# Patient Record
Sex: FEMALE | Race: WHITE | Employment: FULL TIME | URBAN - METROPOLITAN AREA
[De-identification: names, ages, dates, MRNs, and addresses within clinical notes are randomized per-mention and may not be internally consistent; named-entity substitution may affect disease eponyms.]

---

## 2017-02-20 ENCOUNTER — TELEPHONE (OUTPATIENT)
Dept: FAMILY MEDICINE CLINIC | Age: 67
End: 2017-02-20

## 2017-05-24 ENCOUNTER — TELEPHONE (OUTPATIENT)
Dept: FAMILY MEDICINE CLINIC | Age: 67
End: 2017-05-24

## 2017-05-24 DIAGNOSIS — E78.5 HYPERLIPIDEMIA, UNSPECIFIED HYPERLIPIDEMIA TYPE: ICD-10-CM

## 2017-05-24 DIAGNOSIS — I10 ESSENTIAL HYPERTENSION: Primary | ICD-10-CM

## 2017-05-27 ENCOUNTER — HOSPITAL ENCOUNTER (OUTPATIENT)
Dept: OTHER | Age: 67
Discharge: HOME | End: 2017-05-27
Attending: FAMILY MEDICINE | Admitting: FAMILY MEDICINE

## 2017-05-27 LAB
ALBUMIN SERPL-MCNC: 4.4 G/DL (ref 3.4–5)
ALP BLD-CCNC: 54 U/L (ref 40–129)
ALT SERPL-CCNC: 15 U/L (ref 10–40)
ANION GAP SERPL CALCULATED.3IONS-SCNC: 12 MMOL/L (ref 3–16)
AST SERPL-CCNC: 17 U/L (ref 15–37)
BILIRUB SERPL-MCNC: <0.2 MG/DL (ref 0–1)
BILIRUBIN DIRECT: <0.2 MG/DL (ref 0–0.3)
BILIRUBIN, INDIRECT: NORMAL MG/DL (ref 0–1)
BUN BLDV-MCNC: 27 MG/DL (ref 7–20)
CALCIUM SERPL-MCNC: 9.9 MG/DL (ref 8.3–10.6)
CHLORIDE BLD-SCNC: 105 MMOL/L (ref 99–110)
CHOLESTEROL, FASTING: 163 MG/DL (ref 0–199)
CO2: 25 MMOL/L (ref 21–32)
CREAT SERPL-MCNC: 0.9 MG/DL (ref 0.6–1.2)
GFR AFRICAN AMERICAN: >60
GFR NON-AFRICAN AMERICAN: >60
GLUCOSE BLD-MCNC: 111 MG/DL (ref 70–99)
HDLC SERPL-MCNC: 68 MG/DL (ref 40–60)
LDL CHOLESTEROL CALCULATED: 81 MG/DL
POTASSIUM SERPL-SCNC: 4.9 MMOL/L (ref 3.5–5.1)
SODIUM BLD-SCNC: 142 MMOL/L (ref 136–145)
TOTAL PROTEIN: 7.2 G/DL (ref 6.4–8.2)
TRIGLYCERIDE, FASTING: 71 MG/DL (ref 0–150)
VLDLC SERPL CALC-MCNC: 14 MG/DL

## 2017-05-30 RX ORDER — MELOXICAM 15 MG/1
TABLET ORAL
Qty: 90 TABLET | Refills: 0 | Status: SHIPPED | OUTPATIENT
Start: 2017-05-30 | End: 2017-06-02 | Stop reason: SDUPTHER

## 2017-05-30 RX ORDER — CITALOPRAM 40 MG/1
TABLET ORAL
Qty: 90 TABLET | Refills: 0 | Status: SHIPPED | OUTPATIENT
Start: 2017-05-30 | End: 2017-06-02 | Stop reason: SDUPTHER

## 2017-06-02 ENCOUNTER — OFFICE VISIT (OUTPATIENT)
Dept: FAMILY MEDICINE CLINIC | Age: 67
End: 2017-06-02

## 2017-06-02 VITALS
OXYGEN SATURATION: 96 % | DIASTOLIC BLOOD PRESSURE: 78 MMHG | SYSTOLIC BLOOD PRESSURE: 124 MMHG | RESPIRATION RATE: 16 BRPM | HEART RATE: 82 BPM | WEIGHT: 158 LBS | HEIGHT: 65 IN | TEMPERATURE: 98.2 F | BODY MASS INDEX: 26.33 KG/M2

## 2017-06-02 DIAGNOSIS — I10 ESSENTIAL HYPERTENSION: ICD-10-CM

## 2017-06-02 DIAGNOSIS — F41.9 ANXIETY: Primary | ICD-10-CM

## 2017-06-02 DIAGNOSIS — E78.5 HYPERLIPIDEMIA, UNSPECIFIED HYPERLIPIDEMIA TYPE: ICD-10-CM

## 2017-06-02 DIAGNOSIS — F32.A DEPRESSION, UNSPECIFIED DEPRESSION TYPE: ICD-10-CM

## 2017-06-02 PROCEDURE — 99214 OFFICE O/P EST MOD 30 MIN: CPT | Performed by: FAMILY MEDICINE

## 2017-06-02 RX ORDER — MELOXICAM 15 MG/1
TABLET ORAL
Qty: 90 TABLET | Refills: 1 | Status: SHIPPED | OUTPATIENT
Start: 2017-06-02

## 2017-06-02 RX ORDER — CLONIDINE HYDROCHLORIDE 0.2 MG/1
0.2 TABLET ORAL NIGHTLY
Qty: 90 TABLET | Refills: 1 | Status: SHIPPED | OUTPATIENT
Start: 2017-06-02 | End: 2017-12-20 | Stop reason: SDUPTHER

## 2017-06-02 RX ORDER — ATORVASTATIN CALCIUM 20 MG/1
TABLET, FILM COATED ORAL
Qty: 90 TABLET | Refills: 1 | Status: SHIPPED | OUTPATIENT
Start: 2017-06-02 | End: 2017-12-20 | Stop reason: SDUPTHER

## 2017-06-02 RX ORDER — CITALOPRAM 40 MG/1
TABLET ORAL
Qty: 90 TABLET | Refills: 1 | Status: SHIPPED | OUTPATIENT
Start: 2017-06-02

## 2017-06-02 RX ORDER — LORAZEPAM 1 MG/1
1 TABLET ORAL EVERY 6 HOURS PRN
Qty: 60 TABLET | Refills: 1 | Status: SHIPPED | OUTPATIENT
Start: 2017-06-02

## 2017-06-16 ENCOUNTER — TELEPHONE (OUTPATIENT)
Dept: FAMILY MEDICINE CLINIC | Age: 67
End: 2017-06-16

## 2017-06-16 RX ORDER — AMOXICILLIN 500 MG/1
CAPSULE ORAL
Qty: 4 CAPSULE | Refills: 0 | Status: SHIPPED | OUTPATIENT
Start: 2017-06-16 | End: 2017-07-03 | Stop reason: SDUPTHER

## 2017-06-30 RX ORDER — CITALOPRAM 40 MG/1
TABLET ORAL
Qty: 90 TABLET | Refills: 1 | Status: SHIPPED | OUTPATIENT
Start: 2017-06-30

## 2017-07-03 ENCOUNTER — TELEPHONE (OUTPATIENT)
Dept: FAMILY MEDICINE CLINIC | Age: 67
End: 2017-07-03

## 2017-07-03 RX ORDER — AMOXICILLIN 500 MG/1
CAPSULE ORAL
Qty: 4 CAPSULE | Refills: 0 | Status: SHIPPED | OUTPATIENT
Start: 2017-07-03 | End: 2017-09-06 | Stop reason: SDUPTHER

## 2017-09-06 ENCOUNTER — TELEPHONE (OUTPATIENT)
Dept: FAMILY MEDICINE CLINIC | Age: 67
End: 2017-09-06

## 2017-09-06 RX ORDER — AMOXICILLIN 500 MG/1
CAPSULE ORAL
Qty: 4 CAPSULE | Refills: 1 | Status: SHIPPED | OUTPATIENT
Start: 2017-09-06 | End: 2017-10-19 | Stop reason: SDUPTHER

## 2017-10-19 ENCOUNTER — TELEPHONE (OUTPATIENT)
Dept: FAMILY MEDICINE CLINIC | Age: 67
End: 2017-10-19

## 2017-10-19 DIAGNOSIS — F10.10 ALCOHOL ABUSE: Primary | ICD-10-CM

## 2017-10-19 RX ORDER — AMOXICILLIN 500 MG/1
CAPSULE ORAL
Qty: 4 CAPSULE | Refills: 1 | Status: SHIPPED | OUTPATIENT
Start: 2017-10-19

## 2017-12-26 RX ORDER — ATORVASTATIN CALCIUM 20 MG/1
TABLET, FILM COATED ORAL
Qty: 90 TABLET | Refills: 0 | Status: SHIPPED | OUTPATIENT
Start: 2017-12-26

## 2017-12-26 RX ORDER — CLONIDINE HYDROCHLORIDE 0.2 MG/1
TABLET ORAL
Qty: 90 TABLET | Refills: 0 | Status: SHIPPED | OUTPATIENT
Start: 2017-12-26

## 2018-01-06 ENCOUNTER — HOSPITAL ENCOUNTER (OUTPATIENT)
Dept: OTHER | Age: 68
Discharge: HOME | End: 2018-01-06
Attending: FAMILY MEDICINE | Admitting: FAMILY MEDICINE

## 2018-01-07 LAB
ESTIMATED AVERAGE GLUCOSE: 148.5 MG/DL
HBA1C MFR BLD: 6.8 %

## 2018-05-30 ENCOUNTER — HOSPITAL ENCOUNTER (OUTPATIENT)
Dept: DIABETES SERVICES | Age: 68
Discharge: HOME | End: 2018-05-31
Admitting: FAMILY MEDICINE

## 2018-05-30 DIAGNOSIS — R73.03 PREDIABETES: ICD-10-CM

## 2018-06-01 ENCOUNTER — HOSPITAL ENCOUNTER (OUTPATIENT)
Dept: OTHER | Age: 68
Discharge: HOME | End: 2018-06-30
Attending: FAMILY MEDICINE | Admitting: FAMILY MEDICINE

## 2018-11-20 ENCOUNTER — APPOINTMENT (OUTPATIENT)
Dept: GENERAL RADIOLOGY | Age: 68
End: 2018-11-20
Payer: COMMERCIAL

## 2018-11-20 ENCOUNTER — HOSPITAL ENCOUNTER (EMERGENCY)
Age: 68
Discharge: HOME | End: 2018-11-21
Attending: EMERGENCY MEDICINE
Payer: COMMERCIAL

## 2018-11-20 DIAGNOSIS — R11.2 NAUSEA AND VOMITING, INTRACTABILITY OF VOMITING NOT SPECIFIED, UNSPECIFIED VOMITING TYPE: ICD-10-CM

## 2018-11-20 DIAGNOSIS — E86.0 DEHYDRATION: Primary | ICD-10-CM

## 2018-11-20 DIAGNOSIS — E87.6 HYPOKALEMIA: ICD-10-CM

## 2018-11-20 DIAGNOSIS — R10.13 EPIGASTRIC PAIN: ICD-10-CM

## 2018-11-20 LAB
A/G RATIO: 1.3 (ref 1.1–2.2)
ALBUMIN SERPL-MCNC: 4.5 G/DL (ref 3.4–5)
ALP BLD-CCNC: 75 U/L (ref 40–129)
ALT SERPL-CCNC: 15 U/L (ref 10–40)
ANION GAP SERPL CALCULATED.3IONS-SCNC: 13 MMOL/L (ref 3–16)
AST SERPL-CCNC: 24 U/L (ref 15–37)
BACTERIA: ABNORMAL /HPF
BASOPHILS ABSOLUTE: 0 K/UL (ref 0–0.2)
BASOPHILS RELATIVE PERCENT: 0.1 %
BILIRUB SERPL-MCNC: 0.7 MG/DL (ref 0–1)
BILIRUBIN URINE: NEGATIVE
BLOOD, URINE: ABNORMAL
BUN BLDV-MCNC: 20 MG/DL (ref 7–20)
CALCIUM SERPL-MCNC: 10.1 MG/DL (ref 8.3–10.6)
CHLORIDE BLD-SCNC: 91 MMOL/L (ref 99–110)
CLARITY: CLEAR
CO2: 35 MMOL/L (ref 21–32)
COLOR: YELLOW
CREAT SERPL-MCNC: 1 MG/DL (ref 0.6–1.2)
EOSINOPHILS ABSOLUTE: 0 K/UL (ref 0–0.6)
EOSINOPHILS RELATIVE PERCENT: 0.1 %
EPITHELIAL CELLS, UA: ABNORMAL /HPF
GFR AFRICAN AMERICAN: >60
GFR NON-AFRICAN AMERICAN: 55
GLOBULIN: 3.5 G/DL
GLUCOSE BLD-MCNC: 162 MG/DL (ref 70–99)
GLUCOSE URINE: NEGATIVE MG/DL
HCT VFR BLD CALC: 43.4 % (ref 36–48)
HEMOGLOBIN: 14.8 G/DL (ref 12–16)
KETONES, URINE: NEGATIVE MG/DL
LEUKOCYTE ESTERASE, URINE: ABNORMAL
LIPASE: 13 U/L (ref 13–60)
LYMPHOCYTES ABSOLUTE: 0.7 K/UL (ref 1–5.1)
LYMPHOCYTES RELATIVE PERCENT: 4.7 %
MAGNESIUM: 2.6 MG/DL (ref 1.8–2.4)
MCH RBC QN AUTO: 31.6 PG (ref 26–34)
MCHC RBC AUTO-ENTMCNC: 34.1 G/DL (ref 31–36)
MCV RBC AUTO: 92.6 FL (ref 80–100)
MICROSCOPIC EXAMINATION: YES
MONOCYTES ABSOLUTE: 0.1 K/UL (ref 0–1.3)
MONOCYTES RELATIVE PERCENT: 0.9 %
NEUTROPHILS ABSOLUTE: 13.3 K/UL (ref 1.7–7.7)
NEUTROPHILS RELATIVE PERCENT: 94.2 %
NITRITE, URINE: NEGATIVE
PDW BLD-RTO: 13.1 % (ref 12.4–15.4)
PH UA: 8.5
PLATELET # BLD: 299 K/UL (ref 135–450)
PMV BLD AUTO: 7.4 FL (ref 5–10.5)
POTASSIUM REFLEX MAGNESIUM: 2.9 MMOL/L (ref 3.5–5.1)
PRO-BNP: 300 PG/ML (ref 0–124)
PROTEIN UA: ABNORMAL MG/DL
RBC # BLD: 4.69 M/UL (ref 4–5.2)
RBC UA: ABNORMAL /HPF (ref 0–2)
SODIUM BLD-SCNC: 139 MMOL/L (ref 136–145)
SPECIFIC GRAVITY UA: 1.01
TOTAL PROTEIN: 8 G/DL (ref 6.4–8.2)
TROPONIN: <0.01 NG/ML
URINE REFLEX TO CULTURE: YES
URINE TYPE: ABNORMAL
UROBILINOGEN, URINE: 0.2 E.U./DL
WBC # BLD: 14.1 K/UL (ref 4–11)
WBC UA: ABNORMAL /HPF (ref 0–5)

## 2018-11-20 PROCEDURE — 85025 COMPLETE CBC W/AUTO DIFF WBC: CPT

## 2018-11-20 PROCEDURE — 96365 THER/PROPH/DIAG IV INF INIT: CPT

## 2018-11-20 PROCEDURE — 96361 HYDRATE IV INFUSION ADD-ON: CPT

## 2018-11-20 PROCEDURE — 93005 ELECTROCARDIOGRAM TRACING: CPT | Performed by: PHYSICIAN ASSISTANT

## 2018-11-20 PROCEDURE — 83735 ASSAY OF MAGNESIUM: CPT

## 2018-11-20 PROCEDURE — 87086 URINE CULTURE/COLONY COUNT: CPT

## 2018-11-20 PROCEDURE — 83880 ASSAY OF NATRIURETIC PEPTIDE: CPT

## 2018-11-20 PROCEDURE — 81001 URINALYSIS AUTO W/SCOPE: CPT

## 2018-11-20 PROCEDURE — 2580000003 HC RX 258: Performed by: PHYSICIAN ASSISTANT

## 2018-11-20 PROCEDURE — 83690 ASSAY OF LIPASE: CPT

## 2018-11-20 PROCEDURE — 80053 COMPREHEN METABOLIC PANEL: CPT

## 2018-11-20 PROCEDURE — 6360000002 HC RX W HCPCS: Performed by: PHYSICIAN ASSISTANT

## 2018-11-20 PROCEDURE — 6370000000 HC RX 637 (ALT 250 FOR IP): Performed by: PHYSICIAN ASSISTANT

## 2018-11-20 PROCEDURE — 99285 EMERGENCY DEPT VISIT HI MDM: CPT

## 2018-11-20 PROCEDURE — 84484 ASSAY OF TROPONIN QUANT: CPT

## 2018-11-20 PROCEDURE — S0028 INJECTION, FAMOTIDINE, 20 MG: HCPCS | Performed by: PHYSICIAN ASSISTANT

## 2018-11-20 PROCEDURE — 2500000003 HC RX 250 WO HCPCS: Performed by: PHYSICIAN ASSISTANT

## 2018-11-20 PROCEDURE — 71045 X-RAY EXAM CHEST 1 VIEW: CPT

## 2018-11-20 PROCEDURE — 96375 TX/PRO/DX INJ NEW DRUG ADDON: CPT

## 2018-11-20 RX ORDER — CYCLOBENZAPRINE HCL 10 MG
10 TABLET ORAL DAILY PRN
COMMUNITY

## 2018-11-20 RX ORDER — POTASSIUM CHLORIDE 7.45 MG/ML
10 INJECTION INTRAVENOUS ONCE
Status: COMPLETED | OUTPATIENT
Start: 2018-11-20 | End: 2018-11-21

## 2018-11-20 RX ORDER — ONDANSETRON 2 MG/ML
4 INJECTION INTRAMUSCULAR; INTRAVENOUS ONCE
Status: COMPLETED | OUTPATIENT
Start: 2018-11-20 | End: 2018-11-20

## 2018-11-20 RX ORDER — CITALOPRAM 20 MG/1
20 TABLET ORAL DAILY
COMMUNITY

## 2018-11-20 RX ORDER — POTASSIUM CHLORIDE 20 MEQ/1
40 TABLET, EXTENDED RELEASE ORAL ONCE
Status: COMPLETED | OUTPATIENT
Start: 2018-11-20 | End: 2018-11-20

## 2018-11-20 RX ORDER — 0.9 % SODIUM CHLORIDE 0.9 %
1000 INTRAVENOUS SOLUTION INTRAVENOUS ONCE
Status: COMPLETED | OUTPATIENT
Start: 2018-11-20 | End: 2018-11-21

## 2018-11-20 RX ORDER — 0.9 % SODIUM CHLORIDE 0.9 %
1000 INTRAVENOUS SOLUTION INTRAVENOUS ONCE
Status: COMPLETED | OUTPATIENT
Start: 2018-11-20 | End: 2018-11-20

## 2018-11-20 RX ADMIN — SODIUM CHLORIDE 1000 ML: 9 INJECTION, SOLUTION INTRAVENOUS at 23:07

## 2018-11-20 RX ADMIN — POTASSIUM CHLORIDE 10 MEQ: 7.46 INJECTION, SOLUTION INTRAVENOUS at 23:07

## 2018-11-20 RX ADMIN — ONDANSETRON 4 MG: 2 INJECTION INTRAMUSCULAR; INTRAVENOUS at 22:40

## 2018-11-20 RX ADMIN — POTASSIUM CHLORIDE 40 MEQ: 20 TABLET, EXTENDED RELEASE ORAL at 22:40

## 2018-11-20 RX ADMIN — FAMOTIDINE 20 MG: 10 INJECTION, SOLUTION INTRAVENOUS at 22:40

## 2018-11-20 RX ADMIN — SODIUM CHLORIDE 1000 ML: 9 INJECTION, SOLUTION INTRAVENOUS at 22:06

## 2018-11-21 VITALS
HEIGHT: 65 IN | BODY MASS INDEX: 24.16 KG/M2 | OXYGEN SATURATION: 95 % | DIASTOLIC BLOOD PRESSURE: 74 MMHG | HEART RATE: 92 BPM | RESPIRATION RATE: 14 BRPM | TEMPERATURE: 97.9 F | SYSTOLIC BLOOD PRESSURE: 126 MMHG | WEIGHT: 145 LBS

## 2018-11-21 LAB
EKG ATRIAL RATE: 122 BPM
EKG DIAGNOSIS: NORMAL
EKG P AXIS: 75 DEGREES
EKG P-R INTERVAL: 144 MS
EKG Q-T INTERVAL: 336 MS
EKG QRS DURATION: 88 MS
EKG QTC CALCULATION (BAZETT): 478 MS
EKG R AXIS: 86 DEGREES
EKG T AXIS: 80 DEGREES
EKG VENTRICULAR RATE: 122 BPM

## 2018-11-21 PROCEDURE — 96375 TX/PRO/DX INJ NEW DRUG ADDON: CPT

## 2018-11-21 PROCEDURE — 6360000002 HC RX W HCPCS: Performed by: PHYSICIAN ASSISTANT

## 2018-11-21 PROCEDURE — 2580000003 HC RX 258: Performed by: PHYSICIAN ASSISTANT

## 2018-11-21 PROCEDURE — 96367 TX/PROPH/DG ADDL SEQ IV INF: CPT

## 2018-11-21 PROCEDURE — 93010 ELECTROCARDIOGRAM REPORT: CPT | Performed by: INTERNAL MEDICINE

## 2018-11-21 PROCEDURE — 6370000000 HC RX 637 (ALT 250 FOR IP): Performed by: PHYSICIAN ASSISTANT

## 2018-11-21 RX ORDER — PROMETHAZINE HYDROCHLORIDE 25 MG/ML
12.5 INJECTION, SOLUTION INTRAMUSCULAR; INTRAVENOUS ONCE
Status: COMPLETED | OUTPATIENT
Start: 2018-11-21 | End: 2018-11-21

## 2018-11-21 RX ORDER — DICYCLOMINE HCL 20 MG
20 TABLET ORAL ONCE
Status: COMPLETED | OUTPATIENT
Start: 2018-11-21 | End: 2018-11-21

## 2018-11-21 RX ADMIN — CEFTRIAXONE SODIUM 1 G: 1 INJECTION, POWDER, FOR SOLUTION INTRAMUSCULAR; INTRAVENOUS at 00:11

## 2018-11-21 RX ADMIN — PROMETHAZINE HYDROCHLORIDE 12.5 MG: 25 INJECTION INTRAMUSCULAR; INTRAVENOUS at 00:49

## 2018-11-21 RX ADMIN — DICYCLOMINE HYDROCHLORIDE 20 MG: 20 TABLET ORAL at 00:49

## 2018-11-21 NOTE — ED PROVIDER NOTES
activity: No     Other Topics Concern    Not on file     Social History Narrative    No narrative on file     No current facility-administered medications for this encounter. Current Outpatient Prescriptions   Medication Sig Dispense Refill    citalopram (CELEXA) 20 MG tablet Take 20 mg by mouth daily      cyclobenzaprine (FLEXERIL) 10 MG tablet Take 10 mg by mouth daily as needed for Muscle spasms      atorvastatin (LIPITOR) 20 MG tablet TAKE ONE TABLET BY MOUTH DAILY 90 tablet 0    cloNIDine (CATAPRES) 0.2 MG tablet TAKE ONE TABLET BY MOUTH ONCE NIGHTLY 90 tablet 0    amoxicillin (AMOXIL) 500 MG capsule Take 4 capsules 1 hour before procedure. 4 capsule 1    meloxicam (MOBIC) 15 MG tablet TAKE ONE TABLET BY MOUTH DAILY 90 tablet 1    LORazepam (ATIVAN) 1 MG tablet Take 1 tablet by mouth every 6 hours as needed for Anxiety 60 tablet 1    meloxicam (MOBIC) 15 MG tablet TAKE ONE TABLET BY MOUTH DAILY 90 tablet 1    citalopram (CELEXA) 40 MG tablet TAKE ONE TABLET BY MOUTH DAILY 90 tablet 1    magnesium chloride (MAG DELAY 64) 535 (64 MG) MG TBCR CR tablet Take 1 tablet by mouth daily. 30 tablet 0    citalopram (CELEXA) 40 MG tablet TAKE ONE TABLET BY MOUTH DAILY 90 tablet 1    varenicline (CHANTIX STARTING MONTH KYLE) 0.5 MG X 11 & 1 MG X 42 tablet Follow package directions. 56 tablet 0    varenicline (CHANTIX CONTINUING MONTH KYLE) 1 MG tablet Follow package directions. 56 tablet 2    Potassium Gluconate 550 MG TABS Take 1 tablet by mouth      Multiple Vitamin (MULTI-VITAMIN DAILY) TABS Take  by mouth.          Allergies   Allergen Reactions    Codeine Nausea Only    Sulfa Antibiotics      Nursing Notes Reviewed    Physical Exam:  ED Triage Vitals [11/20/18 2130]   Enc Vitals Group      BP (!) 148/72      Pulse 114      Resp 18      Temp 97.9 °F (36.6 °C)      Temp Source Oral      SpO2 95 %      Weight 145 lb (65.8 kg)      Height 5' 5\" (1.651 m)      Head Circumference       Peak Flow Pain Score       Pain Loc       Pain Edu? Excl. in [address]? GENERAL APPEARANCE: A well-developed well-nourished pleasant uncomfortable 46-year-old female in mild distress  HEAD: Normocephalic, atraumatic  EYES: Sclera anicteric.no conjunctival injection,   ENT: Mucous membranes moist, no nasal discharge,  NECK: Supple, no meningismus,   HEART: RRR but rapid at 106 without rubs murmurs or gallops  LUNGS:  Clear good air movement no wheezing no retraction or accessory muscle use,  ABDOMEN: Soft, non-tender to palpation, no guarding or rebound. , no mass or distention and no hepatosplenomegaly. Clearly no evidence of an acute abdomen or peritoneal signs at time of exam  : Bladder not distended,   EXTREMITIES: No acute deformities, no peripheral edema  SKIN: Warm and dry.  Normal color, no rash,  capillary refill less than 2 seconds  MENTAL STATUS: Alert, oriented, interactive,     Nursing note and vital signs reviewed     I have reviewed and interpreted all of the currently available lab results from this visit (if applicable):  Results for orders placed or performed during the hospital encounter of 11/20/18   CBC Auto Differential   Result Value Ref Range    WBC 14.1 (H) 4.0 - 11.0 K/uL    RBC 4.69 4.00 - 5.20 M/uL    Hemoglobin 14.8 12.0 - 16.0 g/dL    Hematocrit 43.4 36.0 - 48.0 %    MCV 92.6 80.0 - 100.0 fL    MCH 31.6 26.0 - 34.0 pg    MCHC 34.1 31.0 - 36.0 g/dL    RDW 13.1 12.4 - 15.4 %    Platelets 642 281 - 709 K/uL    MPV 7.4 5.0 - 10.5 fL    Neutrophils % 94.2 %    Lymphocytes % 4.7 %    Monocytes % 0.9 %    Eosinophils % 0.1 %    Basophils % 0.1 %    Neutrophils # 13.3 (H) 1.7 - 7.7 K/uL    Lymphocytes # 0.7 (L) 1.0 - 5.1 K/uL    Monocytes # 0.1 0.0 - 1.3 K/uL    Eosinophils # 0.0 0.0 - 0.6 K/uL    Basophils # 0.0 0.0 - 0.2 K/uL   Comprehensive Metabolic Panel w/ Reflex to MG   Result Value Ref Range    Sodium 139 136 - 145 mmol/L    Potassium reflex Magnesium 2.9 (LL) 3.5 - 5.1 mmol/L    Chloride 91 (L) 99 - 110 mmol/L    CO2 35 (H) 21 - 32 mmol/L    Anion Gap 13 3 - 16    Glucose 162 (H) 70 - 99 mg/dL    BUN 20 7 - 20 mg/dL    CREATININE 1.0 0.6 - 1.2 mg/dL    GFR Non-African American 55 (A) >60    GFR African American >60 >60    Calcium 10.1 8.3 - 10.6 mg/dL    Total Protein 8.0 6.4 - 8.2 g/dL    Alb 4.5 3.4 - 5.0 g/dL    Albumin/Globulin Ratio 1.3 1.1 - 2.2    Total Bilirubin 0.7 0.0 - 1.0 mg/dL    Alkaline Phosphatase 75 40 - 129 U/L    ALT 15 10 - 40 U/L    AST 24 15 - 37 U/L    Globulin 3.5 g/dL   Troponin   Result Value Ref Range    Troponin <0.01 <0.01 ng/mL   Lipase   Result Value Ref Range    Lipase 13.0 13.0 - 60.0 U/L   Urinalysis Reflex to Culture   Result Value Ref Range    Color, UA Yellow Straw/Yellow    Clarity, UA Clear Clear    Glucose, Ur Negative Negative mg/dL    Bilirubin Urine Negative Negative    Ketones, Urine Negative Negative mg/dL    Specific Gravity, UA 1.010 1.005 - 1.030    Blood, Urine TRACE-INTACT (A) Negative    pH, UA 8.5 (A) 5.0 - 8.0    Protein, UA TRACE (A) Negative mg/dL    Urobilinogen, Urine 0.2 <2.0 E.U./dL    Nitrite, Urine Negative Negative    Leukocyte Esterase, Urine SMALL (A) Negative    Microscopic Examination YES     Urine Reflex to Culture Yes     Urine Type Not Specified    Brain Natriuretic Peptide   Result Value Ref Range    Pro- (H) 0 - 124 pg/mL   Magnesium   Result Value Ref Range    Magnesium 2.60 (H) 1.80 - 2.40 mg/dL   Microscopic Urinalysis   Result Value Ref Range    WBC, UA 0-2 0 - 5 /HPF    RBC, UA 3-5 (A) 0 - 2 /HPF    Epi Cells 0-2 /HPF    Bacteria, UA 1+ (A) /HPF   EKG 12 Lead   Result Value Ref Range    Ventricular Rate 122 BPM    Atrial Rate 122 BPM    P-R Interval 144 ms    QRS Duration 88 ms    Q-T Interval 336 ms    QTc Calculation (Bazett) 478 ms    P Axis 75 degrees    R Axis 86 degrees    T Axis 80 degrees    Diagnosis       Sinus tachycardiaNonspecific ST abnormalityAbnormal ECGNo previous ECGs available        Radiographs (if

## 2018-11-21 NOTE — ED PROVIDER NOTES
skin/urogenital alcon.  No further workup   CBC Auto Differential   Result Value Ref Range    WBC 14.1 (H) 4.0 - 11.0 K/uL    RBC 4.69 4.00 - 5.20 M/uL    Hemoglobin 14.8 12.0 - 16.0 g/dL    Hematocrit 43.4 36.0 - 48.0 %    MCV 92.6 80.0 - 100.0 fL    MCH 31.6 26.0 - 34.0 pg    MCHC 34.1 31.0 - 36.0 g/dL    RDW 13.1 12.4 - 15.4 %    Platelets 363 444 - 357 K/uL    MPV 7.4 5.0 - 10.5 fL    Neutrophils % 94.2 %    Lymphocytes % 4.7 %    Monocytes % 0.9 %    Eosinophils % 0.1 %    Basophils % 0.1 %    Neutrophils # 13.3 (H) 1.7 - 7.7 K/uL    Lymphocytes # 0.7 (L) 1.0 - 5.1 K/uL    Monocytes # 0.1 0.0 - 1.3 K/uL    Eosinophils # 0.0 0.0 - 0.6 K/uL    Basophils # 0.0 0.0 - 0.2 K/uL   Comprehensive Metabolic Panel w/ Reflex to MG   Result Value Ref Range    Sodium 139 136 - 145 mmol/L    Potassium reflex Magnesium 2.9 (LL) 3.5 - 5.1 mmol/L    Chloride 91 (L) 99 - 110 mmol/L    CO2 35 (H) 21 - 32 mmol/L    Anion Gap 13 3 - 16    Glucose 162 (H) 70 - 99 mg/dL    BUN 20 7 - 20 mg/dL    CREATININE 1.0 0.6 - 1.2 mg/dL    GFR Non-African American 55 (A) >60    GFR African American >60 >60    Calcium 10.1 8.3 - 10.6 mg/dL    Total Protein 8.0 6.4 - 8.2 g/dL    Alb 4.5 3.4 - 5.0 g/dL    Albumin/Globulin Ratio 1.3 1.1 - 2.2    Total Bilirubin 0.7 0.0 - 1.0 mg/dL    Alkaline Phosphatase 75 40 - 129 U/L    ALT 15 10 - 40 U/L    AST 24 15 - 37 U/L    Globulin 3.5 g/dL   Troponin   Result Value Ref Range    Troponin <0.01 <0.01 ng/mL   Lipase   Result Value Ref Range    Lipase 13.0 13.0 - 60.0 U/L   Urinalysis Reflex to Culture   Result Value Ref Range    Color, UA Yellow Straw/Yellow    Clarity, UA Clear Clear    Glucose, Ur Negative Negative mg/dL    Bilirubin Urine Negative Negative    Ketones, Urine Negative Negative mg/dL    Specific Gravity, UA 1.010 1.005 - 1.030    Blood, Urine TRACE-INTACT (A) Negative    pH, UA 8.5 (A) 5.0 - 8.0    Protein, UA TRACE (A) Negative mg/dL    Urobilinogen, Urine 0.2 <2.0 E.U./dL    Nitrite, Urine going home her sister's weight is stable with her tonight although she typically lives alone. Is very close follow-up with primary care provider indications for return to the ED with a low suspicion for return. All questions are answered. Indications for return to the ED are discussed. Patient is advised if any new or worsening symptoms arise they should immediately return to the emergency room. Follow-up with primary care in 1-2 days. I estimate there is LOW risk for ACUTE CORONARY SYNDROME, INTRACRANIAL HEMORRHAGE, MALIGNANT DYSRHYTHMIA, MENINGITIS, PNEUMONIA, PULMONARY EMBOLISM, SEPSIS, SUBARACHNOID HEMORRHAGE, SUBDURAL HEMATOMA, STROKE, or URINARY TRACT INFECTION, thus I consider the discharge disposition reasonable. Ja Lopez and I have discussed the diagnosis and risks, and we agree with discharging home to follow-up with their primary doctor. We also discussed returning to the Emergency Department immediately if new or worsening symptoms occur. We have discussed the symptoms which are most concerning (e.g., changing or worsening pain, weakness, vomiting, fever) that necessitate immediate return. Counseling:   I have spoken with the patient and discussed todays results, in addition to providing specific details for the plan of care and counseling regarding the diagnosis and prognosis and are agreeable with the plan. Assessment      1. Dehydration    2. Epigastric pain    3. Nausea and vomiting, intractability of vomiting not specified, unspecified vomiting type    4. Hypokalemia      This patient's ED course included: a personal history and physicial examination  This patient has remained hemodynamically stable during their ED course. Plan   Discharge to home. Patient condition is stable.     New Medications     Discharge Medication List as of 11/21/2018  1:19 AM        Electronically signed by Natividad Gonsales PA-C   DD: 11/20/18  **This report was transcribed using voice recognition

## 2018-11-22 LAB — URINE CULTURE, ROUTINE: NORMAL

## 2019-03-25 ENCOUNTER — HOSPITAL ENCOUNTER (OUTPATIENT)
Dept: GENERAL RADIOLOGY | Age: 69
Discharge: HOME | End: 2019-03-25
Payer: MEDICARE

## 2019-03-25 DIAGNOSIS — M15.0 PRIMARY GENERALIZED (OSTEO)ARTHRITIS: ICD-10-CM

## 2019-03-25 PROCEDURE — 77080 DXA BONE DENSITY AXIAL: CPT

## 2020-06-01 ENCOUNTER — HOSPITAL ENCOUNTER (OUTPATIENT)
Age: 70
Discharge: HOME | End: 2020-06-01
Payer: MEDICARE

## 2020-06-01 ENCOUNTER — OFFICE VISIT (OUTPATIENT)
Dept: ENT CLINIC | Age: 70
End: 2020-06-01
Payer: MEDICARE

## 2020-06-01 VITALS
TEMPERATURE: 97.6 F | SYSTOLIC BLOOD PRESSURE: 118 MMHG | DIASTOLIC BLOOD PRESSURE: 60 MMHG | HEIGHT: 66 IN | BODY MASS INDEX: 20.63 KG/M2 | WEIGHT: 128.4 LBS

## 2020-06-01 PROBLEM — H90.3 SENSORINEURAL HEARING LOSS (SNHL) OF BOTH EARS: Status: ACTIVE | Noted: 2020-06-01

## 2020-06-01 PROBLEM — H93.11 TINNITUS AURIUM, RIGHT: Status: ACTIVE | Noted: 2020-06-01

## 2020-06-01 LAB
ALBUMIN SERPL-MCNC: 4.5 G/DL (ref 3.4–5)
ALP BLD-CCNC: 52 U/L (ref 40–129)
ALT SERPL-CCNC: 14 U/L (ref 10–40)
AST SERPL-CCNC: 20 U/L (ref 15–37)
BILIRUB SERPL-MCNC: 0.3 MG/DL (ref 0–1)
BILIRUBIN DIRECT: <0.2 MG/DL (ref 0–0.3)
BILIRUBIN, INDIRECT: NORMAL MG/DL (ref 0–1)
TOTAL PROTEIN: 7.1 G/DL (ref 6.4–8.2)

## 2020-06-01 PROCEDURE — 80076 HEPATIC FUNCTION PANEL: CPT

## 2020-06-01 PROCEDURE — 36415 COLL VENOUS BLD VENIPUNCTURE: CPT

## 2020-06-01 PROCEDURE — 99203 OFFICE O/P NEW LOW 30 MIN: CPT | Performed by: OTOLARYNGOLOGY

## 2020-06-01 RX ORDER — FLUTICASONE PROPIONATE 50 MCG
1 SPRAY, SUSPENSION (ML) NASAL DAILY
COMMUNITY

## 2020-06-01 RX ORDER — VENLAFAXINE HYDROCHLORIDE 75 MG/1
75 CAPSULE, EXTENDED RELEASE ORAL DAILY
COMMUNITY

## 2020-06-01 RX ORDER — GLUCOSAMINE/CHONDR SU A SOD 750-600 MG
TABLET ORAL
COMMUNITY

## 2020-06-01 RX ORDER — PHENOL 1.4 %
AEROSOL, SPRAY (ML) MUCOUS MEMBRANE
COMMUNITY

## 2020-06-01 RX ORDER — BUDESONIDE AND FORMOTEROL FUMARATE DIHYDRATE 80; 4.5 UG/1; UG/1
2 AEROSOL RESPIRATORY (INHALATION) 2 TIMES DAILY
COMMUNITY

## 2020-06-01 RX ORDER — CIPROFLOXACIN HYDROCHLORIDE 3.5 MG/ML
4 SOLUTION/ DROPS TOPICAL 2 TIMES DAILY
Qty: 32 DROP | Refills: 0 | Status: SHIPPED | OUTPATIENT
Start: 2020-06-01 | End: 2020-06-01 | Stop reason: CLARIF

## 2020-06-01 RX ORDER — ALBUTEROL SULFATE 90 UG/1
2 AEROSOL, METERED RESPIRATORY (INHALATION) EVERY 6 HOURS PRN
COMMUNITY

## 2020-06-01 NOTE — PATIENT INSTRUCTIONS
-Dry ear precautions recommended as follows:   Patient to place a silicone ear plug or cotton ball coated and Vaseline into both ears during showering, instructed to remove afterwards to aerate ears OR use a shower cap   Patient instructed to avoid digital trauma to the ears   Instructed to notify the clinic immediately with any acute otalgia, hearing loss, purulent otorrhea

## 2020-06-01 NOTE — PROGRESS NOTES
by mouth daily      cyclobenzaprine (FLEXERIL) 10 MG tablet Take 10 mg by mouth daily as needed for Muscle spasms      amoxicillin (AMOXIL) 500 MG capsule Take 4 capsules 1 hour before procedure. (Patient not taking: Reported on 6/1/2020) 4 capsule 1    citalopram (CELEXA) 40 MG tablet TAKE ONE TABLET BY MOUTH DAILY (Patient not taking: Reported on 6/1/2020) 90 tablet 1    citalopram (CELEXA) 40 MG tablet TAKE ONE TABLET BY MOUTH DAILY (Patient not taking: Reported on 6/1/2020) 90 tablet 1    varenicline (CHANTIX STARTING MONTH KYLE) 0.5 MG X 11 & 1 MG X 42 tablet Follow package directions. (Patient not taking: Reported on 6/1/2020) 56 tablet 0    varenicline (CHANTIX CONTINUING MONTH KYLE) 1 MG tablet Follow package directions. (Patient not taking: Reported on 6/1/2020) 56 tablet 2    Potassium Gluconate 550 MG TABS Take 1 tablet by mouth       No current facility-administered medications for this visit.         Review of Systems     REVIEW OF SYSTEMS  The following systems were reviewed and revealed the following in addition to any already discussed in the HPI:    CONSTITUTIONAL: No weight loss, no fever, no night sweats, no chills  EYES: no vision changes, no blurry vision  EARS: no changes in hearing, no otalgia  NOSE: no epistaxis, no rhinorrhea  RESPIRATORY: no difficulty breathing, no shortness of breath  CV: no chest pain,  no peripheral vascular disease  HEME: No coagulation disorder, no bleeding disorder  NEURO: No TIA or stroke-like symptoms  SKIN: No new rashes in the head and neck, no recent skin cancers  MOUTH: No new ulcers, no recent teeth infections  GASTROINTESTINAL: No diarrhea, stomach pain  PSYCH: No anxiety, no depression    PhysicalExam     Vitals:    06/01/20 1428   BP: 118/60   Site: Left Lower Arm   Position: Sitting   Cuff Size: Medium Adult   Temp: 97.6 °F (36.4 °C)   TempSrc: Oral   Weight: 128 lb 6.4 oz (58.2 kg)   Height: 5' 5.5\" (1.664 m)       PHYSICAL EXAM  /60 (Site: Left

## 2020-07-02 ENCOUNTER — HOSPITAL ENCOUNTER (OUTPATIENT)
Age: 70
Discharge: HOME | End: 2020-07-02
Payer: MEDICARE

## 2020-07-02 LAB
ALBUMIN SERPL-MCNC: 4.4 G/DL (ref 3.4–5)
ALP BLD-CCNC: 50 U/L (ref 40–129)
ALT SERPL-CCNC: 19 U/L (ref 10–40)
AST SERPL-CCNC: 25 U/L (ref 15–37)
BILIRUB SERPL-MCNC: 0.4 MG/DL (ref 0–1)
BILIRUBIN DIRECT: <0.2 MG/DL (ref 0–0.3)
BILIRUBIN, INDIRECT: NORMAL MG/DL (ref 0–1)
TOTAL PROTEIN: 6.6 G/DL (ref 6.4–8.2)

## 2020-07-02 PROCEDURE — 36415 COLL VENOUS BLD VENIPUNCTURE: CPT

## 2020-07-02 PROCEDURE — 80076 HEPATIC FUNCTION PANEL: CPT

## 2020-07-29 ENCOUNTER — HOSPITAL ENCOUNTER (OUTPATIENT)
Dept: PHYSICAL THERAPY | Age: 70
Setting detail: THERAPIES SERIES
Discharge: HOME | End: 2020-07-29
Payer: MEDICARE

## 2020-07-29 PROCEDURE — 97110 THERAPEUTIC EXERCISES: CPT

## 2020-07-29 PROCEDURE — 97163 PT EVAL HIGH COMPLEX 45 MIN: CPT

## 2020-07-29 PROCEDURE — 97530 THERAPEUTIC ACTIVITIES: CPT

## 2020-07-29 NOTE — FLOWSHEET NOTE
Physical Therapy Pelvic Floor Evaluation    2020  Patient: Maribel Lees : 1950  MRN: [id number]    This is a 81 y.o. female referred by Andrew Amaya MD to [place] [address] with a primary diagnosis of: Urge urinary incontinence, female bladder prolapse    Onset Date: 2017  Next MD appt: 3 months post therapy    Subjective:   Patient history: Patient reports she feels that she started having a bulge in her vagina about 3 years ago. States she was not diagnosed with an MD until just recently. Reports she started having incontinence of urine about 2 years ago. States \"I started wearing a pad if I am out of the house, I cannot make it to the bathroom in time\". \"I have to go up 2 flights of stairs and if I don't go before I left work I cannot make it to the bathroom\". Reports most of the time her incontinence is dribbles, but there have been times where she has had complete loss. Also is reporting some incontinence with stress, though mostly is urge related. Not currently sexually active. Denies history of sexual abuse/trauma. Reports she has chronic constipation, takes Miralax and stool softener everyday. Has very painful gas pains and feels she has incomplete BMs. \"I am embarrassed to be in a meeting\". 2nd person present during evaluation today? Declined  What do you feel is your problem? \"incontinence\"   When did you problem first start? \"about 3 years ago\"  Has your problem been getting worse, stay the same, or getting better? \"getting worse\"  Have you ever had treatment for this problem? NO    4 week or greater of failed trial of PFPT program? NO   PFPT program as defined by \"Completing 4 weeks of an ordered plan of pelvic muscle exercises designed to increase periurethral muscle strength\". Urinary frequency? Daytime? NO Nighttime? NO  Bowel problems? Yes, see above  Urinary or bowel leakage? Yes, both  Leakage frequency?  Occasionally   Protection: Always urinary leakage pads      Pregnancy:   # of pregnancies: 5, 2 miscarriages    # of deliveries: 3 vaginal   Episiotomy: yes   Normal post- healing? yes  Are you having regular menstrual cycles? Post-menopause   Date of last pap smear? Unknown (in the last 3 years)    Pain: 0/10    Sensation/neurovascular: WNL  Diagnostic tests: none to date    Precautions/Contraindications:universal     Past Medical History:   Diagnosis Date    Alcohol abuse     Depression     Hyperlipidemia     Hypertension     Hypokalemia     Rheumatic heart disease      Past Surgical History:   Procedure Laterality Date    COLONOSCOPY      FRACTURE SURGERY      bilateral arm fracture    TONSILLECTOMY      TUBAL LIGATION       Not in a hospital admission.     Current Outpatient Medications:     Polyethylene Glycol 3350 (MIRALAX PO), Take 0.52 mg by mouth, Disp: , Rfl:     Melatonin 10 MG TABS, Take by mouth, Disp: , Rfl:     Calcium Carb-Cholecalciferol (CALCIUM 600+D3) 600-200 MG-UNIT TABS, Take by mouth, Disp: , Rfl:     fluticasone (FLONASE) 50 MCG/ACT nasal spray, 1 spray by Each Nostril route daily, Disp: , Rfl:     albuterol sulfate HFA (VENTOLIN HFA) 108 (90 Base) MCG/ACT inhaler, Inhale 2 puffs into the lungs every 6 hours as needed for Wheezing, Disp: , Rfl:     budesonide-formoterol (SYMBICORT) 80-4.5 MCG/ACT AERO, Inhale 2 puffs into the lungs 2 times daily, Disp: , Rfl:     venlafaxine (EFFEXOR XR) 75 MG extended release capsule, Take 75 mg by mouth daily, Disp: , Rfl:     citalopram (CELEXA) 20 MG tablet, Take 20 mg by mouth daily, Disp: , Rfl:     cyclobenzaprine (FLEXERIL) 10 MG tablet, Take 10 mg by mouth daily as needed for Muscle spasms, Disp: , Rfl:     atorvastatin (LIPITOR) 20 MG tablet, TAKE ONE TABLET BY MOUTH DAILY, Disp: 90 tablet, Rfl: 0    cloNIDine (CATAPRES) 0.2 MG tablet, TAKE ONE TABLET BY MOUTH ONCE NIGHTLY, Disp: 90 tablet, Rfl: 0    amoxicillin (AMOXIL) 500 MG capsule, Take 4 capsules 1 hour before procedure. (Patient not taking: Reported on 6/1/2020), Disp: 4 capsule, Rfl: 1    citalopram (CELEXA) 40 MG tablet, TAKE ONE TABLET BY MOUTH DAILY (Patient not taking: Reported on 6/1/2020), Disp: 90 tablet, Rfl: 1    LORazepam (ATIVAN) 1 MG tablet, Take 1 tablet by mouth every 6 hours as needed for Anxiety, Disp: 60 tablet, Rfl: 1    meloxicam (MOBIC) 15 MG tablet, TAKE ONE TABLET BY MOUTH DAILY, Disp: 90 tablet, Rfl: 1    citalopram (CELEXA) 40 MG tablet, TAKE ONE TABLET BY MOUTH DAILY (Patient not taking: Reported on 6/1/2020), Disp: 90 tablet, Rfl: 1    varenicline (CHANTIX STARTING MONTH KYLE) 0.5 MG X 11 & 1 MG X 42 tablet, Follow package directions. (Patient not taking: Reported on 6/1/2020), Disp: 56 tablet, Rfl: 0    varenicline (CHANTIX CONTINUING MONTH PAK) 1 MG tablet, Follow package directions. (Patient not taking: Reported on 6/1/2020), Disp: 56 tablet, Rfl: 2    Potassium Gluconate 550 MG TABS, Take 1 tablet by mouth, Disp: , Rfl:     magnesium chloride (MAG DELAY 64) 535 (64 MG) MG TBCR CR tablet, Take 1 tablet by mouth daily. , Disp: 30 tablet, Rfl: 0    Multiple Vitamin (MULTI-VITAMIN DAILY) TABS, Take  by mouth.  , Disp: , Rfl:      Allergies   Allergen Reactions    Codeine Nausea Only    Sulfa Antibiotics        Objective:    PFDI score: 217/300    Observation:   Posture:  WNL   Gait analysis: WNL  Lumbar Mobility: WNL  Scar Location/Mobility: n/a    Special Tests:  Sacroiliac Test:   Gaenslen: negative    SANCHO: negative     Lumbar Spine:   Slump test: negative    Neuro:   Sensation: (B) UEs: intact to light touch   Sensation: (B) LEs: intact to light touch   Anal Sensation:    S5 intact to light touch    S4 intact to light touch    S3 intact to light touch   Reflexes:     Anal: present    Cough: present    Pelvic Floor Exam  External:  Skin Condition: normal  Sensation: intact  Palpation: no point tenderness   Tone: normal  Perineal Body Mobility:    Voluntary PFM Contraction: present (normal)   Voluntary PFM Relaxation: present (normal)   Involuntary PFM Contraction/Cough Reflex: present (normal)   Involuntary PFM Relaxation: present (normal)  Perineal Body Descent:   Rest: supported   Bearing down: absent (normal-cephalad)      Internal:  Sensation: intact  Palpation: (B) pudendal nerve pain, (R) > (L)  Vaginal Vault Size: WNL  PERFECT:   Power: 3/5   Endurance: 10sec. Repetitions: 3   Fast Twitch: 3   Elevation: present   Co-contraction: not present   Timing: present  OI strength: 4-/5 (B)  Pelvic Organ Prolapse:    Type: Grade II cystocele, Grade I rectocele        Treatment: see flowsheet    Assessment:  Impression: The patient is a 80 yo female referred to PFPT due to UUI and prolapse. The patient demonstrated decreased pelvic floor coordination, endurance, strength and coordination. Demonstrated vaginal prolapse as well as painful internal palpation. The patient is reporting bladder and bowel incontinence at times. Is also reporting discomfort from vaginal prolapse. The patient seems very motivated and should do well with PT and HEP compliance. Time Frame: 6 weeks. Rehab Potential: fair to good    Goals:  1. Patient will demonstrate independence with HEP to self-manage symptoms. 2. Patient will improve pelvic floor strength to at least 4/5 to improve continence. 3. Patient will improve PFDI by at least 50 points demonstrating improved pelvic floor functioning and quality of life. 4. Patient will report less than 3 leakages per 3 day bladder diary period to demonstrate improved continence and pelvic floor functioning. Plan  Patient will be seen 1-2x/week for 6weeks. Rx to consist of: Home management, NMred, manual, modalities PRN, TA, TE    Time IN/OUT: 1:30pm-2:45pm      Kaila Boss PT, DPT    ______________________________________________________________________    If you have any questions or concerns, please don't hesitate to call.   Thank you for your referral.    Physician Signature:________________________________Date:__________________  By signing above, therapists plan is approved by physician

## 2020-07-29 NOTE — FLOWSHEET NOTE
Physical Therapy Daily Treatment Note    Date:  2020    Patient Name:  Ferny Luther    :  1950  MRN: [id number]  Restrictions/Precautions: universal    Medical/Treatment Diagnosis Information:  · Diagnosis: UUI and prolapse  Insurance/Certification information:  PT Insurance Information: [address] $40 co-pay  Physician Information:  Referring Practitioner: Christina Hernández MD  Plan of care signed (Y/N): N  Visit# / total visits:  approved     G-Code (if applicable):          PFDI: 217/300    Medicare Cap (if applicable):  n/a = total amount used, updated 2020    Time in:   1:30pm     Timed Treatment: 55min. Total Treatment Time:  75min.  ________________________________________________________________________________________    Pain Level:    /10  SUBJECTIVE:  See eval    OBJECTIVE:     Exercise (TE) Resistance/Repetitions Other comments            PF strengthening        Short hold: (1sec) 10 times       Long hold: (10sec) 10 times                     PF relaxation Belly breathing:x10                               Other Treatment:   TA:  Bladder re-training/education: 38min    Bladder Diary: patient educated on importance of filling out bladder diary at home, complete with fluid intake, voids, and leakage when applicable. Voiding: patient educated on normal voiding and urinary cycle and the physiology of bladder control muscles and pelvic floor. The patient was educated on bladder dysfunction as well as MESFIN/UUI with urethral involvement. The patient was also educated on prolapse and it's affect on urination and discomfort. Dietary:     Other:    Manual Treatments:  --       Modalities: --     Test/Measurements:  See eval         ASSESSMENT: see eval        Treatment/Activity Tolerance:   [x] Patient tolerated treatment well [] Patient limited by fatique  [] Patient limited by pain [] Patient limited by other medical complications  [] Other:     Goals:    Time Frame: 6 weeks. Rehab Potential: fair to good     Goals:  1. Patient will demonstrate independence with HEP to self-manage symptoms. 2. Patient will improve pelvic floor strength to at least 4/5 to improve continence. 3. Patient will improve PFDI by at least 50 points demonstrating improved pelvic floor functioning and quality of life. 4. Patient will report less than 3 leakages per 3 day bladder diary period to demonstrate improved continence and pelvic floor functioning.         Plan: [x] Continue per plan of care [] Alter current plan (see comments)   [] Plan of care initiated [] Hold pending MD visit [] Discharge      Plan for Next Session:  Review diary, TE    Re-Certification Due Date:   8/28/2020      Signature:  Ai Allen, PT, DPT

## 2020-08-03 ENCOUNTER — HOSPITAL ENCOUNTER (OUTPATIENT)
Dept: PHYSICAL THERAPY | Age: 70
Setting detail: THERAPIES SERIES
Discharge: HOME | End: 2020-08-03
Payer: MEDICARE

## 2020-08-03 PROCEDURE — 97530 THERAPEUTIC ACTIVITIES: CPT

## 2020-08-03 PROCEDURE — 97110 THERAPEUTIC EXERCISES: CPT

## 2020-08-03 NOTE — FLOWSHEET NOTE
Physical Therapy Daily Treatment Note    Date:  8/3/2020    Patient Name:  Justino Weber    :  1950  MRN: [id number]  Restrictions/Precautions: universal    Medical/Treatment Diagnosis Information:  · Diagnosis: UUI and prolapse  Insurance/Certification information:  PT Insurance Information: [address] $40 co-pay  Physician Information:  Referring Practitioner: Twyla Jean MD  Plan of care signed (Y/N): Y  Visit# / total visits:  approved     G-Code (if applicable):          PFDI: 217/300    Medicare Cap (if applicable):  n/a = total amount used, updated 8/3/2020    Time in:   3:45pm     Timed Treatment: 60min. Total Treatment Time:  60min.  ________________________________________________________________________________________    Pain Level:    0/10  SUBJECTIVE:  Patient reports \"I am doing okay\". No new complaints. OBJECTIVE:     Exercise (TE) Resistance/Repetitions Other comments            PF strengthening        Short hold: (1sec) 10 times       Long hold: (5sec) 10 times                     PF relaxation Belly breathing:x10                               Other Treatment:   TA:  Bladder re-training/education: 45min    Bladder/Bowel Diary: 6-7 voids/day, 3-4 small soft BMs/day, 0 episodes of leakage, 0 episodes of urgency    Voiding: educated patient to void every 2-3 hours, avoid holding urine too long. Dietary: limited fiber and water intake, advised patient to increase fiber to ~20-25g/day and water to ~30-40oz/day; significant educated provided to patient on fiber importance and ways to incorporate into daily intake. Other: bowel routine, squatty potty, blowing through fist.    Manual Treatments:  --       Modalities: --     Test/Measurements:  See eval         ASSESSMENT: The patient responded well to education provided. The patient was given a lot of information and handouts to help with education and compliance. The patient adapted nicely to added TE this date.   Will follow up in 2 weeks to assess progress. Treatment/Activity Tolerance:   [x] Patient tolerated treatment well [] Patient limited by fatique  [] Patient limited by pain [] Patient limited by other medical complications  [] Other:     Goals:    Time Frame: 6 weeks. Rehab Potential: fair to good     Goals:  1. Patient will demonstrate independence with HEP to self-manage symptoms. 2. Patient will improve pelvic floor strength to at least 4/5 to improve continence. 3. Patient will improve PFDI by at least 50 points demonstrating improved pelvic floor functioning and quality of life. 4. Patient will report less than 3 leakages per 3 day bladder diary period to demonstrate improved continence and pelvic floor functioning.         Plan: [x] Continue per plan of care [] Alter current plan (see comments)   [] Plan of care initiated [] Hold pending MD visit [] Discharge      Plan for Next Session:  Review diary, TE    Re-Certification Due Date:   8/28/2020      Signature:  Kayleen Hassan, PT, DPT

## 2020-08-19 ENCOUNTER — HOSPITAL ENCOUNTER (OUTPATIENT)
Dept: PHYSICAL THERAPY | Age: 70
Setting detail: THERAPIES SERIES
Discharge: HOME | End: 2020-08-19
Payer: MEDICARE

## 2020-08-19 PROCEDURE — 97530 THERAPEUTIC ACTIVITIES: CPT

## 2020-08-19 PROCEDURE — 97110 THERAPEUTIC EXERCISES: CPT

## 2020-08-19 NOTE — FLOWSHEET NOTE
patient adapted nicely to added TE this date. Has made good progress, should continue to progress. Will follow up with PCP this week regarding medications and constipation. Patient expressed frustrations regarding prolapse. Gave patient updated HEP. Will follow up in 2 weeks to assess progress. Treatment/Activity Tolerance:   [x] Patient tolerated treatment well [] Patient limited by fatique  [] Patient limited by pain [] Patient limited by other medical complications  [] Other:     Goals:    Time Frame: 6 weeks. Rehab Potential: fair to good     Goals:  1. Patient will demonstrate independence with HEP to self-manage symptoms. 2. Patient will improve pelvic floor strength to at least 4/5 to improve continence. 3. Patient will improve PFDI by at least 50 points demonstrating improved pelvic floor functioning and quality of life. 4. Patient will report less than 3 leakages per 3 day bladder diary period to demonstrate improved continence and pelvic floor functioning.         Plan: [x] Continue per plan of care [] Alter current plan (see comments)   [] Plan of care initiated [] Hold pending MD visit [] Discharge      Plan for Next Session:  TE    Re-Certification Due Date:   8/28/2020      Signature:  Malgorzata Hussein, PT, DPT

## 2020-08-22 ENCOUNTER — HOSPITAL ENCOUNTER (OUTPATIENT)
Age: 70
Discharge: HOME | End: 2020-08-22
Payer: MEDICARE

## 2020-08-22 LAB
A/G RATIO: 1.7 (ref 1.1–2.2)
ALBUMIN SERPL-MCNC: 4.7 G/DL (ref 3.4–5)
ALP BLD-CCNC: 52 U/L (ref 40–129)
ALT SERPL-CCNC: 21 U/L (ref 10–40)
ANION GAP SERPL CALCULATED.3IONS-SCNC: 9 MMOL/L (ref 3–16)
AST SERPL-CCNC: 27 U/L (ref 15–37)
BASOPHILS ABSOLUTE: 0.1 K/UL (ref 0–0.2)
BASOPHILS RELATIVE PERCENT: 0.9 %
BILIRUB SERPL-MCNC: 0.4 MG/DL (ref 0–1)
BUN BLDV-MCNC: 25 MG/DL (ref 7–20)
CALCIUM SERPL-MCNC: 10.8 MG/DL (ref 8.3–10.6)
CHLORIDE BLD-SCNC: 98 MMOL/L (ref 99–110)
CHOLESTEROL, TOTAL: 165 MG/DL (ref 0–199)
CO2: 28 MMOL/L (ref 21–32)
CREAT SERPL-MCNC: 1.1 MG/DL (ref 0.6–1.2)
EOSINOPHILS ABSOLUTE: 0.2 K/UL (ref 0–0.6)
EOSINOPHILS RELATIVE PERCENT: 3.6 %
GFR AFRICAN AMERICAN: 60
GFR NON-AFRICAN AMERICAN: 49
GLOBULIN: 2.7 G/DL
GLUCOSE BLD-MCNC: 102 MG/DL (ref 70–99)
HCT VFR BLD CALC: 38.5 % (ref 36–48)
HDLC SERPL-MCNC: 84 MG/DL (ref 40–60)
HEMOGLOBIN: 12.9 G/DL (ref 12–16)
LDL CHOLESTEROL CALCULATED: 73 MG/DL
LYMPHOCYTES ABSOLUTE: 1.3 K/UL (ref 1–5.1)
LYMPHOCYTES RELATIVE PERCENT: 23.4 %
MCH RBC QN AUTO: 32.2 PG (ref 26–34)
MCHC RBC AUTO-ENTMCNC: 33.6 G/DL (ref 31–36)
MCV RBC AUTO: 95.6 FL (ref 80–100)
MONOCYTES ABSOLUTE: 0.5 K/UL (ref 0–1.3)
MONOCYTES RELATIVE PERCENT: 9.4 %
NEUTROPHILS ABSOLUTE: 3.6 K/UL (ref 1.7–7.7)
NEUTROPHILS RELATIVE PERCENT: 62.7 %
PDW BLD-RTO: 13 % (ref 12.4–15.4)
PLATELET # BLD: 239 K/UL (ref 135–450)
PMV BLD AUTO: 7.2 FL (ref 5–10.5)
POTASSIUM SERPL-SCNC: 5.3 MMOL/L (ref 3.5–5.1)
RBC # BLD: 4.03 M/UL (ref 4–5.2)
SODIUM BLD-SCNC: 135 MMOL/L (ref 136–145)
TOTAL PROTEIN: 7.4 G/DL (ref 6.4–8.2)
TRIGL SERPL-MCNC: 40 MG/DL (ref 0–150)
TSH REFLEX: 2.75 UIU/ML (ref 0.27–4.2)
VLDLC SERPL CALC-MCNC: 8 MG/DL
WBC # BLD: 5.7 K/UL (ref 4–11)

## 2020-08-22 PROCEDURE — 80053 COMPREHEN METABOLIC PANEL: CPT

## 2020-08-22 PROCEDURE — 83036 HEMOGLOBIN GLYCOSYLATED A1C: CPT

## 2020-08-22 PROCEDURE — 85025 COMPLETE CBC W/AUTO DIFF WBC: CPT

## 2020-08-22 PROCEDURE — 80061 LIPID PANEL: CPT

## 2020-08-22 PROCEDURE — 36415 COLL VENOUS BLD VENIPUNCTURE: CPT

## 2020-08-22 PROCEDURE — 84443 ASSAY THYROID STIM HORMONE: CPT

## 2020-08-23 LAB
ESTIMATED AVERAGE GLUCOSE: 131.2 MG/DL
HBA1C MFR BLD: 6.2 %

## 2020-08-31 ENCOUNTER — HOSPITAL ENCOUNTER (OUTPATIENT)
Dept: PHYSICAL THERAPY | Age: 70
Setting detail: THERAPIES SERIES
Discharge: HOME | End: 2020-08-31

## 2020-08-31 NOTE — FLOWSHEET NOTE
Physical Therapy  Cancellation/No-show Note  Patient Name:  Yevgeniy Morris  :  1950   Date:  2020  Cancels to date: 1  No-shows to date: 0    For today's appointment patient:  [x] Cancelled  [] Rescheduled appointment  [] No-show     Reason given by patient:  [] Patient ill  [] Conflicting appointment  [] No transportation    [] Conflict with work  [] No reason given  [x] Other:     Comments:  Patient wanted to follow up with GI MD before next appointment.     Electronically signed by:  Thanh Proctor PT

## 2020-09-23 ENCOUNTER — HOSPITAL ENCOUNTER (OUTPATIENT)
Dept: PHYSICAL THERAPY | Age: 70
Setting detail: THERAPIES SERIES
Discharge: HOME | End: 2020-09-23
Payer: MEDICARE

## 2020-09-23 PROCEDURE — 97140 MANUAL THERAPY 1/> REGIONS: CPT

## 2020-09-23 PROCEDURE — 97110 THERAPEUTIC EXERCISES: CPT

## 2020-09-23 PROCEDURE — 97530 THERAPEUTIC ACTIVITIES: CPT

## 2020-09-23 NOTE — FLOWSHEET NOTE
PF strengthening        Short hold: (1sec) 10 times       Long hold: (5sec) 10 times               PF squat x10    PF + hip ABD x10 w/ blue tband    PF + hip ADD x10 w/ ball squeeze                                   Other Treatment:   TA:  Bladder re-training/education:     Bladder/Bowel Diary:  6-7 voids/day, 2-3 small soft (was 3-4 small soft BMs/day), 0 episodes of leakage, 0 episodes of urgency    Voiding: educated patient to void every 2-3 hours, avoid holding urine too long. Dietary: patient has made significant improvement in fiber intake and water intake. Other: bowel routine, squatty potty, blowing through fist.    Manual Treatments:  --       Modalities: --     Test/Measurements:    Palpation: (B) pudendal nerve pain, (R) > (L)  Vaginal Vault Size: WNL  PERFECT:              Power: 4/5 (was 3/5)              Endurance: 10sec. Repetitions: 10 (was 3)              Fast Twitch: 6 (was 3)              Elevation: present              Co-contraction: present (was not present)              Timing: present  Pelvic Organ Prolapse:               Type: Grade I (was Grade II) cystocele, Grade I rectocele          ASSESSMENT: After 4 PT visits, the patient has made some progress with pelvic floor strength and endurance and overall coordination. The patient continues with difficulty and frustration with BMs. The patient requested discharge to continue to work with GI MD to further explore BM issues, though is expressing improvement with pelvic floor PT. Advised patient to continue with above program and HEP to further improve pelvic floor. Let this note stand as d/c summary. Treatment/Activity Tolerance:   [x] Patient tolerated treatment well [] Patient limited by fatique  [] Patient limited by pain [] Patient limited by other medical complications  [] Other:     Goals:    Time Frame: 6 weeks. Rehab Potential: fair to good     Goals:  1.  Patient will demonstrate independence with HEP to self-manage symptoms. (MET)  2. Patient will improve pelvic floor strength to at least 4/5 to improve continence. (MET)  3. Patient will improve PFDI by at least 50 points demonstrating improved pelvic floor functioning and quality of life. (MET)  4.  Patient will report less than 3 leakages per 3 day bladder diary period to demonstrate improved continence and pelvic floor functioning. (MET)        Plan: [] Continue per plan of care [] Alter current plan (see comments)   [] Plan of care initiated [] Hold pending MD visit [x] Discharge       Signature:  Essie Epley, PT, DPT

## 2020-10-03 ENCOUNTER — HOSPITAL ENCOUNTER (OUTPATIENT)
Age: 70
Discharge: HOME | End: 2020-10-03
Payer: MEDICARE

## 2020-10-03 LAB
ANION GAP SERPL CALCULATED.3IONS-SCNC: 8 MMOL/L (ref 3–16)
BUN BLDV-MCNC: 15 MG/DL (ref 7–20)
CALCIUM SERPL-MCNC: 10 MG/DL (ref 8.3–10.6)
CHLORIDE BLD-SCNC: 97 MMOL/L (ref 99–110)
CO2: 29 MMOL/L (ref 21–32)
CREAT SERPL-MCNC: 0.9 MG/DL (ref 0.6–1.2)
GFR AFRICAN AMERICAN: >60
GFR NON-AFRICAN AMERICAN: >60
GLUCOSE BLD-MCNC: 84 MG/DL (ref 70–99)
POTASSIUM SERPL-SCNC: 4.5 MMOL/L (ref 3.5–5.1)
SODIUM BLD-SCNC: 134 MMOL/L (ref 136–145)

## 2020-10-03 PROCEDURE — 80048 BASIC METABOLIC PNL TOTAL CA: CPT

## 2020-10-03 PROCEDURE — 36415 COLL VENOUS BLD VENIPUNCTURE: CPT

## 2021-02-17 ENCOUNTER — HOSPITAL ENCOUNTER (OUTPATIENT)
Age: 71
Discharge: HOME | End: 2021-02-17
Payer: MEDICARE

## 2021-02-17 LAB
A/G RATIO: 1.5 (ref 1.1–2.2)
ALBUMIN SERPL-MCNC: 4.6 G/DL (ref 3.4–5)
ALP BLD-CCNC: 66 U/L (ref 40–129)
ALT SERPL-CCNC: 17 U/L (ref 10–40)
AMPHETAMINE SCREEN, URINE: NORMAL
ANION GAP SERPL CALCULATED.3IONS-SCNC: 11 MMOL/L (ref 3–16)
AST SERPL-CCNC: 24 U/L (ref 15–37)
BARBITURATE SCREEN URINE: NORMAL
BENZODIAZEPINE SCREEN, URINE: NORMAL
BILIRUB SERPL-MCNC: 0.3 MG/DL (ref 0–1)
BUN BLDV-MCNC: 15 MG/DL (ref 7–20)
CALCIUM SERPL-MCNC: 10.3 MG/DL (ref 8.3–10.6)
CANNABINOID SCREEN URINE: NORMAL
CHLORIDE BLD-SCNC: 100 MMOL/L (ref 99–110)
CO2: 27 MMOL/L (ref 21–32)
COCAINE METABOLITE SCREEN URINE: NORMAL
CREAT SERPL-MCNC: 0.9 MG/DL (ref 0.6–1.2)
GFR AFRICAN AMERICAN: >60
GFR NON-AFRICAN AMERICAN: >60
GLOBULIN: 3 G/DL
GLUCOSE BLD-MCNC: 97 MG/DL (ref 70–99)
Lab: NORMAL
METHADONE SCREEN, URINE: NORMAL
OPIATE SCREEN URINE: NORMAL
OXYCODONE URINE: NORMAL
PH UA: 8
PHENCYCLIDINE SCREEN URINE: NORMAL
POTASSIUM SERPL-SCNC: 4.3 MMOL/L (ref 3.5–5.1)
PROPOXYPHENE SCREEN: NORMAL
SODIUM BLD-SCNC: 138 MMOL/L (ref 136–145)
TOTAL PROTEIN: 7.6 G/DL (ref 6.4–8.2)

## 2021-02-17 PROCEDURE — 80053 COMPREHEN METABOLIC PANEL: CPT

## 2021-02-17 PROCEDURE — 80307 DRUG TEST PRSMV CHEM ANLYZR: CPT

## 2021-02-17 PROCEDURE — 36415 COLL VENOUS BLD VENIPUNCTURE: CPT

## 2021-02-17 PROCEDURE — 83036 HEMOGLOBIN GLYCOSYLATED A1C: CPT

## 2021-02-17 PROCEDURE — 85025 COMPLETE CBC W/AUTO DIFF WBC: CPT

## 2021-02-18 LAB
ESTIMATED AVERAGE GLUCOSE: 131.2 MG/DL
HBA1C MFR BLD: 6.2 %

## 2021-02-19 LAB
BASOPHILS ABSOLUTE: 0 K/UL (ref 0–0.2)
BASOPHILS RELATIVE PERCENT: 0.7 %
EOSINOPHILS ABSOLUTE: 0.1 K/UL (ref 0–0.6)
EOSINOPHILS RELATIVE PERCENT: 1.7 %
HCT VFR BLD CALC: 41.9 % (ref 36–48)
HEMOGLOBIN: 13.5 G/DL (ref 12–16)
LYMPHOCYTES ABSOLUTE: 1.6 K/UL (ref 1–5.1)
LYMPHOCYTES RELATIVE PERCENT: 21.6 %
MCH RBC QN AUTO: 31.6 PG (ref 26–34)
MCHC RBC AUTO-ENTMCNC: 32.2 G/DL (ref 31–36)
MCV RBC AUTO: 98 FL (ref 80–100)
MONOCYTES ABSOLUTE: 0.5 K/UL (ref 0–1.3)
MONOCYTES RELATIVE PERCENT: 7 %
NEUTROPHILS ABSOLUTE: 5 K/UL (ref 1.7–7.7)
NEUTROPHILS RELATIVE PERCENT: 69 %
PDW BLD-RTO: 14.2 % (ref 12.4–15.4)
PLATELET # BLD: 295 K/UL (ref 135–450)
PMV BLD AUTO: 8.2 FL (ref 5–10.5)
RBC # BLD: 4.27 M/UL (ref 4–5.2)
WBC # BLD: 7.3 K/UL (ref 4–11)

## 2021-06-09 ENCOUNTER — HOSPITAL ENCOUNTER (OUTPATIENT)
Dept: NEUROLOGY | Age: 71
Discharge: HOME | End: 2021-06-09
Payer: MEDICARE

## 2021-06-09 DIAGNOSIS — R20.2 PARESTHESIA: ICD-10-CM

## 2021-06-09 PROCEDURE — 95885 MUSC TST DONE W/NERV TST LIM: CPT | Performed by: PHYSICAL MEDICINE & REHABILITATION

## 2021-06-09 PROCEDURE — 95910 NRV CNDJ TEST 7-8 STUDIES: CPT | Performed by: PHYSICAL MEDICINE & REHABILITATION

## 2021-06-09 NOTE — PROCEDURES
[address]      Electrodiagnostic Report  Test Date:  2021    Patient: Mary Zambrano : 1950 Physician: Bryson Mckay D.O.   Sex: Female Height:   Ref Phys: Danae Simmonds, APRN-CNP     Patient Complaints:  Patient is a 78year-old female who presents with numbness tingling bilateral hands. onset 3-4 months ago. Patient History / Exam:  PMH no endocrine disease, + borderline a1c 5.7 no neck or arm surgery PE: reflexes 1+, + thumb opposition weakness     Impression: Study is consistent with bilateral carpal tunnel syndrome, moderate severity. No evidence of an acute radiculopathy or other entrapment neuropathy. Thank you. NCV & EMG Findings:  Evaluation of the left median motor and the right median motor nerves showed prolonged distal onset latency (L4.9, R5.9 ms) and reduced amplitude (L2.0, R2.7 mV). The left median sensory and the right median sensory nerves showed prolonged distal peak latency (L4.3, R5.7 ms). All remaining nerves (as indicated in the following tables) were within normal limits. Left vs. Right side comparison data for the median motor nerve indicates abnormal L-R latency difference (1.0 ms). The median sensory nerve indicates abnormal L-R latency difference (1.4 ms) and abnormal L-R amplitude difference (74.6 %). All remaining left vs. right side differences were within normal limits. All examined muscles (as indicated in the following table) showed no evidence of electrical instability.                   Bryson Mckay D.O.        Nerve Conduction Studies  Anti Sensory Summary Table     Stim Site NR Peak (ms) Norm Peak (ms) P-T Amp (µV) Norm P-T Amp Site1 Site2 Delta-P (ms) Dist (cm) Jay (m/s) Norm Jay (m/s)   Left Median Anti Sensory (2nd Digit)   Wrist    4.3 <3.6 44.8 >10 Wrist 2nd Digit 4.3 14.0 33    Right Median Anti Sensory (2nd Digit)   Wrist    5.7 <3.6 11.4 >10 Wrist 2nd Digit 5.7 14.0 25    Left Ulnar Anti Sensory (5th C6-7 Nml Nml Nml Nml Nml 0 Nml Nml    Left Ext Indicis Radial (Post Int) C7-8 Nml Nml Nml Nml Nml 0 Nml Nml    Left 1stDorInt Ulnar C8-T1 Nml Nml Nml Nml Nml 0 Nml Nml    Left Abd Poll Brev Median C8-T1 Nml Nml Nml Nml Nml 0 Nml Nml    Left Cervical Parasp Up Rami C1-3 Nml Nml Nml         Left Cervical Parasp Mid Rami C4-6 Nml Nml Nml         Left Cervical Parasp Low Rami C7-8 Nml Nml Nml           Electronically signed by Gabriel Rcici DO on 6/9/2021 at 3:39 PM]

## 2021-08-07 ENCOUNTER — HOSPITAL ENCOUNTER (OUTPATIENT)
Age: 71
Discharge: HOME | End: 2021-08-07
Payer: MEDICARE

## 2021-08-07 LAB
A/G RATIO: 1.5 (ref 1.1–2.2)
ALBUMIN SERPL-MCNC: 4 G/DL (ref 3.4–5)
ALP BLD-CCNC: 66 U/L (ref 40–129)
ALT SERPL-CCNC: 14 U/L (ref 10–40)
ANION GAP SERPL CALCULATED.3IONS-SCNC: 10 MMOL/L (ref 3–16)
AST SERPL-CCNC: 21 U/L (ref 15–37)
BASOPHILS ABSOLUTE: 0.1 K/UL (ref 0–0.2)
BASOPHILS RELATIVE PERCENT: 1.1 %
BILIRUB SERPL-MCNC: 0.3 MG/DL (ref 0–1)
BUN BLDV-MCNC: 21 MG/DL (ref 7–20)
CALCIUM SERPL-MCNC: 9.5 MG/DL (ref 8.3–10.6)
CHLORIDE BLD-SCNC: 106 MMOL/L (ref 99–110)
CHOLESTEROL, FASTING: 155 MG/DL (ref 0–199)
CO2: 26 MMOL/L (ref 21–32)
CREAT SERPL-MCNC: 0.9 MG/DL (ref 0.6–1.2)
EOSINOPHILS ABSOLUTE: 0.2 K/UL (ref 0–0.6)
EOSINOPHILS RELATIVE PERCENT: 3.4 %
GFR AFRICAN AMERICAN: >60
GFR NON-AFRICAN AMERICAN: >60
GLOBULIN: 2.7 G/DL
GLUCOSE FASTING: 95 MG/DL (ref 70–99)
HCT VFR BLD CALC: 36 % (ref 36–48)
HDLC SERPL-MCNC: 64 MG/DL (ref 40–60)
HEMOGLOBIN: 12.3 G/DL (ref 12–16)
LDL CHOLESTEROL CALCULATED: 78 MG/DL
LYMPHOCYTES ABSOLUTE: 1.6 K/UL (ref 1–5.1)
LYMPHOCYTES RELATIVE PERCENT: 27.1 %
MCH RBC QN AUTO: 31.9 PG (ref 26–34)
MCHC RBC AUTO-ENTMCNC: 34.1 G/DL (ref 31–36)
MCV RBC AUTO: 93.7 FL (ref 80–100)
MONOCYTES ABSOLUTE: 0.5 K/UL (ref 0–1.3)
MONOCYTES RELATIVE PERCENT: 9.4 %
NEUTROPHILS ABSOLUTE: 3.4 K/UL (ref 1.7–7.7)
NEUTROPHILS RELATIVE PERCENT: 59 %
PDW BLD-RTO: 13.3 % (ref 12.4–15.4)
PLATELET # BLD: 221 K/UL (ref 135–450)
PMV BLD AUTO: 8.4 FL (ref 5–10.5)
POTASSIUM SERPL-SCNC: 4.3 MMOL/L (ref 3.5–5.1)
RBC # BLD: 3.84 M/UL (ref 4–5.2)
SODIUM BLD-SCNC: 142 MMOL/L (ref 136–145)
TOTAL PROTEIN: 6.7 G/DL (ref 6.4–8.2)
TRIGLYCERIDE, FASTING: 66 MG/DL (ref 0–150)
TSH REFLEX: 2.97 UIU/ML (ref 0.27–4.2)
VLDLC SERPL CALC-MCNC: 13 MG/DL
WBC # BLD: 5.8 K/UL (ref 4–11)

## 2021-08-07 PROCEDURE — 80061 LIPID PANEL: CPT

## 2021-08-07 PROCEDURE — 85025 COMPLETE CBC W/AUTO DIFF WBC: CPT

## 2021-08-07 PROCEDURE — 84443 ASSAY THYROID STIM HORMONE: CPT

## 2021-08-07 PROCEDURE — 83036 HEMOGLOBIN GLYCOSYLATED A1C: CPT

## 2021-08-07 PROCEDURE — 80053 COMPREHEN METABOLIC PANEL: CPT

## 2021-08-07 PROCEDURE — 36415 COLL VENOUS BLD VENIPUNCTURE: CPT

## 2021-08-08 LAB
ESTIMATED AVERAGE GLUCOSE: 134.1 MG/DL
HBA1C MFR BLD: 6.3 %

## 2022-03-12 ENCOUNTER — HOSPITAL ENCOUNTER (OUTPATIENT)
Age: 72
Discharge: HOME | End: 2022-03-12
Payer: MEDICARE

## 2022-03-12 LAB
BILIRUBIN URINE: NEGATIVE
BLOOD, URINE: ABNORMAL
CLARITY: CLEAR
COLOR: YELLOW
GLUCOSE URINE: NEGATIVE MG/DL
KETONES, URINE: NEGATIVE MG/DL
LEUKOCYTE ESTERASE, URINE: NEGATIVE
MICROSCOPIC EXAMINATION: YES
NITRITE, URINE: NEGATIVE
PH UA: 7 (ref 5–8)
PROTEIN UA: NEGATIVE MG/DL
RBC UA: NORMAL /HPF (ref 0–4)
SPECIFIC GRAVITY UA: 1.01 (ref 1–1.03)
URINE REFLEX TO CULTURE: ABNORMAL
URINE TYPE: ABNORMAL
UROBILINOGEN, URINE: 0.2 E.U./DL
WBC UA: NORMAL /HPF (ref 0–5)

## 2022-03-12 PROCEDURE — 36415 COLL VENOUS BLD VENIPUNCTURE: CPT

## 2022-03-12 PROCEDURE — 80307 DRUG TEST PRSMV CHEM ANLYZR: CPT

## 2022-03-12 PROCEDURE — 80053 COMPREHEN METABOLIC PANEL: CPT

## 2022-03-12 PROCEDURE — 83036 HEMOGLOBIN GLYCOSYLATED A1C: CPT

## 2022-03-12 PROCEDURE — 81001 URINALYSIS AUTO W/SCOPE: CPT

## 2022-03-13 LAB
A/G RATIO: 1.9 (ref 1.1–2.2)
ALBUMIN SERPL-MCNC: 4.2 G/DL (ref 3.4–5)
ALP BLD-CCNC: 57 U/L (ref 40–129)
ALT SERPL-CCNC: 15 U/L (ref 10–40)
AMPHETAMINE SCREEN, URINE: NORMAL
ANION GAP SERPL CALCULATED.3IONS-SCNC: 13 MMOL/L (ref 3–16)
AST SERPL-CCNC: 21 U/L (ref 15–37)
BARBITURATE SCREEN URINE: NORMAL
BENZODIAZEPINE SCREEN, URINE: NORMAL
BILIRUB SERPL-MCNC: 0.3 MG/DL (ref 0–1)
BUN BLDV-MCNC: 22 MG/DL (ref 7–20)
CALCIUM SERPL-MCNC: 9.6 MG/DL (ref 8.3–10.6)
CANNABINOID SCREEN URINE: NORMAL
CHLORIDE BLD-SCNC: 100 MMOL/L (ref 99–110)
CO2: 24 MMOL/L (ref 21–32)
COCAINE METABOLITE SCREEN URINE: NORMAL
CREAT SERPL-MCNC: 0.8 MG/DL (ref 0.6–1.2)
GFR AFRICAN AMERICAN: >60
GFR NON-AFRICAN AMERICAN: >60
GLUCOSE BLD-MCNC: 115 MG/DL (ref 70–99)
Lab: NORMAL
METHADONE SCREEN, URINE: NORMAL
OPIATE SCREEN URINE: NORMAL
OXYCODONE URINE: NORMAL
PH UA: 7
PHENCYCLIDINE SCREEN URINE: NORMAL
POTASSIUM SERPL-SCNC: 4.4 MMOL/L (ref 3.5–5.1)
PROPOXYPHENE SCREEN: NORMAL
SODIUM BLD-SCNC: 137 MMOL/L (ref 136–145)
TOTAL PROTEIN: 6.4 G/DL (ref 6.4–8.2)

## 2022-03-14 LAB
ESTIMATED AVERAGE GLUCOSE: 139.9 MG/DL
HBA1C MFR BLD: 6.5 %

## 2022-06-14 ENCOUNTER — HOSPITAL ENCOUNTER (EMERGENCY)
Age: 72
Discharge: HOME | End: 2022-06-15
Attending: EMERGENCY MEDICINE
Payer: MEDICARE

## 2022-06-14 DIAGNOSIS — I10 UNCONTROLLED HYPERTENSION: ICD-10-CM

## 2022-06-14 DIAGNOSIS — R11.2 NON-INTRACTABLE VOMITING WITH NAUSEA, UNSPECIFIED VOMITING TYPE: ICD-10-CM

## 2022-06-14 DIAGNOSIS — R51.9 NONINTRACTABLE HEADACHE, UNSPECIFIED CHRONICITY PATTERN, UNSPECIFIED HEADACHE TYPE: Primary | ICD-10-CM

## 2022-06-14 LAB
INFLUENZA A: NOT DETECTED
INFLUENZA B: NOT DETECTED
SARS-COV-2 RNA, RT PCR: NOT DETECTED

## 2022-06-14 PROCEDURE — 80053 COMPREHEN METABOLIC PANEL: CPT

## 2022-06-14 PROCEDURE — 96375 TX/PRO/DX INJ NEW DRUG ADDON: CPT

## 2022-06-14 PROCEDURE — 85025 COMPLETE CBC W/AUTO DIFF WBC: CPT

## 2022-06-14 PROCEDURE — 84484 ASSAY OF TROPONIN QUANT: CPT

## 2022-06-14 PROCEDURE — 99284 EMERGENCY DEPT VISIT MOD MDM: CPT

## 2022-06-14 PROCEDURE — 36415 COLL VENOUS BLD VENIPUNCTURE: CPT

## 2022-06-14 PROCEDURE — 82077 ASSAY SPEC XCP UR&BREATH IA: CPT

## 2022-06-14 PROCEDURE — 96374 THER/PROPH/DIAG INJ IV PUSH: CPT

## 2022-06-14 PROCEDURE — 87636 SARSCOV2 & INF A&B AMP PRB: CPT

## 2022-06-14 ASSESSMENT — PAIN DESCRIPTION - LOCATION: LOCATION: HEAD

## 2022-06-14 ASSESSMENT — PAIN DESCRIPTION - DESCRIPTORS: DESCRIPTORS: ACHING

## 2022-06-14 ASSESSMENT — PAIN DESCRIPTION - PAIN TYPE: TYPE: ACUTE PAIN

## 2022-06-14 ASSESSMENT — PAIN SCALES - GENERAL: PAINLEVEL_OUTOF10: 9

## 2022-06-14 ASSESSMENT — PAIN DESCRIPTION - FREQUENCY: FREQUENCY: CONTINUOUS

## 2022-06-14 ASSESSMENT — PAIN - FUNCTIONAL ASSESSMENT: PAIN_FUNCTIONAL_ASSESSMENT: 0-10

## 2022-06-14 NOTE — Clinical Note
Catia Jean-Baptiste was seen and treated in our emergency department on 6/14/2022. She may return to work on 06/16/2022. If you have any questions or concerns, please don't hesitate to call.       Marcia Julian MD

## 2022-06-15 ENCOUNTER — APPOINTMENT (OUTPATIENT)
Dept: CT IMAGING | Age: 72
End: 2022-06-15
Payer: MEDICARE

## 2022-06-15 VITALS
WEIGHT: 128 LBS | HEART RATE: 97 BPM | SYSTOLIC BLOOD PRESSURE: 183 MMHG | DIASTOLIC BLOOD PRESSURE: 92 MMHG | HEIGHT: 65 IN | BODY MASS INDEX: 21.33 KG/M2 | TEMPERATURE: 97 F | OXYGEN SATURATION: 96 % | RESPIRATION RATE: 17 BRPM

## 2022-06-15 LAB
A/G RATIO: 2 (ref 1.1–2.2)
ALBUMIN SERPL-MCNC: 5.5 G/DL (ref 3.4–5)
ALP BLD-CCNC: 60 U/L (ref 40–129)
ALT SERPL-CCNC: 23 U/L (ref 10–40)
ANION GAP SERPL CALCULATED.3IONS-SCNC: 16 MMOL/L (ref 3–16)
AST SERPL-CCNC: 55 U/L (ref 15–37)
BASOPHILS ABSOLUTE: 0 K/UL (ref 0–0.2)
BASOPHILS RELATIVE PERCENT: 0.6 %
BILIRUB SERPL-MCNC: 0.5 MG/DL (ref 0–1)
BUN BLDV-MCNC: 25 MG/DL (ref 7–20)
CALCIUM SERPL-MCNC: 10.4 MG/DL (ref 8.3–10.6)
CHLORIDE BLD-SCNC: 98 MMOL/L (ref 99–110)
CO2: 25 MMOL/L (ref 21–32)
CREAT SERPL-MCNC: 0.6 MG/DL (ref 0.6–1.2)
EKG ATRIAL RATE: 258 BPM
EKG DIAGNOSIS: NORMAL
EKG P AXIS: 67 DEGREES
EKG Q-T INTERVAL: 470 MS
EKG QRS DURATION: 88 MS
EKG QTC CALCULATION (BAZETT): 457 MS
EKG R AXIS: 66 DEGREES
EKG T AXIS: 67 DEGREES
EKG VENTRICULAR RATE: 57 BPM
EOSINOPHILS ABSOLUTE: 0 K/UL (ref 0–0.6)
EOSINOPHILS RELATIVE PERCENT: 0.1 %
ETHANOL: NORMAL MG/DL (ref 0–0.08)
GFR AFRICAN AMERICAN: >60
GFR NON-AFRICAN AMERICAN: >60
GLUCOSE BLD-MCNC: 167 MG/DL (ref 70–99)
HCT VFR BLD CALC: 44.8 % (ref 36–48)
HEMOGLOBIN: 15.1 G/DL (ref 12–16)
LYMPHOCYTES ABSOLUTE: 0.8 K/UL (ref 1–5.1)
LYMPHOCYTES RELATIVE PERCENT: 10.3 %
MCH RBC QN AUTO: 31.2 PG (ref 26–34)
MCHC RBC AUTO-ENTMCNC: 33.7 G/DL (ref 31–36)
MCV RBC AUTO: 92.5 FL (ref 80–100)
MONOCYTES ABSOLUTE: 0.1 K/UL (ref 0–1.3)
MONOCYTES RELATIVE PERCENT: 1.9 %
NEUTROPHILS ABSOLUTE: 6.4 K/UL (ref 1.7–7.7)
NEUTROPHILS RELATIVE PERCENT: 87.1 %
PDW BLD-RTO: 13.5 % (ref 12.4–15.4)
PLATELET # BLD: 271 K/UL (ref 135–450)
PMV BLD AUTO: 7.4 FL (ref 5–10.5)
POTASSIUM REFLEX MAGNESIUM: 5.6 MMOL/L (ref 3.5–5.1)
RBC # BLD: 4.85 M/UL (ref 4–5.2)
SODIUM BLD-SCNC: 139 MMOL/L (ref 136–145)
TOTAL PROTEIN: 8.2 G/DL (ref 6.4–8.2)
TROPONIN: <0.01 NG/ML
WBC # BLD: 7.4 K/UL (ref 4–11)

## 2022-06-15 PROCEDURE — 93005 ELECTROCARDIOGRAM TRACING: CPT | Performed by: EMERGENCY MEDICINE

## 2022-06-15 PROCEDURE — 96375 TX/PRO/DX INJ NEW DRUG ADDON: CPT

## 2022-06-15 PROCEDURE — 70450 CT HEAD/BRAIN W/O DYE: CPT

## 2022-06-15 PROCEDURE — 93010 ELECTROCARDIOGRAM REPORT: CPT | Performed by: INTERNAL MEDICINE

## 2022-06-15 PROCEDURE — 2580000003 HC RX 258: Performed by: EMERGENCY MEDICINE

## 2022-06-15 PROCEDURE — 6360000002 HC RX W HCPCS: Performed by: EMERGENCY MEDICINE

## 2022-06-15 PROCEDURE — 96374 THER/PROPH/DIAG INJ IV PUSH: CPT

## 2022-06-15 PROCEDURE — 6370000000 HC RX 637 (ALT 250 FOR IP): Performed by: EMERGENCY MEDICINE

## 2022-06-15 RX ORDER — PROCHLORPERAZINE EDISYLATE 5 MG/ML
10 INJECTION INTRAMUSCULAR; INTRAVENOUS ONCE
Status: COMPLETED | OUTPATIENT
Start: 2022-06-15 | End: 2022-06-15

## 2022-06-15 RX ORDER — DIPHENHYDRAMINE HYDROCHLORIDE 50 MG/ML
25 INJECTION INTRAMUSCULAR; INTRAVENOUS ONCE
Status: COMPLETED | OUTPATIENT
Start: 2022-06-15 | End: 2022-06-15

## 2022-06-15 RX ORDER — 0.9 % SODIUM CHLORIDE 0.9 %
1000 INTRAVENOUS SOLUTION INTRAVENOUS ONCE
Status: COMPLETED | OUTPATIENT
Start: 2022-06-15 | End: 2022-06-15

## 2022-06-15 RX ORDER — BUTALBITAL, ACETAMINOPHEN AND CAFFEINE 50; 325; 40 MG/1; MG/1; MG/1
1 TABLET ORAL ONCE
Status: COMPLETED | OUTPATIENT
Start: 2022-06-15 | End: 2022-06-15

## 2022-06-15 RX ORDER — ONDANSETRON 2 MG/ML
4 INJECTION INTRAMUSCULAR; INTRAVENOUS ONCE
Status: COMPLETED | OUTPATIENT
Start: 2022-06-15 | End: 2022-06-15

## 2022-06-15 RX ORDER — CLONIDINE HYDROCHLORIDE 0.1 MG/1
0.2 TABLET ORAL ONCE
Status: COMPLETED | OUTPATIENT
Start: 2022-06-15 | End: 2022-06-15

## 2022-06-15 RX ADMIN — DIPHENHYDRAMINE HYDROCHLORIDE 25 MG: 50 INJECTION, SOLUTION INTRAMUSCULAR; INTRAVENOUS at 00:58

## 2022-06-15 RX ADMIN — BUTALBITAL, ACETAMINOPHEN, AND CAFFEINE 1 TABLET: 50; 325; 40 TABLET ORAL at 02:50

## 2022-06-15 RX ADMIN — PROCHLORPERAZINE EDISYLATE 10 MG: 5 INJECTION INTRAMUSCULAR; INTRAVENOUS at 01:00

## 2022-06-15 RX ADMIN — CLONIDINE HYDROCHLORIDE 0.2 MG: 0.1 TABLET ORAL at 01:48

## 2022-06-15 RX ADMIN — SODIUM CHLORIDE 1000 ML: 9 INJECTION, SOLUTION INTRAVENOUS at 00:37

## 2022-06-15 RX ADMIN — ONDANSETRON HYDROCHLORIDE 4 MG: 2 INJECTION, SOLUTION INTRAMUSCULAR; INTRAVENOUS at 00:56

## 2022-06-15 ASSESSMENT — PAIN SCALES - GENERAL
PAINLEVEL_OUTOF10: 6
PAINLEVEL_OUTOF10: 6
PAINLEVEL_OUTOF10: 10

## 2022-06-15 ASSESSMENT — PAIN DESCRIPTION - LOCATION
LOCATION: HEAD

## 2022-06-15 NOTE — ED PROVIDER NOTES
[address] [facility]  EMERGENCY DEPARTMENT ENCOUNTER      Pt Name: Tori Abdullahi  MRN: [id number]  [place] 1950  Date of evaluation: 6/14/2022  Provider: Nicky Silverio MD    CHIEF COMPLAINT       Chief Complaint   Patient presents with    Emesis     headache, body aches and vomitting since 6pm.          HISTORY OF PRESENT ILLNESS   (Location/Symptom, Timing/Onset, Context/Setting, Quality, Duration, Modifying Factors, Severity)  Note limiting factors. Tori Abdullahi is a 73 y.o. female with past medical history of hypertension, hyperlipidemia, alcohol abuse here today with headaches, nausea and vomiting. The patient states she woke up this morning with a severe intense headache. Located throughout her head. Throbbing and moderate to severe. No associated photophobia. No vision or speech changes. Denies numbness, tingling or weakness. Denies recent trauma or injury. She states when she was younger she had migraine headaches and this 1 feels similar but is not usually associated with the nausea and vomiting she is having now. She notes she has not had a migraine like this in quite some time. The patient states that this evening her headache began associated with frequent nausea and vomiting. She denies any abdominal pain whatsoever. No diarrhea. She said no recent fevers. No recent URI symptoms. She was not able to take her blood pressure medications this evening due to the nausea but has otherwise been compliant with meds    HPI    Nursing Notes were reviewed. REVIEW OF SYSTEMS    (2-9 systems for level 4, 10 or more for level 5)     Review of Systems    Please see HPI for pertinent positive and negative review of system findings. A full 10 system ROS was performed and otherwise negative.         PAST MEDICAL HISTORY     Past Medical History:   Diagnosis Date    Alcohol abuse     Depression     Hyperlipidemia     Hypertension     Hypokalemia     Rheumatic heart disease SURGICAL HISTORY       Past Surgical History:   Procedure Laterality Date    COLONOSCOPY      FRACTURE SURGERY      bilateral arm fracture    TONSILLECTOMY      TUBAL LIGATION           CURRENT MEDICATIONS       Previous Medications    ALBUTEROL SULFATE HFA (VENTOLIN HFA) 108 (90 BASE) MCG/ACT INHALER    Inhale 2 puffs into the lungs every 6 hours as needed for Wheezing    AMOXICILLIN (AMOXIL) 500 MG CAPSULE    Take 4 capsules 1 hour before procedure. ATORVASTATIN (LIPITOR) 20 MG TABLET    TAKE ONE TABLET BY MOUTH DAILY    BUDESONIDE-FORMOTEROL (SYMBICORT) 80-4.5 MCG/ACT AERO    Inhale 2 puffs into the lungs 2 times daily    CALCIUM CARB-CHOLECALCIFEROL (CALCIUM 600+D3) 600-200 MG-UNIT TABS    Take by mouth    CITALOPRAM (CELEXA) 20 MG TABLET    Take 20 mg by mouth daily    CITALOPRAM (CELEXA) 40 MG TABLET    TAKE ONE TABLET BY MOUTH DAILY    CITALOPRAM (CELEXA) 40 MG TABLET    TAKE ONE TABLET BY MOUTH DAILY    CLONIDINE (CATAPRES) 0.2 MG TABLET    TAKE ONE TABLET BY MOUTH ONCE NIGHTLY    CYCLOBENZAPRINE (FLEXERIL) 10 MG TABLET    Take 10 mg by mouth daily as needed for Muscle spasms    FLUTICASONE (FLONASE) 50 MCG/ACT NASAL SPRAY    1 spray by Each Nostril route daily    LORAZEPAM (ATIVAN) 1 MG TABLET    Take 1 tablet by mouth every 6 hours as needed for Anxiety    MAGNESIUM CHLORIDE (MAG DELAY 64) 535 (64 MG) MG TBCR CR TABLET    Take 1 tablet by mouth daily. MELATONIN 10 MG TABS    Take by mouth    MELOXICAM (MOBIC) 15 MG TABLET    TAKE ONE TABLET BY MOUTH DAILY    MULTIPLE VITAMIN (MULTI-VITAMIN DAILY) TABS    Take  by mouth. POLYETHYLENE GLYCOL 3350 (MIRALAX PO)    Take 0.52 mg by mouth    POTASSIUM GLUCONATE 550 MG TABS    Take 1 tablet by mouth    VARENICLINE (CHANTIX CONTINUING MONTH PAK) 1 MG TABLET    Follow package directions. VARENICLINE (CHANTIX STARTING MONTH PAK) 0.5 MG X 11 & 1 MG X 42 TABLET    Follow package directions.     VENLAFAXINE (EFFEXOR XR) 75 MG EXTENDED RELEASE CAPSULE    Take 75 mg by mouth daily       ALLERGIES     Codeine and Sulfa antibiotics    FAMILY HISTORY       Family History   Problem Relation Age of Onset    Stroke Sister     Heart Disease Mother     High Blood Pressure Mother     Heart Disease Father     Stroke Sister     Brain Cancer Sister           SOCIAL HISTORY       Social History     Socioeconomic History    Marital status:      Spouse name: None    Number of children: None    Years of education: None    Highest education level: None   Occupational History    None   Tobacco Use    Smoking status: Current Every Day Smoker     Packs/day: 0.50     Years: 31.00     Pack years: 15.50     Types: Cigarettes    Smokeless tobacco: Never Used    Tobacco comment: 1/2 elliott   Substance and Sexual Activity    Alcohol use: No     Alcohol/week: 0.0 standard drinks     Comment: last drink 7 yrs ago. recovering alcoholic    Drug use: No    Sexual activity: Never   Other Topics Concern    None   Social History Narrative    None     Social Determinants of Health     Financial Resource Strain:     Difficulty of Paying Living Expenses: Not on file   Food Insecurity:     Worried About Running Out of Food in the Last Year: Not on file    Kleber of Food in the Last Year: Not on file   Transportation Needs:     Lack of Transportation (Medical): Not on file    Lack of Transportation (Non-Medical):  Not on file   Physical Activity:     Days of Exercise per Week: Not on file    Minutes of Exercise per Session: Not on file   Stress:     Feeling of Stress : Not on file   Social Connections:     Frequency of Communication with Friends and Family: Not on file    Frequency of Social Gatherings with Friends and Family: Not on file    Attends Latter-day Services: Not on file    Active Member of Clubs or Organizations: Not on file    Attends Club or Organization Meetings: Not on file    Marital Status: Not on file   Intimate Partner Violence:     Fear of Current or Ex-Partner: Not on file    Emotionally Abused: Not on file    Physically Abused: Not on file    Sexually Abused: Not on file   Housing Stability:     Unable to Pay for Housing in the Last Year: Not on file    Number of Places Lived in the Last Year: Not on file    Unstable Housing in the Last Year: Not on file       SCREENINGS    [place] Coma Scale  Eye Opening: Spontaneous  Best Verbal Response: Oriented  Best Motor Response: Obeys commands  [place] Coma Scale Score: 15          PHYSICAL EXAM    (up to 7 for level 4, 8 or more for level 5)     ED Triage Vitals   BP Temp Temp Source Heart Rate Resp SpO2 Height Weight   06/14/22 2241 06/14/22 2235 06/14/22 2235 06/14/22 2238 06/14/22 2235 06/14/22 2235 06/14/22 2235 06/14/22 2235   (!) 190/100 97 °F (36.1 °C) Oral 88 18 97 % 5' 5\" (1.651 m) 128 lb (58.1 kg)       Physical Exam    General appearance:  Cooperative. Uncomfortable appearing. Skin:  Warm. Dry. Eye:  Extraocular movements intact. Pupils are equally round and reactive to light and accommodation. Extraocular motions are intact. CN II-XII intact. Ears, nose, mouth and throat:  Oral mucosa moist,  Neck:  Trachea midline. Heart:  Regular rate and rhythm  Perfusion:  intact  Respiratory:  Lungs clear to auscultation bilaterally. Respirations nonlabored. Abdominal:   Non distended. Nontender  Neurological:  Alert and oriented x 3. Moves all extremities spontaneously. Intact sensation throughout. Intact finger-to-nose bilaterally. No drift in the upper or lower extremities.   Gait normal.  2+ bilateral patellar reflexes  Musculoskeletal:   Normal ROM, no deformities          Psychiatric:  Normal mood      DIAGNOSTIC RESULTS       Labs Reviewed   CBC WITH AUTO DIFFERENTIAL - Abnormal; Notable for the following components:       Result Value    Lymphocytes Absolute 0.8 (*)     All other components within normal limits   COMPREHENSIVE METABOLIC PANEL W/ REFLEX TO MG FOR LOW K - Abnormal; Notable for the following components:    Potassium reflex Magnesium 5.6 (*)     Chloride 98 (*)     Glucose 167 (*)     BUN 25 (*)     Albumin 5.5 (*)     AST 55 (*)     All other components within normal limits   COVID-19 & INFLUENZA COMBO   TROPONIN   ETHANOL       Interpretation per the Radiologist below, if obtained/available at the time of this note:    CT Head WO Contrast   Final Result   No acute intracranial abnormality. MRI may be obtained if clinically   indicated. All other labs/imaging were within normal range or not returned as of this dictation. EMERGENCY DEPARTMENT COURSE and DIFFERENTIAL DIAGNOSIS/MDM:   Vitals:    Vitals:    06/15/22 0002 06/15/22 0102 06/15/22 0202 06/15/22 0231   BP: (!) 199/95 (!) 176/98 (!) 181/86 (!) 171/99   Pulse:  (!) 101 75 76   Resp:  14 18 20   Temp:       TempSrc:       SpO2: 97% 98% 95% 97%   Weight:       Height:           EKG: Sinus rhythm although the EKG incorrectly states atrial flutter at a rate of 57 bpm.  Nonspecific ST segment changes with flipped T waves V1 and flattening V2. No ST elevation. Patient presented to the emergency department today complaining of headache with nausea and vomiting. She reports a prior history of migraine headaches that affected her greatly, though notes it has been a few years since she has had a similar type migraine headache. Not particularly worst headache of life, though does note the nausea and vomiting is somewhat new. Normal neurologic exam.  Found to be hypertensive here but has not had her evening dose of clonidine due to her nausea and vomiting. Given her age and prior history of alcoholism a head CT was performed and showed no acute abnormality, mass or acute hemorrhage. She had laboratory studies performed which were unremarkable. She was treated symptomatically here with IV fluids, Compazine, Benadryl and has had nearly complete symptomatic resolution.   She has no more nausea and

## 2022-06-15 NOTE — ED NOTES
Pt left ambulatory with steady gait. Pt awakened to d/c and pt rating HA of 6/10.      Jose Joiner RN  06/15/22 [id number]

## 2022-06-15 NOTE — ED NOTES
Health Call Center    Phone Message    May a detailed message be left on voicemail: yes     Reason for Call: Other: Jenny from [facility] requests an update on the status of Omid's bladder surgery. The last update she reports is from 7/13/2020. Please call her back to discuss.      Action Taken: Message routed to:  [facility] ([facility]  Urology    Travel Screening: Not Applicable                                                                         Pt sleeping, resp easy and unlabored     Joey Baker, KEY  06/15/22 [id number]

## 2022-10-08 ENCOUNTER — HOSPITAL ENCOUNTER (OUTPATIENT)
Age: 72
Discharge: HOME | End: 2022-10-08
Payer: MEDICARE

## 2022-10-08 LAB
A/G RATIO: 2.3 (ref 1.1–2.2)
ALBUMIN SERPL-MCNC: 4.9 G/DL (ref 3.4–5)
ALP BLD-CCNC: 70 U/L (ref 40–129)
ALT SERPL-CCNC: 20 U/L (ref 10–40)
ANION GAP SERPL CALCULATED.3IONS-SCNC: 15 MMOL/L (ref 3–16)
AST SERPL-CCNC: 26 U/L (ref 15–37)
BASOPHILS ABSOLUTE: 0 K/UL (ref 0–0.2)
BASOPHILS RELATIVE PERCENT: 0.9 %
BILIRUB SERPL-MCNC: 0.5 MG/DL (ref 0–1)
BUN BLDV-MCNC: 18 MG/DL (ref 7–20)
CALCIUM SERPL-MCNC: 10.2 MG/DL (ref 8.3–10.6)
CHLORIDE BLD-SCNC: 103 MMOL/L (ref 99–110)
CHOLESTEROL, FASTING: 155 MG/DL (ref 0–199)
CO2: 25 MMOL/L (ref 21–32)
CREAT SERPL-MCNC: 0.9 MG/DL (ref 0.6–1.2)
EOSINOPHILS ABSOLUTE: 0.2 K/UL (ref 0–0.6)
EOSINOPHILS RELATIVE PERCENT: 3.7 %
GFR AFRICAN AMERICAN: >60
GFR NON-AFRICAN AMERICAN: >60
GLUCOSE FASTING: 100 MG/DL (ref 70–99)
HCT VFR BLD CALC: 38.6 % (ref 36–48)
HDLC SERPL-MCNC: 81 MG/DL (ref 40–60)
HEMOGLOBIN: 13.2 G/DL (ref 12–16)
LDL CHOLESTEROL CALCULATED: 62 MG/DL
LYMPHOCYTES ABSOLUTE: 1 K/UL (ref 1–5.1)
LYMPHOCYTES RELATIVE PERCENT: 23.1 %
MCH RBC QN AUTO: 31.7 PG (ref 26–34)
MCHC RBC AUTO-ENTMCNC: 34.2 G/DL (ref 31–36)
MCV RBC AUTO: 92.5 FL (ref 80–100)
MONOCYTES ABSOLUTE: 0.4 K/UL (ref 0–1.3)
MONOCYTES RELATIVE PERCENT: 9 %
NEUTROPHILS ABSOLUTE: 2.9 K/UL (ref 1.7–7.7)
NEUTROPHILS RELATIVE PERCENT: 63.3 %
PDW BLD-RTO: 13.3 % (ref 12.4–15.4)
PLATELET # BLD: 233 K/UL (ref 135–450)
PMV BLD AUTO: 7.9 FL (ref 5–10.5)
POTASSIUM SERPL-SCNC: 4.8 MMOL/L (ref 3.5–5.1)
RBC # BLD: 4.18 M/UL (ref 4–5.2)
SODIUM BLD-SCNC: 143 MMOL/L (ref 136–145)
TOTAL PROTEIN: 7 G/DL (ref 6.4–8.2)
TRIGLYCERIDE, FASTING: 60 MG/DL (ref 0–150)
TSH SERPL DL<=0.05 MIU/L-ACNC: 3.25 UIU/ML (ref 0.27–4.2)
VLDLC SERPL CALC-MCNC: 12 MG/DL
WBC # BLD: 4.5 K/UL (ref 4–11)

## 2022-10-08 PROCEDURE — 85025 COMPLETE CBC W/AUTO DIFF WBC: CPT

## 2022-10-08 PROCEDURE — 80053 COMPREHEN METABOLIC PANEL: CPT

## 2022-10-08 PROCEDURE — 84443 ASSAY THYROID STIM HORMONE: CPT

## 2022-10-08 PROCEDURE — 80061 LIPID PANEL: CPT

## 2022-10-08 PROCEDURE — 83036 HEMOGLOBIN GLYCOSYLATED A1C: CPT

## 2022-10-09 LAB
ESTIMATED AVERAGE GLUCOSE: 128.4 MG/DL
HBA1C MFR BLD: 6.1 %

## 2022-11-22 ENCOUNTER — HOSPITAL ENCOUNTER (OUTPATIENT)
Dept: GENERAL RADIOLOGY | Age: 72
Discharge: HOME | End: 2022-11-22
Payer: MEDICARE

## 2022-11-22 DIAGNOSIS — Z78.0 ASYMPTOMATIC MENOPAUSAL STATE: ICD-10-CM

## 2022-11-22 PROCEDURE — 77080 DXA BONE DENSITY AXIAL: CPT

## 2022-12-12 ENCOUNTER — APPOINTMENT (OUTPATIENT)
Dept: CT IMAGING | Age: 72
End: 2022-12-12
Payer: MEDICARE

## 2022-12-12 ENCOUNTER — HOSPITAL ENCOUNTER (EMERGENCY)
Age: 72
Discharge: HOME | End: 2022-12-12
Payer: MEDICARE

## 2022-12-12 ENCOUNTER — APPOINTMENT (OUTPATIENT)
Dept: GENERAL RADIOLOGY | Age: 72
End: 2022-12-12
Payer: MEDICARE

## 2022-12-12 VITALS
BODY MASS INDEX: 24.45 KG/M2 | SYSTOLIC BLOOD PRESSURE: 162 MMHG | RESPIRATION RATE: 16 BRPM | DIASTOLIC BLOOD PRESSURE: 83 MMHG | HEART RATE: 93 BPM | HEIGHT: 63 IN | TEMPERATURE: 98.3 F | WEIGHT: 138 LBS | OXYGEN SATURATION: 97 %

## 2022-12-12 DIAGNOSIS — S70.02XA CONTUSION OF LEFT HIP, INITIAL ENCOUNTER: Primary | ICD-10-CM
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
## 2022-12-12 DIAGNOSIS — S70.02XA HEMATOMA OF LEFT HIP, INITIAL ENCOUNTER: ICD-10-CM

## 2022-12-12 DIAGNOSIS — R26.9 ALTERED GAIT: ICD-10-CM

## 2022-12-12 LAB
A/G RATIO: 1.5 (ref 1.1–2.2)
ALBUMIN SERPL-MCNC: 4.4 G/DL (ref 3.4–5)
ALP BLD-CCNC: 69 U/L (ref 40–129)
ALT SERPL-CCNC: 21 U/L (ref 10–40)
ANION GAP SERPL CALCULATED.3IONS-SCNC: 11 MMOL/L (ref 3–16)
AST SERPL-CCNC: 28 U/L (ref 15–37)
BASOPHILS ABSOLUTE: 0.1 K/UL (ref 0–0.2)
BASOPHILS RELATIVE PERCENT: 1.1 %
BILIRUB SERPL-MCNC: 0.4 MG/DL (ref 0–1)
BUN BLDV-MCNC: 18 MG/DL (ref 7–20)
CALCIUM SERPL-MCNC: 9.6 MG/DL (ref 8.3–10.6)
CHLORIDE BLD-SCNC: 103 MMOL/L (ref 99–110)
CO2: 27 MMOL/L (ref 21–32)
CREAT SERPL-MCNC: 0.9 MG/DL (ref 0.6–1.2)
EOSINOPHILS ABSOLUTE: 0.1 K/UL (ref 0–0.6)
EOSINOPHILS RELATIVE PERCENT: 1.5 %
ETHANOL: NORMAL MG/DL (ref 0–0.08)
GFR SERPL CREATININE-BSD FRML MDRD: >60 ML/MIN/{1.73_M2}
GLUCOSE BLD-MCNC: 110 MG/DL (ref 70–99)
HCT VFR BLD CALC: 40.3 % (ref 36–48)
HEMOGLOBIN: 13.5 G/DL (ref 12–16)
LYMPHOCYTES ABSOLUTE: 1.9 K/UL (ref 1–5.1)
LYMPHOCYTES RELATIVE PERCENT: 30.3 %
MCH RBC QN AUTO: 30.8 PG (ref 26–34)
MCHC RBC AUTO-ENTMCNC: 33.6 G/DL (ref 31–36)
MCV RBC AUTO: 91.7 FL (ref 80–100)
MONOCYTES ABSOLUTE: 0.4 K/UL (ref 0–1.3)
MONOCYTES RELATIVE PERCENT: 7.2 %
NEUTROPHILS ABSOLUTE: 3.7 K/UL (ref 1.7–7.7)
NEUTROPHILS RELATIVE PERCENT: 59.9 %
PDW BLD-RTO: 13.5 % (ref 12.4–15.4)
PLATELET # BLD: 241 K/UL (ref 135–450)
PMV BLD AUTO: 7.5 FL (ref 5–10.5)
POTASSIUM REFLEX MAGNESIUM: 4.4 MMOL/L (ref 3.5–5.1)
RBC # BLD: 4.39 M/UL (ref 4–5.2)
SODIUM BLD-SCNC: 141 MMOL/L (ref 136–145)
TOTAL PROTEIN: 7.4 G/DL (ref 6.4–8.2)
TROPONIN: <0.01 NG/ML
WBC # BLD: 6.1 K/UL (ref 4–11)

## 2022-12-12 PROCEDURE — 80053 COMPREHEN METABOLIC PANEL: CPT

## 2022-12-12 PROCEDURE — 72125 CT NECK SPINE W/O DYE: CPT

## 2022-12-12 PROCEDURE — 85025 COMPLETE CBC W/AUTO DIFF WBC: CPT

## 2022-12-12 PROCEDURE — 82077 ASSAY SPEC XCP UR&BREATH IA: CPT

## 2022-12-12 PROCEDURE — 84484 ASSAY OF TROPONIN QUANT: CPT

## 2022-12-12 PROCEDURE — 72131 CT LUMBAR SPINE W/O DYE: CPT

## 2022-12-12 PROCEDURE — 71101 X-RAY EXAM UNILAT RIBS/CHEST: CPT

## 2022-12-12 PROCEDURE — 99285 EMERGENCY DEPT VISIT HI MDM: CPT

## 2022-12-12 PROCEDURE — 6370000000 HC RX 637 (ALT 250 FOR IP): Performed by: PHYSICIAN ASSISTANT

## 2022-12-12 PROCEDURE — 70450 CT HEAD/BRAIN W/O DYE: CPT

## 2022-12-12 PROCEDURE — 36415 COLL VENOUS BLD VENIPUNCTURE: CPT

## 2022-12-12 PROCEDURE — 93005 ELECTROCARDIOGRAM TRACING: CPT | Performed by: PHYSICIAN ASSISTANT

## 2022-12-12 PROCEDURE — 73502 X-RAY EXAM HIP UNI 2-3 VIEWS: CPT

## 2022-12-12 RX ORDER — LIDOCAINE 4 G/G
1 PATCH TOPICAL ONCE
Status: DISCONTINUED | OUTPATIENT
Start: 2022-12-12 | End: 2022-12-13 | Stop reason: HOSPADM

## 2022-12-12 RX ORDER — METHOCARBAMOL 500 MG/1
500 TABLET, FILM COATED ORAL 4 TIMES DAILY
Qty: 12 TABLET | Refills: 0 | Status: SHIPPED | OUTPATIENT
Start: 2022-12-12 | End: 2022-12-15

## 2022-12-12 RX ORDER — ACETAMINOPHEN 500 MG
1000 TABLET ORAL ONCE
Status: COMPLETED | OUTPATIENT
Start: 2022-12-12 | End: 2022-12-12

## 2022-12-12 RX ORDER — LIDOCAINE 50 MG/G
1 PATCH TOPICAL DAILY
Qty: 10 PATCH | Refills: 0 | Status: SHIPPED | OUTPATIENT
Start: 2022-12-12 | End: 2022-12-22

## 2022-12-12 RX ADMIN — ACETAMINOPHEN 1000 MG: 500 TABLET ORAL at 19:50

## 2022-12-12 ASSESSMENT — PAIN SCALES - GENERAL: PAINLEVEL_OUTOF10: 7

## 2022-12-12 NOTE — Clinical Note
Jaspreet Castellon was seen and treated in our emergency department on 12/12/2022. She may return to work on 12/15/2022. If you have any questions or concerns, please don't hesitate to call.       PARDEEP Quiñones

## 2022-12-12 NOTE — ED PROVIDER NOTES
[address] ED  EMERGENCY DEPARTMENT ENCOUNTER        Pt Name: Carey Varela  MRN: [id number]  [place] 1950  Date of evaluation: 12/12/2022  Provider: PARDEEP Solitario  PCP: Colton Acevedo,     This patient was not seen and evaluated by the attending physician No att. providers found. I have evaluated this patient. My supervising physician was available for consultation. CHIEF COMPLAINT       Chief Complaint   Patient presents with    Fall     Tripped and fell a few days ago. Ongoing pain left hip and back. Arrives ambulatory slowly. HISTORY OF PRESENT ILLNESS   (Location/Symptom, Timing/Onset, Context/Setting, Quality, Duration, Modifying Factors, Severity)  Note limiting factors. Carey Varela is a 81 y.o. female with past medical history of hypertension, hyperlipidemia, alcohol abuse, osteopenia who presents via private vehicle from her home for hip pain. She notes a couple of days ago she had a mechanical fall and she fell on her left lateral hip. She denies hitting her head no LOC. She was drinking etoh at time of fall. She notes since then she has had pain to the lateral left hip and low back pain. Moving the hip, WB and lying on her left side makes it worse. Nothing makes it better. She denies N/V headache or vision change or neck pain since the fall. She has no other acute concerns or complaints at this time. Denies Blood thinners. Nursing Notes were all reviewed and agreed with or any disagreements were addressed  in the HPI. Pt was seen during the [place] 19 pandemic. Appropriate PPE worn by ME during patient encounters. Pt seen during a time with constrained hospital bed capacity and other potential inpatient and outpatient resources were constrained due to the viral pandemic. REVIEW OF SYSTEMS    (2-9 systems for level 4, 10 or more for level 5)     Review of Systems    Positives and Pertinent negatives as per HPI.   Except as noted abovein the ROS, all other systems were reviewed and negative. PAST MEDICAL HISTORY     Past Medical History:   Diagnosis Date    Alcohol abuse     Depression     Hyperlipidemia     Hypertension     Hypokalemia     Rheumatic heart disease          SURGICAL HISTORY     Past Surgical History:   Procedure Laterality Date    COLONOSCOPY      FRACTURE SURGERY      bilateral arm fracture    TONSILLECTOMY      TUBAL LIGATION           CURRENTMEDICATIONS       Discharge Medication List as of 12/12/2022 10:34 PM        CONTINUE these medications which have NOT CHANGED    Details   Polyethylene Glycol 3350 (MIRALAX PO) Take 0.52 mg by mouthHistorical Med      Melatonin 10 MG TABS Take by mouthHistorical Med      Calcium Carb-Cholecalciferol (CALCIUM 600+D3) 600-200 MG-UNIT TABS Take by mouthHistorical Med      fluticasone (FLONASE) 50 MCG/ACT nasal spray 1 spray by Each Nostril route dailyHistorical Med      albuterol sulfate HFA (VENTOLIN HFA) 108 (90 Base) MCG/ACT inhaler Inhale 2 puffs into the lungs every 6 hours as needed for WheezingHistorical Med      venlafaxine (EFFEXOR XR) 75 MG extended release capsule Take 75 mg by mouth dailyHistorical Med      atorvastatin (LIPITOR) 20 MG tablet TAKE ONE TABLET BY MOUTH DAILY, Disp-90 tablet, R-0Normal      cloNIDine (CATAPRES) 0.2 MG tablet TAKE ONE TABLET BY MOUTH ONCE NIGHTLY, Disp-90 tablet, R-0Normal      LORazepam (ATIVAN) 1 MG tablet Take 1 tablet by mouth every 6 hours as needed for Anxiety, Disp-60 tablet, R-1Print      meloxicam (MOBIC) 15 MG tablet TAKE ONE TABLET BY MOUTH DAILY, Disp-90 tablet, R-1Normal      Potassium Gluconate 550 MG TABS Take 1 tablet by mouth      magnesium chloride (MAG DELAY 64) 535 (64 MG) MG TBCR CR tablet Take 1 tablet by mouth daily. , Disp-30 tablet, R-0      Multiple Vitamin (MULTI-VITAMIN DAILY) TABS Take  by mouth.                  ALLERGIES     Codeine and Sulfa antibiotics    FAMILYHISTORY       Family History   Problem Relation Age of Onset    Stroke Sister     Heart Disease Mother     High Blood Pressure Mother     Heart Disease Father     Stroke Sister     Brain Cancer Sister           SOCIAL HISTORY       Social History     Socioeconomic History    Marital status:    Tobacco Use    Smoking status: Every Day     Packs/day: 0.50     Years: 31.00     Pack years: 15.50     Types: Cigarettes    Smokeless tobacco: Never    Tobacco comments:     1/2 elliott   Substance and Sexual Activity    Alcohol use: No     Alcohol/week: 0.0 standard drinks     Comment: last drink 7 yrs ago. recovering alcoholic    Drug use: No    Sexual activity: Never       SCREENINGS             PHYSICAL EXAM    (up to 7 for level 4, 8 or more for level 5)     ED Triage Vitals [12/12/22 1639]   BP Temp Temp Source Heart Rate Resp SpO2 Height Weight   (!) 162/83 98.3 °F (36.8 °C) Oral 93 16 97 % 5' 3\" (1.6 m) 138 lb (62.6 kg)       Physical Exam  Vitals and nursing note reviewed. Constitutional:       General: She is not in acute distress. Appearance: She is well-developed. She is not ill-appearing, toxic-appearing or diaphoretic. HENT:      Head: Normocephalic and atraumatic. No raccoon eyes or Chinchilla's sign. Right Ear: External ear normal. No hemotympanum. Left Ear: External ear normal. No hemotympanum. Nose: Nose normal.      Mouth/Throat:      Mouth: Mucous membranes are moist.      Pharynx: Oropharynx is clear. Eyes:      General:         Right eye: No discharge. Left eye: No discharge. Extraocular Movements: Extraocular movements intact. Conjunctiva/sclera: Conjunctivae normal.      Pupils: Pupils are equal, round, and reactive to light. Cardiovascular:      Rate and Rhythm: Normal rate and regular rhythm. Pulses: Normal pulses. Heart sounds: Normal heart sounds. No murmur heard. No friction rub. No gallop. Pulmonary:      Effort: Pulmonary effort is normal. No respiratory distress.       Breath sounds: Normal breath sounds. No wheezing or rales. Chest:      Chest wall: No tenderness. Abdominal:      General: Abdomen is flat. There is no distension. Palpations: Abdomen is soft. Tenderness: There is no abdominal tenderness. Musculoskeletal:      Cervical back: Normal range of motion and neck supple. No rigidity or tenderness. Comments: Inspection of the left lower extremity reveals overall good bony alignment no gross deformity there is extensive bruising from the left greater trochanter that extends along the IT band. No crepitus, the hematoma is soft, compressible, no pain out of proportion to exam.  Full active range of motion at the hip and the knee. No pain with knee or hip range of motion. No gross ligamentous instability. Skin:     General: Skin is warm and dry. Capillary Refill: Capillary refill takes less than 2 seconds. Neurological:      General: No focal deficit present. Mental Status: She is alert and oriented to person, place, and time. Cranial Nerves: No cranial nerve deficit. Sensory: No sensory deficit. Motor: No weakness. Gait: Gait normal.      Deep Tendon Reflexes: Reflexes normal.   Psychiatric:         Behavior: Behavior normal.         DIAGNOSTIC RESULTS   LABS:    Labs Reviewed   COMPREHENSIVE METABOLIC PANEL W/ REFLEX TO MG FOR LOW K - Abnormal; Notable for the following components:       Result Value    Glucose 110 (*)     All other components within normal limits   CBC WITH AUTO DIFFERENTIAL   TROPONIN   ETHANOL       All other labs were within normal range or not returned as of this dictation. EKG: All EKG's are interpreted by the Emergency Department Physician who either signs orCo-signs this chart in the absence of a cardiologist.  Please see their note for interpretation of EKG.       RADIOLOGY:   Non-plain film images such as CT, Ultrasound and MRI are read by the radiologist. Plain radiographic images are visualized andpreliminarily interpreted by the  ED Provider with the below findings:        Interpretation perthe Radiologist below, if available at the time of this note:    CT LUMBAR SPINE WO CONTRAST   Final Result   1. No acute traumatic abnormality involving the lumbar spine. CT CERVICAL SPINE WO CONTRAST   Final Result   1. No acute intracranial abnormality. 2. No acute traumatic abnormality involving the cervical spine. 3. Multilevel degenerative disease seen throughout the cervical spine with   grade 1 spondylolisthesis of C3 on C4.         CT HEAD WO CONTRAST   Final Result   1. No acute intracranial abnormality. 2. No acute traumatic abnormality involving the cervical spine. 3. Multilevel degenerative disease seen throughout the cervical spine with   grade 1 spondylolisthesis of C3 on C4. XR HIP 2-3 VW W PELVIS LEFT   Final Result   1. Mild degenerative changes seen in the hip joints, lumbar spine and   sacroiliac joints, though no fracture or dislocation. XR RIBS LEFT INCLUDE CHEST (MIN 3 VIEWS)   Final Result   1. No rib fracture, pneumothorax, or other acute process. No results found.        PROCEDURES   Unless otherwise noted below, none     Procedures    CRITICAL CARE TIME   N/A    CONSULTS:  None      EMERGENCY DEPARTMENT COURSE and DIFFERENTIALDIAGNOSIS/MDM:   Vitals:    Vitals:    12/12/22 1639   BP: (!) 162/83   Pulse: 93   Resp: 16   Temp: 98.3 °F (36.8 °C)   TempSrc: Oral   SpO2: 97%   Weight: 138 lb (62.6 kg)   Height: 5' 3\" (1.6 m)       Patient was given thefollowing medications:  Medications   acetaminophen (TYLENOL) tablet 1,000 mg (1,000 mg Oral Given 12/12/22 1950)       PDMP Monitoring:    Last PDMP [place] as Reviewed [facility]):  Review User Review Instant Review Result          Last Controlled Substance Monitoring Documentation      [address] Office Visit from 6/2/2017 in [address] The Prescription Monitoring Report for this abnormality no evidence of acute back or cervical spine abnormality. No evidence of acute hip fracture. Patient is able to actively range of the joint and able to weight-bear I have no concern for occult fracture. I believe she is suffering a contusion and hematoma, she will be given outpatient referral to orthopedics for hematoma monitoring. 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 I observed the patient's gait, I do not appreciate any abnormality but patient notes that she has been Progressively feeling steady on her feet therefore she will be given outpatient referral to neurology to ensure her perception of gait abnormality is not from a neuro source, she will also be provided with outpatient Ortho. ER return precautions discussed. Based on patient's clinical history and clinical findings I currently estimate there is low probability for: compartment syndrome, DVT, septic arthritis, dislocation, surgically emergent fracture, tendon or NV injury. We have discussed the symptoms which are most concerning (e.g., changing or worsening pain, numbness, weakness) that necessitate immediate return. Pt is in agreement with the current plan and all questions were addressed. Is this patient to be included in the SEP-1 Core Measure due to severe sepsis or septic shock? No   Exclusion criteria - the patient is NOT to be included for SEP-1 Core Measure due to: Infection is not suspected    Discharge Time out:  CC Reviewed Yes   Test Results Yes     Vitals:    12/12/22 1639   BP: (!) 162/83   Pulse: 93   Resp: 16   Temp: 98.3 °F (36.8 °C)   SpO2: 97%              FINAL IMPRESSION      1. Contusion of left hip, initial encounter    2. Hematoma of left hip, initial encounter    3.  Fall from standing, initial encounter 4. Altered gait          DISPOSITION/PLAN   DISPOSITION Decision To Discharge 12/12/2022 10:17:21 PM      PATIENT REFERREDTO:  MD Katarina Ruiz [address]  [phone]    Schedule an appointment as soon as possible for a visit   [address] [facility] [address]  [phone]    Schedule an appointment as soon as possible for a visit   For a recheck in  3-7  days, sooner if no improvement or worsening of symptoms    [place] ([place] [facility] [place] [facility]  [address] [facility]  Go to   If symptoms worsen    Evelin Jack, [address] [facility] [address]  [address]    Schedule an appointment as soon as possible for a visit   neurology    DISCHARGE MEDICATIONS:  Discharge Medication List as of 12/12/2022 10:34 PM        START taking these medications    Details   methocarbamol (ROBAXIN) 500 MG tablet Take 1 tablet by mouth 4 times daily for 3 days, Disp-12 tablet, R-0Normal      lidocaine (LIDODERM) 5 % Place 1 patch onto the skin daily for 10 days 12 hours on, 12 hours off., Disp-10 patch, R-0Normal             DISCONTINUED MEDICATIONS:  Discharge Medication List as of 12/12/2022 10:34 PM                 (Please note that portions ofthis note were completed with a voice recognition program.  Efforts were made to edit the dictations but occasionally words are mis-transcribed.)    Maegan Gottlieb (electronically signed)       PARDEEP Gottlieb  12/16/22 [id number]

## 2022-12-13 LAB
EKG ATRIAL RATE: 70 BPM
EKG DIAGNOSIS: NORMAL
EKG P AXIS: 53 DEGREES
EKG P-R INTERVAL: 140 MS
EKG Q-T INTERVAL: 410 MS
EKG QRS DURATION: 78 MS
EKG QTC CALCULATION (BAZETT): 442 MS
EKG R AXIS: 68 DEGREES
EKG T AXIS: 61 DEGREES
EKG VENTRICULAR RATE: 70 BPM

## 2022-12-13 PROCEDURE — 93010 ELECTROCARDIOGRAM REPORT: CPT | Performed by: INTERNAL MEDICINE

## 2023-01-08 ENCOUNTER — HOSPITAL ENCOUNTER (OUTPATIENT)
Age: 73
Discharge: HOME | End: 2023-01-08
Payer: MEDICARE

## 2023-01-08 LAB
A/G RATIO: 1.6 (ref 1.1–2.2)
ALBUMIN SERPL-MCNC: 4 G/DL (ref 3.4–5)
ALP BLD-CCNC: 58 U/L (ref 40–129)
ALT SERPL-CCNC: 16 U/L (ref 10–40)
ANION GAP SERPL CALCULATED.3IONS-SCNC: 13 MMOL/L (ref 3–16)
AST SERPL-CCNC: 26 U/L (ref 15–37)
BASOPHILS ABSOLUTE: 0.1 K/UL (ref 0–0.2)
BASOPHILS RELATIVE PERCENT: 1.2 %
BILIRUB SERPL-MCNC: 0.3 MG/DL (ref 0–1)
BUN BLDV-MCNC: 25 MG/DL (ref 7–20)
C-REACTIVE PROTEIN: <3 MG/L (ref 0–5.1)
CALCIUM SERPL-MCNC: 9.5 MG/DL (ref 8.3–10.6)
CHLORIDE BLD-SCNC: 103 MMOL/L (ref 99–110)
CO2: 24 MMOL/L (ref 21–32)
CREAT SERPL-MCNC: 0.9 MG/DL (ref 0.6–1.2)
EOSINOPHILS ABSOLUTE: 0.1 K/UL (ref 0–0.6)
EOSINOPHILS RELATIVE PERCENT: 1.9 %
GFR SERPL CREATININE-BSD FRML MDRD: >60 ML/MIN/{1.73_M2}
GLUCOSE BLD-MCNC: 113 MG/DL (ref 70–99)
HCT VFR BLD CALC: 34.1 % (ref 36–48)
HEMOGLOBIN: 11.7 G/DL (ref 12–16)
IRON SATURATION: 19 % (ref 15–50)
IRON: 66 UG/DL (ref 37–145)
LYMPHOCYTES ABSOLUTE: 1.6 K/UL (ref 1–5.1)
LYMPHOCYTES RELATIVE PERCENT: 21.7 %
MCH RBC QN AUTO: 31.6 PG (ref 26–34)
MCHC RBC AUTO-ENTMCNC: 34.3 G/DL (ref 31–36)
MCV RBC AUTO: 92.1 FL (ref 80–100)
MONOCYTES ABSOLUTE: 0.7 K/UL (ref 0–1.3)
MONOCYTES RELATIVE PERCENT: 9.5 %
NEUTROPHILS ABSOLUTE: 5 K/UL (ref 1.7–7.7)
NEUTROPHILS RELATIVE PERCENT: 65.7 %
PDW BLD-RTO: 14.5 % (ref 12.4–15.4)
PLATELET # BLD: 264 K/UL (ref 135–450)
PMV BLD AUTO: 7.7 FL (ref 5–10.5)
POTASSIUM SERPL-SCNC: 4.7 MMOL/L (ref 3.5–5.1)
RBC # BLD: 3.71 M/UL (ref 4–5.2)
SEDIMENTATION RATE, ERYTHROCYTE: 26 MM/HR (ref 0–30)
SODIUM BLD-SCNC: 140 MMOL/L (ref 136–145)
T4 TOTAL: 6 UG/DL (ref 4.5–10.9)
TOTAL IRON BINDING CAPACITY: 340 UG/DL (ref 260–445)
TOTAL PROTEIN: 6.5 G/DL (ref 6.4–8.2)
TSH SERPL DL<=0.05 MIU/L-ACNC: 2.51 UIU/ML (ref 0.27–4.2)
VITAMIN B-12: 812 PG/ML (ref 211–911)
VITAMIN D 25-HYDROXY: 37.5 NG/ML
WBC # BLD: 7.6 K/UL (ref 4–11)

## 2023-01-08 PROCEDURE — 82306 VITAMIN D 25 HYDROXY: CPT

## 2023-01-08 PROCEDURE — 84443 ASSAY THYROID STIM HORMONE: CPT

## 2023-01-08 PROCEDURE — 86038 ANTINUCLEAR ANTIBODIES: CPT

## 2023-01-08 PROCEDURE — 82607 VITAMIN B-12: CPT

## 2023-01-08 PROCEDURE — 84439 ASSAY OF FREE THYROXINE: CPT

## 2023-01-08 PROCEDURE — 85025 COMPLETE CBC W/AUTO DIFF WBC: CPT

## 2023-01-08 PROCEDURE — 84425 ASSAY OF VITAMIN B-1: CPT

## 2023-01-08 PROCEDURE — 83550 IRON BINDING TEST: CPT

## 2023-01-08 PROCEDURE — 82746 ASSAY OF FOLIC ACID SERUM: CPT

## 2023-01-08 PROCEDURE — 80053 COMPREHEN METABOLIC PANEL: CPT

## 2023-01-08 PROCEDURE — 83540 ASSAY OF IRON: CPT

## 2023-01-08 PROCEDURE — 86039 ANTINUCLEAR ANTIBODIES (ANA): CPT

## 2023-01-08 PROCEDURE — 85652 RBC SED RATE AUTOMATED: CPT

## 2023-01-08 PROCEDURE — 84436 ASSAY OF TOTAL THYROXINE: CPT

## 2023-01-08 PROCEDURE — 86140 C-REACTIVE PROTEIN: CPT

## 2023-01-09 LAB
ANTI-NUCLEAR ANTIBODY (ANA): POSITIVE
FOLATE: >20 NG/ML (ref 4.78–24.2)
T4 FREE: 0.8 NG/DL (ref 0.9–1.8)

## 2023-01-10 ENCOUNTER — OFFICE VISIT (OUTPATIENT)
Dept: ORTHOPEDIC SURGERY | Age: 73
End: 2023-01-10

## 2023-01-10 VITALS — BODY MASS INDEX: 24.41 KG/M2 | WEIGHT: 143 LBS | HEIGHT: 64 IN

## 2023-01-10 DIAGNOSIS — M25.552 HIP PAIN, LEFT: Primary | ICD-10-CM

## 2023-01-10 DIAGNOSIS — T14.8XXA HEMATOMA: ICD-10-CM

## 2023-01-10 RX ORDER — LISINOPRIL 10 MG/1
10 TABLET ORAL DAILY
COMMUNITY

## 2023-01-10 NOTE — PROGRESS NOTES
Dr Iva Morrow      Date /Time 1/10/2023       4:20 PM EST  Name Geovanna Garcia             1950   Location  [address] [place] [address]  MRN [id number]                Chief Complaint   Patient presents with    Hip Injury     Slipped and fell getting in to a vehicle 12/10/22. History of Present Illness    Geovanna Garcia is a 81 y.o. female who presents with  left hip pain. Sent in consultation by Mana Kyle DO,. Injury Mechanism:  fall. Worker's Comp. & legal issues:   none. Previous Treatments: Ice, Heat, and NSAIDs    Patient presents to the office today for new problem. Patient here with a chief complaint of left lateral hip pain. On 12/10/2022 patient tripped and fell. She states she has had several falls. She states that her legs just seem to stop working. She has no groin pain or buttocks pain. She has no pain with weightbearing. She simply has pain with pressure applied over the lateral aspect of the hip. Past History  Past Medical History:   Diagnosis Date    Alcohol abuse     Depression     Hyperlipidemia     Hypertension     Hypokalemia     Rheumatic heart disease      Past Surgical History:   Procedure Laterality Date    COLONOSCOPY      FRACTURE SURGERY      bilateral arm fracture    TONSILLECTOMY      TUBAL LIGATION       Family History   Problem Relation Age of Onset    Stroke Sister     Heart Disease Mother     High Blood Pressure Mother     Heart Disease Father     Stroke Sister     Brain Cancer Sister      Social History     Tobacco Use    Smoking status: Every Day     Packs/day: 0.50     Years: 31.00     Pack years: 15.50     Types: Cigarettes    Smokeless tobacco: Never    Tobacco comments:     1/2 elliott   Substance Use Topics    Alcohol use: No     Alcohol/week: 0.0 standard drinks     Comment: last drink 7 yrs ago.  recovering alcoholic      Current Outpatient Medications on File Prior to Visit   Medication Sig Dispense Refill    Polyethylene Glycol 3350 (Dorene Whipple PO) Take 0.52 mg by mouth      Melatonin 10 MG TABS Take by mouth      Calcium Carb-Cholecalciferol (CALCIUM 600+D3) 600-200 MG-UNIT TABS Take by mouth      fluticasone (FLONASE) 50 MCG/ACT nasal spray 1 spray by Each Nostril route daily      venlafaxine (EFFEXOR XR) 75 MG extended release capsule Take 75 mg by mouth daily      atorvastatin (LIPITOR) 20 MG tablet TAKE ONE TABLET BY MOUTH DAILY 90 tablet 0    cloNIDine (CATAPRES) 0.2 MG tablet TAKE ONE TABLET BY MOUTH ONCE NIGHTLY 90 tablet 0    LORazepam (ATIVAN) 1 MG tablet Take 1 tablet by mouth every 6 hours as needed for Anxiety 60 tablet 1    meloxicam (MOBIC) 15 MG tablet TAKE ONE TABLET BY MOUTH DAILY 90 tablet 1    Potassium Gluconate 550 MG TABS Take 1 tablet by mouth      magnesium chloride (MAG DELAY 64) 535 (64 MG) MG TBCR CR tablet Take 1 tablet by mouth daily. 30 tablet 0    Multiple Vitamin (MULTI-VITAMIN DAILY) TABS Take  by mouth.        lisinopril (PRINIVIL;ZESTRIL) 10 MG tablet Take 10 mg by mouth daily      albuterol sulfate HFA (PROVENTIL;VENTOLIN;PROAIR) 108 (90 Base) MCG/ACT inhaler Inhale 2 puffs into the lungs every 6 hours as needed for Wheezing (Patient not taking: Reported on 1/10/2023)       No current facility-administered medications on file prior to visit. ASCVD 10-YEAR RISK SCORE  The 10-year ASCVD risk score (Bhavana DK, et al., 2019) is: 26.3%    Values used to calculate the score:      Age: 81 years      Sex: Female      Is Non- : No      Diabetic: No      Tobacco smoker: Yes      Systolic Blood Pressure: 477 mmHg      Is BP treated: Yes      HDL Cholesterol: 81 mg/dL      Total Cholesterol: 155 mg/dL   . Review of Systems  10-point ROS is negative other than HPI. Physical Exam  Based off 1997 Exam Criteria  Ht 5' 4\" (1.626 m)   Wt 143 lb (64.9 kg)   BMI 24.55 kg/m²      Constitutional:       General: He is not in acute distress. Appearance: Normal appearance.    Cardiovascular:      Rate and Rhythm: Normal rate and regular rhythm. Pulses: Normal pulses. Pulmonary:      Effort: Pulmonary effort is normal. No respiratory distress. Neurological:      Mental Status: He is alert and oriented to person, place, and time. Mental status is at baseline. Musculoskeletal:  Gait:  altered    Skin: Clean and intact.   No open sores or wounds    Lymphatics: No palpable lymph nodes    Spine / Hip Exam:      RIGHT  LEFT    Lumbar Spine Exam  [x] All Neg    [x] All Neg     Straight leg raise  []  []Not tested   []  []Not tested    Clonus  []  []Not tested   []  []Not tested    Pain with motion  []  []Not tested   []  []Not tested    Radiculopathy  []  []Not tested   []  []Not tested    Paraspinal muscle tenderness  [] Paraspinal  []Midline   [] Paraspinal  []Midline   Sensation RIGHT  LEFT    L3  [x] Normal []Decreased    [x] Normal []Decreased   L4  [x] Normal  []Decreased   [x] Normal []Decreased   L5  [x] Normal []Decreased   [x] Normal []Decreased   S1  [x] Normal  []Decreased   [x] Normal []Decreased   Pelvis       Scoliosis  [x] Nml  [] Present     Leg-length discrepency  [x] Equal  [] Right longer   [] Left longer   Range of Motion Active Passive Active Passive   Hip Flexion 120  120    Abduction 50  50    External Rotation @ 90 flex 65  65    Internal Rotation @ 90 flex 20  20           Hip Impingement / Dysplasia  [x] All Neg  [] Not tested   [x] All Neg  [] Not tested    Hip impingement test  []  []Not tested   []  []Not tested    C-sign  []  []Not tested   []  []Not tested    Anterior instability apprehension  []  []Not tested   []  []Not tested    Posterior instability apprehension  []  []Not tested   []  []Not tested    Uncontained Internal rotation  []  []Not tested  []  []Not tested          Abductors  [x] All Neg  [] Not tested   [] All Neg  [] Not tested    Medius strength  []  []Not tested   []  []Not tested    Minimum strength  []  []Not tested   []  []Not tested    IT band tendonitis  [] []Not tested   []  []Not tested    Trochanteric tenderness  []  []Not tested  [x]  []Not tested   Sciatic neuropathic pain  []  []Not tested   []  []Not tested       Hematoma present lateral hip    Post-arthroplasty  [] All Neg  [] Not tested   [] All Neg  [] Not tested    Rectus tendonitis  []  []Not tested   []  []Not tested    Iliopsoas tendonitis       Start-up pain  []  []Not tested   []  []Not tested          Imaging    Left Hip: [address]: Trays were ordered and reviewed of the left hip.  3 views. AP pelvis, lateral, and false profile. They demonstrate no evidence of fractures or dislocations with well-maintained joint space. Procedure:  Orders Placed This Encounter   Procedures    XR HIP LEFT (2-3 VIEWS)    Walker Medline     Patient was prescribed a Medline Walker. This mobility device is required for the following reasons:    Patient has mobility limitations that significantly impair their ability to participate in one or more mobility related activities of daily living. The patient is able to safely use the mobility device. Functional mobility deficit can be sufficiently resolved with the use of this device. Verbal and written instructions for the use of and application of this item were provided. The patient was educated and fit by a healthcare professional with expert knowledge and specialization in brace application while under the direct supervision of the treating physician. They were instructed to contact the office immediately should the equipment result in increased pain, decreased sensation, increased swelling or worsening of the condition. Assessment and Plan  Angie Magallanes was seen today for hip injury. Diagnoses and all orders for this visit:    Hip pain, left  -     XR HIP LEFT (2-3 VIEWS)    Hematoma      Patient has extreme balance issues. She already has a scheduled appointment with a neurosurgeon.   Her hip hematoma should resolve without consequence. I have provided her with a walker. Follow-up as needed. I discussed with Eric Bryson that her history, symptoms, signs, and imaging are most consistent with  hip hematoma    We reviewed the natural history of these conditions and treatment options ranging from conservative measures (rest, icing, activity modification, physical therapy, pain meds, cortisone injection) to surgical options. In terms of treatment, I recommended continuing with rest, icing, avoidance of painful activities, NSAIDs or pain meds as tolerated, and physical therapy. We discussed surgical options as well, should conservative measures fail. Electronically signed by Saskia Hassan MD on 1/10/2023 at 4:20 PM  This dictation was generated by voice recognition computer software. Although all attempts are made to edit the dictation for accuracy, there may be errors in the transcription that are not intended.

## 2023-01-12 LAB
ANTINUCLEAR AB INTERPRETIVE COMMENT: NORMAL
ANTINUCLEAR ANTIBODY, HEP-2, IGG: NORMAL

## 2023-01-13 LAB — VITAMIN B1 WHOLE BLOOD: 101 NMOL/L (ref 70–180)

## 2023-02-10 ENCOUNTER — HOSPITAL ENCOUNTER (OUTPATIENT)
Age: 73
Discharge: HOME | End: 2023-02-10
Payer: MEDICARE

## 2023-02-10 DIAGNOSIS — Z01.818 ENCOUNTER FOR PREADMISSION TESTING: ICD-10-CM

## 2023-02-10 LAB
ABO/RH: NORMAL
ANION GAP SERPL CALCULATED.3IONS-SCNC: 11 MMOL/L (ref 3–16)
ANTIBODY SCREEN: NORMAL
APTT: 27.7 SEC (ref 23–34.3)
BACTERIA: NORMAL /HPF
BASOPHILS ABSOLUTE: 0.1 K/UL (ref 0–0.2)
BASOPHILS RELATIVE PERCENT: 1 %
BILIRUBIN URINE: NEGATIVE
BLOOD, URINE: NEGATIVE
BUN BLDV-MCNC: 24 MG/DL (ref 7–20)
CALCIUM SERPL-MCNC: 9.9 MG/DL (ref 8.3–10.6)
CHLORIDE BLD-SCNC: 103 MMOL/L (ref 99–110)
CLARITY: ABNORMAL
CO2: 29 MMOL/L (ref 21–32)
COLOR: YELLOW
CREAT SERPL-MCNC: 0.8 MG/DL (ref 0.6–1.2)
EOSINOPHILS ABSOLUTE: 0.1 K/UL (ref 0–0.6)
EOSINOPHILS RELATIVE PERCENT: 2.2 %
EPITHELIAL CELLS, UA: 0 /HPF (ref 0–5)
GFR SERPL CREATININE-BSD FRML MDRD: >60 ML/MIN/{1.73_M2}
GLUCOSE BLD-MCNC: 106 MG/DL (ref 70–99)
GLUCOSE URINE: NEGATIVE MG/DL
HCT VFR BLD CALC: 38.7 % (ref 36–48)
HEMOGLOBIN: 12.6 G/DL (ref 12–16)
HYALINE CASTS: 0 /LPF (ref 0–8)
INR BLD: 0.96 (ref 0.87–1.14)
KETONES, URINE: NEGATIVE MG/DL
LEUKOCYTE ESTERASE, URINE: NEGATIVE
LYMPHOCYTES ABSOLUTE: 1.3 K/UL (ref 1–5.1)
LYMPHOCYTES RELATIVE PERCENT: 20.5 %
MCH RBC QN AUTO: 30.4 PG (ref 26–34)
MCHC RBC AUTO-ENTMCNC: 32.4 G/DL (ref 31–36)
MCV RBC AUTO: 93.9 FL (ref 80–100)
MICROSCOPIC EXAMINATION: YES
MONOCYTES ABSOLUTE: 0.7 K/UL (ref 0–1.3)
MONOCYTES RELATIVE PERCENT: 10.7 %
NEUTROPHILS ABSOLUTE: 4.2 K/UL (ref 1.7–7.7)
NEUTROPHILS RELATIVE PERCENT: 65.6 %
NITRITE, URINE: NEGATIVE
PDW BLD-RTO: 13.7 % (ref 12.4–15.4)
PH UA: 7.5 (ref 5–8)
PLATELET # BLD: 266 K/UL (ref 135–450)
PMV BLD AUTO: 7.8 FL (ref 5–10.5)
POTASSIUM SERPL-SCNC: 3.9 MMOL/L (ref 3.5–5.1)
PROTEIN UA: NEGATIVE MG/DL
PROTHROMBIN TIME: 12.7 SEC (ref 11.7–14.5)
RBC # BLD: 4.12 M/UL (ref 4–5.2)
RBC UA: 3 /HPF (ref 0–4)
SEDIMENTATION RATE, ERYTHROCYTE: 23 MM/HR (ref 0–30)
SODIUM BLD-SCNC: 143 MMOL/L (ref 136–145)
SPECIFIC GRAVITY UA: 1.01 (ref 1–1.03)
URINE REFLEX TO CULTURE: ABNORMAL
URINE TYPE: ABNORMAL
UROBILINOGEN, URINE: 0.2 E.U./DL
WBC # BLD: 6.4 K/UL (ref 4–11)
WBC UA: 1 /HPF (ref 0–5)

## 2023-02-10 PROCEDURE — 85652 RBC SED RATE AUTOMATED: CPT

## 2023-02-10 PROCEDURE — 86850 RBC ANTIBODY SCREEN: CPT

## 2023-02-10 PROCEDURE — 85610 PROTHROMBIN TIME: CPT

## 2023-02-10 PROCEDURE — 87641 MR-STAPH DNA AMP PROBE: CPT

## 2023-02-10 PROCEDURE — 85730 THROMBOPLASTIN TIME PARTIAL: CPT

## 2023-02-10 PROCEDURE — 86900 BLOOD TYPING SEROLOGIC ABO: CPT

## 2023-02-10 PROCEDURE — 36415 COLL VENOUS BLD VENIPUNCTURE: CPT

## 2023-02-10 PROCEDURE — 86901 BLOOD TYPING SEROLOGIC RH(D): CPT

## 2023-02-10 PROCEDURE — 80048 BASIC METABOLIC PNL TOTAL CA: CPT

## 2023-02-10 PROCEDURE — 85025 COMPLETE CBC W/AUTO DIFF WBC: CPT

## 2023-02-10 PROCEDURE — 81001 URINALYSIS AUTO W/SCOPE: CPT

## 2023-02-12 LAB — MRSA SCREEN RT-PCR: NORMAL

## 2023-02-13 ENCOUNTER — ANESTHESIA EVENT (OUTPATIENT)
Dept: OPERATING ROOM | Age: 73
End: 2023-02-13
Payer: MEDICARE

## 2023-02-13 RX ORDER — MAGNESIUM OXIDE 400 MG/1
800 TABLET ORAL DAILY
Status: ON HOLD | COMMUNITY

## 2023-02-13 NOTE — PROGRESS NOTES
[address] [facility] time ______0600______        Surgery time ____0730________    Take the following medications with a sip of water: Follow your MD/Surgeons pre-procedure instructions regarding your medications     Do not eat or drink anything after 12:00 midnight prior to your surgery. This includes water chewing gum, mints and ice chips. You may brush your teeth and gargle the morning of your surgery, but do not swallow the water     Please see your family doctor/pediatrician for a history and physical and/or concerning medications. Bring any test results/reports from your physicians office. If you are under the care of a heart doctor or specialist doctor, please be aware that you may be asked to them for clearance    You may be asked to stop blood thinners such as Coumadin, Plavix, Fragmin, Lovenox, etc., or any anti-inflammatories such as:  Aspirin, Ibuprofen, Advil, Naproxen prior to your surgery. We also ask that you stop any OTC medications such as fish oil, vitamin E, glucosamine, garlic, Multivitamins, COQ 10, etc.    We ask that you do not smoke 24 hours prior to surgery  We ask that you do not  drink any alcoholic beverages 24 hours prior to surgery     You must make arrangements for a responsible adult to take you home after your surgery. For your safety you will not be allowed to leave alone or drive yourself home. Your surgery will be cancelled if you do not have a ride home. Also for your safety, it is strongly suggested that someone stay with you the first 24 hours after your surgery. A parent or legal guardian must accompany a child scheduled for surgery and plan to stay at the hospital until the child is discharged. Please do not bring other children with you. For your comfort, please wear simple loose fitting clothing to the hospital.  Please do not bring valuables.     Do not wear any make-up or nail polish on your fingers or toes      For your safety, please do not wear any jewelry or body piercing's on the day of surgery. All jewelry must be removed. If you have dentures, they will be removed before going to operating room. For your convenience, we will provide you with a container. If you wear contact lenses or glasses, they will be removed, please bring a case for them. If you have a living will and a durable power of  for healthcare, please bring in a copy. As part of our patient safety program to minimize surgical site infections, we ask you to do the following:    Please notify your surgeon if you develop any illness between         now and the  day of your surgery. This includes a cough, cold, fever, sore throat, nausea,         or vomiting, and diarrhea, etc.   Please notify your surgeon if you experience dizziness, shortness         of breath or blurred vision between now and the time of your surgery. Do not shave your operative site 96 hours prior to surgery. For face and neck surgery, men may use an electric razor 48 hours   prior to surgery. You may shower the night before surgery or the morning of   your surgery with an antibacterial soap. You will need to bring a photo ID and insurance card    [facility] has an onsite pharmacy, would you like to utilize our pharmacy     If you will be staying overnight and use a C-pap machine, please bring   your C-pap to hospital     Our goal is to provide you with excellent care, therefore, visitors will be limited to two(2) in the room at a time so that we may focus on providing this care for you. Please contact pre-admission testing if you have any further questions. [facility] phone number:  [address] PAT fax number:  [phone]  Please note these are generalized instructions for all surgical cases, you may be provided with more specific instructions according to your surgery.     C-Difficile admission screening and protocol:       * Admitted with diarrhea? [] YES    [x]  NO     *Prior history of C-Diff. In last 3 months? [] YES    [x]  NO     *Antibiotic use in the past 6-8 weeks? [x]  NO    []  YES                 If yes, which ANTIBIOTIC AND REASON______     *Prior hospitalization or nursing home in the last month? []  YES    [x]  NO        SAFETY FIRST. .call before you fall

## 2023-02-14 ENCOUNTER — ANESTHESIA (OUTPATIENT)
Dept: OPERATING ROOM | Age: 73
End: 2023-02-14
Payer: MEDICARE

## 2023-02-14 ENCOUNTER — HOSPITAL ENCOUNTER (INPATIENT)
Age: 73
LOS: 3 days | Discharge: NURSING, REHAB OR LONG-TERM CARE | End: 2023-02-17
Attending: NEUROLOGICAL SURGERY | Admitting: NEUROLOGICAL SURGERY
Payer: MEDICARE

## 2023-02-14 ENCOUNTER — APPOINTMENT (OUTPATIENT)
Dept: GENERAL RADIOLOGY | Age: 73
End: 2023-02-14
Attending: NEUROLOGICAL SURGERY
Payer: MEDICARE

## 2023-02-14 DIAGNOSIS — M47.12 CERVICAL SPONDYLOSIS WITH MYELOPATHY: ICD-10-CM

## 2023-02-14 DIAGNOSIS — Z01.818 ENCOUNTER FOR PREADMISSION TESTING: Primary | ICD-10-CM

## 2023-02-14 LAB
ABO/RH: NORMAL
ANTIBODY SCREEN: NORMAL

## 2023-02-14 PROCEDURE — 6370000000 HC RX 637 (ALT 250 FOR IP)

## 2023-02-14 PROCEDURE — 3700000000 HC ANESTHESIA ATTENDED CARE: Performed by: NEUROLOGICAL SURGERY

## 2023-02-14 PROCEDURE — 6360000002 HC RX W HCPCS

## 2023-02-14 PROCEDURE — 2580000003 HC RX 258: Performed by: NEUROLOGICAL SURGERY

## 2023-02-14 PROCEDURE — 2580000003 HC RX 258: Performed by: NURSE ANESTHETIST, CERTIFIED REGISTERED

## 2023-02-14 PROCEDURE — 7100000000 HC PACU RECOVERY - FIRST 15 MIN: Performed by: NEUROLOGICAL SURGERY

## 2023-02-14 PROCEDURE — 6370000000 HC RX 637 (ALT 250 FOR IP): Performed by: NEUROLOGICAL SURGERY

## 2023-02-14 PROCEDURE — 3209999900 FLUORO FOR SURGICAL PROCEDURES

## 2023-02-14 PROCEDURE — 86850 RBC ANTIBODY SCREEN: CPT

## 2023-02-14 PROCEDURE — 2709999900 HC NON-CHARGEABLE SUPPLY: Performed by: NEUROLOGICAL SURGERY

## 2023-02-14 PROCEDURE — A4217 STERILE WATER/SALINE, 500 ML: HCPCS | Performed by: NEUROLOGICAL SURGERY

## 2023-02-14 PROCEDURE — 86900 BLOOD TYPING SEROLOGIC ABO: CPT

## 2023-02-14 PROCEDURE — 6370000000 HC RX 637 (ALT 250 FOR IP): Performed by: ANESTHESIOLOGY

## 2023-02-14 PROCEDURE — 2580000003 HC RX 258

## 2023-02-14 PROCEDURE — 0RG20K1 FUSION OF 2 OR MORE CERVICAL VERTEBRAL JOINTS WITH NONAUTOLOGOUS TISSUE SUBSTITUTE, POSTERIOR APPROACH, POSTERIOR COLUMN, OPEN APPROACH: ICD-10-PCS | Performed by: NEUROLOGICAL SURGERY

## 2023-02-14 PROCEDURE — 6360000002 HC RX W HCPCS: Performed by: ANESTHESIOLOGY

## 2023-02-14 PROCEDURE — 3700000001 HC ADD 15 MINUTES (ANESTHESIA): Performed by: NEUROLOGICAL SURGERY

## 2023-02-14 PROCEDURE — 6360000002 HC RX W HCPCS: Performed by: NEUROLOGICAL SURGERY

## 2023-02-14 PROCEDURE — 1200000000 HC SEMI PRIVATE

## 2023-02-14 PROCEDURE — 3600000015 HC SURGERY LEVEL 5 ADDTL 15MIN: Performed by: NEUROLOGICAL SURGERY

## 2023-02-14 PROCEDURE — 72020 X-RAY EXAM OF SPINE 1 VIEW: CPT

## 2023-02-14 PROCEDURE — 2500000003 HC RX 250 WO HCPCS: Performed by: NURSE ANESTHETIST, CERTIFIED REGISTERED

## 2023-02-14 PROCEDURE — 6360000002 HC RX W HCPCS: Performed by: NURSE ANESTHETIST, CERTIFIED REGISTERED

## 2023-02-14 PROCEDURE — 86901 BLOOD TYPING SEROLOGIC RH(D): CPT

## 2023-02-14 PROCEDURE — 2720000010 HC SURG SUPPLY STERILE: Performed by: NEUROLOGICAL SURGERY

## 2023-02-14 PROCEDURE — C1713 ANCHOR/SCREW BN/BN,TIS/BN: HCPCS | Performed by: NEUROLOGICAL SURGERY

## 2023-02-14 PROCEDURE — 3600000005 HC SURGERY LEVEL 5 BASE: Performed by: NEUROLOGICAL SURGERY

## 2023-02-14 PROCEDURE — 7100000001 HC PACU RECOVERY - ADDTL 15 MIN: Performed by: NEUROLOGICAL SURGERY

## 2023-02-14 PROCEDURE — 00NW0ZZ RELEASE CERVICAL SPINAL CORD, OPEN APPROACH: ICD-10-PCS | Performed by: NEUROLOGICAL SURGERY

## 2023-02-14 PROCEDURE — 2500000003 HC RX 250 WO HCPCS: Performed by: NEUROLOGICAL SURGERY

## 2023-02-14 DEVICE — SCREW SPNL L10MM DIA3.5MM OCCIPITOCERVICOTHORACIC TI POLYAX: Type: IMPLANTABLE DEVICE | Site: NECK | Status: FUNCTIONAL

## 2023-02-14 DEVICE — GRAFT BNE SUB M CANC FRZN MORSELIZED W/ VIABLE CELL TRINITY: Type: IMPLANTABLE DEVICE | Site: NECK | Status: FUNCTIONAL

## 2023-02-14 DEVICE — SET SCR SPNL TI ALLY OCCIPITOCERVICOTHORACIC STREAMLINE OCT: Type: IMPLANTABLE DEVICE | Site: NECK | Status: FUNCTIONAL

## 2023-02-14 DEVICE — ROD SPNL 60 MM: Type: IMPLANTABLE DEVICE | Site: NECK | Status: FUNCTIONAL

## 2023-02-14 RX ORDER — EPHEDRINE SULFATE/0.9% NACL/PF 50 MG/5 ML
SYRINGE (ML) INTRAVENOUS PRN
Status: DISCONTINUED | OUTPATIENT
Start: 2023-02-14 | End: 2023-02-14 | Stop reason: SDUPTHER

## 2023-02-14 RX ORDER — SODIUM CHLORIDE 9 MG/ML
INJECTION, SOLUTION INTRAVENOUS PRN
Status: DISCONTINUED | OUTPATIENT
Start: 2023-02-14 | End: 2023-02-14 | Stop reason: HOSPADM

## 2023-02-14 RX ORDER — MEPERIDINE HYDROCHLORIDE 25 MG/ML
12.5 INJECTION INTRAMUSCULAR; INTRAVENOUS; SUBCUTANEOUS EVERY 5 MIN PRN
Status: DISCONTINUED | OUTPATIENT
Start: 2023-02-14 | End: 2023-02-14 | Stop reason: HOSPADM

## 2023-02-14 RX ORDER — KETAMINE HCL IN NACL, ISO-OSM 100MG/10ML
SYRINGE (ML) INJECTION PRN
Status: DISCONTINUED | OUTPATIENT
Start: 2023-02-14 | End: 2023-02-14 | Stop reason: SDUPTHER

## 2023-02-14 RX ORDER — ONDANSETRON 2 MG/ML
4 INJECTION INTRAMUSCULAR; INTRAVENOUS EVERY 6 HOURS PRN
Status: DISCONTINUED | OUTPATIENT
Start: 2023-02-14 | End: 2023-02-17 | Stop reason: HOSPADM

## 2023-02-14 RX ORDER — MAGNESIUM HYDROXIDE/ALUMINUM HYDROXICE/SIMETHICONE 120; 1200; 1200 MG/30ML; MG/30ML; MG/30ML
15 SUSPENSION ORAL EVERY 6 HOURS PRN
Status: DISCONTINUED | OUTPATIENT
Start: 2023-02-14 | End: 2023-02-17 | Stop reason: HOSPADM

## 2023-02-14 RX ORDER — LORAZEPAM 1 MG/1
1 TABLET ORAL 2 TIMES DAILY PRN
Status: DISCONTINUED | OUTPATIENT
Start: 2023-02-14 | End: 2023-02-17 | Stop reason: HOSPADM

## 2023-02-14 RX ORDER — SODIUM CHLORIDE 9 MG/ML
INJECTION, SOLUTION INTRAVENOUS CONTINUOUS
Status: DISCONTINUED | OUTPATIENT
Start: 2023-02-14 | End: 2023-02-17 | Stop reason: HOSPADM

## 2023-02-14 RX ORDER — PHENYLEPHRINE HCL IN 0.9% NACL 1 MG/10 ML
SYRINGE (ML) INTRAVENOUS PRN
Status: DISCONTINUED | OUTPATIENT
Start: 2023-02-14 | End: 2023-02-14 | Stop reason: SDUPTHER

## 2023-02-14 RX ORDER — METHOCARBAMOL 100 MG/ML
INJECTION, SOLUTION INTRAMUSCULAR; INTRAVENOUS PRN
Status: DISCONTINUED | OUTPATIENT
Start: 2023-02-14 | End: 2023-02-14 | Stop reason: SDUPTHER

## 2023-02-14 RX ORDER — ROCURONIUM BROMIDE 10 MG/ML
INJECTION, SOLUTION INTRAVENOUS PRN
Status: DISCONTINUED | OUTPATIENT
Start: 2023-02-14 | End: 2023-02-14 | Stop reason: SDUPTHER

## 2023-02-14 RX ORDER — LORAZEPAM 1 MG/1
1 TABLET ORAL EVERY 6 HOURS PRN
Status: DISCONTINUED | OUTPATIENT
Start: 2023-02-14 | End: 2023-02-14 | Stop reason: DRUGHIGH

## 2023-02-14 RX ORDER — GINSENG 100 MG
CAPSULE ORAL
Status: COMPLETED | OUTPATIENT
Start: 2023-02-14 | End: 2023-02-14

## 2023-02-14 RX ORDER — FLUTICASONE PROPIONATE 50 MCG
1 SPRAY, SUSPENSION (ML) NASAL DAILY
Status: DISCONTINUED | OUTPATIENT
Start: 2023-02-15 | End: 2023-02-17 | Stop reason: HOSPADM

## 2023-02-14 RX ORDER — LANOLIN ALCOHOL/MO/W.PET/CERES
9 CREAM (GRAM) TOPICAL NIGHTLY PRN
Status: DISCONTINUED | OUTPATIENT
Start: 2023-02-14 | End: 2023-02-14 | Stop reason: ALTCHOICE

## 2023-02-14 RX ORDER — MIDAZOLAM HYDROCHLORIDE 1 MG/ML
INJECTION INTRAMUSCULAR; INTRAVENOUS PRN
Status: DISCONTINUED | OUTPATIENT
Start: 2023-02-14 | End: 2023-02-14 | Stop reason: SDUPTHER

## 2023-02-14 RX ORDER — ONDANSETRON 2 MG/ML
4 INJECTION INTRAMUSCULAR; INTRAVENOUS
Status: DISCONTINUED | OUTPATIENT
Start: 2023-02-14 | End: 2023-02-14 | Stop reason: HOSPADM

## 2023-02-14 RX ORDER — LABETALOL HYDROCHLORIDE 5 MG/ML
5 INJECTION, SOLUTION INTRAVENOUS
Status: DISCONTINUED | OUTPATIENT
Start: 2023-02-14 | End: 2023-02-14 | Stop reason: HOSPADM

## 2023-02-14 RX ORDER — ONDANSETRON 4 MG/1
4 TABLET, ORALLY DISINTEGRATING ORAL EVERY 8 HOURS PRN
Status: DISCONTINUED | OUTPATIENT
Start: 2023-02-14 | End: 2023-02-17 | Stop reason: HOSPADM

## 2023-02-14 RX ORDER — SUCCINYLCHOLINE/SOD CL,ISO/PF 200MG/10ML
SYRINGE (ML) INTRAVENOUS PRN
Status: DISCONTINUED | OUTPATIENT
Start: 2023-02-14 | End: 2023-02-14 | Stop reason: SDUPTHER

## 2023-02-14 RX ORDER — METHOCARBAMOL 750 MG/1
750 TABLET, FILM COATED ORAL 4 TIMES DAILY
Status: DISCONTINUED | OUTPATIENT
Start: 2023-02-14 | End: 2023-02-17 | Stop reason: HOSPADM

## 2023-02-14 RX ORDER — LABETALOL HYDROCHLORIDE 5 MG/ML
INJECTION, SOLUTION INTRAVENOUS PRN
Status: DISCONTINUED | OUTPATIENT
Start: 2023-02-14 | End: 2023-02-14 | Stop reason: SDUPTHER

## 2023-02-14 RX ORDER — ONDANSETRON 2 MG/ML
INJECTION INTRAMUSCULAR; INTRAVENOUS PRN
Status: DISCONTINUED | OUTPATIENT
Start: 2023-02-14 | End: 2023-02-14 | Stop reason: SDUPTHER

## 2023-02-14 RX ORDER — SODIUM CHLORIDE 0.9 % (FLUSH) 0.9 %
5-40 SYRINGE (ML) INJECTION PRN
Status: DISCONTINUED | OUTPATIENT
Start: 2023-02-14 | End: 2023-02-17 | Stop reason: HOSPADM

## 2023-02-14 RX ORDER — SODIUM CHLORIDE 0.9 % (FLUSH) 0.9 %
5-40 SYRINGE (ML) INJECTION PRN
Status: DISCONTINUED | OUTPATIENT
Start: 2023-02-14 | End: 2023-02-14 | Stop reason: HOSPADM

## 2023-02-14 RX ORDER — SODIUM CHLORIDE 0.9 % (FLUSH) 0.9 %
5-40 SYRINGE (ML) INJECTION EVERY 12 HOURS SCHEDULED
Status: DISCONTINUED | OUTPATIENT
Start: 2023-02-14 | End: 2023-02-17 | Stop reason: HOSPADM

## 2023-02-14 RX ORDER — FENTANYL CITRATE 50 UG/ML
INJECTION, SOLUTION INTRAMUSCULAR; INTRAVENOUS PRN
Status: DISCONTINUED | OUTPATIENT
Start: 2023-02-14 | End: 2023-02-14 | Stop reason: SDUPTHER

## 2023-02-14 RX ORDER — DEXAMETHASONE SODIUM PHOSPHATE 4 MG/ML
INJECTION, SOLUTION INTRA-ARTICULAR; INTRALESIONAL; INTRAMUSCULAR; INTRAVENOUS; SOFT TISSUE PRN
Status: DISCONTINUED | OUTPATIENT
Start: 2023-02-14 | End: 2023-02-14 | Stop reason: SDUPTHER

## 2023-02-14 RX ORDER — LANOLIN ALCOHOL/MO/W.PET/CERES
9 CREAM (GRAM) TOPICAL NIGHTLY PRN
Status: DISCONTINUED | OUTPATIENT
Start: 2023-02-14 | End: 2023-02-17 | Stop reason: HOSPADM

## 2023-02-14 RX ORDER — ATORVASTATIN CALCIUM 20 MG/1
20 TABLET, FILM COATED ORAL DAILY
Status: DISCONTINUED | OUTPATIENT
Start: 2023-02-14 | End: 2023-02-17 | Stop reason: HOSPADM

## 2023-02-14 RX ORDER — SODIUM CHLORIDE 0.9 % (FLUSH) 0.9 %
5-40 SYRINGE (ML) INJECTION EVERY 12 HOURS SCHEDULED
Status: DISCONTINUED | OUTPATIENT
Start: 2023-02-14 | End: 2023-02-14 | Stop reason: HOSPADM

## 2023-02-14 RX ORDER — POLYETHYLENE GLYCOL 3350 17 G/17G
17 POWDER, FOR SOLUTION ORAL DAILY
Status: DISCONTINUED | OUTPATIENT
Start: 2023-02-14 | End: 2023-02-17 | Stop reason: HOSPADM

## 2023-02-14 RX ORDER — BUPIVACAINE HYDROCHLORIDE AND EPINEPHRINE 5; 5 MG/ML; UG/ML
INJECTION, SOLUTION EPIDURAL; INTRACAUDAL; PERINEURAL
Status: COMPLETED | OUTPATIENT
Start: 2023-02-14 | End: 2023-02-14

## 2023-02-14 RX ORDER — SODIUM CHLORIDE 9 MG/ML
INJECTION, SOLUTION INTRAVENOUS PRN
Status: DISCONTINUED | OUTPATIENT
Start: 2023-02-14 | End: 2023-02-17 | Stop reason: HOSPADM

## 2023-02-14 RX ORDER — SODIUM CHLORIDE 9 MG/ML
INJECTION, SOLUTION INTRAVENOUS CONTINUOUS PRN
Status: DISCONTINUED | OUTPATIENT
Start: 2023-02-14 | End: 2023-02-14 | Stop reason: SDUPTHER

## 2023-02-14 RX ORDER — LISINOPRIL 10 MG/1
10 TABLET ORAL DAILY
Status: DISCONTINUED | OUTPATIENT
Start: 2023-02-14 | End: 2023-02-17 | Stop reason: HOSPADM

## 2023-02-14 RX ORDER — PROPOFOL 10 MG/ML
INJECTION, EMULSION INTRAVENOUS PRN
Status: DISCONTINUED | OUTPATIENT
Start: 2023-02-14 | End: 2023-02-14 | Stop reason: SDUPTHER

## 2023-02-14 RX ORDER — OXYCODONE HYDROCHLORIDE 5 MG/1
5 TABLET ORAL EVERY 4 HOURS PRN
Status: DISCONTINUED | OUTPATIENT
Start: 2023-02-14 | End: 2023-02-17 | Stop reason: HOSPADM

## 2023-02-14 RX ORDER — VENLAFAXINE HYDROCHLORIDE 75 MG/1
150 CAPSULE, EXTENDED RELEASE ORAL DAILY
Status: DISCONTINUED | OUTPATIENT
Start: 2023-02-14 | End: 2023-02-17 | Stop reason: HOSPADM

## 2023-02-14 RX ORDER — ACETAMINOPHEN 325 MG/1
650 TABLET ORAL EVERY 6 HOURS
Status: DISCONTINUED | OUTPATIENT
Start: 2023-02-14 | End: 2023-02-17 | Stop reason: HOSPADM

## 2023-02-14 RX ORDER — FENTANYL CITRATE 50 UG/ML
25 INJECTION, SOLUTION INTRAMUSCULAR; INTRAVENOUS EVERY 5 MIN PRN
Status: DISCONTINUED | OUTPATIENT
Start: 2023-02-14 | End: 2023-02-14 | Stop reason: HOSPADM

## 2023-02-14 RX ORDER — GLYCOPYRROLATE 0.2 MG/ML
INJECTION INTRAMUSCULAR; INTRAVENOUS PRN
Status: DISCONTINUED | OUTPATIENT
Start: 2023-02-14 | End: 2023-02-14 | Stop reason: SDUPTHER

## 2023-02-14 RX ORDER — LIDOCAINE HYDROCHLORIDE 20 MG/ML
INJECTION, SOLUTION EPIDURAL; INFILTRATION; INTRACAUDAL; PERINEURAL PRN
Status: DISCONTINUED | OUTPATIENT
Start: 2023-02-14 | End: 2023-02-14 | Stop reason: SDUPTHER

## 2023-02-14 RX ORDER — OXYCODONE HYDROCHLORIDE 10 MG/1
10 TABLET ORAL EVERY 4 HOURS PRN
Status: DISCONTINUED | OUTPATIENT
Start: 2023-02-14 | End: 2023-02-17 | Stop reason: HOSPADM

## 2023-02-14 RX ORDER — CLONIDINE HYDROCHLORIDE 0.1 MG/1
0.1 TABLET ORAL NIGHTLY
Status: DISCONTINUED | OUTPATIENT
Start: 2023-02-14 | End: 2023-02-17 | Stop reason: HOSPADM

## 2023-02-14 RX ORDER — DIPHENHYDRAMINE HYDROCHLORIDE 50 MG/ML
12.5 INJECTION INTRAMUSCULAR; INTRAVENOUS
Status: DISCONTINUED | OUTPATIENT
Start: 2023-02-14 | End: 2023-02-14 | Stop reason: HOSPADM

## 2023-02-14 RX ORDER — LANOLIN ALCOHOL/MO/W.PET/CERES
800 CREAM (GRAM) TOPICAL DAILY
Status: DISCONTINUED | OUTPATIENT
Start: 2023-02-14 | End: 2023-02-17 | Stop reason: HOSPADM

## 2023-02-14 RX ORDER — LANOLIN ALCOHOL/MO/W.PET/CERES
800 CREAM (GRAM) TOPICAL DAILY
Status: DISCONTINUED | OUTPATIENT
Start: 2023-02-14 | End: 2023-02-14 | Stop reason: ALTCHOICE

## 2023-02-14 RX ORDER — SCOLOPAMINE TRANSDERMAL SYSTEM 1 MG/1
1 PATCH, EXTENDED RELEASE TRANSDERMAL ONCE
Status: COMPLETED | OUTPATIENT
Start: 2023-02-14 | End: 2023-02-17

## 2023-02-14 RX ADMIN — ACETAMINOPHEN 325MG 650 MG: 325 TABLET ORAL at 14:21

## 2023-02-14 RX ADMIN — Medication 200 MG: at 08:22

## 2023-02-14 RX ADMIN — METOPROLOL TARTRATE 25 MG: 25 TABLET, FILM COATED ORAL at 21:29

## 2023-02-14 RX ADMIN — SODIUM CHLORIDE: 9 INJECTION, SOLUTION INTRAVENOUS at 07:43

## 2023-02-14 RX ADMIN — POLYETHYLENE GLYCOL 3350 17 G: 17 POWDER, FOR SOLUTION ORAL at 14:27

## 2023-02-14 RX ADMIN — HYDROMORPHONE HYDROCHLORIDE 0.5 MG: 1 INJECTION, SOLUTION INTRAMUSCULAR; INTRAVENOUS; SUBCUTANEOUS at 11:54

## 2023-02-14 RX ADMIN — GLYCOPYRROLATE 0.2 MG: 0.2 INJECTION INTRAMUSCULAR; INTRAVENOUS at 07:43

## 2023-02-14 RX ADMIN — FENTANYL CITRATE 50 MCG: 50 INJECTION INTRAMUSCULAR; INTRAVENOUS at 08:35

## 2023-02-14 RX ADMIN — SODIUM CHLORIDE: 9 INJECTION, SOLUTION INTRAVENOUS at 07:33

## 2023-02-14 RX ADMIN — MIDAZOLAM 2 MG: 1 INJECTION INTRAMUSCULAR; INTRAVENOUS at 07:33

## 2023-02-14 RX ADMIN — SUGAMMADEX 200 MG: 100 INJECTION, SOLUTION INTRAVENOUS at 10:20

## 2023-02-14 RX ADMIN — Medication 20 MG: at 07:48

## 2023-02-14 RX ADMIN — ROCURONIUM BROMIDE 10 MG: 100 INJECTION, SOLUTION INTRAVENOUS at 07:37

## 2023-02-14 RX ADMIN — LABETALOL HYDROCHLORIDE 2.5 MG: 5 INJECTION, SOLUTION INTRAVENOUS at 09:55

## 2023-02-14 RX ADMIN — Medication 20 MG: at 08:10

## 2023-02-14 RX ADMIN — LISINOPRIL 10 MG: 10 TABLET ORAL at 14:21

## 2023-02-14 RX ADMIN — ROCURONIUM BROMIDE 20 MG: 100 INJECTION, SOLUTION INTRAVENOUS at 08:58

## 2023-02-14 RX ADMIN — PROPOFOL 100 MG: 10 INJECTION, EMULSION INTRAVENOUS at 07:37

## 2023-02-14 RX ADMIN — Medication 800 MG: at 16:17

## 2023-02-14 RX ADMIN — PROPOFOL 50 MG: 10 INJECTION, EMULSION INTRAVENOUS at 10:18

## 2023-02-14 RX ADMIN — FENTANYL CITRATE 100 MCG: 50 INJECTION INTRAMUSCULAR; INTRAVENOUS at 10:37

## 2023-02-14 RX ADMIN — VANCOMYCIN HYDROCHLORIDE 750 MG: 750 INJECTION, POWDER, LYOPHILIZED, FOR SOLUTION INTRAVENOUS at 16:14

## 2023-02-14 RX ADMIN — LABETALOL HYDROCHLORIDE 2.5 MG: 5 INJECTION, SOLUTION INTRAVENOUS at 10:02

## 2023-02-14 RX ADMIN — CEFAZOLIN 2000 MG: 2 INJECTION, POWDER, FOR SOLUTION INTRAMUSCULAR; INTRAVENOUS at 07:33

## 2023-02-14 RX ADMIN — FENTANYL CITRATE 50 MCG: 50 INJECTION INTRAMUSCULAR; INTRAVENOUS at 08:42

## 2023-02-14 RX ADMIN — FENTANYL CITRATE 100 MCG: 50 INJECTION INTRAMUSCULAR; INTRAVENOUS at 07:34

## 2023-02-14 RX ADMIN — SODIUM CHLORIDE: 9 INJECTION, SOLUTION INTRAVENOUS at 21:34

## 2023-02-14 RX ADMIN — ROCURONIUM BROMIDE 40 MG: 100 INJECTION, SOLUTION INTRAVENOUS at 07:41

## 2023-02-14 RX ADMIN — VENLAFAXINE HYDROCHLORIDE 150 MG: 75 CAPSULE, EXTENDED RELEASE ORAL at 14:21

## 2023-02-14 RX ADMIN — ONDANSETRON 4 MG: 2 INJECTION INTRAMUSCULAR; INTRAVENOUS at 07:43

## 2023-02-14 RX ADMIN — Medication 20 MG: at 07:45

## 2023-02-14 RX ADMIN — METHOCARBAMOL TABLETS 750 MG: 750 TABLET, COATED ORAL at 21:29

## 2023-02-14 RX ADMIN — OXYCODONE HYDROCHLORIDE 10 MG: 10 TABLET ORAL at 21:29

## 2023-02-14 RX ADMIN — LABETALOL HYDROCHLORIDE 2.5 MG: 5 INJECTION, SOLUTION INTRAVENOUS at 08:58

## 2023-02-14 RX ADMIN — SODIUM CHLORIDE: 9 INJECTION, SOLUTION INTRAVENOUS at 14:29

## 2023-02-14 RX ADMIN — LABETALOL HYDROCHLORIDE 2.5 MG: 5 INJECTION, SOLUTION INTRAVENOUS at 08:42

## 2023-02-14 RX ADMIN — DEXAMETHASONE SODIUM PHOSPHATE 8 MG: 4 INJECTION, SOLUTION INTRAMUSCULAR; INTRAVENOUS at 07:43

## 2023-02-14 RX ADMIN — SODIUM CHLORIDE: 9 INJECTION, SOLUTION INTRAVENOUS at 09:51

## 2023-02-14 RX ADMIN — Medication 100 MG: at 07:37

## 2023-02-14 RX ADMIN — METHOCARBAMOL 1000 MG: 100 INJECTION, SOLUTION INTRAMUSCULAR; INTRAVENOUS at 08:18

## 2023-02-14 RX ADMIN — Medication 10 MG: at 07:50

## 2023-02-14 RX ADMIN — LIDOCAINE HYDROCHLORIDE 60 MG: 20 INJECTION, SOLUTION EPIDURAL; INFILTRATION; INTRACAUDAL; PERINEURAL at 07:37

## 2023-02-14 RX ADMIN — ACETAMINOPHEN 325MG 650 MG: 325 TABLET ORAL at 21:29

## 2023-02-14 RX ADMIN — CLONIDINE HYDROCHLORIDE 0.1 MG: 0.1 TABLET ORAL at 21:29

## 2023-02-14 RX ADMIN — Medication 10 MG: at 08:32

## 2023-02-14 RX ADMIN — ATORVASTATIN CALCIUM 20 MG: 20 TABLET, FILM COATED ORAL at 14:21

## 2023-02-14 RX ADMIN — ROCURONIUM BROMIDE 30 MG: 100 INJECTION, SOLUTION INTRAVENOUS at 08:10

## 2023-02-14 ASSESSMENT — LIFESTYLE VARIABLES: SMOKING_STATUS: 1

## 2023-02-14 ASSESSMENT — PAIN SCALES - GENERAL
PAINLEVEL_OUTOF10: 9
PAINLEVEL_OUTOF10: 5
PAINLEVEL_OUTOF10: 9
PAINLEVEL_OUTOF10: 9
PAINLEVEL_OUTOF10: 8
PAINLEVEL_OUTOF10: 8
PAINLEVEL_OUTOF10: 5

## 2023-02-14 ASSESSMENT — PAIN DESCRIPTION - LOCATION
LOCATION: NECK

## 2023-02-14 ASSESSMENT — PAIN DESCRIPTION - FREQUENCY
FREQUENCY: CONTINUOUS

## 2023-02-14 ASSESSMENT — PAIN DESCRIPTION - ORIENTATION
ORIENTATION: MID

## 2023-02-14 ASSESSMENT — PAIN - FUNCTIONAL ASSESSMENT
PAIN_FUNCTIONAL_ASSESSMENT: PREVENTS OR INTERFERES SOME ACTIVE ACTIVITIES AND ADLS
PAIN_FUNCTIONAL_ASSESSMENT: 0-10
PAIN_FUNCTIONAL_ASSESSMENT: ACTIVITIES ARE NOT PREVENTED

## 2023-02-14 ASSESSMENT — PAIN DESCRIPTION - PAIN TYPE
TYPE: SURGICAL PAIN

## 2023-02-14 ASSESSMENT — PAIN DESCRIPTION - ONSET
ONSET: ON-GOING

## 2023-02-14 ASSESSMENT — PAIN DESCRIPTION - DESCRIPTORS
DESCRIPTORS: SORE;HEAVINESS
DESCRIPTORS: ACHING
DESCRIPTORS: ACHING

## 2023-02-14 ASSESSMENT — ENCOUNTER SYMPTOMS: SHORTNESS OF BREATH: 0

## 2023-02-14 NOTE — BRIEF OP NOTE
Brief Postoperative Note      Patient: Ian Tran  YOB: 1950  MRN: [id number]    Date of Procedure: 2/14/2023    Pre-Op Diagnosis: CERVICAL Spondylosis WITH MYELOPATHY    Post-Op Diagnosis: Same       Procedure(s):  DECOMPRESSIVE POSTERIOR CERVICAL LAMINECTOMY C3, C4, C5 WITH LATERAL MASS DANII AND SCREW FUSION C3-C6    Surgeon(s):  Greg Oliveira MD    Assistant:  Surgical Assistant: Veto Bang; Roddy Calloway    Anesthesia: General    Estimated Blood Loss (mL): less than 913     Complications: None    Specimens:   * No specimens in log *    Implants:  Implant Name Type Inv. Item Serial No.  Lot No. LRB No. Used Action   GRAFT BNE SUB M CANC FRZN MORSELIZED W/ VIABLE CELL [place] - [id number]  GRAFT BNE SUB M CANC FRZN MORSELIZED W/ VIABLE CELL [place] [id number] [employer]-  N/A 1 Implanted   SCREW SPNL L10MM DIA3. 5MM OCCIPITOCERVICOTHORACIC TI [employer] - [id number]  SCREW SPNL L10MM DIA3. 5MM OCCIPITOCERVICOTHORACIC TI POLYAX  [employer]  N/A 7 Implanted   SET SCR SPNL TI ALLY OCCIPITOCERVICOTHORACIC STREAMLINE OCT - [id number]  SET SCR SPNL TI ALLY OCCIPITOCERVICOTHORACIC STREAMLINE OCT  [employer]  N/A 7 Implanted   DANII SPNL 60 MM - [id number]  DANII SPNL 60 MM  [employer]-  N/A 2 Implanted         Drains:   Closed/Suction Drain Posterior Neck (Active)       Urinary Catheter 02/14/23 2 Way (Active)       Findings: severe canal stenosis C34 C45. Severe lateral mass degeneration worst at left C34, unable to achieve adequate purchase of screw on left at C34. Stabilized C3-6 on right and C4-6 on left.       Electronically signed by Greg Oliveira MD on 2/14/2023 at 10:37 AM    [id number]

## 2023-02-14 NOTE — PROGRESS NOTES
PACU Transfer Note    Vitals:    02/14/23 1215   BP: (!) 127/57   Pulse: 66   Resp: 10   Temp: 96.9 °F (36.1 °C)   SpO2: 93%       In: 1250 [I.V.:1200]  Out: 905 [Urine:825; Drains:5]    Pain assessment:  present - adequately treated  Pain Level: 5    Report given to Receiving unit [facility]. Drain in place and emptied, keating draining with no issues. Dressing to neck clean, dry and intact.      2/14/2023 12:30 PM

## 2023-02-14 NOTE — ANESTHESIA POSTPROCEDURE EVALUATION
Department of Anesthesiology  Postprocedure Note    Patient: Samantha Montenegro  MRN: [id number]  YOB: 1950  Date of evaluation: 2/14/2023      Procedure Summary     Date: 02/14/23 Room / Location: [address] [place] [address]    Anesthesia Start: 0237 Anesthesia Stop: 4233    Procedure: DECOMPRESSIVE POSTERIOR CERVICAL LAMINECTOMY C3, C4, C5 WITH LATERAL MASS DANII AND SCREW FUSION C3-C6 (Neck) Diagnosis:       Cervical disc disorder at C5-C6 level with myelopathy      (CERVICAL DISC WITH MYELOPATHY)    Surgeons: Alicia Del Castillo MD Responsible Provider: Tripp Hartley MD    Anesthesia Type: General ASA Status: 3          Anesthesia Type: General    Karsten Phase I: Karsten Score: 8    Karsten Phase II:        Anesthesia Post Evaluation    Patient location during evaluation: PACU  Patient participation: complete - patient participated  Level of consciousness: awake  Airway patency: patent  Nausea & Vomiting: no nausea  Complications: no  Cardiovascular status: hemodynamically stable  Respiratory status: acceptable  Hydration status: euvolemic  Multimodal analgesia pain management approach

## 2023-02-14 NOTE — PROGRESS NOTES
Patient arrived on the unit alert and oriented x4. Patients dressing on back of neck is clean dry and intact. Patients accordion drain is in place. Patient has a keating. Patient has orders for general diet. Will continue to monitor. Call light in place.   Electronically signed by Hermann Harris RN on 2/14/2023 at 6:51 PM

## 2023-02-14 NOTE — ANESTHESIA PRE PROCEDURE
Department of Anesthesiology  Preprocedure Note       Name:  Diana Santos   Age:  81 y.o.  :  1950                                          MRN:  [id number]         Date:  2023      Surgeon: Lu Romero):  Reese Pan MD    Procedure: Procedure(s):  DECOMPRESSIVE POSTERIOR CERVICAL LAMINECTOMY C3, C4, C5 WITH LATERAL MASS DANII AND SCREW FUSION C3-C6    Medications prior to admission:   Prior to Admission medications    Medication Sig Start Date End Date Taking?  Authorizing Provider   metoprolol tartrate (LOPRESSOR) 25 MG tablet Take 25 mg by mouth 2 times daily   Yes Historical Provider, MD   magnesium oxide (MAG-OX) 400 MG tablet Take 800 mg by mouth daily   Yes Historical Provider, MD   lisinopril (PRINIVIL;ZESTRIL) 10 MG tablet Take 10 mg by mouth daily    Historical Provider, MD   Polyethylene Glycol 3350 (MIRALAX PO) Take 0.52 mg by mouth as needed    Historical Provider, MD   Melatonin 10 MG TABS Take by mouth nightly as needed    Historical Provider, MD   Calcium Carb-Cholecalciferol (CALCIUM 600+D3) 600-200 MG-UNIT TABS Take 1 tablet by mouth 2 times daily    Historical Provider, MD   fluticasone (FLONASE) 50 MCG/ACT nasal spray 1 spray by Each Nostril route daily    Historical Provider, MD   venlafaxine (EFFEXOR XR) 75 MG extended release capsule Take 150 mg by mouth daily    Historical Provider, MD   atorvastatin (LIPITOR) 20 MG tablet TAKE ONE TABLET BY MOUTH DAILY 17   Annmarie Monterroso, DO   cloNIDine (CATAPRES) 0.2 MG tablet TAKE ONE TABLET BY MOUTH ONCE NIGHTLY 17   Annmarie Monterroso, DO   LORazepam (ATIVAN) 1 MG tablet Take 1 tablet by mouth every 6 hours as needed for Anxiety 17   Regulo Ibarra MD   meloxicam (MOBIC) 15 MG tablet TAKE ONE TABLET BY MOUTH DAILY 17   Regulo Ibarra MD   Potassium Gluconate 550 MG TABS Take 1 tablet by mouth daily    Historical Provider, MD   Multiple Vitamin (MULTI-VITAMIN DAILY) TABS Take by mouth daily Historical Provider, MD       Current medications:    Current Facility-Administered Medications   Medication Dose Route Frequency Provider Last Rate Last Admin    sodium chloride flush 0.9 % injection 5-40 mL  5-40 mL IntraVENous 2 times per day Brunilda Lainez MD        sodium chloride flush 0.9 % injection 5-40 mL  5-40 mL IntraVENous PRN Brunilda Lainez MD        0.9 % sodium chloride infusion   IntraVENous PRN Brunilda Lainez MD        ceFAZolin (ANCEF) 2,000 mg in sodium chloride 0.9 % 50 mL IVPB (mini-bag)  2,000 mg IntraVENous Once Lexy Jean MD           Allergies: Allergies   Allergen Reactions    Codeine Nausea And Vomiting and Nausea Only    Sulfa Antibiotics Nausea And Vomiting       Problem List:    Patient Active Problem List   Diagnosis Code    Hypertension I10    Depression F32. A    Hyperlipidemia E78.5    Hypophosphatemia E83.39    Alcohol abuse F10.10    Anxiety F41.9    Tobacco use Z72.0    Hyperglycemia R73.9    Osteopenia M85.80    Tinnitus aurium, right H93.11    Sensorineural hearing loss (SNHL) of both ears H90.3       Past Medical History:        Diagnosis Date    Alcohol abuse 2010    Anxiety and depression     Arthritis     Cancer ([facility] [address] 2021    right fallopian cancer    Depression     Hyperlipidemia     Hypertension     Hypokalemia 2010    IBS (irritable bowel syndrome)     Overactive bladder     Pre-diabetes     Prolonged emergence from general anesthesia     Rheumatic heart disease     at age 7 had rheumatic fever    Wears glasses        Past Surgical History:        Procedure Laterality Date    BILATERAL SALPINGOOPHORECTOMY      during bladder surgery    BLADDER SUSPENSION      also removed uterus and fallopian tubes    CARPAL TUNNEL RELEASE Bilateral 2022    COLONOSCOPY      FRACTURE SURGERY      bilateral arm fracture    OVARY REMOVAL Bilateral     TONSILLECTOMY      TUBAL LIGATION         Social History:    Social History     Tobacco Use    Smoking status: Every Day     Packs/day: 0.50     Years: 31.00     Pack years: 15.50     Types: Cigarettes    Smokeless tobacco: Never    Tobacco comments:     1/2 elliott   Substance Use Topics    Alcohol use: Not Currently     Comment: quit in 2012                                Ready to quit: Not Answered  Counseling given: Not Answered  Tobacco comments: 1/2 elliott      Vital Signs (Current):   Vitals:    02/13/23 0858 02/14/23 0632   BP:  114/67   Pulse:  60   Resp:  16   Temp:  98.1 °F (36.7 °C)   TempSrc:  Temporal   SpO2:  98%   Weight: 141 lb (64 kg) 140 lb (63.5 kg)   Height: 5' 4\" (1.626 m) 5' 4\" (1.626 m)                                              BP Readings from Last 3 Encounters:   02/14/23 114/67   12/12/22 (!) 162/83   06/15/22 (!) 183/92       NPO Status: Time of last liquid consumption: 2200                        Time of last solid consumption: 2200                        Date of last liquid consumption: 02/13/23                        Date of last solid food consumption: 02/13/23    BMI:   Wt Readings from Last 3 Encounters:   02/14/23 140 lb (63.5 kg)   01/10/23 143 lb (64.9 kg)   12/12/22 138 lb (62.6 kg)     Body mass index is 24.03 kg/m².     CBC:   Lab Results   Component Value Date/Time    WBC 6.4 02/10/2023 02:30 PM    RBC 4.12 02/10/2023 02:30 PM    HGB 12.6 02/10/2023 02:30 PM    HCT 38.7 02/10/2023 02:30 PM    MCV 93.9 02/10/2023 02:30 PM    RDW 13.7 02/10/2023 02:30 PM     02/10/2023 02:30 PM       CMP:   Lab Results   Component Value Date/Time     02/10/2023 02:30 PM    K 3.9 02/10/2023 02:30 PM    K 4.4 12/12/2022 08:00 PM     02/10/2023 02:30 PM    CO2 29 02/10/2023 02:30 PM    BUN 24 02/10/2023 02:30 PM    CREATININE 0.8 02/10/2023 02:30 PM    GFRAA >60 10/08/2022 09:32 AM    GFRAA >60 03/07/2012 09:30 PM    AGRATIO 1.6 01/08/2023 09:45 AM    LABGLOM >60 02/10/2023 02:30 PM    GLUCOSE 106 02/10/2023 02:30 PM    PROT 6.5 01/08/2023 09:45 AM    PROT 8.0 03/07/2012 09:30 PM    CALCIUM 9.9 02/10/2023 02:30 PM    BILITOT 0.3 01/08/2023 09:45 AM    ALKPHOS 58 01/08/2023 09:45 AM    AST 26 01/08/2023 09:45 AM    ALT 16 01/08/2023 09:45 AM       POC Tests: No results for input(s): POCGLU, POCNA, POCK, POCCL, POCBUN, POCHEMO, POCHCT in the last 72 hours. Coags:   Lab Results   Component Value Date/Time    PROTIME 12.7 02/10/2023 02:30 PM    INR 0.96 02/10/2023 02:30 PM    APTT 27.7 02/10/2023 02:30 PM       HCG (If Applicable):   Lab Results   Component Value Date    PREGTESTUR neg 11/12/2010        ABGs: No results found for: PHART, PO2ART, YDG5JUB, YGC9MSU, BEART, I9FMQBFA     Type & Screen (If Applicable):  No results found for: LABABO, LABRH    Drug/Infectious Status (If Applicable):  No results found for: HIV, HEPCAB    COVID-19 Screening (If Applicable):   Lab Results   Component Value Date/Time    COVID19 NOT DETECTED 06/14/2022 10:45 PM           Anesthesia Evaluation  Patient summary reviewed history of anesthetic complications (Slow to wake up): Airway: Mallampati: II  TM distance: >3 FB   Neck ROM: limited  Mouth opening: > = 3 FB and < 3 FB   Dental: normal exam   (+) partials      Pulmonary:normal exam  breath sounds clear to auscultation  (+) current smoker    (-) shortness of breath          Patient did not smoke on day of surgery. Cardiovascular:    (+) hypertension:, hyperlipidemia        Rhythm: regular  Rate: normal                    Neuro/Psych:   (+) neuromuscular disease:, depression/anxiety             GI/Hepatic/Renal: Neg GI/Hepatic/Renal ROS            Endo/Other:    (+) DiabetesType II DM, well controlled, , : arthritis:., .                 Abdominal:             Vascular: negative vascular ROS. Other Findings:           Anesthesia Plan      general     ASA 3       Induction: intravenous. MIPS: Postoperative opioids intended and Prophylactic antiemetics administered.   Anesthetic plan and risks discussed with patient. Plan discussed with CRNA.     Attending anesthesiologist reviewed and agrees with Ragena Bernheim, MD   2/14/2023

## 2023-02-14 NOTE — PROGRESS NOTES
Patient admitted to PACU # 13 from OR at [address], C4, C5 WITH LATERAL MASS DANII AND SCREW FUSION C3-C6   per Dr Margret Gonzalez. Attached to PACU monitoring system and report received from anesthesia provider. Patient was reported to be hemodynamically stable during procedure. Patient drowsy on admission and denied pain. Drain in place and compressed. Dressing to neck clean dry and intact. Neck collar on. Will monitor.

## 2023-02-14 NOTE — H&P
Update History & Physical    The patient's History and Physical of February 14, 2023 was reviewed with the patient and I examined the patient. There was no change. The surgical site was confirmed by the patient and me. Plan: The risks, benefits, expected outcome, and alternative to the recommended procedure have been discussed with the patient. Patient understands and wants to proceed with the procedure.      Electronically signed by Orion Villagran MD on 2/14/2023 at 7:19 AM

## 2023-02-14 NOTE — PLAN OF CARE
Problem: Discharge Planning  Goal: Discharge to home or other facility with appropriate resources  Outcome: Progressing     Problem: Safety - Adult  Goal: Free from fall injury  Outcome: Progressing     Problem: Pain  Goal: Verbalizes/displays adequate comfort level or baseline comfort level  Outcome: Progressing     Problem: Skin/Tissue Integrity  Goal: Absence of new skin breakdown  Description: 1. Monitor for areas of redness and/or skin breakdown  2. Assess vascular access sites hourly  3. Every 4-6 hours minimum:  Change oxygen saturation probe site  4. Every 4-6 hours:  If on nasal continuous positive airway pressure, respiratory therapy assess nares and determine need for appliance change or resting period.   Outcome: Progressing     Problem: ABCDS Injury Assessment  Goal: Absence of physical injury  Outcome: Progressing    Electronically signed by Maritza Leigh RN on 2/14/2023 at 5:32 PM

## 2023-02-15 PROCEDURE — 97530 THERAPEUTIC ACTIVITIES: CPT

## 2023-02-15 PROCEDURE — 97535 SELF CARE MNGMENT TRAINING: CPT

## 2023-02-15 PROCEDURE — 94760 N-INVAS EAR/PLS OXIMETRY 1: CPT

## 2023-02-15 PROCEDURE — 6370000000 HC RX 637 (ALT 250 FOR IP): Performed by: NEUROLOGICAL SURGERY

## 2023-02-15 PROCEDURE — 6360000002 HC RX W HCPCS

## 2023-02-15 PROCEDURE — 97166 OT EVAL MOD COMPLEX 45 MIN: CPT

## 2023-02-15 PROCEDURE — 1200000000 HC SEMI PRIVATE

## 2023-02-15 PROCEDURE — 2580000003 HC RX 258: Performed by: NEUROLOGICAL SURGERY

## 2023-02-15 PROCEDURE — 97163 PT EVAL HIGH COMPLEX 45 MIN: CPT

## 2023-02-15 PROCEDURE — 2580000003 HC RX 258

## 2023-02-15 PROCEDURE — 6360000002 HC RX W HCPCS: Performed by: NEUROLOGICAL SURGERY

## 2023-02-15 PROCEDURE — 97116 GAIT TRAINING THERAPY: CPT

## 2023-02-15 PROCEDURE — 6370000000 HC RX 637 (ALT 250 FOR IP)

## 2023-02-15 RX ADMIN — METOPROLOL TARTRATE 25 MG: 25 TABLET, FILM COATED ORAL at 08:50

## 2023-02-15 RX ADMIN — CLONIDINE HYDROCHLORIDE 0.1 MG: 0.1 TABLET ORAL at 21:01

## 2023-02-15 RX ADMIN — ACETAMINOPHEN 325MG 650 MG: 325 TABLET ORAL at 08:50

## 2023-02-15 RX ADMIN — BISACODYL 5 MG: 5 TABLET, COATED ORAL at 21:14

## 2023-02-15 RX ADMIN — SODIUM CHLORIDE: 9 INJECTION, SOLUTION INTRAVENOUS at 05:33

## 2023-02-15 RX ADMIN — OXYCODONE HYDROCHLORIDE 5 MG: 5 TABLET ORAL at 16:01

## 2023-02-15 RX ADMIN — ONDANSETRON 4 MG: 2 INJECTION INTRAMUSCULAR; INTRAVENOUS at 01:13

## 2023-02-15 RX ADMIN — VENLAFAXINE HYDROCHLORIDE 150 MG: 75 CAPSULE, EXTENDED RELEASE ORAL at 08:49

## 2023-02-15 RX ADMIN — LISINOPRIL 10 MG: 10 TABLET ORAL at 08:50

## 2023-02-15 RX ADMIN — VANCOMYCIN HYDROCHLORIDE 750 MG: 750 INJECTION, POWDER, LYOPHILIZED, FOR SOLUTION INTRAVENOUS at 16:11

## 2023-02-15 RX ADMIN — ATORVASTATIN CALCIUM 20 MG: 20 TABLET, FILM COATED ORAL at 08:50

## 2023-02-15 RX ADMIN — ACETAMINOPHEN 325MG 650 MG: 325 TABLET ORAL at 13:42

## 2023-02-15 RX ADMIN — ACETAMINOPHEN 325MG 650 MG: 325 TABLET ORAL at 01:13

## 2023-02-15 RX ADMIN — VANCOMYCIN HYDROCHLORIDE 750 MG: 750 INJECTION, POWDER, LYOPHILIZED, FOR SOLUTION INTRAVENOUS at 01:13

## 2023-02-15 RX ADMIN — Medication 10 ML: at 08:51

## 2023-02-15 RX ADMIN — OXYCODONE HYDROCHLORIDE 10 MG: 10 TABLET ORAL at 20:59

## 2023-02-15 RX ADMIN — METOPROLOL TARTRATE 25 MG: 25 TABLET, FILM COATED ORAL at 21:01

## 2023-02-15 RX ADMIN — METHOCARBAMOL TABLETS 750 MG: 750 TABLET, COATED ORAL at 13:42

## 2023-02-15 RX ADMIN — METHOCARBAMOL TABLETS 750 MG: 750 TABLET, COATED ORAL at 08:49

## 2023-02-15 RX ADMIN — METHOCARBAMOL TABLETS 750 MG: 750 TABLET, COATED ORAL at 20:59

## 2023-02-15 RX ADMIN — Medication 800 MG: at 08:49

## 2023-02-15 RX ADMIN — Medication 10 ML: at 21:06

## 2023-02-15 RX ADMIN — METHOCARBAMOL TABLETS 750 MG: 750 TABLET, COATED ORAL at 17:49

## 2023-02-15 RX ADMIN — Medication 9 MG: at 20:57

## 2023-02-15 RX ADMIN — ACETAMINOPHEN 325MG 650 MG: 325 TABLET ORAL at 20:57

## 2023-02-15 RX ADMIN — OXYCODONE HYDROCHLORIDE 10 MG: 10 TABLET ORAL at 01:12

## 2023-02-15 ASSESSMENT — PAIN - FUNCTIONAL ASSESSMENT
PAIN_FUNCTIONAL_ASSESSMENT: PREVENTS OR INTERFERES SOME ACTIVE ACTIVITIES AND ADLS

## 2023-02-15 ASSESSMENT — PAIN DESCRIPTION - ONSET
ONSET: ON-GOING

## 2023-02-15 ASSESSMENT — PAIN DESCRIPTION - ORIENTATION
ORIENTATION: MID
ORIENTATION: POSTERIOR
ORIENTATION: MID
ORIENTATION: POSTERIOR
ORIENTATION: MID

## 2023-02-15 ASSESSMENT — PAIN DESCRIPTION - DESCRIPTORS
DESCRIPTORS: ACHING
DESCRIPTORS: THROBBING
DESCRIPTORS: HEAVINESS;SORE
DESCRIPTORS: SORE;HEAVINESS
DESCRIPTORS: DISCOMFORT
DESCRIPTORS: ACHING
DESCRIPTORS: SORE;HEAVINESS
DESCRIPTORS: THROBBING

## 2023-02-15 ASSESSMENT — PAIN DESCRIPTION - LOCATION
LOCATION: NECK
LOCATION: INCISION;NECK

## 2023-02-15 ASSESSMENT — PAIN SCALES - GENERAL
PAINLEVEL_OUTOF10: 9
PAINLEVEL_OUTOF10: 8
PAINLEVEL_OUTOF10: 0
PAINLEVEL_OUTOF10: 8
PAINLEVEL_OUTOF10: 9
PAINLEVEL_OUTOF10: 10
PAINLEVEL_OUTOF10: 9
PAINLEVEL_OUTOF10: 0
PAINLEVEL_OUTOF10: 9
PAINLEVEL_OUTOF10: 7
PAINLEVEL_OUTOF10: 0
PAINLEVEL_OUTOF10: 8
PAINLEVEL_OUTOF10: 9

## 2023-02-15 ASSESSMENT — PAIN DESCRIPTION - FREQUENCY
FREQUENCY: CONTINUOUS

## 2023-02-15 ASSESSMENT — PAIN DESCRIPTION - PAIN TYPE
TYPE: SURGICAL PAIN

## 2023-02-15 NOTE — CARE COORDINATION
[facility] - Acute Rehab Unit   After review, this patient is felt to be:       [x]  Appropriate for Acute Inpatient Rehab    []  Appropriate for Acute Inpatient Rehab Pending Insurance Authorization    []  Not appropriate for Acute Inpatient Rehab    []  Referral received and ARU reviewing patient; Evaluation ongoing. Left VM for CM regarding acceptance. Will await call back before initiating precert. Will notify DCP with further updates.  Thank you for the referral.       Rich Lemus MPH, RRT  Clinical Liaison [address] [place] [address]  A)[phone] (W)[phone]   Electronically signed by Rich Lemus on 2/15/2023 at 1:01 PM

## 2023-02-15 NOTE — CARE COORDINATION
Patient in agreement with plan for ARU as recommended by PT and ordered by MD. She prefers facility closer to home which [facility] list.   Patient referred and accepted pending [facility] approval.   Electronically signed by Indigo Alberto on 2/15/2023 at 5:04 PM

## 2023-02-15 NOTE — PLAN OF CARE
Problem: Discharge Planning  Goal: Discharge to home or other facility with appropriate resources  2/15/2023 1108 by Tona Casas RN  Outcome: Progressing  Flowsheets (Taken 2/15/2023 1108)  Discharge to home or other facility with appropriate resources:   Identify barriers to discharge with patient and caregiver   Identify discharge learning needs (meds, wound care, etc)   Arrange for needed discharge resources and transportation as appropriate     Problem: Safety - Adult  Goal: Free from fall injury  2/15/2023 1108 by Tona Casas RN  Outcome: Progressing  Flowsheets (Taken 2/15/2023 1108)  Free From Fall Injury: Instruct family/caregiver on patient safety     Problem: Pain  Goal: Verbalizes/displays adequate comfort level or baseline comfort level  2/15/2023 1108 by Tona Casas RN  Outcome: Progressing  Flowsheets (Taken 2/15/2023 1108)  Verbalizes/displays adequate comfort level or baseline comfort level:   Encourage patient to monitor pain and request assistance   Assess pain using appropriate pain scale   Administer analgesics based on type and severity of pain and evaluate response   Implement non-pharmacological measures as appropriate and evaluate response     Problem: Skin/Tissue Integrity  Goal: Absence of new skin breakdown  Description: 1. Monitor for areas of redness and/or skin breakdown  2. Assess vascular access sites hourly  3. Every 4-6 hours minimum:  Change oxygen saturation probe site  4. Every 4-6 hours:  If on nasal continuous positive airway pressure, respiratory therapy assess nares and determine need for appliance change or resting period.   2/15/2023 1108 by Tona Casas RN  Outcome: Progressing     Problem: ABCDS Injury Assessment  Goal: Absence of physical injury  2/15/2023 1108 by Tona Casas RN  Outcome: Progressing  Flowsheets (Taken 2/15/2023 1108)  Absence of Physical Injury: Implement safety measures based on patient assessment

## 2023-02-15 NOTE — PROGRESS NOTES
Occupational Therapy  Facility/Department: [facility]  Occupational Therapy Initial Assessment    Name: Jess Flower  : 1950  MRN: [id number]  Date of Service: 2/15/2023    Discharge Recommendations:  5-7 sessions per week, Patient would benefit from continued therapy after discharge        Jess Flower scored a 14/24 on the AM-PAC ADL Inpatient form. Current research shows that an AM-PAC score of 17 or less is typically not associated with a discharge to the patient's home setting. Based on the patient's AM-PAC score and their current ADL deficits, it is recommended that the patient have 5-7 sessions per week of Occupational Therapy at d/c to increase the patient's independence. At this time, this patient demonstrates medical, and rehabilitative needs, and would benefit from intensive rehabilitation services upon discharge from the Inpatient setting. This patient demonstrates the ability to participate in and benefit from an intensive therapy program with a coordinated interdisciplinary team approach. Please see assessment section for further patient specific details. If patient discharges prior to next session this note will serve as a discharge summary. Please see below for the latest assessment towards goals. Patient Diagnosis(es): The encounter diagnosis was Encounter for preadmission testing. Past Medical History:  has a past medical history of Alcohol abuse, Anxiety and depression, Arthritis, Cancer ([address] Depression, Hyperlipidemia, Hypertension, Hypokalemia, IBS (irritable bowel syndrome), Overactive bladder, Pre-diabetes, Prolonged emergence from general anesthesia, Rheumatic heart disease, and Wears glasses. Past Surgical History:  has a past surgical history that includes Tonsillectomy; Tubal ligation; fracture surgery; Colonoscopy; bladder suspension; Ovary removal (Bilateral); Carpal tunnel release (Bilateral, );  Bilateral salpingoophorectomy; and cervical fusion (N/A, 2/14/2023). Treatment Diagnosis: impaired ADL/fxl mobility    Assessment   Performance deficits / Impairments: Decreased functional mobility ; Decreased safe awareness;Decreased ADL status; Decreased balance;Decreased high-level IADLs;Decreased endurance;Decreased strength;Decreased ROM; Decreased fine motor control;Decreased coordination  Assessment: 80 yo female admitted 2/14 for planned decompressive laminectomy C3-C5 and fusion C3, C4, C5, C6 d/t cervical spondylosis with myelopathy. Pt with multiple falls. PMH: HTN, Depression, Alcohol abuse, hearing loss, carpal tunnel (surgery). PTA, pt lives alone and fully IND + FT work. However, last 5 months noted falls, BUE numbness and poor coordination, BLE ataxia and \"clumsiness\". Today, pt functioning below baseline most limited by spinal precautions, and decreased coordination, balance and endurance. Pt with mild improvement in BUE and BLE symptoms post procedure. Pt required Min/CGA fxl tx and fxl mobility household distances with RW with fair balance requiring inc time. Pt required Min A sink side grooming (for balance and oral care), Mod A toileting and Max A donning [place] J collar. Pt is a high fall risk with low AMPAC score demonstrating need for further skilled OT.  Anticipate able to tolerate mod-high pace OT 5-7x/week to maximize return to IND  Treatment Diagnosis: impaired ADL/fxl mobility  Prognosis: Good  Decision Making: Medium Complexity  REQUIRES OT FOLLOW-UP: Yes  Activity Tolerance  Activity Tolerance: Patient limited by fatigue        Plan   Occupational Therapy Plan  Times Per Week: 5  Current Treatment Recommendations: Strengthening, ROM, Balance training, Functional mobility training, Endurance training, Pain management, Safety education & training, Modalities, Positioning, Patient/Caregiver education & training, Self-Care / ADL, Home management training     Restrictions  Restrictions/Precautions  Restrictions/Precautions: Fall Risk  Required Braces or Orthoses?: Yes  Required Braces or Orthoses  Cervical: c-collar  Position Activity Restriction  Spinal Precautions: No Bending, No Lifting, No Twisting    Subjective   General  Chart Reviewed: Yes  Patient assessed for rehabilitation services?: Yes  Additional Pertinent Hx: 82 yo female admitted 2/14 for planned decompressive laminectomy C3-C5 and fusion C3, C4, C5, C6 d/t cervical spondylosis with myelopathy. Pt with multiple falls. PMH: HTN, Depression, Alcohol abuse, hearing loss, carpal tunnel (surgery)  Family / Caregiver Present: No  Referring Practitioner: Anna Crabtree MD  Diagnosis: cerival spondylosis with myelopathy  Subjective  Subjective: Pt resting in recliner upon arrival and agreeable to OT/PT eval. Symptoms prior to surgery: BUE numbness dorsum upper arm and forearm, down to entire hand/digitnumbness. BUE poor coordination. BLE \"clumsiness\" and ataxia. BLE spasms (no pain or numbness)  General Comment  Comments: RN ok to see       Social/Functional History  Social/Functional History  Lives With: Alone  Type of Home: House  Home Layout: Able to Live on Main level with bedroom/bathroom  Home Access: Stairs to enter with rails  Entrance Stairs - Number of Steps: 3  Entrance Stairs - Rails: Both  Bathroom Shower/Tub: Walk-in shower  Bathroom Toilet: Standard  Bathroom Equipment: Grab bars in shower, Grab bars around toilet  Home Equipment: Waynetta Bears, 4 wheeled, Walker, rolling  Has the patient had two or more falls in the past year or any fall with injury in the past year?: Yes (last 5 months- many falls.  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 mild limitation at shoulders d/t pain)  PROM: Within functional limits  Strength: Generally decreased, functional  Coordination: Generally decreased, functional  Sensation: Impaired (dorsum of upper arm and forearm- reports mild. entire BUE hands numbness (able to identify light touch))    ADL  Grooming: Minimal assistance; Increased time to complete;Verbal cueing  Grooming Skilled Clinical Factors: standing sink side for oral care and hand washing. increased time d/t poor/fair coordination. UE Dressing: Maximum assistance  UE Dressing Skilled Clinical Factors: adjusting [place] J collar  Toileting: Moderate assistance  Toileting Skilled Clinical Factors: Pt urinates on commode. pt manages pericare, assist clothing management       Activity Tolerance  Activity Tolerance: Patient limited by pain; Patient tolerated treatment well  Bed mobility  Bed Mobility Comments: not observed    Transfers  Sit to stand: Contact guard assistance;Minimal assistance  Stand to sit: Contact guard assistance;Minimal assistance  Transfer Comments: RW. cues hand placement. recliner/toilet                     Standing balance: mild posterior lean. Min A sink side grooming x5 min. required unilateral support on sink                Fxl mobility: Min/CGA with RW. recliner>sink>toilet>sink>recliner with mild-moderate unsteadiness. min increased time.  cues for RW management    Vision  Vision: Within Functional Limits  Vision Exceptions: Wears glasses at all times  Hearing  Hearing: Exceptions to [facility]  Hearing Exceptions: Hard of hearing/hearing concerns (deaf right ear)    Cognition  Overall Cognitive Status: WFL  Orientation  Overall Orientation Status: Within Functional Limits                    Education Given To: Patient  Education Provided: Role of Therapy;Plan of Care;Transfer Training;Precautions  Education Provided Comments: spinal precautions. need for further therapy.   Education Method: Demonstration;Verbal  Barriers to Learning: None  Education Outcome: Verbalized understanding;Demonstrated understanding;Continued education needed                      AM-PAC Score        AM-PAC Inpatient Daily Activity Raw Score: 14 (02/15/23 1132)  AM-PAC Inpatient ADL T-Scale Score : 33.39 (02/15/23 1132)  ADL Inpatient CMS 0-100% Score: 59.67 (02/15/23 1132)  ADL Inpatient CMS G-Code Modifier : CK (02/15/23 1132)    Goals  Short Term Goals  Time Frame for Short Term Goals: prior to d/c  Short Term Goal 1: toileting SUP  Short Term Goal 2: LB dressing with AE as needed CGA  Short Term Goal 3: UB dressing (including [place] J collar) SBA  Short Term Goal 4: tolerate 5 min fxl standing task SBA  Short Term Goal 5: fxl tx and mobility with RW household distances SBA  Patient Goals   Patient goals : \"continue working and back to my independent self\"       Therapy Time   Individual Concurrent Group Co-treatment   Time In 1020         Time Out 1115         Minutes 55         Timed Code Treatment Minutes: 40 Minutes (15 eval. 25 ADL 15 TA)       SILVIA Grider, OTR/L

## 2023-02-15 NOTE — PROGRESS NOTES
ARU consult noted. Patient requesting Jojo at present as she lives out that way. CM placed call to [facility] for Jojo.

## 2023-02-15 NOTE — PLAN OF CARE
Problem: Discharge Planning  Goal: Discharge to home or other facility with appropriate resources  Outcome: Progressing  Flowsheets (Taken 2/15/2023 0847)  Discharge to home or other facility with appropriate resources: Identify barriers to discharge with patient and caregiver     Problem: Safety - Adult  Goal: Free from fall injury  Outcome: Progressing  Flowsheets (Taken 2/15/2023 0847)  Free From Fall Injury: Instruct family/caregiver on patient safety     Problem: Pain  Goal: Verbalizes/displays adequate comfort level or baseline comfort level  Outcome: Progressing  Flowsheets (Taken 2/15/2023 0847)  Verbalizes/displays adequate comfort level or baseline comfort level: Encourage patient to monitor pain and request assistance     Problem: Skin/Tissue Integrity  Goal: Absence of new skin breakdown  Description: 1. Monitor for areas of redness and/or skin breakdown  2. Assess vascular access sites hourly  3. Every 4-6 hours minimum:  Change oxygen saturation probe site  4. Every 4-6 hours:  If on nasal continuous positive airway pressure, respiratory therapy assess nares and determine need for appliance change or resting period. Outcome: Progressing  Note: Will monitor skin and mucous members. Will turn patient every 2 hours, monitor for friction and sheering, and change dressings as needed. Will preform skin assessment every shift.          Problem: ABCDS Injury Assessment  Goal: Absence of physical injury  Outcome: Progressing  Flowsheets (Taken 2/15/2023 0847)  Absence of Physical Injury: Implement safety measures based on patient assessment

## 2023-02-15 NOTE — PROGRESS NOTES
Patient assisted to bathroom and up to chair for breakfast. Voided 500 ml of clear, yellow urine. Tolerating ambulation with walker. Cervical collar in place. Pain tolerable at this time. Agreeable to taking scheduled Tylenol. Accordion drain in place. No output at this time. Ice pack in place. Chair alarm on. Call light and belongings within reach. Will monitor.      Electronically signed by Ge Juárez RN on 2/15/2023 at 8:10 AM

## 2023-02-15 NOTE — OP NOTE
[address] [place] [address] [facility]                                OPERATIVE REPORT    PATIENT NAME: Da Judge                     :        1950  MED REC NO:   [id number]                          ROOM:       3126  ACCOUNT NO:   [de-identified]                           ADMIT DATE: 2023  PROVIDER:     Eleazar Fragoso MD    DATE OF PROCEDURE:  2023    PREOPERATIVE DIAGNOSIS:  Cervical spondylosis with myelopathy. POSTOPERATIVE DIAGNOSIS:  Cervical spondylosis with myelopathy. OPERATION PERFORMED:  1. Posterior cervical decompressive laminectomy C3, C4, C5.  2.  Posterior cervical instrumentation and fusion using RTI lateral mass  screws and rods, bilateral C3, C4, C5, C6.  3.  Posterolateral fusion bilateral C3-4, C4-5, C5-6 using Radha. 4.  Physician-directed and interpreted intraoperative fluoroscopy. SURGEON:  Eleazar Fragoso MD    ANESTHESIA:  General endotracheal.    COMPLICATIONS:  None apparent. ESTIMATED BLOOD LOSS:  75 mL. INDICATIONS:  The patient is a 70-year-old with progressive decline in  neurologic function and imaging demonstrated cervical spondylosis with  severe canal stenosis and signal change in the spinal cord with cord  compression. She was floridly myelopathic and was set up for an urgent  laminectomy and decompression with fixation. OPERATIVE PROCEDURE:  After obtaining informed consent, she was taken to  the operating suite on 2023. General endotracheal intubation was  initiated. She was positioned prone onto laminectomy rolls with head  secured in three-point fixation, chin was two fingerbreadths above the  sternum in midline position. Posterior cervical was prepped with hair  clippers. Fluoroscopy was brought in and a midline posterior cervical  incision was planned. She was prepped and draped in typical surgical  fashion.   Skin was infiltrated with Marcaine and epinephrine. Scalpel  was used to incise the skin. Bovie was used to dissect subcutaneous  tissues to the ligamentum nuchae. Midline dissection with Bovie of  ligamentum nuchae was performed and this was dissected down to C2 to the  midline. C2 level was confirmed intraoperatively with fluoroscopy. Following this, careful bilateral subperiosteal dissections were  performed to expose the posterior elements and lateral masses from C3 to  C6 and self-retaining retractors were placed. Following this, a Midas  drill was used to decorticate entry points of the lateral masses C3 to  C6 bilaterally and this was placed 1 mm medial and 1 mm inferior to the  midpoint of the lateral mass. A  hand drill with a stop guide to  10 mm was delivered to the field and  holes were drilled in the  lateral masses of C3 to C6 bilaterally under fluoroscopy. Hand tap was  then used to tap the  holes and 10 mm lateral mass screws were then  advanced through the  holes bilaterally C3 to C6. The left C3-4  lateral mass was severely degenerated and adequate purchase of a lateral  mass screw could not be obtained and a decision was made to not place a  screw in that lateral mass. Following this, the Midas drill was then  used to drill a trough between the junction of the lateral mass and the  lamina of C3, C4 and C5 bilaterally; 2 mm and 1 mm Kerrisons were then  used to remove remnants of eggshell thin bone of C3, C4 and C5  bilaterally. Nerve hook was used to elevate any ligament and Kerrison  was used to dissect ligament and then a Gaynel Prow was used to assure that  there was no adhesion between posterior elements and posterior dura. The posterior elements of C3 to C5 were grasped with Allis clamps and  were then removed and lifted and delivered off the field in toto. This  achieved decompression of the cervical canal from C3 to C5 and there was  decompression of the cord.   [place] and electrocautery were used for  hemostasis and the incision was irrigated. Bleeding was controlled. Rods were then selected and cut to size. These were positioned over the  lateral mass screws bilaterally. Set screws were placed and torquing  device was used to complete posterior instrumentation, fusion of C3 to  C6. Following this, lateral masses were decorticated with a drill and  Radha was packed along the lateral masses and hardware completing  posterolateral fusion bilateral of C3 to C6. A drain was placed and  then the incision was closed in multiple layers. Skin was closed. Dressing was applied. The patient was then flipped onto a bed in a hard  collar and the patient was extubated and transferred to Recovery in  hemodynamically stable condition moving all four extremities. There are  no apparent complications. All counts were correct. A time-out  procedure was performed prior to incision and I was present for the  entire procedure.         Luanne Funes MD    D: 02/14/2023 10:59:54       T: 02/14/2023 11:02:33     [id number]  Job#: [id number]     Doc#: [id number]    CC:

## 2023-02-15 NOTE — PROGRESS NOTES
Patient up in chair. Has been in chair most of shift. Tolerating well. Pain tolerable at this time. Cervical collar in place. Ice pack placed as needed. Dinner eaten. 15 ml of clear, red drainage emptied from drain. Total of 40 ml emptied this shift. Chair alarm on. Patient calls appropriately for assistance. Will monitor.      Electronically signed by Vicki Jones RN on 2/15/2023 at 6:32 PM

## 2023-02-15 NOTE — PROGRESS NOTES
POD 1 posterior cervical laminectomy C3-5 and fusion C3-6  Afebrile  Vitals stable  Uop good  Incision cdi  Wearing collar  Pain adequately controlled with pain medication  Drain 55/50    Doing well. Noting improvement in preoperative symptoms, states walking is better, upper extremity sensation is improved. Continue drain  Continue anitbiotics  Continue pain control  Continue PT OT    Consult for acute rehab placement. Answered her questions, in agreement.

## 2023-02-15 NOTE — PROGRESS NOTES
Physical Therapy  Facility/Department: [address] ORTHOPEDICS  Physical Therapy Initial Assessment    Name: Otilia Mckeon  : 1950  MRN: [id number]  Date of Service: 2/15/2023    Assessment / Discharge Recommendations:  -cervical decompression and fusion secondary to myelopathy   - recent bilateral carpal tunnel surgery   -good start mobilizing from bed and to ambulate with rolling walker   -recommend Acute Inpatient Rehab for continued PT OT and nursing care   -high fall risk  -anticipate able to tolerate and well benefit from a HIGH frequency of sessions  -she will need to reach Modified Independent level for all mobility and basic ADLs to manage at home safely and effectively   -she lives alone    Otilia Mckeon scored a 13/24 on the AM-PAC short mobility form. Current research shows that an AM-PAC score of 17 or less is typically not associated with a discharge to the patient's home setting. Based on the patient's AM-PAC score and their current functional mobility deficits, it is recommended that the patient have 5-7 sessions per week of Physical Therapy at d/c to increase the patient's independence. At this time, this patient demonstrates complex rehabilitative needs, and would benefit from intensive rehabilitation services upon discharge from the Inpatient setting. If patient discharges prior to next session this note will serve as a discharge summary. Please see below for the latest assessment towards goals. Patient Diagnosis(es): The encounter diagnosis was Encounter for preadmission testing. Past Medical History:  has a past medical history of Alcohol abuse, Anxiety and depression, Arthritis, Cancer ([address] Depression, Hyperlipidemia, Hypertension, Hypokalemia, IBS (irritable bowel syndrome), Overactive bladder, Pre-diabetes, Prolonged emergence from general anesthesia, Rheumatic heart disease, and Wears glasses.   Past Surgical History:  has a past surgical history that includes Tonsillectomy; Tubal ligation; fracture surgery; Colonoscopy; bladder suspension; Ovary removal (Bilateral); Carpal tunnel release (Bilateral, 2022); Bilateral salpingoophorectomy; and cervical fusion (N/A, 2/14/2023). Body Structures, Functions, Activity Limitations Requiring Skilled Therapeutic Intervention: Decreased functional mobility ; Decreased strength;Decreased balance;Decreased ADL status; Decreased coordination;Decreased fine motor control  Therapy Prognosis: Good  Decision Making: Medium Complexity  Requires PT Follow-Up: Yes  Activity Tolerance  Activity Tolerance: Patient limited by pain; Patient tolerated treatment well     Plan   Physcial Therapy Plan  Current Treatment Recommendations: Functional mobility training, Transfer training, ADL/Self-care training, Gait training, Positioning, Modalities, Therapeutic activities, Patient/Caregiver education & training  Additional Comments: qdx1-2 then 3-5 while on acute floor  Safety Devices  Type of Devices: Chair alarm in place, Call light within reach, Gait belt, Patient at risk for falls, Left in chair, Nurse notified     Restrictions  Restrictions/Precautions  Restrictions/Precautions: Fall Risk  Required Braces or Orthoses?: Yes  Required Braces or Orthoses  Cervical: c-collar  Position Activity Restriction  Spinal Precautions: No Bending, No Lifting, No Twisting     Subjective   General  Chart Reviewed: Yes  Patient assessed for rehabilitation services?: Yes  Additional Pertinent Hx: here due to recent history of falls - recent bilater carpal tunnel surgery  - to neurosurgeon and found to be myelopathic  Response To Previous Treatment: Not applicable  Family / Caregiver Present: No  Follows Commands: Within Functional Limits  Subjective  Subjective: arrived to room along with OT to patient resting in recliner - she is alert oriented and agreeable to PTOT assessments and up to ambulate in room to bathroom (brush teeth, wash face, etc)    -she states pain is moderate at rest - along incision and onto both suprascapular areas right > left      -she does report her arms are feeling some better since surgery  Social/Functional History  Social/Functional History  Lives With: Alone  Type of Home: House  Home Layout: Able to Live on Main level with bedroom/bathroom  Home Access: Stairs to enter with rails  Entrance Stairs - Number of Steps: 3  Entrance Stairs - Rails: Both  Bathroom Shower/Tub: Walk-in shower  Bathroom Toilet: Standard  Bathroom Equipment: Grab bars in shower, Grab bars around toilet  Home Equipment: Druscilla Covert, 4 wheeled, Walker, rolling  Has the patient had two or more falls in the past year or any fall with injury in the past year?: Yes (last 5 months- many falls.  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\"  Vision/Hearing  Vision  Vision: Within Functional Limits  Vision Exceptions: Wears glasses at all times  Hearing  Hearing: Exceptions to [facility]  Hearing Exceptions: Hard of hearing/hearing concerns (deaf right ear)    Cognition   Orientation  Overall Orientation Status: Within Functional Limits  Cognition  Overall Cognitive Status: WFL     Objective   Observation/Palpation  Observation: Leydi santamaria  Gross Assessment  Tone: Normal  Sensation:  (not assessed formally)   Strength Other  Other: grossly functional - not tested formally in LEs at this session (anticipate rehab stay for formal baseline testing)  Bed mobility  Bed Mobility Comments: not observed  Transfers  Sit to Stand: Minimal Assistance  Stand to Sit: Minimal Assistance  Ambulation  Surface: Level tile  Device: Rolling Walker  Assistance: Minimal assistance  Quality of Gait: step through pattern with ~symmetric step length but with increased lateral sway and inconsistent foot placement  Distance: in room for ~40 feet overall  Comments: mild unsteadiness with ambulation  More Ambulation?: No  Stairs/Curb  Stairs?: No  Balance  Comments: midline in sitting at the recliner  - midline in static stance with modest lean on the walker -taking increased time to get stable   -dynamic is fair on rolling walker with hands on assist - improves with increased hold of the gait belt to \"ground\" her  -placed additional arm rests to recliner   -placed cold pack to neck     AM-PAC Score  AM-PAC Inpatient Mobility Raw Score : 13 (02/15/23 1106)  AM-PAC Inpatient T-Scale Score : 36.74 (02/15/23 1106)  Mobility Inpatient CMS 0-100% Score: 64.91 (02/15/23 1106)  Mobility Inpatient CMS G-Code Modifier : CL (02/15/23 1106)    Goals  Short Term Goals  Time Frame for Short Term Goals: 2-3 days  Short Term Goal 1: bed mobility at [facility]/min assist  Short Term Goal 2: transfers at close cga  Short Term Goal 3: ambulation at [facility] rolling walker for 75 feet  Short Term Goal 4: min assist donning and doffing the [place]-J collar  Patient Goals   Patient Goals : pain relief and for good healing and return of good function       Education  Patient Education  Education Given To: Patient  Education Provided: Role of Therapy;Plan of Care;Precautions;Transfer Training  Education Method: Demonstration;Verbal  Barriers to Learning: None  Education Outcome: Verbalized understanding;Continued education needed      Therapy Time   Individual Concurrent Group Co-treatment   Time In 1020         Time Out 1115         Minutes 2050 [facility],

## 2023-02-16 LAB
CREAT SERPL-MCNC: 0.7 MG/DL (ref 0.6–1.2)
GFR SERPL CREATININE-BSD FRML MDRD: >60 ML/MIN/{1.73_M2}
GLUCOSE BLD-MCNC: 101 MG/DL (ref 70–99)
PERFORMED ON: ABNORMAL
VANCOMYCIN RANDOM: 13.8 UG/ML

## 2023-02-16 PROCEDURE — 36415 COLL VENOUS BLD VENIPUNCTURE: CPT

## 2023-02-16 PROCEDURE — 97530 THERAPEUTIC ACTIVITIES: CPT

## 2023-02-16 PROCEDURE — 1200000000 HC SEMI PRIVATE

## 2023-02-16 PROCEDURE — 6370000000 HC RX 637 (ALT 250 FOR IP): Performed by: NEUROLOGICAL SURGERY

## 2023-02-16 PROCEDURE — 82565 ASSAY OF CREATININE: CPT

## 2023-02-16 PROCEDURE — 2580000003 HC RX 258

## 2023-02-16 PROCEDURE — 80202 ASSAY OF VANCOMYCIN: CPT

## 2023-02-16 PROCEDURE — 94760 N-INVAS EAR/PLS OXIMETRY 1: CPT

## 2023-02-16 PROCEDURE — 2580000003 HC RX 258: Performed by: NEUROLOGICAL SURGERY

## 2023-02-16 PROCEDURE — 6360000002 HC RX W HCPCS

## 2023-02-16 PROCEDURE — 6370000000 HC RX 637 (ALT 250 FOR IP)

## 2023-02-16 RX ADMIN — METOPROLOL TARTRATE 25 MG: 25 TABLET, FILM COATED ORAL at 20:23

## 2023-02-16 RX ADMIN — Medication 10 ML: at 08:30

## 2023-02-16 RX ADMIN — METHOCARBAMOL TABLETS 750 MG: 750 TABLET, COATED ORAL at 20:23

## 2023-02-16 RX ADMIN — POLYETHYLENE GLYCOL 3350 17 G: 17 POWDER, FOR SOLUTION ORAL at 08:27

## 2023-02-16 RX ADMIN — CLONIDINE HYDROCHLORIDE 0.1 MG: 0.1 TABLET ORAL at 20:23

## 2023-02-16 RX ADMIN — OXYCODONE HYDROCHLORIDE 5 MG: 5 TABLET ORAL at 15:04

## 2023-02-16 RX ADMIN — VANCOMYCIN HYDROCHLORIDE 750 MG: 750 INJECTION, POWDER, LYOPHILIZED, FOR SOLUTION INTRAVENOUS at 02:35

## 2023-02-16 RX ADMIN — LISINOPRIL 10 MG: 10 TABLET ORAL at 08:28

## 2023-02-16 RX ADMIN — OXYCODONE HYDROCHLORIDE 10 MG: 10 TABLET ORAL at 02:24

## 2023-02-16 RX ADMIN — METOPROLOL TARTRATE 25 MG: 25 TABLET, FILM COATED ORAL at 08:27

## 2023-02-16 RX ADMIN — ACETAMINOPHEN 325MG 650 MG: 325 TABLET ORAL at 20:23

## 2023-02-16 RX ADMIN — Medication 800 MG: at 08:28

## 2023-02-16 RX ADMIN — ACETAMINOPHEN 325MG 650 MG: 325 TABLET ORAL at 08:28

## 2023-02-16 RX ADMIN — METHOCARBAMOL TABLETS 750 MG: 750 TABLET, COATED ORAL at 08:28

## 2023-02-16 RX ADMIN — VANCOMYCIN HYDROCHLORIDE 750 MG: 750 INJECTION, POWDER, LYOPHILIZED, FOR SOLUTION INTRAVENOUS at 15:08

## 2023-02-16 RX ADMIN — METHOCARBAMOL TABLETS 750 MG: 750 TABLET, COATED ORAL at 17:07

## 2023-02-16 RX ADMIN — ACETAMINOPHEN 325MG 650 MG: 325 TABLET ORAL at 15:04

## 2023-02-16 RX ADMIN — ACETAMINOPHEN 325MG 650 MG: 325 TABLET ORAL at 02:22

## 2023-02-16 RX ADMIN — VENLAFAXINE HYDROCHLORIDE 150 MG: 75 CAPSULE, EXTENDED RELEASE ORAL at 08:27

## 2023-02-16 RX ADMIN — OXYCODONE HYDROCHLORIDE 5 MG: 5 TABLET ORAL at 08:27

## 2023-02-16 RX ADMIN — OXYCODONE HYDROCHLORIDE 5 MG: 5 TABLET ORAL at 20:23

## 2023-02-16 RX ADMIN — METHOCARBAMOL TABLETS 750 MG: 750 TABLET, COATED ORAL at 15:03

## 2023-02-16 RX ADMIN — ATORVASTATIN CALCIUM 20 MG: 20 TABLET, FILM COATED ORAL at 08:28

## 2023-02-16 ASSESSMENT — PAIN SCALES - GENERAL
PAINLEVEL_OUTOF10: 1
PAINLEVEL_OUTOF10: 8
PAINLEVEL_OUTOF10: 5
PAINLEVEL_OUTOF10: 6
PAINLEVEL_OUTOF10: 9

## 2023-02-16 ASSESSMENT — PAIN DESCRIPTION - ONSET: ONSET: ON-GOING

## 2023-02-16 ASSESSMENT — PAIN DESCRIPTION - DESCRIPTORS
DESCRIPTORS: ACHING;DISCOMFORT
DESCRIPTORS: ACHING

## 2023-02-16 ASSESSMENT — PAIN DESCRIPTION - ORIENTATION
ORIENTATION: POSTERIOR

## 2023-02-16 ASSESSMENT — PAIN SCALES - WONG BAKER: WONGBAKER_NUMERICALRESPONSE: 0

## 2023-02-16 ASSESSMENT — PAIN - FUNCTIONAL ASSESSMENT
PAIN_FUNCTIONAL_ASSESSMENT: PREVENTS OR INTERFERES SOME ACTIVE ACTIVITIES AND ADLS
PAIN_FUNCTIONAL_ASSESSMENT: PREVENTS OR INTERFERES SOME ACTIVE ACTIVITIES AND ADLS

## 2023-02-16 ASSESSMENT — PAIN DESCRIPTION - LOCATION
LOCATION: NECK

## 2023-02-16 ASSESSMENT — PAIN DESCRIPTION - FREQUENCY: FREQUENCY: CONTINUOUS

## 2023-02-16 ASSESSMENT — PAIN DESCRIPTION - PAIN TYPE: TYPE: SURGICAL PAIN

## 2023-02-16 NOTE — PROGRESS NOTES
Patient assisted to bathroom then back to bed using walker. Denies pain at this time. C collar still in place. Drain intact. Call light in reach.

## 2023-02-16 NOTE — PROGRESS NOTES
Assessment done and charted. Patient was mediated with oxycodone for neck pain. Cervical collar remains in place. 5ml of bloody drainage emptied from accordian drain. Will continue to monitor.

## 2023-02-16 NOTE — CONSULTS
Consult Note  Physical Medicine and Rehabilitation    Patient: Nely Morris  [id number]  Date: 2/16/2023      Chief Complaint: BUE sensory changes, difficulty with balance    History of Present Illness/Hospital Course:  Patient is a 82 yo F with pmh HTN, HLD, Pre-DM2, IBS, depression/anxiety who initially presented 2/14/2023 for urgent cervical spine surgery. She presented recently as an outpatient with progressing BUE numbness, coordination difficulty, and frequent falls. Imaging revealed cervical spondylosis with severe canal stenosis, cord compression, and cord signal change. Therefore decision was made to undergo urgent C3-5 laminectomy and and C3-6 posterior fusion (2/14 with Dr. Dyan Leiva). Currently, patient reports moderate posterior neck pain. Described as burning. Worse with movement and removing cervical collar. Improves with rest and medication. She has tingling/numbness in bilateral hands and difficulty with fine motor coordination. She denies sensory loss in the legs but does have difficulty with coordination and balance. Her symptoms were progressive over the past 6 months. Overall symptoms seem slightly improved since surgery. She would like to go to [address] [place] [address] to improve her function prior to returning home.        Prior Level of Function:  Independent for mobility, ADLs, and IADLs -- but having frequent falls in last month    Current Level of Function:  Anand for mobility  Up to MaxA for ADLs    Pertinent Social History:  Support: lives alone  Home set-up: house with [address], 3 steps to enter    Past Medical History:   Diagnosis Date    Alcohol abuse 2010    Anxiety and depression     Arthritis     Cancer ([facility] [address] 2021    right fallopian cancer    Depression     Hyperlipidemia     Hypertension     Hypokalemia 2010    IBS (irritable bowel syndrome)     Overactive bladder     Pre-diabetes     Prolonged emergence from general anesthesia     Rheumatic heart disease     at age 7 had rheumatic fever Wears glasses        Past Surgical History:   Procedure Laterality Date    BILATERAL SALPINGOOPHORECTOMY      during bladder surgery    BLADDER SUSPENSION      also removed uterus and fallopian tubes    CARPAL TUNNEL RELEASE Bilateral 2022    CERVICAL FUSION N/A 2/14/2023    DECOMPRESSIVE POSTERIOR CERVICAL LAMINECTOMY C3, C4, C5 WITH LATERAL MASS DANII AND SCREW FUSION C3-C6 performed by Bimal Cox MD at [place] [address]      bilateral arm fracture    OVARY REMOVAL Bilateral     TONSILLECTOMY      TUBAL LIGATION         Family History   Problem Relation Age of Onset    Heart Disease Mother     High Blood Pressure Mother     Heart Disease Father     Stroke Sister     Stroke Sister     Brain Cancer Sister        Social History     Socioeconomic History    Marital status:      Spouse name: None    Number of children: None    Years of education: None    Highest education level: None   Tobacco Use    Smoking status: Every Day     Packs/day: 0.50     Years: 31.00     Pack years: 15.50     Types: Cigarettes    Smokeless tobacco: Never    Tobacco comments:     1/2 elliott   Vaping Use    Vaping Use: Never used   Substance and Sexual Activity    Alcohol use: Not Currently     Comment: quit in 2012    Drug use: Never    Sexual activity: Not Currently           REVIEW OF SYSTEMS:   CONSTITUTIONAL: negative for fevers, chills, diaphoresis, appetite change, night sweats, unexpected weight change, fatigue  EYES: negative for blurred vision, eye discharge, visual disturbance and icterus  HEENT: negative for hearing loss, tinnitus, ear drainage, sinus pressure, nasal congestion, epistaxis and snoring  RESPIRATORY: Negative for hemoptysis, cough, sputum production  CARDIOVASCULAR: negative for chest pain, palpitations, exertional chest pressure/discomfort, syncope, edema  GASTROINTESTINAL: negative for nausea, vomiting, diarrhea, blood in stool, abdominal pain, constipation  GENITOURINARY: negative for frequency, dysuria, urinary incontinence, decreased urine volume, and hematuria  HEMATOLOGIC/LYMPHATIC: negative for easy bruising, bleeding and lymphadenopathy  ALLERGIC/IMMUNOLOGIC: negative for recurrent infections, angioedema, anaphylaxis and drug reactions  ENDOCRINE: negative for weight changes and diabetic symptoms including polyuria, polydipsia and polyphagia  MUSCULOSKELETAL: refer to HPI  NEUROLOGICAL: refer to HPI  PSYCHIATRIC/BEHAVIORAL: negative for hallucinations, behavioral problems, agitation, confusion. Physical Examination:  Vitals: Patient Vitals for the past 24 hrs:   BP Temp Temp src Pulse Resp SpO2 Weight   02/16/23 0733 123/63 98.4 °F (36.9 °C) Oral 61 17 95 % --   02/16/23 0453 (!) 113/59 98.3 °F (36.8 °C) Oral 60 16 93 % --   02/16/23 0235 -- -- -- -- -- -- 141 lb 15.6 oz (64.4 kg)   02/16/23 0222 -- -- -- -- 16 -- --   02/16/23 0024 106/67 98.5 °F (36.9 °C) -- 69 16 90 % --   02/15/23 2101 124/62 -- -- 68 -- -- --   02/15/23 2057 -- -- -- -- 17 -- --   02/15/23 1911 124/62 98 °F (36.7 °C) -- 68 16 95 % --     Const: Alert. WDWN. No distress  Eyes: Conjunctiva noninjected, no icterus noted; pupils equal, round, and reactive to light. HENT: Atraumatic, normocephalic; Oral mucosa moist  Neck: Trachea midline, neck supple. No thyromegaly noted. CV: Regular rate and rhythm, no murmur rub or gallop noted  Resp: Lungs clear to auscultation bilaterally, no rales wheezes or rhonchi, no retractions. Respirations unlabored. GI: Soft, nontender, nondistended. Normal bowel sounds. No palpable masses. Skin: Normal temperature and turgor. No rashes or breakdown noted on exposed areas. Ext: No significant edema appreciated. No varicosities. MSK: Cervical collar in place. No joint tenderness, erythema, warmth noted. Neuro: Alert, oriented, appropriate. No cranial nerve deficits appreciated. Sensation intact to light touch but subjectively abnormal in bilateral hands/fingers. Motor examination reveals strength 4/4 shoulder abduction, elbow flex, elbow ext, wrist ext, 5-/5 , 4/5 finger abduction, 5-/5 BLE. No abnormalities with finger/nose noted. Reflexes increased, symmetric. Negative Gaby. Psych: Stable mood, normal judgement, normal affect     Lab Results   Component Value Date    WBC 6.4 02/10/2023    HGB 12.6 02/10/2023    HCT 38.7 02/10/2023    MCV 93.9 02/10/2023     02/10/2023     Lab Results   Component Value Date    INR 0.96 02/10/2023    INR 0.98 05/25/2011    PROTIME 12.7 02/10/2023    PROTIME 10.7 05/25/2011     Lab Results   Component Value Date    CREATININE 0.8 02/10/2023    BUN 24 (H) 02/10/2023     02/10/2023    K 3.9 02/10/2023     02/10/2023    CO2 29 02/10/2023     Lab Results   Component Value Date    ALT 16 01/08/2023    AST 26 01/08/2023    ALKPHOS 58 01/08/2023    BILITOT 0.3 01/08/2023       Most recent EKG revealed:  Normal sinus rhythmNormal ECGWhen compared with ECG of 15-DEANDRE-2022 00:27,No significant change was foundConfirmed by Ladarius Gruber MD, [address] (1986) on 12/13/2022 6:47:26 AM    Most recent imaging studies revealed:    Per Dr. Sofiya Hanley note:  MRI cervical spine with cervical spondylosis, severe canal stenosis, spine cord compression and signal change. Assessment:  Cervical myelopathy -s/p C3-5 laminectomy and C3-6 fusion (2/14 with Dr. Sofiya Hanley)  HTN  HLD  Pre-DM2  IBS  Depression/anxiety    Impairments: weakness, sensory deficit, decreased balance     Recommendations:    Patient with new functional deficits and ongoing medical complexity. Demonstrates ability to tolerate 3 hours therapy/day. She is a good candidate for acute inpatient rehab when medically appropriate. Her preference is for [address] based on location. Requires insurance pre-cert. Thank you for this consult. Please contact me with any questions or concerns. Elijah Oconnor.  Lalita Gonzalez MD 2/16/2023, 8:03 AM

## 2023-02-16 NOTE — PROGRESS NOTES
Shift assessment complete. Vitals taken and stable. AM meds and PRN eddie, given to patient. Patient sitting up in chair eating breakfast. Call light in reach.

## 2023-02-16 NOTE — PROGRESS NOTES
Clinical Pharmacy Note  Vancomycin Consult    Laurent Julien is a 81 y.o. female ordered Vancomycin for post-op prophylaxis; consult received from Dr. Margret Gonzalez to manage therapy. Allergies:  Codeine and Sulfa antibiotics     Temp max:  Temp (24hrs), Av.3 °F (36.8 °C), Min:98 °F (36.7 °C), Max:98.5 °F (36.9 °C)      No results for input(s): WBC in the last 72 hours. Recent Labs     23  1125   CREATININE 0.7         Intake/Output Summary (Last 24 hours) at 2023 1308  Last data filed at 2023 0615  Gross per 24 hour   Intake 800 ml   Output 4655 ml   Net -3855 ml             Ht Readings from Last 1 Encounters:   23 5' 4\" (1.626 m)        Wt Readings from Last 1 Encounters:   23 141 lb 15.6 oz (64.4 kg)         Estimated Creatinine Clearance: 65 mL/min (based on SCr of 0.7 mg/dL). Assessment:  Day # 3 of vancomycin. Current regimen: 750 mg every 12 hours  Vancomycin level: 13.8 mg/L  Predicted AUC: 453     Plan:  Continue current regimen. Thank you for the consult.    Aaron Tate [facility], PharmD, 2023 1:09 PM

## 2023-02-16 NOTE — PLAN OF CARE
Problem: Discharge Planning  Goal: Discharge to home or other facility with appropriate resources  2/15/2023 2327 by Armond Liu RN  Outcome: Progressing  Flowsheets (Taken 2/15/2023 2327)  Discharge to home or other facility with appropriate resources:   Identify barriers to discharge with patient and caregiver   Identify discharge learning needs (meds, wound care, etc)     Problem: Safety - Adult  Goal: Free from fall injury  2/15/2023 2327 by Armond Liu RN  Outcome: Progressing  Flowsheets (Taken 2/15/2023 2327)  Free From Fall Injury: Instruct family/caregiver on patient safety     Problem: Pain  Goal: Verbalizes/displays adequate comfort level or baseline comfort level  2/15/2023 2327 by Armond Liu RN  Outcome: Progressing  Flowsheets (Taken 2/15/2023 2327)  Verbalizes/displays adequate comfort level or baseline comfort level:   Encourage patient to monitor pain and request assistance   Assess pain using appropriate pain scale   Administer analgesics based on type and severity of pain and evaluate response   Implement non-pharmacological measures as appropriate and evaluate response     Problem: Skin/Tissue Integrity  Goal: Absence of new skin breakdown  Description: 1. Monitor for areas of redness and/or skin breakdown  2. Assess vascular access sites hourly  3. Every 4-6 hours minimum:  Change oxygen saturation probe site  4. Every 4-6 hours:  If on nasal continuous positive airway pressure, respiratory therapy assess nares and determine need for appliance change or resting period. 2/15/2023 2327 by Armond Liu RN  Outcome: Progressing  Note: Skin assessment complete. No new signs of skin breakdown noted. Assistance provided with repositioning while in bed.       Problem: ABCDS Injury Assessment  Goal: Absence of physical injury  2/15/2023 2327 by Armond Liu RN  Outcome: Progressing  Flowsheets (Taken 2/15/2023 2327)  Absence of Physical Injury: Implement safety measures based on patient assessment

## 2023-02-16 NOTE — PROGRESS NOTES
Dressing to neck removed. Incision with staple LEEANNA, no drainage noted. Collar in place. Pt refused offer of ice pack at this time. Will continue to monitor.

## 2023-02-16 NOTE — PROGRESS NOTES
[address] Day: 3  POD #2   Operation: Posterior cervical laminectomy C3-5 and fusion C3-6 on right and C4-6 on the left (per operative note), and posterior lateral fusion bilaterally C3-6 with Radha. Placement of drain in surgery. /69   Pulse 69   Temp 98.4 °F (36.9 °C) (Oral)   Resp 18   Ht 5' 4\" (1.626 m)   Wt 141 lb 15.6 oz (64.4 kg)   SpO2 96%   BMI 24.37 kg/m²     Patient admitted for above procedure for c/o weakness, numbness and tingling in the upper and lower extremities, difficulty walking with several falls, weakness in rt HG, difficulty buttoning and bladder incontinence. Gaby's was positive L>R. Legs felt \"out of control. \" She was using a walking. Doing well. Up in the chair. Getting up to the bathroom to void. Improved bladder control. Gait and sensation improved. Legs feel more in control. Eating and urinating without difficulty. Pre-operative pain, numbness, tingling, weakness down bilaterally arm is improving. Complaining of posterior neck pain and pain across the shoulders. Pain controlled on current medications. pain is perceived as moderate (4-6 pain scale). Ice therapy is helpful. 3+/5 motor strength in all myotomes bilateral upper, and 4+/5 in the lower extremities. Sensation intact \"like sandpaper in the upper extremities,\" but \"normal in the lower extremities. Dressing clean, dry and intact. Drain with serosanguinous drainage; output yesterday 25,20,10  Collar in place. Off briefly for meals. Participating in therapy. Acute rehab consulted. She would like to go to [facility] near her home. Gait is improving. Labs: Glucose 101  Med list and MAR reviewed. A/P:  Continue collar, therapy and pain control   Continue drain, Vancomycin  Await acute rehab eval and decision from insurance.    Monitor for BM  All questions answered at the bedside

## 2023-02-16 NOTE — PROGRESS NOTES
Dressing removed from rt knee. Prineo dressing LEEANNA in place. No drainage noted. Small amount of drainage noted on old dressing. Shay hose and pneumoboots in place. Call light in reach. Will continue to monitor.

## 2023-02-16 NOTE — PLAN OF CARE
Problem: Discharge Planning  Goal: Discharge to home or other facility with appropriate resources  2/16/2023 1525 by Hannah Pantoja RN  Outcome: Progressing  2/16/2023 1525 by Hannah Pantoja RN  Outcome: Progressing     Problem: Safety - Adult  Goal: Free from fall injury  2/16/2023 1525 by Hannah Pantoja RN  Outcome: Progressing  2/16/2023 1525 by Hannah Pantoja RN  Outcome: Progressing     Problem: Pain  Goal: Verbalizes/displays adequate comfort level or baseline comfort level  2/16/2023 1525 by Hannah Pantoja RN  Outcome: Progressing  2/16/2023 1525 by Hannah Pantoja RN  Outcome: Progressing     Problem: Skin/Tissue Integrity  Goal: Absence of new skin breakdown  Description: 1. Monitor for areas of redness and/or skin breakdown  2. Assess vascular access sites hourly  3. Every 4-6 hours minimum:  Change oxygen saturation probe site  4. Every 4-6 hours:  If on nasal continuous positive airway pressure, respiratory therapy assess nares and determine need for appliance change or resting period.   2/16/2023 1525 by Hannah Pantoja RN  Outcome: Progressing  2/16/2023 1525 by Hannah Pantoja RN  Outcome: Progressing     Problem: ABCDS Injury Assessment  Goal: Absence of physical injury  2/16/2023 1525 by Hannah Pantoja RN  Outcome: Progressing  2/16/2023 1525 by Hannah Pantoja RN  Outcome: Progressing

## 2023-02-16 NOTE — CARE COORDINATION
[facility] and can accept patient. Informed Patric Kirkland nurse. She reported patient may be ready for discharge tomorrow. Informed Anna Juarez at [address] [place] [address] regarding above.  Patient will need a rapid covid test.    Electronically signed by NINO Delarosa, NIC, Case Management on 2/16/2023 at 3:09 PM  [facility] [id number]

## 2023-02-16 NOTE — PROGRESS NOTES
Patient assigned to this nurse from [facility]. Patient is resting in the chair, tray set up to assist patient with eating dinner. VSS stable. New ice pack applied to incision site on c-spine and neck brace reapplied. Robaxin given per order, patient satisfied. No acute complaints at this time. Will continue to monitor.  Electronically signed by Jose Sevilla RN on 2/16/2023 at 6:11 PM

## 2023-02-16 NOTE — PROGRESS NOTES
Physical Therapy    Erlinda Flores  2/16/2023  To room to patient sitting OOB in recliner - states she is overall comfortable - using tray table leg/base at foot rest  Recommend she use the 'arm rests' as placed yesterday to recliner to lessen the tension of her arms to the lower cervicosapular area. Agreeable and indicates more comfortable.   Electronically signed by Martina Gutierrez PT on 2/16/2023 at 9:44 AM

## 2023-02-17 ENCOUNTER — HOSPITAL ENCOUNTER (INPATIENT)
Age: 73
DRG: 559 | End: 2023-02-17
Attending: STUDENT IN AN ORGANIZED HEALTH CARE EDUCATION/TRAINING PROGRAM | Admitting: STUDENT IN AN ORGANIZED HEALTH CARE EDUCATION/TRAINING PROGRAM
Payer: MEDICARE

## 2023-02-17 VITALS
HEIGHT: 64 IN | BODY MASS INDEX: 24.39 KG/M2 | HEART RATE: 59 BPM | TEMPERATURE: 98.1 F | OXYGEN SATURATION: 96 % | WEIGHT: 142.86 LBS | RESPIRATION RATE: 16 BRPM | SYSTOLIC BLOOD PRESSURE: 137 MMHG | DIASTOLIC BLOOD PRESSURE: 58 MMHG

## 2023-02-17 DIAGNOSIS — G95.9 CERVICAL MYELOPATHY (HCC): Primary | ICD-10-CM

## 2023-02-17 DIAGNOSIS — M47.12 CERVICAL SPONDYLOSIS WITH MYELOPATHY: ICD-10-CM

## 2023-02-17 LAB — SARS-COV-2, NAAT: NOT DETECTED

## 2023-02-17 PROCEDURE — 6360000002 HC RX W HCPCS

## 2023-02-17 PROCEDURE — 2580000003 HC RX 258

## 2023-02-17 PROCEDURE — 6370000000 HC RX 637 (ALT 250 FOR IP): Performed by: STUDENT IN AN ORGANIZED HEALTH CARE EDUCATION/TRAINING PROGRAM

## 2023-02-17 PROCEDURE — 94760 N-INVAS EAR/PLS OXIMETRY 1: CPT

## 2023-02-17 PROCEDURE — 6370000000 HC RX 637 (ALT 250 FOR IP)

## 2023-02-17 PROCEDURE — 6370000000 HC RX 637 (ALT 250 FOR IP): Performed by: NEUROLOGICAL SURGERY

## 2023-02-17 PROCEDURE — 1280000000 HC REHAB R&B

## 2023-02-17 PROCEDURE — 87635 SARS-COV-2 COVID-19 AMP PRB: CPT

## 2023-02-17 RX ORDER — BISACODYL 10 MG
10 SUPPOSITORY, RECTAL RECTAL DAILY PRN
Status: DISCONTINUED | OUTPATIENT
Start: 2023-02-17 | End: 2023-03-03 | Stop reason: HOSPADM

## 2023-02-17 RX ORDER — LANOLIN ALCOHOL/MO/W.PET/CERES
800 CREAM (GRAM) TOPICAL DAILY
Status: CANCELLED | OUTPATIENT
Start: 2023-02-18

## 2023-02-17 RX ORDER — MAGNESIUM HYDROXIDE/ALUMINUM HYDROXICE/SIMETHICONE 120; 1200; 1200 MG/30ML; MG/30ML; MG/30ML
15 SUSPENSION ORAL EVERY 6 HOURS PRN
Status: CANCELLED | OUTPATIENT
Start: 2023-02-17

## 2023-02-17 RX ORDER — MAGNESIUM HYDROXIDE/ALUMINUM HYDROXICE/SIMETHICONE 120; 1200; 1200 MG/30ML; MG/30ML; MG/30ML
15 SUSPENSION ORAL EVERY 6 HOURS PRN
Status: DISCONTINUED | OUTPATIENT
Start: 2023-02-17 | End: 2023-03-03 | Stop reason: HOSPADM

## 2023-02-17 RX ORDER — POLYETHYLENE GLYCOL 3350 17 G/17G
17 POWDER, FOR SOLUTION ORAL DAILY
Status: DISCONTINUED | OUTPATIENT
Start: 2023-02-18 | End: 2023-03-03 | Stop reason: HOSPADM

## 2023-02-17 RX ORDER — VENLAFAXINE HYDROCHLORIDE 37.5 MG/1
150 CAPSULE, EXTENDED RELEASE ORAL DAILY
Status: DISCONTINUED | OUTPATIENT
Start: 2023-02-18 | End: 2023-03-03 | Stop reason: HOSPADM

## 2023-02-17 RX ORDER — ACETAMINOPHEN 325 MG/1
650 TABLET ORAL EVERY 6 HOURS
Status: DISCONTINUED | OUTPATIENT
Start: 2023-02-17 | End: 2023-02-21

## 2023-02-17 RX ORDER — VENLAFAXINE HYDROCHLORIDE 75 MG/1
150 CAPSULE, EXTENDED RELEASE ORAL DAILY
Status: CANCELLED | OUTPATIENT
Start: 2023-02-18

## 2023-02-17 RX ORDER — POLYETHYLENE GLYCOL 3350 17 G/17G
17 POWDER, FOR SOLUTION ORAL DAILY
Status: CANCELLED | OUTPATIENT
Start: 2023-02-18

## 2023-02-17 RX ORDER — OXYCODONE HYDROCHLORIDE 5 MG/1
5 TABLET ORAL EVERY 4 HOURS PRN
Status: CANCELLED | OUTPATIENT
Start: 2023-02-17

## 2023-02-17 RX ORDER — FLUTICASONE PROPIONATE 50 MCG
1 SPRAY, SUSPENSION (ML) NASAL DAILY
Status: CANCELLED | OUTPATIENT
Start: 2023-02-18

## 2023-02-17 RX ORDER — ONDANSETRON 4 MG/1
4 TABLET, ORALLY DISINTEGRATING ORAL EVERY 8 HOURS PRN
Status: CANCELLED | OUTPATIENT
Start: 2023-02-17

## 2023-02-17 RX ORDER — ATORVASTATIN CALCIUM 10 MG/1
20 TABLET, FILM COATED ORAL DAILY
Status: DISCONTINUED | OUTPATIENT
Start: 2023-02-18 | End: 2023-03-03 | Stop reason: HOSPADM

## 2023-02-17 RX ORDER — MAGNESIUM HYDROXIDE/ALUMINUM HYDROXICE/SIMETHICONE 120; 1200; 1200 MG/30ML; MG/30ML; MG/30ML
30 SUSPENSION ORAL EVERY 6 HOURS PRN
Status: DISCONTINUED | OUTPATIENT
Start: 2023-02-17 | End: 2023-02-17

## 2023-02-17 RX ORDER — LISINOPRIL 10 MG/1
10 TABLET ORAL DAILY
Status: CANCELLED | OUTPATIENT
Start: 2023-02-18

## 2023-02-17 RX ORDER — MAGNESIUM HYDROXIDE/ALUMINUM HYDROXICE/SIMETHICONE 120; 1200; 1200 MG/30ML; MG/30ML; MG/30ML
30 SUSPENSION ORAL EVERY 6 HOURS PRN
Status: CANCELLED | OUTPATIENT
Start: 2023-02-17

## 2023-02-17 RX ORDER — LANOLIN ALCOHOL/MO/W.PET/CERES
800 CREAM (GRAM) TOPICAL DAILY
Status: DISCONTINUED | OUTPATIENT
Start: 2023-02-18 | End: 2023-03-03 | Stop reason: HOSPADM

## 2023-02-17 RX ORDER — BISACODYL 10 MG
10 SUPPOSITORY, RECTAL RECTAL DAILY PRN
Status: CANCELLED | OUTPATIENT
Start: 2023-02-17

## 2023-02-17 RX ORDER — ONDANSETRON 2 MG/ML
4 INJECTION INTRAMUSCULAR; INTRAVENOUS EVERY 6 HOURS PRN
Status: DISCONTINUED | OUTPATIENT
Start: 2023-02-17 | End: 2023-03-03 | Stop reason: HOSPADM

## 2023-02-17 RX ORDER — METHOCARBAMOL 750 MG/1
750 TABLET, FILM COATED ORAL 4 TIMES DAILY
Status: DISCONTINUED | OUTPATIENT
Start: 2023-02-17 | End: 2023-02-21

## 2023-02-17 RX ORDER — METHOCARBAMOL 750 MG/1
750 TABLET, FILM COATED ORAL 4 TIMES DAILY
Qty: 40 TABLET | Refills: 0 | Status: ON HOLD | OUTPATIENT
Start: 2023-02-17 | End: 2023-02-27

## 2023-02-17 RX ORDER — LISINOPRIL 10 MG/1
10 TABLET ORAL DAILY
Status: DISCONTINUED | OUTPATIENT
Start: 2023-02-18 | End: 2023-03-03 | Stop reason: HOSPADM

## 2023-02-17 RX ORDER — SODIUM CHLORIDE 0.9 % (FLUSH) 0.9 %
5-40 SYRINGE (ML) INJECTION EVERY 12 HOURS SCHEDULED
Status: CANCELLED | OUTPATIENT
Start: 2023-02-17

## 2023-02-17 RX ORDER — SODIUM CHLORIDE 0.9 % (FLUSH) 0.9 %
5-40 SYRINGE (ML) INJECTION EVERY 12 HOURS SCHEDULED
Status: DISCONTINUED | OUTPATIENT
Start: 2023-02-18 | End: 2023-02-22

## 2023-02-17 RX ORDER — LORAZEPAM 1 MG/1
1 TABLET ORAL 2 TIMES DAILY PRN
Status: CANCELLED | OUTPATIENT
Start: 2023-02-17

## 2023-02-17 RX ORDER — ACETAMINOPHEN 325 MG/1
650 TABLET ORAL EVERY 6 HOURS
Status: CANCELLED | OUTPATIENT
Start: 2023-02-17

## 2023-02-17 RX ORDER — OXYCODONE HYDROCHLORIDE 5 MG/1
5 TABLET ORAL EVERY 4 HOURS PRN
Status: DISCONTINUED | OUTPATIENT
Start: 2023-02-17 | End: 2023-02-21

## 2023-02-17 RX ORDER — LORAZEPAM 1 MG/1
1 TABLET ORAL 2 TIMES DAILY PRN
Status: DISCONTINUED | OUTPATIENT
Start: 2023-02-17 | End: 2023-03-03 | Stop reason: HOSPADM

## 2023-02-17 RX ORDER — OXYCODONE HYDROCHLORIDE 5 MG/1
10 TABLET ORAL EVERY 4 HOURS PRN
Status: DISCONTINUED | OUTPATIENT
Start: 2023-02-17 | End: 2023-02-21

## 2023-02-17 RX ORDER — CLONIDINE HYDROCHLORIDE 0.1 MG/1
0.1 TABLET ORAL NIGHTLY
Status: DISCONTINUED | OUTPATIENT
Start: 2023-02-17 | End: 2023-03-03 | Stop reason: HOSPADM

## 2023-02-17 RX ORDER — LANOLIN ALCOHOL/MO/W.PET/CERES
9 CREAM (GRAM) TOPICAL NIGHTLY PRN
Status: CANCELLED | OUTPATIENT
Start: 2023-02-17

## 2023-02-17 RX ORDER — METHOCARBAMOL 750 MG/1
750 TABLET, FILM COATED ORAL 4 TIMES DAILY
Status: CANCELLED | OUTPATIENT
Start: 2023-02-17

## 2023-02-17 RX ORDER — SODIUM CHLORIDE 0.9 % (FLUSH) 0.9 %
5-40 SYRINGE (ML) INJECTION PRN
Status: CANCELLED | OUTPATIENT
Start: 2023-02-17

## 2023-02-17 RX ORDER — FLUTICASONE PROPIONATE 50 MCG
1 SPRAY, SUSPENSION (ML) NASAL DAILY
Status: DISCONTINUED | OUTPATIENT
Start: 2023-02-18 | End: 2023-02-23

## 2023-02-17 RX ORDER — CLONIDINE HYDROCHLORIDE 0.1 MG/1
0.1 TABLET ORAL NIGHTLY
Status: CANCELLED | OUTPATIENT
Start: 2023-02-17

## 2023-02-17 RX ORDER — OXYCODONE HYDROCHLORIDE 10 MG/1
10 TABLET ORAL EVERY 4 HOURS PRN
Status: CANCELLED | OUTPATIENT
Start: 2023-02-17

## 2023-02-17 RX ORDER — SODIUM CHLORIDE 0.9 % (FLUSH) 0.9 %
5-40 SYRINGE (ML) INJECTION PRN
Status: DISCONTINUED | OUTPATIENT
Start: 2023-02-17 | End: 2023-03-03 | Stop reason: HOSPADM

## 2023-02-17 RX ORDER — CLONIDINE HYDROCHLORIDE 0.2 MG/1
0.1 TABLET ORAL NIGHTLY
Qty: 90 TABLET | Refills: 0 | Status: ON HOLD
Start: 2023-02-17

## 2023-02-17 RX ORDER — ATORVASTATIN CALCIUM 20 MG/1
20 TABLET, FILM COATED ORAL DAILY
Status: CANCELLED | OUTPATIENT
Start: 2023-02-18

## 2023-02-17 RX ORDER — ONDANSETRON 4 MG/1
4 TABLET, ORALLY DISINTEGRATING ORAL EVERY 8 HOURS PRN
Status: DISCONTINUED | OUTPATIENT
Start: 2023-02-17 | End: 2023-03-03 | Stop reason: HOSPADM

## 2023-02-17 RX ORDER — LANOLIN ALCOHOL/MO/W.PET/CERES
9 CREAM (GRAM) TOPICAL NIGHTLY PRN
Status: DISCONTINUED | OUTPATIENT
Start: 2023-02-17 | End: 2023-02-22

## 2023-02-17 RX ORDER — ONDANSETRON 2 MG/ML
4 INJECTION INTRAMUSCULAR; INTRAVENOUS EVERY 6 HOURS PRN
Status: CANCELLED | OUTPATIENT
Start: 2023-02-17

## 2023-02-17 RX ORDER — OXYCODONE HYDROCHLORIDE 5 MG/1
5 TABLET ORAL EVERY 6 HOURS PRN
Qty: 28 TABLET | Refills: 0 | Status: ON HOLD | OUTPATIENT
Start: 2023-02-17 | End: 2023-02-24

## 2023-02-17 RX ADMIN — ATORVASTATIN CALCIUM 20 MG: 20 TABLET, FILM COATED ORAL at 08:11

## 2023-02-17 RX ADMIN — LISINOPRIL 10 MG: 10 TABLET ORAL at 08:11

## 2023-02-17 RX ADMIN — CLONIDINE HYDROCHLORIDE 0.1 MG: 0.1 TABLET ORAL at 20:52

## 2023-02-17 RX ADMIN — ACETAMINOPHEN 325MG 650 MG: 325 TABLET ORAL at 02:23

## 2023-02-17 RX ADMIN — OXYCODONE 10 MG: 5 TABLET ORAL at 20:51

## 2023-02-17 RX ADMIN — METOPROLOL TARTRATE 25 MG: 25 TABLET, FILM COATED ORAL at 20:51

## 2023-02-17 RX ADMIN — VENLAFAXINE HYDROCHLORIDE 150 MG: 75 CAPSULE, EXTENDED RELEASE ORAL at 08:10

## 2023-02-17 RX ADMIN — Medication 800 MG: at 08:12

## 2023-02-17 RX ADMIN — ACETAMINOPHEN 325MG 650 MG: 325 TABLET ORAL at 08:11

## 2023-02-17 RX ADMIN — ACETAMINOPHEN 325MG 650 MG: 325 TABLET ORAL at 20:51

## 2023-02-17 RX ADMIN — METOPROLOL TARTRATE 25 MG: 25 TABLET, FILM COATED ORAL at 08:11

## 2023-02-17 RX ADMIN — METHOCARBAMOL TABLETS 750 MG: 750 TABLET, COATED ORAL at 08:11

## 2023-02-17 RX ADMIN — VANCOMYCIN HYDROCHLORIDE 750 MG: 750 INJECTION, POWDER, LYOPHILIZED, FOR SOLUTION INTRAVENOUS at 02:26

## 2023-02-17 RX ADMIN — ACETAMINOPHEN 325MG 650 MG: 325 TABLET ORAL at 15:42

## 2023-02-17 RX ADMIN — METHOCARBAMOL TABLETS 750 MG: 750 TABLET, COATED ORAL at 20:52

## 2023-02-17 RX ADMIN — POLYETHYLENE GLYCOL 3350 17 G: 17 POWDER, FOR SOLUTION ORAL at 08:10

## 2023-02-17 ASSESSMENT — PAIN DESCRIPTION - DESCRIPTORS
DESCRIPTORS: ACHING;DISCOMFORT
DESCRIPTORS: THROBBING
DESCRIPTORS: THROBBING
DESCRIPTORS: PRESSURE;TIGHTNESS

## 2023-02-17 ASSESSMENT — PAIN SCALES - GENERAL
PAINLEVEL_OUTOF10: 3
PAINLEVEL_OUTOF10: 0
PAINLEVEL_OUTOF10: 9
PAINLEVEL_OUTOF10: 7
PAINLEVEL_OUTOF10: 9
PAINLEVEL_OUTOF10: 10
PAINLEVEL_OUTOF10: 0

## 2023-02-17 ASSESSMENT — PAIN - FUNCTIONAL ASSESSMENT
PAIN_FUNCTIONAL_ASSESSMENT: PREVENTS OR INTERFERES SOME ACTIVE ACTIVITIES AND ADLS

## 2023-02-17 ASSESSMENT — PAIN DESCRIPTION - ORIENTATION
ORIENTATION: POSTERIOR

## 2023-02-17 ASSESSMENT — PAIN DESCRIPTION - PAIN TYPE
TYPE: SURGICAL PAIN

## 2023-02-17 ASSESSMENT — PAIN DESCRIPTION - LOCATION
LOCATION: INCISION
LOCATION: NECK
LOCATION: NECK
LOCATION: INCISION

## 2023-02-17 ASSESSMENT — PAIN DESCRIPTION - FREQUENCY
FREQUENCY: CONTINUOUS
FREQUENCY: INTERMITTENT
FREQUENCY: INTERMITTENT
FREQUENCY: CONTINUOUS

## 2023-02-17 ASSESSMENT — PAIN SCALES - WONG BAKER
WONGBAKER_NUMERICALRESPONSE: 2
WONGBAKER_NUMERICALRESPONSE: 0

## 2023-02-17 ASSESSMENT — PAIN DESCRIPTION - ONSET
ONSET: ON-GOING

## 2023-02-17 NOTE — PROGRESS NOTES
Patient resting in chair at start of shift, A/Ox4 and vital signs stable. Complaining of pain to posterior neck, PRN pain medication given. Patient up to bathroom x1 with walker and back to bed. C-collar on, neuro checks remain the same since post surgery. States chronic tingling to BUE has improved. Drain with scant amount of drainage in chamber - will empty at a later time. Alarm on, all needs met at this time.

## 2023-02-17 NOTE — PLAN OF CARE
Problem: Skin/Tissue Integrity - Adult  Goal: Skin integrity remains intact  Outcome: Progressing  Goal: Incisions, wounds, or drain sites healing without S/S of infection  Outcome: Progressing  Goal: Oral mucous membranes remain intact  Outcome: Progressing

## 2023-02-17 NOTE — PROGRESS NOTES
[id number]Hx: Cervical myelopathy [facility]) [G95.9]   BYU:[birth year]  SOPHY:[id number]  Date of Admit: 2/17/2023  Room #: 6969/2076-83    Subjective:   Patient admitted to room 161 @ 1350 from [facility] via stretcher. Alert and oriented x4. Oriented to room and call light system. Oriented to rehab routine and therapy schedules. Informed about care conferences and ordering of meals with PCA. Drug / Medication Review:   Medications were reviewed by RN at time of admission  [x]  No potential or actual clinically significant medication issues were noted. []  Potential or actual clinically significant medication issues were found and MD was notified. (Specified below if applicable)   []  Allergy to medication   []  Drug interactions (drug/drug, drug/food, drug/disease interactions)   []  Duplicate drug   []  Omission (drug missing from prescribed regimen)   []  Non adherence   []  Adverse reaction   []  Wrong patient, drug, dose, route, time error   []  Ineffective drug therapy    Patient Mood Interview    Symptom Presence  0. No (enter 0 in column 2)  1. Yes (enter 0-3 in column 2)  9. No response (leave column 2 blank  Symptom Frequency  0. Never or 1 day  1. 2-6 days (several days)  2. 7-11 days (half or more days)  3. 12-14 days (nearly everyday) 1. Symptom Presence 2. Symptom Frequency    Enter scores in boxes   Little interest or pleasure in doing things   0 0   Feeling down, depressed, or hopeless   1 3   If either A or B is coded 2 or 3, CONTINUE asking the questions below. If not, END the interview.      Trouble falling or staying asleep, or sleeping too much   1 1   Feeling tired or having little energy   1 3   Poor appetite or overeating   0 0   Feeling bad about yourself - or that you are a failure or have let yourself or your family down   0 0   Trouble concentrating on things, such as reading the newspaper or watching television   1 3   Moving or speaking so slowly that other people could have noticed. Or the opposite- being so fidgety or restless that you have been moving around a lot more than usual.   0 0   Thoughts that you would be better off dead, or of hurting yourself in some way. 0 0   Total Severity: Add scores for all frequency responses in column 2    10   Social isolation (from [employer])  How often do you feel lonely or isolated from those around you?   [] 0. Never  [] 1. Rarely  [x] 2. Sometimes  [] 3. Often  [] 4. Always  [] 8. Patient unable to respond. Admission BIMS:  Number of word repeated after first attempt:  []  0. None []  1. One []  2. Two [x]  3. Three    Able to report correct year:  []  0. Missed by >5 years, or no answer  []  1. Missed by 2-5 years  []  2. Missed by 1 year  [x]  3. Correct    Able to report correct month:   []  0. Missed by >1 month, or no answer  []  1. Missed by 6 days to 1 month  [x]  2. Accurate within 5 days    Able to report correct day of the week:  []  0. Incorrect or no answer  [x]  1. Correct    Recall:   Able to recall \"sock\":  []  0. No could not recall  []  1. Yes, after cueing  [x]  2. Yes, no cue required  Able to recall \"blue\":  []  0. No could not recall  []  1. Yes, after cueing  [x]  2. Yes, no cue required  Able to recall \"bed\":  []  0. No could not recall  []  1. Yes, after cueing  [x]  2. Yes, no cue required      4 Eyes Skin Assessment   The patient is being assessed for: Admission     I agree that 2 RN's have performed a thorough Head to Toe Skin Assessment on the patient. ALL assessment sites listed below have been assessed. Areas assessed by both nurses:   [x]   Head, Face, and Ears   [x]   Shoulders, Back, and Chest, Abdomen  [x]   Arms, Elbows, and Hands   [x]   Coccyx, Sacrum, and Ischium  [x]   Legs, Feet, and Heel     Does the Patient have Skin Breakdown?   No         Aubrey Prevention initiated:  Yes   Wound Care Orders initiated:  Not Applicable      [facility] nurse consulted for Pressure Injury (Stage 3,4, Unstageable, DTI, NWPT, Complex wounds)and New or Established Ostomies:  Not Applicable    Primary Nurse eSignature: Sara Villanueva RN  Co-signer eSignature:  Patricia Carr RN

## 2023-02-17 NOTE — DISCHARGE INSTR - COC
Continuity of Care Form    Patient Name: Otilia Mckeon   :  1950  MRN:  [id number]    [facility] date:  2023  Discharge date:  2023    Code Status Order: Full Code   Advance Directives:   Advance Care Flowsheet Documentation       Date/Time Healthcare Directive Type of Healthcare Directive Copy in [address] Agent's Name Healthcare Agent's Phone Number    23 1735 No, patient does not have an advance directive for healthcare treatment -- -- -- -- --            Admitting Physician:  Anuel López MD  PCP: Jennifer Clark DO    Discharging Nurse: Jacinto [facility] Unit/Room#: G8T-1363/3126-01  Discharging Unit Phone Number: [phone]    Emergency Contact:   Extended Emergency Contact Information  Primary Emergency Contact: [address] [employer] Phone: [phone]  Mobile Phone: [phone]  Relation: Boyfriend   needed?  No  Secondary Emergency Contact: Citlaly Hernandez  Address: [address] [place] [address] Phone: [phone]  Relation: Child    Past Surgical History:  Past Surgical History:   Procedure Laterality Date    BILATERAL SALPINGOOPHORECTOMY      during bladder surgery    BLADDER SUSPENSION      also removed uterus and fallopian tubes    CARPAL TUNNEL RELEASE Bilateral     CERVICAL FUSION N/A 2023    DECOMPRESSIVE POSTERIOR CERVICAL LAMINECTOMY C3, C4, C5 WITH LATERAL MASS DANII AND SCREW FUSION C3-C6 performed by Anuel López MD at [facility]      bilateral arm fracture    OVARY REMOVAL Bilateral     TONSILLECTOMY      TUBAL LIGATION         Immunization History:   Immunization History   Administered Date(s) Administered    Influenza Vaccine, unspecified formulation 10/24/2016    Influenza Virus Vaccine 2011    Pneumococcal Polysaccharide (Ykorjgmef31) 2011, 2014       Active Problems:  Patient Active Problem List Diagnosis Code    Hypertension I10    Depression F32. A    Hyperlipidemia E78.5    Hypophosphatemia E83.39    Alcohol abuse F10.10    Anxiety F41.9    Tobacco use Z72.0    Hyperglycemia R73.9    Osteopenia M85.80    Tinnitus aurium, right H93.11    Sensorineural hearing loss (SNHL) of both ears H90.3    Cervical spondylosis with myelopathy M47.12       Isolation/Infection:   Isolation            No Isolation          Patient Infection Status       Infection Onset Added Last Indicated Last Indicated By Review Planned Expiration Resolved Resolved By    None active    Resolved    COVID-19 (Rule Out) 06/14/22 06/14/22 06/14/22 COVID-19 & Influenza Combo (Ordered)   06/14/22 Rule-Out Test Resulted            Nurse Assessment:  Last Vital Signs: BP (!) 137/58   Pulse 59   Temp 98.1 °F (36.7 °C) (Oral)   Resp 16   Ht 5' 4\" (1.626 m)   Wt 142 lb 13.7 oz (64.8 kg)   SpO2 96%   BMI 24.52 kg/m²     Last documented pain score (0-10 scale): Pain Level: 9  Last Weight:   Wt Readings from Last 1 Encounters:   02/17/23 142 lb 13.7 oz (64.8 kg)     Mental Status:  oriented, alert, coherent, logical, thought processes intact, and able to concentrate and follow conversation    IV Access:  - None    Nursing Mobility/ADLs:  Walking   Assisted  Transfer  Assisted  Bathing  Assisted  Dressing  Assisted  Toileting  Assisted  Feeding  Independent  Med Admin  Assisted  Med Delivery   whole    Wound Care Documentation and Therapy:  Incision 02/14/23 Neck Posterior (Active)   Dressing Status Clean;Dry; Intact 02/17/23 1156   Dressing Change Due 02/16/23 02/16/23 0800   Incision Cleansed Not Cleansed 02/17/23 1156   Dressing/Treatment Dry dressing 02/17/23 1156   Closure Other (Comment) 02/17/23 0950   Margins Other (Comment) 02/17/23 0950   Incision Assessment Other (Comment) 02/17/23 0950   Drainage Amount None 02/17/23 1156   Odor None 02/17/23 1156   Wandy-incision Assessment Intact 02/17/23 1156   Number of days: 3 Elimination:  Continence: Bowel: Yes  Bladder: Yes  Urinary Catheter: None   Colostomy/Ileostomy/Ileal Conduit: No       Date of Last BM: 2/14/2023    Intake/Output Summary (Last 24 hours) at 2/17/2023 1202  Last data filed at 2/17/2023 0645  Gross per 24 hour   Intake 1397.83 ml   Output 1320 ml   Net 77.83 ml     I/O last 3 completed shifts: In: 2437.8 [P.O.:1400; I.V.:419.6; IV Piggyback:618.2]  Out: 4164 [Urine:4700; Drains:35]    Safety Concerns: At Risk for Falls    Impairments/Disabilities:      Hearing    Nutrition Therapy:  Current Nutrition Therapy:   - Oral Diet:  General    Routes of Feeding: Oral  Liquids: No Restrictions  Daily Fluid Restriction: no  Last Modified Barium Swallow with Video (Video Swallowing Test): not done    Treatments at the Time of Hospital Discharge:   Respiratory Treatments:   Oxygen Therapy:  is not on home oxygen therapy.   Ventilator:    - No ventilator support    Rehab Therapies: Physical Therapy and Occupational Therapy  Weight Bearing Status/Restrictions: No weight bearing restrictions  Other Medical Equipment (for information only, NOT a DME order):  walker  Other Treatments:     Patient's personal belongings (please select all that are sent with patient):  None    RN SIGNATURE:  Electronically signed by Channing Mc RN on 2/17/23 at 12:04 PM EST    CASE MANAGEMENT/SOCIAL WORK SECTION    Inpatient Status Date: ***    Readmission Risk Assessment Score:  Readmission Risk              Risk of Unplanned Readmission:  7           Discharging to Facility/ Agency   [facility]: [facility]  Address:  [facility]:[phone]  Fax:    Dialysis Facility (if applicable)   Name:  Address:  Dialysis Schedule:  Phone:  Fax:    / signature: Electronically signed by Ulisses Juarez on 2/17/23 at 12:30 PM EST    PHYSICIAN SECTION    Prognosis: {Prognosis:8577569759}    Condition at Discharge: [address] Patient Condition:190136256}    Rehab Potential (if transferring to Rehab): {Prognosis:9841843201}    Recommended Labs or Other Treatments After Discharge: ***    Physician Certification: I certify the above information and transfer of Elrinda Flores  is necessary for the continuing treatment of the diagnosis listed and that she requires {Admit to Appropriate Level of Care:05737} for {GREATER/LESS:888518609} 30 days.      Update Admission H&P: {CHP DME Changes in SFCVD:361412190}    PHYSICIAN SIGNATURE:  {Esignature:[id number]}

## 2023-02-17 NOTE — PLAN OF CARE
Problem: Discharge Planning  Goal: Discharge to home or other facility with appropriate resources  2/17/2023 0957 by Ryan Gutierrez RN  Outcome: Progressing     Problem: Safety - Adult  Goal: Free from fall injury  2/17/2023 0957 by Ryan Gutirerez RN  Outcome: Progressing     Problem: Pain  Goal: Verbalizes/displays adequate comfort level or baseline comfort level  2/17/2023 0957 by Ryan Gutierrez RN  Outcome: Progressing     Problem: Skin/Tissue Integrity  Goal: Absence of new skin breakdown  Description: 1. Monitor for areas of redness and/or skin breakdown  2. Assess vascular access sites hourly  3. Every 4-6 hours minimum:  Change oxygen saturation probe site  4. Every 4-6 hours:  If on nasal continuous positive airway pressure, respiratory therapy assess nares and determine need for appliance change or resting period.   2/17/2023 0957 by Ryan Gutierrez RN  Outcome: Progressing     Problem: ABCDS Injury Assessment  Goal: Absence of physical injury  2/17/2023 0957 by Ryan Gutierrez RN  Outcome: Progressing

## 2023-02-17 NOTE — PLAN OF CARE
ARU PATIENT TREATMENT PLAN  [facility], [address]   [phone]    Angel Harmon    : 1950  Acct #: [de-identified]  MRN: [id number]   PHYSICIAN:  Juanita Freeman MD  Primary Problem    Patient Active Problem List   Diagnosis    Hypertension    Depression    Hyperlipidemia    Hypophosphatemia    Alcohol abuse    Anxiety    Tobacco use    Hyperglycemia    Osteopenia    Tinnitus aurium, right    Sensorineural hearing loss (SNHL) of both ears    Cervical spondylosis with myelopathy    Cervical myelopathy [facility]       Rehabilitation Diagnosis:     Cervical myelopathy ([facility] [address] [G95.9]       ADMIT DATE:2023    Patient Goals: \"to get to take care of the litter box and trash\"    Admitting Impairments: Pt. Admitted s/p Laminectomy resulting in Decreased functional mobility ; Decreased safe awareness;Decreased ADL status; Decreased balance;Decreased high-level IADLs;Decreased endurance;Decreased strength;Decreased ROM; Decreased fine motor control;Decreased coordination  Barriers: home setup, availability of assistance, pain, endurance, comorbidities  Participation: Good     CARE PLAN     NURSING:  Angel Harmon while on this unit will:     [x] Be continent of bowel and bladder     [x] Have an adequate number of bowel movements  [] Urinate with no urinary retention >300ml in bladder  [] Complete bladder protocol with keating removal  [] Maintain O2 SATs at ___%  [x] Have pain managed while on ARU       [] Be pain free by discharge   [x] Have no skin breakdown while on ARU  [] Have improved skin integrity via wound measurements  [] Have no signs/symptoms of infection at the wound site  [x] Be free from injury during hospitalization   [] Complete education with patient/family with understanding demonstrated for:  [] Adjustment   [] Other:   Nursing interventions may include bowel/bladder training, education for medical assistive devices, medication education, O2 saturation management, energy conservation, stress management techniques, fall prevention, alarms protocol, seating and positioning, skin/wound care, pressure relief instruction,dressing changes,  infection protection, DVT prophylaxis, and/or assistance with in room safety with transfers to bed, toilet, wheelchair, shower as well as bathroom activities and hygiene. Patient/caregiver education for:   [] Disease/sustained injury/management      [x] Medication Use   [] Surgical intervention   [x] Safety   [x] Body mechanics and or joint protection   [x] Health maintenance         PHYSICAL THERAPY:  Goals:                  Short Term Goals  Time Frame for Short Term Goals: 2/24  Short Term Goal 1: Pt will complete bed mobility with supervision  Short Term Goal 2: Pt will sit to stand with SBA to RW  Short Term Goal 3: Pt will ambulate x 100' with RW with supervision  Short Term Goal 4: Pt will complete 12 stairs with min A            Long Term Goals  Time Frame for Long Term Goals : 3/3/23  Long Term Goal 1: Pt will be independent with bed mobility  Long Term Goal 2: Pt will ambulate x 150' with LRAD independently  Long Term Goal 3: Pt will complete 4 stairs with Mod I  Long Term Goal 4: Pt will be independent donning/doffing c-collar  These goals were reviewed with this patient at the time of assessment and Jorge Alberto Griffin is in agreement. Plan of Care: Pt to be seen 5 out of 7 days per week, 90   mins (exact) per day for 14 days (exact)                   Current Treatment Recommendations: Functional mobility training, Transfer training, ADL/Self-care training, Gait training, Positioning, Modalities, Therapeutic activities, Patient/Caregiver education & training      OCCUPATIONAL THERAPY:  Goals:             Short Term Goals  Time Frame for Short Term Goals: 1 week (2/24/23)  Short Term Goal 1: Pt will complete LB dressing with min A. Short Term Goal 2: Pt will complete toileting with Anand.   Short Term Goal 3: Pt will complete bathing with min a. Short Term Goal 4: Pt will complete light household tasks with min A. :  Long Term Goals  Time Frame for Long Term Goals : 2 weeks (3/3/23)  Long Term Goal 1: Pt will complete LB dressing mod I,.  Long Term Goal 2: Pt will complete toileting mod I.  Long Term Goal 3: Pt will complete bathing with mod I.  Long Term Goal 4: Pt will complete light household tasks independent. :    These goals were reviewed with this patient at the time of assessment and Prieto Zamorano is in agreement    Plan of Care:  Pt to be seen 5 out of 7 days per week, 90   mins (exact) per day for 14 days (exact)  Current Treatment Recommendations: Strengthening, ROM, Balance training, Functional mobility training, Endurance training, Pain management, Safety education & training, Modalities, Positioning, Patient/Caregiver education & training, Self-Care / ADL, Home management training      SPEECH THERAPY:   Goals:    Long Term Goals:   Time Frame for Long Term Goals: 14 Days (03/03/2023)  Goal 1: Pt will improve overall cognitive-linguistic abilities to promote safe and independent return home PLOF     Short Term Goals:  Time Frame for Short Term Goals: 10 Days (02/27/2023)  Goal 1: Pt will complete graded recall recall tasks using compensatory strategies with 80% acc given min cues   Goal 2: Pt will complete problem solving, safety, and thought organization tasks with 80% acc given min cues   Goal 3: Pt will complete verbal and visual reasoning tasks with 80% acc given min cues  These goals were reviewed with this patient at the time of assessment and Prieto Zamorano is in agreement    Plan of Care: Pt to be seen 5 out of 7 days per week, 30   mins (exact) per day for 14 days (exact)             Dysphagia: N/a              Speech/Language/Cognition: Compensatory strategy training and carryover, recall/STM, problem solving, reasoning, exec function, thought organization, attention.     CASE MANAGEMENT:  Goals: Assist patient/family with discharge planning, patient/family counseling,   and coordination with insurance during ARU stay. QIM / IRF ROBBY SCORES:  ITEM CURRENT SCORE GOAL   Eating CARE Score: 5 Discharge Goal: Independent   Oral Hygiene CARE Score: 5 Discharge Goal: Independent   Toileting Hygiene CARE Score: 3 Discharge Goal: Independent   Shower/Bathe Self CARE Score: 2 Discharge Goal: Independent   Upper Body Dressing CARE Score: 2 Discharge Goal: Independent   Lower Body Dressing CARE Score: 2 Discharge Goal: Independent   On/Off Footwear CARE Score: 1 Discharge Goal: Independent   Roll Left & Right CARE Score: 4 Discharge Goal: Independent   Sit to Lying  CARE Score: 3 Discharge Goal: Independent   Lying to Sitting EOB CARE Score: 3 Discharge Goal: Independent   Sit to Stand CARE Score: 3 Discharge Goal: Independent   Chair/Bed to Chair Transfer CARE Score: 3 Discharge Goal: Independent   Toilet Transfer CARE Score: 3 Discharge Goal: Independent   Car Transfer CARE Score: 3 Discharge Goal: Independent   Walk 10 Feet CARE Score: 88 Discharge Goal: Independent   Walk 50 Feet, 2 Turns CARE Score: 88 Discharge Goal: Independent   Walk 150 Feet CARE Score: 88 Discharge Goal: Independent   Walk 10 Feet, Uneven Surface CARE Score: 88 Discharge Goal: Independent   1 Step (Curb) CARE Score: 3 Discharge Goal: Independent   4 Steps CARE Score: 3 Discharge Goal: Independent   12 Steps CARE Score: 3 Discharge Goal: Independent    Object CARE Score: 4 Discharge Goal: Independent   Wheel 50 feet, 2 turns       Wheel 150 Feet              Jillian Jennings will be seen a minimum of 3 hours of therapy per day, a minimum of 5 out of 7 days per week. [] In this rare instance due to the nature of this patient's medical involvement, this patient will be seen 15 hours per week (900 minutes within a 7 day period).     Treatments may include therapeutic exercises, gait training, neuromuscular re-ed, transfer training, community reintegration, bed mobility, w/c mobility and training, self care, home mgmt, cognitive training, energy conservation,dysphagia tx, speech/language/communication therapy, group therapy, and patient/family education. In addition, dietician/nutritionist may monitor calorie count as well as intake and collaboratively work with SLP on dietary upgrades. Neuropsychology/Psychology may evaluate and provide necessary support. Medical issues being managed closely and that require 24 hour availability of a physician:   [] Swallowing Precautions  [x] Bowel/Bladder Fx  [x] Weight bearing precautions   [x] Wound Care    [x] Pain Mgmt   [x] Infection Protection   [x] DVT Prophylaxis   [x] Fall Precautions  [x] Fluid/Electrolyte/Nutrition Balance   [] Voice Protection   [] Respiratory  [] Other:    Medical Prognosis: [x] Good  [] Fair    [] Guarded   Total expected IRF days 14  Anticipated discharge destination:    [] Home Independently   [x] Home Modified Independent  [] Home with supervision    []SNF     [] Other                                           Physician anticipated functional outcomes:  Home w/ assist as needed and home health services. IPOC brief synthesis: Patient is a 80 yo F with pmh HTN, HLD, Pre-DM2, IBS, depression/anxiety who presented to [facility] on 2/14/2023 for urgent C3-5 laminectomy and and C3-6 posterior fusion. She was [facility] to [facility] on 2/17/23 due to functional deficits below her baseline. This plan has been reviewed with Arlin Paul on 2/18  in a language the patient understands. Arlin Paul has had the opportunity to include input with the therapy team.      I have reviewed this initial plan of care and agree with its contents:    Title   Name    Date    Time    Physician: Joleen Griffin.  Golden Perry MD    Case Mgmt: Gabriel Andrews RN    OT: Patricia Ge OTR/L    PT: Shaq Talbot PT, DPT    RN: Sakshi Fonseca RN    ST: Aida Kong MA CFY-SLP     : Selena Saleh, OTR/L    Other:

## 2023-02-17 NOTE — PROGRESS NOTES
Patient is alert & oriented x4 and resting in bed. Drain was pulled by Marie Starkey RN. Patient rated pain this AM 9/10, Robaxin and scheduled Tylenol given, patient satisfied. Patient being discharged to [address] [place] [address] today. Fall precautions in place, call light in place, will continue to monitor.  Electronically signed by Jennie Kennedy RN on 2/17/2023 at 1:16 PM

## 2023-02-17 NOTE — CARE COORDINATION
Case Management Assessment  Initial Evaluation    Date/Time of Evaluation: 2/17/2023 3:09 PM  Assessment Completed by: Moises Murray RN    If patient is discharged prior to next notation, then this note serves as note for discharge by case management. Patient Name: Jorge Alberto Griffin                   YOB: 1950  Diagnosis: Cervical myelopathy ([facility] [address] [G95.9]                   Date / Time: 2/17/2023  2:02 PM    Patient Admission Status: REHAB IP   Readmission Risk (Low < 19, Mod (19-27), High > 27): Readmission Risk Score: 8.7    Current PCP: Yuliana Giron, DO  PCP verified by CM? Yes    Chart Reviewed: Yes      History Provided by: Patient  Patient Orientation: Alert and Oriented    Patient Cognition: Alert    Hospitalization in the last 30 days (Readmission):  No    If yes, Readmission Assessment in CM Navigator will be completed.     Advance Directives:      Code Status: Full Code   Patient's Primary Decision Maker is: Legal Next of Kin    Primary Decision MakerCorbin Kami Child - [phone]    Secondary Decision Maker: Maribel Kale  Child - [phone]    Supplemental (Other) Decision Maker: Ajith Oakes  Boyfriend - [phone]    Discharge Planning:    Patient lives with: (P) Alone Type of Home: (P) House  Primary Care Giver: Self  Patient Support Systems include: Spouse/Significant Other, Family Members   Current Financial resources: (P) None  Current community resources: (P) None  Current services prior to admission: (P) None            Current DME:  Rolling walker and rollator            Type of Home Care services:  (P) None    ADLS  Prior functional level: (P) Independent in ADLs/IADLs  Current functional level: (P) Assistance with the following:, Mobility    PT AM-PAC:   /24  OT AM-PAC:   /24    Family can provide assistance at DC: (P) Yes  Would you like Case Management to discuss the discharge plan with any other family members/significant others, and if so, who? (P) Yes  Plans to Return to Present Housing: (P) Unknown at present  Other Identified Issues/Barriers to RETURNING to current housing: Lives alone  Potential Assistance needed at discharge: (P) Home Care            Potential DME:    Patient expects to discharge to: (P) [address] [place] [address] for transportation at discharge:      Financial    Payor: Albina Donovan / Plan: Jennifer Colunga ESSENTIAL/PLUS / Product Type: *No Product type* /     Does insurance require precert for SNF: Yes    Potential assistance Purchasing Medications: (P) No  Meds-to-Beds request:        Pranay Ventura [id number] - [facility], [address] [phone] - F [phone]  VIVEK Molina [address]  Phone: [phone] Fax: 0901 [address] [phone]  [address] [facility] [address]  Phone: [phone] Fax: [phone]    [address] [facility]  [address] [facility] [address] [facility] [address]  Phone: [phone] Fax: [phone]    [id number] [place] [address] [phone] Noé Hanson [phone]  [address] [address]  Phone: [phone] Fax: [phone]      Notes:    Factors facilitating achievement of predicted outcomes: Family support, Motivated, Cooperative, and Pleasant    Barriers to discharge: Decreased endurance and Lower extremity weakness    Additional Case Management Notes: Patient independent PLOF with AD. Lives alone in a 2 story home with a 3 step entry into home. Home has walk-in-shower with grab bars. The Plan for Transition of Care is related to the following treatment goals of Cervical myelopathy ([facility] [address] [G95.9]      The Patient and/or Patient Representative Agree with the Discharge Plan?   Yes      Transportation needs: Family can provide   Has lack of transportation kept you from medical appointments, meetings, work, or ADL's? [] Yes, it has kept me from medical appointments or from getting my meds  [] Yes, It has kept me from non-medical meetings, appointments, work, or getting things I need  [x] No  [] Pt unable to respond  [] Pt declines to respond     How often do you need to have someone help you when you read instructions, pamphlets, or other written material from your doctor or pharmacy? [] Never                             [] Always  [x] Rarely                            [] Patient unable to respond  [] Sometimes                     [] Pt declines to respond  [] Often         Ethnicity: Are you of , /a, or Bhutanese origin?   [x] No, not of , /a, or Bhutanese origin  [] Yes, [place], [facility], Chicano/a  [] Yes, [address] [place] [address]  [] Yes, [place]  [] Yes, another , , or Bhutanese origin  [] Pt unable to respond  [] Pt declines to respond     Race: [x] White                                                [] [place]              [] [address] [place] [address]  [] [place] or [place] American                [] [place]          [] [place] or Alicia  [] American [place] [facility] or [place] Native     [] [place]             [] Vincentian  []  [place] [facility]                                      [] [place]      [] Other [place]  [] [facility]                                             [] Other        [] Pt unable to respond                      [] Pt declines to respond                  [] None of the above   Preferred Language: English        Signature:      Myah Vargas RN  Case Management Department  Ph: [phone] Fax: [phone]

## 2023-02-17 NOTE — DISCHARGE INSTRUCTIONS
[facility] Instructions    Cover incision with light gauze and tape, change dressing daily. Continue [place] J collar. May remove briefly for hygiene and for meals, otherwise it should be on. Up in the chair for meals. May sleep on her side or on her back. Incision may get wet in the shower beginning on 2/17. Remove gauze for shower. Pat dry and then reapply fresh gauze and tape. No plastic. No Tegaderm. No ointment, peroxide or alcohol on the wound. Ambulate with assistance with rolling walker. Progress as safely able to tolerate. Encourage upright posture and shoulder relaxation. Cool/ice therapy for 1 hour QID to the surgical site. No lifting >8#. No excessive motion in the neck. Follow up in the office in 2 weeks for scheduled wound check and suture removal.   Follow up in the office in 4 weeks with new Xrays of the cervical spine. Take the order in white envelope to get the Xray done. Appointments and Xray order are in the white envelope   Call the office to speak with the RN Mon-Fri, 9-4 PM, [phone], option 4.

## 2023-02-17 NOTE — DISCHARGE SUMMARY
Discharge Summary    Date of Admission: 2/14/2023  5:56 AM  Date of Discharge: 2/17/23  Admission Diagnosis: Cervical spondylosis with myelopathy. Patient Active Problem List   Diagnosis    Hypertension    Depression    Hyperlipidemia    Hypophosphatemia    Alcohol abuse    Anxiety    Tobacco use    Hyperglycemia    Osteopenia    Tinnitus aurium, right    Sensorineural hearing loss (SNHL) of both ears    Cervical spondylosis with myelopathy     Discharge Diagnosis: Same   Condition on Discharge: improved/good  Attending for Admission: Andrea Kearns MD  Procedures:   1. Posterior cervical decompressive laminectomy C3, C4, C5.  2.  Posterior cervical instrumentation and fusion using RTI lateral mass  screws and rods, bilateral C3, C4, C5, C6.  3.  Posterolateral fusion bilateral C3-4, C4-5, C5-6 using Radha. 4. Placement of drain in surgery. Reason for Admission: Otilia Mckeon is a 81 y.o. female patient who was admitted to the hospital for the above procedure for complaints of weakness, numbness and tingling in the upper and lower extremities, difficulty walking with several falls, weakness in rt HG, difficulty buttoning and bladder incontinence. Gaby's was positive L>R. Legs felt \"out of control. \" She was using a walking. She underwent the procedure listed above on 2/14/23. Hospital Course: After surgery, Her pre-operative numbness and tingling in the extremities is improved. Spasticity and shaky feeling in the legs and arms is improving. Able to stand up under her own power with verbal cues for hand placements with walker/chair/bed. Walked in the [place] 50 feet. Encouraged to relax the left shoulder down with breathing, shoulder rolls and conscious relaxation. She complained of posterior neck pain, residual shaky feeling in the arms and legs. The pain was controlled on Tylenol and muscle relaxants and ice therapy, with only minimal oxycodone due to side effects/nausea.  Incentive spirometer with TV 2000 ml. Her [place] J collar was in place. The light gauze dressing was clean, dry and intact. Sutures intact. Drain output monitored every shift, for serosanguinous drainage. Output sufficiently reduced and drain as removed on 2/17. Vancomycin coverage while it was in place. There was no erythema or edema around the surgical site. Ice therapy helpful. Prior to discharge She was eating well, urinating and ambulating short distance in the [place] (50-75 ft) with rolling walker. No scissoring noted. Mild unsteadiness. Walker necessary for safety and stability. Legs fatigue with progressive ambulation, but notes improved \"control. \"  Motor strength improving. 4/5 in BUE/HG and 4+/5 in BLE. DTR still very brisk. No clonus noted. Mild Smith's on the left only. Discharge Vitals/Labs: BP (!) 137/58   Pulse 59   Temp 98.1 °F (36.7 °C) (Oral)   Resp 16   Ht 5' 4\" (1.626 m)   Wt 142 lb 13.7 oz (64.8 kg)   SpO2 96%   BMI 24.52 kg/m²      Discharge Medications:      Medication List        START taking these medications      methocarbamol 750 MG tablet  Commonly known as: ROBAXIN  Take 1 tablet by mouth 4 times daily for 10 days     oxyCODONE 5 MG immediate release tablet  Commonly known as: ROXICODONE  Take 1 tablet by mouth every 6 hours as needed for Pain for up to 7 days. Max Daily Amount: 20 mg            CHANGE how you take these medications      cloNIDine 0.2 MG tablet  Commonly known as: CATAPRES  Take 0.5 tablets by mouth nightly Historical med. Dose corrected. What changed: See the new instructions.             CONTINUE taking these medications      atorvastatin 20 MG tablet  Commonly known as: LIPITOR  TAKE ONE TABLET BY MOUTH DAILY     fluticasone 50 MCG/ACT nasal spray  Commonly known as: FLONASE     lisinopril 10 MG tablet  Commonly known as: PRINIVIL;ZESTRIL     LORazepam 1 MG tablet  Commonly known as: Ativan  Take 1 tablet by mouth every 6 hours as needed for Anxiety     magnesium oxide 400 MG tablet  Commonly known as: MAG-OX     Melatonin 10 MG Tabs     metoprolol tartrate 25 MG tablet  Commonly known as: LOPRESSOR     MIRALAX PO     Potassium Gluconate 550 MG Tabs     venlafaxine 75 MG extended release capsule  Commonly known as: EFFEXOR XR            STOP taking these medications      Calcium 600+D3 600-5 MG-MCG Tabs tablet  Generic drug: calcium carb-cholecalciferol     meloxicam 15 MG tablet  Commonly known as: MOBIC     Multi-Vitamin Daily Tabs               Where to Get Your Medications        You can get these medications from any pharmacy    Bring a paper prescription for each of these medications  methocarbamol 750 MG tablet  oxyCODONE 5 MG immediate release tablet       Information about where to get these medications is not yet available    Ask your nurse or doctor about these medications  cloNIDine 0.2 MG tablet       Discharge Destination: The patient was discharged to Acute Rehab at [facility]. Follow-up: The patient is to follow-up with Dr. Micah Adams in the office in 2 weeks for wound check and suture removal then again 4 weeks with xrays. Discharge Instructions: Verbal and written discharge instructions as well as dressing change instructions were given to the patient at the time of consent. No NSAIDS for 6 months, nor anticoagulation for 1 week post-operatively. No driving or riding in a motor vehicle. No lifting or bending. Continue collar, but remove for hygiene and meals.

## 2023-02-17 NOTE — PROGRESS NOTES
Report called to [facility] at [address]. Transport picked patient up to take to [facility]. Transport could not fit walker with a seat into ambulette, being held at  for family to . AVS printed and packet given to transporters.  Electronically signed by Los Hernandez RN on 2/17/2023 at 1:14 PM

## 2023-02-17 NOTE — PROGRESS NOTES
IRF ROBBY Admission Assessment  [facility] Acute Rehab Unit    Patient:Citlali Dan     Rehab Dx/Hx: Cervical myelopathy ([facility] [address] [G95.9]   REP:4/9/2772  NGOZI:[id number]  Date of Admit: 2/17/2023     Pain Effect on Sleep:  Over the past 5 days, how much of the time has pain made it hard for you to sleep at night?  []  0. Does not apply - I have not had any pain or hurting in the past 5 days  []  1. Rarely or not at all  []  2. Occasionally  []  3. Frequently  [x]  4. Almost constantly  []  8. Unable to answer    Over the past 5 days, how often have you limited your participation in rehabilitation therapy sessions due to pain?  []  0. Does not apply - I have not received rehabilitation therapy in the past 5 days  [x]  1. Rarely or not at all  []  2. Occasionally  []  3. Frequently  []  4. Almost constantly  []  8. Unable to answer    Pain Interference with Day-to-Day Activities:  Over the past 5 days, how often have you limited your day-to-day activities (excluding rehabilitation therapy session)? []  1. Rarely or not at all  []  2. Occasionally  [x]  3. Frequently  []  4. Almost constantly  []  8. Unable to answer      High-Risk Drug Classes: Use and Indication   Is taking: Check if the pt is taking any medications by pharmacological classification  Indication noted: If column 1 is checked, check if there is an indication noted for all meds in the drug class Is taking  (check all that apply) Indication noted (check all that apply)   Antipsychotic [] []   Anticoagulant [] []   Antibiotic [x] [x]   Opioid [x] [x]   Antiplatelet [] []   Hypoglycemic (including insulin) [] []   None of the above [] []     Special Treatments, Procedures, and Programs   Check all of the following treatments, procedures, and programs that apply on admission. On admission (check all that apply)   Cancer Treatments    A1. Chemotherapy []   A2. IV []   A3. Oral []   A10. Other []   B1. Radiation []   Respiratory Therapies    C1.  Oxygen Therapy []   C2. Continuous []   C3. Intermittent []   C4. High-concentration []   D1. Suctioning []   D2. Scheduled []   D3. As needed []   E1. Tracheostomy Care []   F1. Invasive Mechanical Ventilator (ventilator or respirator) []   G1. Non-invasive Mechanical Ventilator []   G2. BiPAP []   G3. CPAP []   Other    H1. IV Medications []   H2. Vasoactive medications []   H3. Antibiotics []   H4. Anticoagulation []   H10. Other []   I1. Transfusions []   J1. Dialysis []   J2. Hemodialysis []   J3. Peritoneal dialysis []   O1. IV access []   O2. Peripheral [x]   O3. Midline []   O4.  Central (PICC, tunneled, port) []   None of the above []     Signature: Sharita Kwan RN

## 2023-02-17 NOTE — CARE COORDINATION
DISCHARGE SUMMARY     DATE OF DISCHARGE: 2/17/2023    DISCHARGE DESTINATION: [facility] Acute Rehab     FACILITY:     INSURANCE PRECERT OBTAINED: yes    JAZMINE/JACK COMPLETED: n/a    COVID RESULT: negative 2/17/23      TRANSPORTATION:     Company Name:  [employer] up Time: 1245pm    Phone Number: [phone]      COMMENTS: Received call from [facility]--they can accept patient today but patient must be in the bed by 5pm.   Informed patient of above and transport time of 1245pm. She is agreeable to the plan and will inform her family. RN aware. Report number [phone].     Electronically signed by Romero Mack, NINO, LISW, Case Management on 2/17/2023 at 12:22 PM  [place] [phone]

## 2023-02-17 NOTE — PLAN OF CARE
Problem: Discharge Planning  Goal: Discharge to home or other facility with appropriate resources  2/16/2023 2205 by Adamaris Shah RN  Outcome: Progressing  Flowsheets (Taken 2/16/2023 2205)  Discharge to home or other facility with appropriate resources: Identify barriers to discharge with patient and caregiver  2/16/2023 1525 by Blue Cabrera RN  Outcome: Progressing  2/16/2023 1525 by Blue Cabrera RN  Outcome: Progressing     Problem: Safety - Adult  Goal: Free from fall injury  2/16/2023 2205 by Adamaris Shah RN  Outcome: Progressing  Flowsheets (Taken 2/16/2023 2205)  Free From Fall Injury: Instruct family/caregiver on patient safety  2/16/2023 1525 by Blue Cabrera RN  Outcome: Progressing  2/16/2023 1525 by Blue Cabrera RN  Outcome: Progressing     Problem: Pain  Goal: Verbalizes/displays adequate comfort level or baseline comfort level  2/16/2023 2205 by Adamaris Shah RN  Outcome: Progressing  Flowsheets (Taken 2/16/2023 2205)  Verbalizes/displays adequate comfort level or baseline comfort level: Encourage patient to monitor pain and request assistance  2/16/2023 1525 by Blue Cabrera RN  Outcome: Progressing  2/16/2023 1525 by Blue Cabrera RN  Outcome: Progressing     Problem: Skin/Tissue Integrity  Goal: Absence of new skin breakdown  Description: 1. Monitor for areas of redness and/or skin breakdown  2. Assess vascular access sites hourly  3. Every 4-6 hours minimum:  Change oxygen saturation probe site  4. Every 4-6 hours:  If on nasal continuous positive airway pressure, respiratory therapy assess nares and determine need for appliance change or resting period.   2/16/2023 2205 by Adamaris Shah RN  Outcome: Progressing  2/16/2023 1525 by Blue Cabrera RN  Outcome: Progressing  2/16/2023 1525 by Blue Cabrera RN  Outcome: Progressing     Problem: ABCDS Injury Assessment  Goal: Absence of physical injury  2/16/2023 2205 by Adamaris Shah RN  Outcome: Progressing  Flowsheets (Taken 2/16/2023 2205)  Absence of Physical Injury: Implement safety measures based on patient assessment  2/16/2023 1525 by Sandra Ramos RN  Outcome: Progressing  2/16/2023 1525 by Sandra Ramos RN  Outcome: Progressing

## 2023-02-17 NOTE — PROGRESS NOTES
Patient continues to rest in bed this am, no acute events. New ice applied to posterior neck. Patient states she only wants Tylenol for pain. Drain emptied, alarm on, all needs met.

## 2023-02-17 NOTE — H&P
Patient: Artis Mendez  [id number]  Date: 2/17/2023      Chief Complaint:  BUE sensory changes, difficulty with balance    History of Present Illness/Hospital Course:  Patient is a 80 yo F with pmh HTN, HLD, Pre-DM2, IBS, depression/anxiety who initially presented 2/14/2023 for urgent cervical spine surgery. She presented recently as an outpatient with progressing BUE numbness, coordination difficulty, and frequent falls. Imaging revealed cervical spondylosis with severe canal stenosis, cord compression, and cord signal change. Therefore decision was made to undergo urgent C3-5 laminectomy and and C3-6 posterior fusion (2/14 with Dr. Nj Castro). She was admitted to [facility] on 2/17/23 due to functional deficits below her baseline. Today she reports continued weakness, particularly in her hands. She reports difficulty feeding herself due to this. She reports tingling in her hands bilaterally. She denies bowel or bladder dysfunction. Prior Level of Function:  Independent in self care, indoor mobility, stairs, functional cognition     Current Level of Function:  Mod assist for toileting hygiene, sit to stand, chair/bed transfer, walk 10 feet  Max assist for upper body dressing     has a past medical history of Alcohol abuse, Anxiety and depression, Arthritis, Cancer ([facility] [address] Depression, Hyperlipidemia, Hypertension, Hypokalemia, IBS (irritable bowel syndrome), Overactive bladder, Pre-diabetes, Prolonged emergence from general anesthesia, Rheumatic heart disease, and Wears glasses. has a past surgical history that includes Tonsillectomy; Tubal ligation; fracture surgery; Colonoscopy; bladder suspension; Ovary removal (Bilateral); Carpal tunnel release (Bilateral, 2022); Bilateral salpingoophorectomy; and cervical fusion (N/A, 2/14/2023). reports that she has been smoking cigarettes. She has a 15.50 pack-year smoking history. She has never used smokeless tobacco. She reports that she does not currently use alcohol. She reports that she does not use drugs. family history includes Brain Cancer in her sister; Heart Disease in her father and mother; High Blood Pressure in her mother; Stroke in her sister and sister.     Current Facility-Administered Medications   Medication Dose Route Frequency Provider Last Rate Last Admin    acetaminophen (TYLENOL) tablet 650 mg  650 mg Oral Q6H Idalmis Garcia MD   650 mg at 02/17/23 1542    aluminum & magnesium hydroxide-simethicone (MAALOX) 200-200-20 MG/5ML suspension 15 mL  15 mL Oral Q6H PRN Idalmis Garcia MD        [START ON 2/18/2023] atorvastatin (LIPITOR) tablet 20 mg  20 mg Oral Daily Idalmis Garcia MD        bisacodyl (DULCOLAX) EC tablet 5 mg  5 mg Oral Daily PRN Idalmis Garcia MD        cloNIDine (CATAPRES) tablet 0.1 mg  0.1 mg Oral Nightly Idalmis Garcia MD        [START ON 2/18/2023] fluticasone [facility]) 50 MCG/ACT nasal spray 1 spray  1 spray Each Nostril Daily Idalmis Garcia MD        [START ON 2/18/2023] lisinopril (PRINIVIL;ZESTRIL) tablet 10 mg  10 mg Oral Daily Idalmis Garcia MD        LORazepam (ATIVAN) tablet 1 mg  1 mg Oral BID PRN Idalmis Garcia MD        [START ON 2/18/2023] magnesium oxide (MAG-OX) tablet 800 mg  800 mg Oral Daily Idalmis Garcia MD        melatonin tablet 9 mg  9 mg Oral Nightly PRN Idalmis Garcia MD        methocarbamol (ROBAXIN) tablet 750 mg  750 mg Oral 4x Daily Idalmis Garcia MD        metoprolol tartrate (LOPRESSOR) tablet 25 mg  25 mg Oral BID Idalmis Garcia MD        ondansetron (ZOFRAN-ODT) disintegrating tablet 4 mg  4 mg Oral Q8H PRN Idalmis Garcia MD        Or    ondansetron [facility]) injection 4 mg  4 mg IntraVENous Q6H PRN Idalmis Garcia MD        oxyCODONE (ROXICODONE) immediate release tablet 5 mg  5 mg Oral Q4H PRN Idalmis Garcia MD        Or    oxyCODONE (ROXICODONE) immediate release tablet 10 mg  10 mg Oral Q4H PRN Idalmis Garcia MD [START ON 2/18/2023] polyethylene glycol (GLYCOLAX) packet 17 g  17 g Oral Daily Alis Garcia MD        [START ON 2/18/2023] sodium chloride flush 0.9 % injection 5-40 mL  5-40 mL IntraVENous 2 times per day Alis Garcia MD        sodium chloride flush 0.9 % injection 5-40 mL  5-40 mL IntraVENous PRN Alis Garcia MD        [START ON 2/18/2023] venlafaxine (EFFEXOR XR) extended release capsule 150 mg  150 mg Oral Daily Alis Garcia MD        aluminum & magnesium hydroxide-simethicone (MAALOX) 200-200-20 MG/5ML suspension 30 mL  30 mL Oral Q6H PRN Alis Garcia MD        bisacodyl (DULCOLAX) suppository 10 mg  10 mg Rectal Daily PRN Alis Garcia MD           Allergies   Allergen Reactions    Codeine Nausea And Vomiting and Nausea Only    Sulfa Antibiotics Nausea And Vomiting       Current Facility-Administered Medications   Medication Dose Route Frequency Provider Last Rate Last Admin    acetaminophen (TYLENOL) tablet 650 mg  650 mg Oral Q6H Alis Garcia MD   650 mg at 02/17/23 1542    aluminum & magnesium hydroxide-simethicone (MAALOX) 200-200-20 MG/5ML suspension 15 mL  15 mL Oral Q6H PRN Alis Garcia MD        [START ON 2/18/2023] atorvastatin (LIPITOR) tablet 20 mg  20 mg Oral Daily Alis Garcia MD        bisacodyl (DULCOLAX) EC tablet 5 mg  5 mg Oral Daily PRN Alis Garcia MD        cloNIDine (CATAPRES) tablet 0.1 mg  0.1 mg Oral Nightly Alis Garcia MD        [START ON 2/18/2023] fluticasone [facility]) 50 MCG/ACT nasal spray 1 spray  1 spray Each Nostril Daily Alis Garcia MD        [START ON 2/18/2023] lisinopril (PRINIVIL;ZESTRIL) tablet 10 mg  10 mg Oral Daily Alis Garcia MD        LORazepam (ATIVAN) tablet 1 mg  1 mg Oral BID PRN Alis Garcia MD        [START ON 2/18/2023] magnesium oxide (MAG-OX) tablet 800 mg  800 mg Oral Daily Alis Garcia MD        melatonin tablet 9 mg  9 mg Oral Nightly PRN Blanca Estimable, MD        methocarbamol (ROBAXIN) tablet 750 mg  750 mg Oral 4x Daily Blanca Estimable, MD        metoprolol tartrate (LOPRESSOR) tablet 25 mg  25 mg Oral BID Blanca Estimable, MD        ondansetron (ZOFRAN-ODT) disintegrating tablet 4 mg  4 mg Oral Q8H PRN MD Ash Christianson    ondansetron [facility]) injection 4 mg  4 mg IntraVENous Q6H PRN Castilloe Estimable, MD        oxyCODONE (ROXICODONE) immediate release tablet 5 mg  5 mg Oral Q4H PRN Castilloe MD Ash Powell    oxyCODONE (ROXICODONE) immediate release tablet 10 mg  10 mg Oral Q4H PRN Castilloe Estimable, MD        [START ON 2/18/2023] polyethylene glycol (GLYCOLAX) packet 17 g  17 g Oral Daily Blanca Estimable, MD        [START ON 2/18/2023] sodium chloride flush 0.9 % injection 5-40 mL  5-40 mL IntraVENous 2 times per day Blanca Estimable, MD        sodium chloride flush 0.9 % injection 5-40 mL  5-40 mL IntraVENous PRN Castilloe Andre, MD        [START ON 2/18/2023] venlafaxine (EFFEXOR XR) extended release capsule 150 mg  150 mg Oral Daily Castilloe Estimable, MD        aluminum & magnesium hydroxide-simethicone (MAALOX) 200-200-20 MG/5ML suspension 30 mL  30 mL Oral Q6H PRN Blanca Estimable, MD        bisacodyl (DULCOLAX) suppository 10 mg  10 mg Rectal Daily PRN Castilloe EstimMD marcos             REVIEW OF SYSTEMS:   CONSTITUTIONAL: negative for fevers, chills, diaphoresis, activity change, appetite change, fatigue, night sweats and unexpected weight change. EYES: negative for blurred vision, eye discharge, visual disturbance and icterus. HEENT: negative for hearing loss, tinnitus, ear drainage, sinus pressure, nasal congestion, epistaxis and snoring. RESPIRATORY: Negative for hemoptysis, cough, sputum production. CARDIOVASCULAR: negative for chest pain, palpitations, exertional chest pressure/discomfort, edema, syncope.    GASTROINTESTINAL: negative for nausea, vomiting, diarrhea, constipation, blood in stool and abdominal pain. GENITOURINARY: negative for frequency, dysuria, urinary incontinence, decreased urine volume, and hematuria. HEMATOLOGIC/LYMPHATIC: negative for easy bruising, bleeding and lymphadenopathy. ALLERGIC/IMMUNOLOGIC: negative for recurrent infections, angioedema, anaphylaxis and drug reactions. ENDOCRINE: negative for weight changes and diabetic symptoms including polyuria, polydipsia and polyphagia. MUSCULOSKELETAL: negative for pain, joint swelling, decreased range of motion and muscle weakness. NEUROLOGICAL: negative for headaches, slurred speech, unilateral weakness. PSYCHIATRIC/BEHAVIORAL: negative for hallucinations, behavioral problems, confusion and agitation. All pertinent positives are noted in the HPI. Physical Examination:  Vitals: Patient Vitals for the past 24 hrs:   BP Temp Temp src Pulse Resp SpO2 Height   02/17/23 1400 122/70 98.3 °F (36.8 °C) Oral 78 16 95 % 5' 4\" (1.626 m)     Psych: Stable mood, normal judgement, normal affect   Const: No distress  Eyes: Conjunctiva noninjected, no icterus noted; pupils equal, round, and reactive to light. HENT: Atraumatic, normocephalic; Oral mucosa moist  Neck: Trachea midline, neck supple. No thyromegaly noted. CV: Regular rate and rhythm, no murmur rub or gallop noted  Resp: Lungs clear to auscultation bilaterally, no rales wheezes or ronchi, no retractions. Respirations unlabored. GI: Soft, nontender, nondistended. Normal bowel sounds. No palpable masses. Neuro: Alert, oriented, appropriate. No cranial nerve deficits appreciated. Sensation intact to light touch. Motor examination reveals normal strength in all four limbs, except 4/5 strength with bilateral hand intrinsics. Skin: Normal temperature and turgor  MSK: No joint abnormalities noted. Ext: No significant edema appreciated. No varicosities.     Lab Results   Component Value Date    WBC 6.4 02/10/2023    HGB 12.6 02/10/2023    HCT 38.7 02/10/2023    MCV 93.9 02/10/2023     02/10/2023     Lab Results   Component Value Date    INR 0.96 02/10/2023    INR 0.98 05/25/2011    PROTIME 12.7 02/10/2023    PROTIME 10.7 05/25/2011     Lab Results   Component Value Date    CREATININE 0.7 02/16/2023    BUN 24 (H) 02/10/2023     02/10/2023    K 3.9 02/10/2023     02/10/2023    CO2 29 02/10/2023     Lab Results   Component Value Date    ALT 16 01/08/2023    AST 26 01/08/2023    ALKPHOS 58 01/08/2023    BILITOT 0.3 01/08/2023     Most recent EKG revealed:  Normal sinus rhythmNormal ECGWhen compared with ECG of 15-DEANDRE-2022 00:27,No significant change was foundConfirmed by Mirian Garcia MD, [address] ([id number]) on 12/13/2022 6:47:26 AM     Most recent imaging studies revealed:     Per Dr. Lane Junior note:  MRI cervical spine with cervical spondylosis, severe canal stenosis, spine cord compression and signal change. The above laboratory data have been reviewed. The above imaging data have been reviewed. The above medical testing have been reviewed. Body mass index is 24.52 kg/m². Barriers to Discharge: home setup, availability of assistance, pain, endurance, comorbidities    POST ADMISSION PHYSICIAN EVALUATION  The patient has agreed to being admitted to our comprehensive inpatient rehabilitation facility consisting of at least 180 minutes of therapy a day, 5 out of 7 days a week. The patient/family has a good understanding of our discharge process. The patient has potential to make improvement and is in need of at least two of the following multidisciplinary therapies including but not limited to physical, occupational, respiratory, and speech, nutritional services, wound care, and prosthetics and orthotics. Given the patients complex condition and risk of further medical complications, rehabilitation services cannot be safely provided at a lower level of care such as a skilled nursing facility.  I have compared the patients medical and functional status at the time of the preadmission screening and the same on this date, and there are no significant changes. By signing this document, I acknowledge that I have personally performed a full physical examination on this patient within 24 hours of admission to this inpatient rehabilitation facility and have determined the patient to be able to tolerate the above course of treatment at an intensive level for a reasonable period of time. I will be completing a detailed individualized Plan of Care for this patient by day four of the patients stay based upon the Preadmission Screen, this Post-Admission Evaluation, and the therapy evaluations. Rehabilitation Diagnosis:  Spinal Cord Dysfunction - Non-traumatic, 4.1212, Quadriplegia, Incomplete C5-8    Assessment and Plan:  Patient is a 80 yo F with pmh HTN, HLD, Pre-DM2, IBS, depression/anxiety who presented [facility] on 2/14/2023 for urgent C3-5 laminectomy and and C3-6 posterior fusion. She was admitted to [facility] on 2/17/23 due to functional deficits below her baseline. Cervical Myelopathy  - s/p C3-5 laminectomy and C3-6 fusion on 2/14 with Dr. Elina James  - cervical collar when OOB  - multimodal pain control  - incision care  - vancomycin discontinued as drain is out  - PT, OT, ST    HTN  - lisinopril, metoprolol    HLD  - statin    Anxiety/Depression  - effexor    Bowels: adjust medications as needed for regular bowel movements    Bladder: Check PVR x 3. [address] if PVR > 200ml or if any volume is > 500 ml. Sleep: melatonin provided prn. PPX  DVT: SCDs  GI: not indicated    Follow up appointments: Neurosurgery, PCP    Asia Villafuerte.  Marcelino Oliveira MD 2/17/2023, 4:35 PM

## 2023-02-18 ENCOUNTER — APPOINTMENT (OUTPATIENT)
Dept: GENERAL RADIOLOGY | Age: 73
DRG: 559 | End: 2023-02-18
Attending: STUDENT IN AN ORGANIZED HEALTH CARE EDUCATION/TRAINING PROGRAM
Payer: MEDICARE

## 2023-02-18 ENCOUNTER — APPOINTMENT (OUTPATIENT)
Dept: CT IMAGING | Age: 73
DRG: 559 | End: 2023-02-18
Attending: STUDENT IN AN ORGANIZED HEALTH CARE EDUCATION/TRAINING PROGRAM
Payer: MEDICARE

## 2023-02-18 LAB
ANION GAP SERPL CALCULATED.3IONS-SCNC: 12 MMOL/L (ref 3–16)
BASOPHILS ABSOLUTE: 0.1 K/UL (ref 0–0.2)
BASOPHILS RELATIVE PERCENT: 0.6 %
BUN BLDV-MCNC: 18 MG/DL (ref 7–20)
CALCIUM SERPL-MCNC: 9.7 MG/DL (ref 8.3–10.6)
CHLORIDE BLD-SCNC: 101 MMOL/L (ref 99–110)
CO2: 26 MMOL/L (ref 21–32)
CREAT SERPL-MCNC: 0.6 MG/DL (ref 0.6–1.2)
EOSINOPHILS ABSOLUTE: 0.1 K/UL (ref 0–0.6)
EOSINOPHILS RELATIVE PERCENT: 1.6 %
GFR SERPL CREATININE-BSD FRML MDRD: >60 ML/MIN/{1.73_M2}
GLUCOSE BLD-MCNC: 115 MG/DL (ref 70–99)
HCT VFR BLD CALC: 37.6 % (ref 36–48)
HEMOGLOBIN: 12.6 G/DL (ref 12–16)
LYMPHOCYTES ABSOLUTE: 1.5 K/UL (ref 1–5.1)
LYMPHOCYTES RELATIVE PERCENT: 17.8 %
MCH RBC QN AUTO: 31.4 PG (ref 26–34)
MCHC RBC AUTO-ENTMCNC: 33.6 G/DL (ref 31–36)
MCV RBC AUTO: 93.4 FL (ref 80–100)
MONOCYTES ABSOLUTE: 0.9 K/UL (ref 0–1.3)
MONOCYTES RELATIVE PERCENT: 10.5 %
NEUTROPHILS ABSOLUTE: 5.8 K/UL (ref 1.7–7.7)
NEUTROPHILS RELATIVE PERCENT: 69.5 %
PDW BLD-RTO: 13.2 % (ref 12.4–15.4)
PLATELET # BLD: 206 K/UL (ref 135–450)
PMV BLD AUTO: 7.4 FL (ref 5–10.5)
POTASSIUM REFLEX MAGNESIUM: 3.6 MMOL/L (ref 3.5–5.1)
RBC # BLD: 4.03 M/UL (ref 4–5.2)
SODIUM BLD-SCNC: 139 MMOL/L (ref 136–145)
WBC # BLD: 8.4 K/UL (ref 4–11)

## 2023-02-18 PROCEDURE — 97530 THERAPEUTIC ACTIVITIES: CPT

## 2023-02-18 PROCEDURE — 6370000000 HC RX 637 (ALT 250 FOR IP): Performed by: STUDENT IN AN ORGANIZED HEALTH CARE EDUCATION/TRAINING PROGRAM

## 2023-02-18 PROCEDURE — 92523 SPEECH SOUND LANG COMPREHEN: CPT

## 2023-02-18 PROCEDURE — 92526 ORAL FUNCTION THERAPY: CPT

## 2023-02-18 PROCEDURE — 97162 PT EVAL MOD COMPLEX 30 MIN: CPT

## 2023-02-18 PROCEDURE — 92610 EVALUATE SWALLOWING FUNCTION: CPT

## 2023-02-18 PROCEDURE — 70450 CT HEAD/BRAIN W/O DYE: CPT

## 2023-02-18 PROCEDURE — 97535 SELF CARE MNGMENT TRAINING: CPT

## 2023-02-18 PROCEDURE — 97166 OT EVAL MOD COMPLEX 45 MIN: CPT

## 2023-02-18 PROCEDURE — 80048 BASIC METABOLIC PNL TOTAL CA: CPT

## 2023-02-18 PROCEDURE — 2580000003 HC RX 258: Performed by: STUDENT IN AN ORGANIZED HEALTH CARE EDUCATION/TRAINING PROGRAM

## 2023-02-18 PROCEDURE — 97110 THERAPEUTIC EXERCISES: CPT

## 2023-02-18 PROCEDURE — 85025 COMPLETE CBC W/AUTO DIFF WBC: CPT

## 2023-02-18 PROCEDURE — 72040 X-RAY EXAM NECK SPINE 2-3 VW: CPT

## 2023-02-18 PROCEDURE — 36415 COLL VENOUS BLD VENIPUNCTURE: CPT

## 2023-02-18 PROCEDURE — 1280000000 HC REHAB R&B

## 2023-02-18 RX ORDER — QUETIAPINE FUMARATE 25 MG/1
25 TABLET, FILM COATED ORAL NIGHTLY PRN
Status: DISCONTINUED | OUTPATIENT
Start: 2023-02-18 | End: 2023-03-03 | Stop reason: HOSPADM

## 2023-02-18 RX ADMIN — OXYCODONE 10 MG: 5 TABLET ORAL at 08:00

## 2023-02-18 RX ADMIN — SODIUM CHLORIDE, PRESERVATIVE FREE 10 ML: 5 INJECTION INTRAVENOUS at 08:38

## 2023-02-18 RX ADMIN — METOPROLOL TARTRATE 25 MG: 25 TABLET, FILM COATED ORAL at 08:00

## 2023-02-18 RX ADMIN — VENLAFAXINE HYDROCHLORIDE 150 MG: 37.5 CAPSULE, EXTENDED RELEASE ORAL at 08:00

## 2023-02-18 RX ADMIN — METHOCARBAMOL TABLETS 750 MG: 750 TABLET, COATED ORAL at 20:11

## 2023-02-18 RX ADMIN — ACETAMINOPHEN 325MG 650 MG: 325 TABLET ORAL at 08:38

## 2023-02-18 RX ADMIN — LISINOPRIL 10 MG: 10 TABLET ORAL at 08:00

## 2023-02-18 RX ADMIN — CLONIDINE HYDROCHLORIDE 0.1 MG: 0.1 TABLET ORAL at 20:08

## 2023-02-18 RX ADMIN — METHOCARBAMOL TABLETS 750 MG: 750 TABLET, COATED ORAL at 08:00

## 2023-02-18 RX ADMIN — ACETAMINOPHEN 325MG 650 MG: 325 TABLET ORAL at 20:09

## 2023-02-18 RX ADMIN — METHOCARBAMOL TABLETS 750 MG: 750 TABLET, COATED ORAL at 17:26

## 2023-02-18 RX ADMIN — MAGNESIUM OXIDE TAB 400 MG (240 MG ELEMENTAL MG) 800 MG: 400 (240 MG) TAB at 08:00

## 2023-02-18 RX ADMIN — OXYCODONE 10 MG: 5 TABLET ORAL at 20:10

## 2023-02-18 RX ADMIN — POLYETHYLENE GLYCOL 3350 17 G: 17 POWDER, FOR SOLUTION ORAL at 08:01

## 2023-02-18 RX ADMIN — METOPROLOL TARTRATE 25 MG: 25 TABLET, FILM COATED ORAL at 20:10

## 2023-02-18 RX ADMIN — ATORVASTATIN CALCIUM 20 MG: 10 TABLET, FILM COATED ORAL at 08:01

## 2023-02-18 RX ADMIN — ACETAMINOPHEN 325MG 650 MG: 325 TABLET ORAL at 14:43

## 2023-02-18 RX ADMIN — SODIUM CHLORIDE, PRESERVATIVE FREE 10 ML: 5 INJECTION INTRAVENOUS at 20:05

## 2023-02-18 RX ADMIN — ACETAMINOPHEN 325MG 650 MG: 325 TABLET ORAL at 02:07

## 2023-02-18 RX ADMIN — METHOCARBAMOL TABLETS 750 MG: 750 TABLET, COATED ORAL at 14:43

## 2023-02-18 ASSESSMENT — PAIN DESCRIPTION - DESCRIPTORS
DESCRIPTORS: BURNING;SHARP
DESCRIPTORS: ACHING
DESCRIPTORS: ACHING

## 2023-02-18 ASSESSMENT — PAIN SCALES - GENERAL
PAINLEVEL_OUTOF10: 8
PAINLEVEL_OUTOF10: 4
PAINLEVEL_OUTOF10: 6
PAINLEVEL_OUTOF10: 8
PAINLEVEL_OUTOF10: 9
PAINLEVEL_OUTOF10: 7
PAINLEVEL_OUTOF10: 3

## 2023-02-18 ASSESSMENT — PAIN DESCRIPTION - ORIENTATION
ORIENTATION: MID
ORIENTATION: MID
ORIENTATION: MID;RIGHT

## 2023-02-18 ASSESSMENT — PAIN - FUNCTIONAL ASSESSMENT: PAIN_FUNCTIONAL_ASSESSMENT: ACTIVITIES ARE NOT PREVENTED

## 2023-02-18 ASSESSMENT — PAIN DESCRIPTION - LOCATION
LOCATION: NECK;BACK
LOCATION: INCISION;NECK
LOCATION: BACK

## 2023-02-18 ASSESSMENT — PAIN DESCRIPTION - FREQUENCY: FREQUENCY: CONTINUOUS

## 2023-02-18 ASSESSMENT — PAIN DESCRIPTION - PAIN TYPE: TYPE: SURGICAL PAIN

## 2023-02-18 ASSESSMENT — PAIN DESCRIPTION - ONSET: ONSET: ON-GOING

## 2023-02-18 NOTE — PLAN OF CARE
Problem: Safety - Adult  Goal: Free from fall injury  Outcome: Progressing  Flowsheets (Taken 2/18/2023 1708)  Free From Fall Injury:   Instruct family/caregiver on patient safety   Based on caregiver fall risk screen, instruct family/caregiver to ask for assistance with transferring infant if caregiver noted to have fall risk factors     Problem: Pain  Goal: Verbalizes/displays adequate comfort level or baseline comfort level  Outcome: Progressing  Flowsheets (Taken 2/18/2023 1708)  Verbalizes/displays adequate comfort level or baseline comfort level:   Encourage patient to monitor pain and request assistance   Administer analgesics based on type and severity of pain and evaluate response   Assess pain using appropriate pain scale   Implement non-pharmacological measures as appropriate and evaluate response

## 2023-02-18 NOTE — PROGRESS NOTES
Pt continues to be impulsive and unable to sleep. Frequent rounding completed to ensure pt safety. Call light in reach and posey bed alarm on.

## 2023-02-18 NOTE — PROGRESS NOTES
Physical Therapy  Facility/Department: [facility]  Rehabilitation Physical Therapy Initial Assessment    NAME: Marcos Hernández  : 1950 (81 y.o.)  MRN: [id number]  CODE STATUS: Full Code    Date of Service: 23      Past Medical History:   Diagnosis Date    Alcohol abuse     Anxiety and depression     Arthritis     Cancer ([address]     right fallopian cancer    Depression     Hyperlipidemia     Hypertension     Hypokalemia     IBS (irritable bowel syndrome)     Overactive bladder     Pre-diabetes     Prolonged emergence from general anesthesia     Rheumatic heart disease     at age 7 had rheumatic fever    Wears glasses      Past Surgical History:   Procedure Laterality Date    BILATERAL SALPINGOOPHORECTOMY      during bladder surgery    BLADDER SUSPENSION      also removed uterus and fallopian tubes    CARPAL TUNNEL RELEASE Bilateral     CERVICAL FUSION N/A 2023    DECOMPRESSIVE POSTERIOR CERVICAL LAMINECTOMY C3, C4, C5 WITH LATERAL MASS DANII AND SCREW FUSION C3-C6 performed by Dariel Menjivar MD at [place] [address]      bilateral arm fracture    OVARY REMOVAL Bilateral     TONSILLECTOMY      TUBAL LIGATION         Chart Reviewed: Yes  Patient assessed for rehabilitation services?: Yes  Additional Pertinent Hx: here due to recent history of falls - recent bilater carpal tunnel surgery  - to neurosurgeon and found to be myelopathic  Referring Practitioner: Remedios Dewey MD  Referral Date : 23  Diagnosis: cervical myelopathy    Restrictions:  Restrictions/Precautions: Fall Risk  Required Braces or Orthoses  Cervical: c-collar  Position Activity Restriction  Spinal Precautions: No Bending; No Lifting; No Twisting     SUBJECTIVE   Pt reported 9/10 pain.  RN notified and provided pain medication     Prior Level of Function:  Social/Functional History  Lives With: Alone  Type of Home: House  Home Layout: Able to Live on Main level with bedroom/bathroom, Two level  Home Access: Stairs to enter with rails  Entrance Stairs - Number of Steps: 2  Entrance Stairs - Rails: Both  Bathroom Shower/Tub: Walk-in shower  Bathroom Toilet: Standard  Bathroom Equipment: Grab bars in shower, Grab bars around toilet, Hand-held shower, Shower chair  Home Equipment: Samuel Sultana, 4 wheeled, Walker, rolling  Has the patient had two or more falls in the past year or any fall with injury in the past year?: Yes (3 falls in 5 mo)  ADL Assistance: Independent  Homemaking Assistance:  (sister was helping with cleaning)  Ambulation Assistance: Independent  Transfer Assistance: Independent  Active : Yes  Mode of Transportation: Car  Occupation: Full time employment  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\"      OBJECTIVE  Vision  Vision: Within Functional Limits  Vision Exceptions: Wears glasses at all times    Hearing  Hearing: Exceptions to [facility]  Hearing Exceptions: Hard of hearing/hearing concerns    Cognition  Overall Cognitive Status: [facility]    Strength  Strength RLE  Strength RLE: WFL  Strength LLE  Strength LLE: WFL    Quality of Movement  Tone RLE  RLE Tone: Normotonic  Tone LLE  LLE Tone: Normotonic  Motor Control  Gross Motor?: WFL  Coordination  Rapid Alternating Movements: Normal    Sensation  Overall Sensation Status: Impaired (decreased sensation to B hands)    Functional Mobility  Bed mobility  Supine to Sit: Minimal assistance  Sit to Supine: Minimal assistance  Transfers  Sit to Stand: Minimal Assistance  Stand to Sit: Minimal Assistance  Stand Pivot Transfers: Minimal Assistance  Balance  Posture: Fair  Sitting - Static: Good  Sitting - Dynamic: Good  Standing - Static: Fair  Standing - Dynamic: Fair; - (with RW)    Environmental Mobility  Ambulation  Surface: Level tile  Device: Rolling Walker  Assistance: Minimal assistance  Quality of Gait: varied step length, narrow KAREEM  Distance: 45'  Comments: mild unsteadiness with ambulation  More Ambulation?: No  Stairs/Curb  Stairs?: Yes  Stairs  # Steps : 4  Stairs Height: 6\"  Rails: Bilateral  Device: No Device  Assistance: Minimal assistance         ASSESSMENT  Vitals  Heart Rate: 90  BP: 135/69  MAP (Calculated): 91    Activity Tolerance  Activity Tolerance: Patient limited by pain; Patient tolerated treatment well    Assessment  Assessment: Pt admitted to [facility] due to decreased in independence and mobility following c-spine surgery. Pt resting in bed upon entry with c-collar donned. Pt lives alone and was previously independent and working full time. Had increased need for AD for mobility (rolling walker) over the past 3 months. Pt Required RW today and able to ambulate only 45' neeidng min A due to LOB. Was able to complete 4 stairs with Min A. Pt returned to bed at EOS to rest. Pt will benefit from IPR setting to maximize independence and return to PLOF. Treatment Diagnosis: decreased mobility  Therapy Prognosis: Good  Decision Making: Medium Complexity  Barriers to Learning: none  Discharge Recommendations: Home with assist PRN;Home with Home health PT  PT Equipment Recommendations  Equipment Needed: No    CLINICAL IMPRESSION   Pt admitted to [facility] due to decreased in independence and mobility following c-spine surgery. Pt resting in bed upon entry with c-collar donned. Pt lives alone and was previously independent and working full time. Had increased need for AD for mobility (rolling walker) over the past 3 months. Pt Required RW today and able to ambulate only 45' neeidng min A due to LOB. Was able to complete 4 stairs with Min A. Pt returned to bed at EOS to rest. Pt will benefit from IPR setting to maximize independence and return to PLOF.     GOALS  Patient Goals   Patient Goals : I want to be able to take care of my animals  Short Term Goals  Time Frame for Short Term Goals: 2/24  Short Term Goal 1: Pt will complete bed mobility with supervision  Short Term Goal 2: Pt will sit to stand with SBA to RW  Short Term Goal 3: Pt will ambulate x 100' with RW with supervision  Short Term Goal 4: Pt will complete 12 stairs with min A  Long Term Goals  Time Frame for Long Term Goals : 3/3/23  Long Term Goal 1: Pt will be independent with bed mobility  Long Term Goal 2: Pt will ambulate x 150' with LRAD independently  Long Term Goal 3: Pt will complete 4 stairs with Mod I  Long Term Goal 4: Pt will be independent donning/doffing c-collar    PLAN OF CARE  Frequency: 1-2 treatment sessions per day, 5-7 days per week  Physcial Therapy Plan  General Plan: 5-7 times per week  Current Treatment Recommendations: Functional mobility training;Transfer training;ADL/Self-care training;Gait training;Positioning;Modalities; Therapeutic activities; Patient/Caregiver education & training  Safety Devices  Type of Devices: Chair alarm in place;Call light within reach;Gait belt;Patient at risk for falls; Left in chair;Nurse notified; All fall risk precautions in place; Bed alarm in place  Restraints  Restraints Initially in Place: No    EDUCATION  Education  Education Given To: Patient  Education Provided: Role of Therapy;Plan of Care;Precautions;Transfer Training  Education Method: Demonstration  Barriers to Learning: None  Education Outcome: Verbalized understanding;Continued education needed;Demonstrated understanding    Therapy Time   Individual Concurrent Group Co-treatment   Time In 0730         Time Out 0830         Minutes 60           Timed Code Treatment Minutes: [address], PT, 02/18/23 at 12:12 PM

## 2023-02-18 NOTE — PLAN OF CARE
SLP completed evaluation. Please refer to notes in EMR.     Winston BOLES-SLP  Clinical Fellow  [id number]

## 2023-02-18 NOTE — CONSULTS
Comprehensive Nutrition Assessment    Type and Reason for Visit:  Initial, Consult    Nutrition Recommendations/Plan:   Continue general diet   Encourage PO intakes as tolerated   Monitor nutrition adequacy, pertinent labs, bowel habits, wt changes, and clinical progress     Malnutrition Assessment:  Malnutrition Status: At risk for malnutrition (Comment) (02/18/23 1241)    Context:  Acute Illness     Findings of the 6 clinical characteristics of malnutrition:  Energy Intake:  Mild decrease in energy intake (Comment)    Nutrition Assessment:    New ARU pt admitted with cerical myelopathy. S/p C3-5 laminectomy and C3-6 posterior fusion on 2/14. Hx of prediabetes, HLD, HTN and IBS. Consulted for oral nutrition supplements. Pt nutritionally at risk AEB fair appetite and poor intakes PTA. Pt reports appetite improving since admission. States PO intakes poor prior. Encouraged PO intakes as tolerated for healing. Declined need for ONS. Will continue to monitor. Nutrition Related Findings:    Labs reviewed. Active BS. BM on 2/14. Wound Type: Surgical Incision       Current Nutrition Intake & Therapies:    Average Meal Intake: 26-50%, 51-75%  Average Supplements Intake: None Ordered  ADULT DIET; Regular    Anthropometric Measures:  Height: 5' 4\" (162.6 cm)  Ideal Body Weight (IBW): 120 lbs (55 kg)       Current Body Weight: 134 lb (60.8 kg), 111.7 % IBW.  Weight Source: Not Specified  Current BMI (kg/m2): 23                          BMI Categories: Normal Weight (BMI 22.0 to 24.9) age over 72    Estimated Daily Nutrient Needs:  Energy Requirements Based On: Kcal/kg (25-30)  Weight Used for Energy Requirements: Current  Energy (kcal/day): 9358-0085 kcal  Weight Used for Protein Requirements: Current (1.0-1.2 g/kg)  Protein (g/day): 61-74 g  Method Used for Fluid Requirements: 1 ml/kcal  Fluid (ml/day): 9648-9848 mL    Nutrition Diagnosis:   Inadequate oral intake related to inadequate protein-energy intake as evidenced by poor intake prior to admission    Nutrition Interventions:   Food and/or Nutrient Delivery: Continue Current Diet  Nutrition Education/Counseling: No recommendation at this time  Coordination of Nutrition Care: Continue to monitor while inpatient, Interdisciplinary Rounds       Goals:     Goals: PO intake 50% or greater, prior to discharge       Nutrition Monitoring and Evaluation:   Behavioral-Environmental Outcomes: None Identified  Food/Nutrient Intake Outcomes: Food and Nutrient Intake  Physical Signs/Symptoms Outcomes: Biochemical Data, Nutrition Focused Physical Findings, Weight    Discharge Planning:    Continue current diet     Alvin Wan [address], [address] [place] [address], LD  Contact: [phone]

## 2023-02-18 NOTE — PLAN OF CARE
Pain assessment completed. Nonpharmacological methods offered prior to and during times of pain. Medicated per orders as necessary.

## 2023-02-18 NOTE — PROGRESS NOTES
Pt was found ambulating in [place] with walker without use of call light to voice needs and stated \" I didn't want to bother you. \" Pt stated she fell in her room hitting her head on the counter. VS and neuro check completed and WNL. No apparent injuries noted. RN educated pt on importance of call light use and safety precautions. Posey bed alarm now in place. Pt A&Ox4 and stated, no need to notify family she would inform them of the fall. Clinical, Unit manager and MD notified. STAT head CT and cervical xr ordered per MD with no new abnormalities noted.

## 2023-02-18 NOTE — PROGRESS NOTES
Occupational Therapy  Facility/Department: [facility] ARU  Rehabilitation Occupational Therapy Evaluation/Treatment        Date: 23  Patient Name: Terri Aviles       Room: 0446/9279-20  MRN: [id number]  Account: [de-identified]   : 1950  (81 y.o.) Gender: female     Referring Practitioner: Sultana Whipple MD  Diagnosis: cerival spondylosis with myelopathy  Additional Pertinent Hx: 82 yo female admitted  for planned decompressive laminectomy C3-C5 and fusion C3, C4, C5, C6 d/t cervical spondylosis with myelopathy. Pt with multiple falls. PMH: HTN, Depression, Alcohol abuse, hearing loss, carpal tunnel (surgery)    Restrictions  Restrictions/Precautions: Fall Risk  Other position/activity restrictions: \"Ok to remove for meals and hygiene\"  Required Braces or Orthoses  Cervical: c-collar  Required Braces or Orthoses?: Yes    Subjective  Subjective: Pt up in chair and agreeable to eval  Comments: RN ok to see, RN covered incision and IV for bathing, okay to rinse head with staples          Vitals  Heart Rate: 90  Resp: 18  BP: 135/69  Height: 5' 4\" (162.6 cm)  Oxygen Therapy  SpO2: 97 %  O2 Device: None (Room air)  Level of Consciousness: Alert (0)    Objective     Cognition  Overall Cognitive Status: Exceptions  Arousal/Alertness: Inconsistent responses to stimuli  Following Commands:  Follows one step commands with repetition  Attention Span: Attends with cues to redirect  Memory: Decreased short term memory  Safety Judgement: Decreased awareness of need for safety  Problem Solving: Assistance required to implement solutions  Orientation  Overall Orientation Status: Within Functional Limits       ROM  LUE AROM (degrees)  LUE AROM : WFL  RUE AROM (degrees)  RUE AROM : WFL  LUE Strength  Gross LUE Strength: [facility]  L Hand General: 3/5  Fine Motor Skills  Coordination  Movements Are Fluid And Coordinated: No  Coordination and Movement Description: Fine motor impairments;Right UE;Left UE;Tremors Comment: poor coordination for self care tasks   Hand Assessment     Functional Mobility  Balance  Sitting Balance: Contact guard assistance (seated on shower chair)  Standing Balance: Moderate assistance (min- mod A with RW)  Standing Balance  Time: ~2 minutes standing multiple times  Activity: standing for adls, bathing, bathroom mobility  Transfers  Sit to stand: Minimal assistance  Stand to sit: Minimal assistance  Transfer Comments: RW  Toilet Transfers  Toilet - Technique: Ambulating  Equipment Used: Grab bars  Toilet Transfer: Maximum assistance  Social/Functional History  Lives With: Alone  Type of Home: House  Home Layout: Able to Live on Main level with bedroom/bathroom; Two level  Home Access: Stairs to enter with rails  Entrance Stairs - Number of Steps: 2  Entrance Stairs - Rails: Both  Bathroom Shower/Tub: Walk-in shower  Bathroom Toilet: Standard  Bathroom Equipment: Grab bars in shower;Grab bars around toilet;Hand-held shower; Shower chair  Home Equipment: Sherl Derian, 4 wheeled; Walker, rolling  ADL  Feeding: Setup  Grooming: Minimal assistance; Increased time to complete;Verbal cueing  Grooming Skilled Clinical Factors: standing for hand hygiene, seated for oral care  UE Bathing: Contact guard assistance  UE Bathing Skilled Clinical Factors: seated on shower chair  LE Bathing: Dependent/Total  UE Dressing: Maximum assistance  UE Dressing Skilled Clinical Factors: hunter flores help with miami j collar  LE Dressing: Dependent/Total  Toileting: Moderate assistance  Toileting Skilled Clinical Factors: help with clothing management    Goals  Patient Goals   Patient goals : \"to get to take care of the litter box and trash\"  Short Term Goals  Time Frame for Short Term Goals: 1 week (2/24/23)  Short Term Goal 1: Pt will complete LB dressing with min A. Short Term Goal 2: Pt will complete toileting with Anand. Short Term Goal 3: Pt will complete bathing with min a.   Short Term Goal 4: Pt will complete light household tasks with min A. Long Term Goals  Time Frame for Long Term Goals : 2 weeks (3/3/23)  Long Term Goal 1: Pt will complete LB dressing mod I,.  Long Term Goal 2: Pt will complete toileting mod I.  Long Term Goal 3: Pt will complete bathing with mod I.  Long Term Goal 4: Pt will complete light household tasks independent. Assessment  Performance deficits / Impairments: Decreased functional mobility ; Decreased safe awareness;Decreased ADL status; Decreased balance;Decreased high-level IADLs;Decreased endurance;Decreased strength;Decreased ROM; Decreased fine motor control;Decreased coordination      Assessment: Pt 80 yo female functioning below baseline on decompressive laminectomy C3-C5 and fusion C3, C4, C5, C6 d/t cervical spondylosis with myelopathy on 2/14. Pt PLOF is independent and works full time. She has had frequent falls and increased numbness in BUE limiting daily tasks. Pt is currently limited due to poor safety awareness and balance. Pt required max-total A for LB dressing/bathing with mod cues for cervical precautions. Pt and family educated on tasks modificiation. Pt is not safe to return home at this time due to level of assist and lives a lone. Pt will likely need home with HHOT and CTA for equipment. Prognosis: Good  Decision Making: Medium Complexity  Discharge Recommendations: Home with assist PRN;Home with Home health OT  Occupational Therapy Plan  Times Per Week: 5  Current Treatment Recommendations: Strengthening;ROM;Balance training;Functional mobility training; Endurance training;Pain management; Safety education & training;Modalities; Positioning;Patient/Caregiver education & training;Self-Care / ADL; Home management training    Signs and Symptoms of Delirium (from CAM)  A. Acute Onset Mental Status Change  Is there evidence of an acute change in mental status from the patient's baseline?  [] 0. No [x] 1. Yes    B.  Inattention: Did the patient have difficulty focusing attention, for example being easily distractible or having difficulty keeping track of what was being said?  [] 0. Behavior not present    [] 1. Behavior continuously present, does not fluctuate   [x] 2. Behavior present, fluctuates (comes and goes, changes in severity)    C. Disorganized thinking: Was the patient's thinking disorganized or incoherent (rambling or irrelevant conversation, unclear or illogical flow of ideas, or unpredictable switching from subject to subject)? [] 0. Behavior not present    [] 1. Behavior continuously present, does not fluctuate   [x] 2. Behavior present, fluctuates (comes and goes, changes in severity)    D. Altered level of consciousness: Did the patient have altered level of consciousness as indicated by any of the following criteria? Vigilant: startled easily to any sound or touch  Lethargic: repeatedly dozed off when being asked questions, but responded to voice or touch  Stuporous: very difficult to arouse and keep aroused for the interview  Comatose: could not be aroused  [x] 0. Behavior not present    [] 1. Behavior continuously present, does not fluctuate   [] 2. Behavior present, fluctuates (comes and goes, changes in severity)    Ability to hear (with hearing aid or hearing appliance if normally used)  [x] 0. Adequate - no difficulty in normal conversation, social interaction, listening to TV  [] 1. Minimal difficulty - difficulty in some environments (e.g., when person speaks softly or setting is noisy)  [] 2. Moderate difficulty - speaker has to increase volume and speak distinctly  [] 3. Highly impaired - absence of useful hearing    Ability to see in adequate light (with glasses or other visual appliances)  [x] 0. Adequate - sees fine detail, such as regular print in newspapers/books  [] 1. Impaired - sees large print, but not regular print in newspapers/books  [] 2. Moderately impaired - limited vision; not able to see newspaper headlines but can identify objects  [] 3.  Highly impaired - object identification in question, but eyes appear to follow objects  [] 4.  Severely impaired - no vision or sees only light, colors or shapes; eyes do not appear to follow objects        Therapy Time   Individual Concurrent Group Co-treatment   Time In 1230         Time Out 1400         Minutes 90         Timed Code Treatment Minutes: 75 Minutes (15 minutes for evaluation)       Jeanmarie Saldivar, OTR/L

## 2023-02-18 NOTE — PROGRESS NOTES
Speech Language Pathology  Facility/Department: [address] [place] [address]  Initial Speech/Language/Cognitive Assessment       NAME:Citlali Dan  : 1950 (81 y.o.)   MRN: [id number]  ROOM: [address] [place], [place] [address]  ADMISSION DATE: 2023  PATIENT DIAGNOSIS(ES): Cervical myelopathy (HCC) [G95.9]  Patient Active Problem List    Diagnosis Date Noted    [facility]    Cervical spondylosis with myelopathy 2023    Tinnitus aurium, right 2020    Sensorineural hearing loss (SNHL) of both ears 2020    Osteopenia 2015    Hyperglycemia 2013    Anxiety 2013    Tobacco use 2013    Hypophosphatemia 11/15/2011    Alcohol abuse 11/15/2011    Hypertension     Depression     Hyperlipidemia      Past Medical History:   Diagnosis Date    Alcohol abuse     Anxiety and depression     Arthritis     Cancer ([facility]     right fallopian cancer    Depression     Hyperlipidemia     Hypertension     Hypokalemia     IBS (irritable bowel syndrome)     Overactive bladder     Pre-diabetes     Prolonged emergence from general anesthesia     Rheumatic heart disease     at age 7 had rheumatic fever    Wears glasses      Past Surgical History:   Procedure Laterality Date    BILATERAL SALPINGOOPHORECTOMY      during bladder surgery    BLADDER SUSPENSION      also removed uterus and fallopian tubes    CARPAL TUNNEL RELEASE Bilateral     CERVICAL FUSION N/A 2023    DECOMPRESSIVE POSTERIOR CERVICAL LAMINECTOMY C3, C4, C5 WITH LATERAL MASS DANII AND SCREW FUSION C3-C6 performed by Corrie Koch MD at [facility]      bilateral arm fracture    OVARY REMOVAL Bilateral     TONSILLECTOMY      TUBAL LIGATION       Allergies   Allergen Reactions    Codeine Nausea And Vomiting and Nausea Only    Sulfa Antibiotics Nausea And Vomiting       Date of Evaluation: 2023   Evaluating Therapist: ROGERIO Velasco    Subjective:   Chart Reviewed: : [x] Yes [] No    Onset Date: 02/17/2023    Recent Results of CT of Head / MRI:  Date: 02/18/2023  Impressions:   Impression   1. No acute intracranial abnormality. 2.  Right posterior scalp injury. 3.  Subcutaneous emphysema with small foci of gas in the posterior neck   partially visualized. Medical record review/interview: Per MD H&P: \"Patient is a 82 yo F with pmh HTN, HLD, Pre-DM2, IBS, depression/anxiety who initially presented 2/14/2023 for urgent cervical spine surgery. She presented recently as an outpatient with progressing BUE numbness, coordination difficulty, and frequent falls. Imaging revealed cervical spondylosis with severe canal stenosis, cord compression, and cord signal change. Therefore decision was made to undergo urgent C3-5 laminectomy and and C3-6 posterior fusion (2/14 with Dr. Jesus Mason). She was [facility] to [facility] on 2/17/23 due to functional deficits below her baseline. Today she reports continued weakness, particularly in her hands. She reports difficulty feeding herself due to this. She reports tingling in her hands bilaterally. She denies bowel or bladder dysfunction. \"    Behavior / Cognition: alert, cooperative, pleasant mood, and impulsive    Primary Complaint: N/a     Pt's goals: \"take care of myself when I get home\"     Pain: The patient does not complain of pain     Vitals/labs:   SpO2: 97  RR: 16  BP: 167/75  HR: 77    Vision / Hearing:  Vision: Impaired and Wears glasses all the time  Hearing: X = Exceptions to [facility] and pt reports hearing loss in right ear     Previous Level of Function:  Living Status: Alone  Occupation: Full-time employment  Highest Level of Education: High School   Homemaking Responsibilities:   Meal Prep Responsibility: Primary  Laundry Responsibility: Primary  Cleaning Responsibility: Primary  Bill Paying / Finance Responsibility: Primary  Shopping Responsibility: Primary  Health Care Management: Primary  Active : Yes   Mode of Transportation: Car  Leisure and Hobbies: taking care of her animals       Assessment   Cognitive Diagnosis: Mild Cognitive Deficits     Impressions: Pt alert, pleasant and cooperative. Pt reports no changes in cognition from baseline. The pt was administered the [address] ([address] [facility] [facility]) Examination this date to assess cognition. The pt scored a 24/30 with a score of 27 or greater considered WNL per the SLUMS. See pt's scores on each subtest below. Pt's clock drawing score was hindered by inability to bend neck down to draw on paper d/t C-collar. The pt demonstrated difficulty with clock drawing d/t C-collar and short-term memory. The pt's strengths included orientation, calculations, naming, attention, shape identification and auditory comprehension. Formal Assessments:       [facility] Mental Status (UMS) Examination   Section / subtest   Score Comments   Orientation   3/3    Short-term recall   3/5 Imm recall 5/5     STR 3/5 improving to 5/5 with min cues    Calculations   3/3    Naming   3/3    Attention: number repetition   2/2    Visuospatial tasks: Clock drawing and shape identification   2/6 Pt's clock drawing score was hindered by inability to bend neck down to draw on paper d/t C-collar.       Auditory comprehension 8/8     Total score: 24/30           Goals:  Long Term Goals:   Time Frame for Long Term Goals: 14 Days (03/03/2023)  Goal 1: Pt will improve overall cognitive-linguistic abilities to promote safe and independent return home PLOF     Short Term Goals:  Time Frame for Short Term Goals: 10 Days (02/27/2023)  Goal 1: Pt will complete graded recall recall tasks using compensatory strategies with 80% acc given min cues   Goal 2: Pt will complete problem solving, safety, and thought organization tasks with 80% acc given min cues   Goal 3: Pt will complete verbal and visual reasoning tasks with 80% acc given min cues    Objective:   Cranial nerve / Oral motor exam:   CN V (trigeminal): ophthalmic, maxillary, and mandibular facial sensation- WNL b/l  CN VII (facial): WNL b/l  CN IX/X (glossopharyngeal/vagus): MPT: DNT; pitch range: DNT; vocal quality: Adequate; cough: Strong-perceptually, Non-Productive, and Dry  CN XII (hypoglossal): WNL b/l    Oral motor exam   Labial ROM WFL    Dentition: upper partial dentures and intact    Motor Speech: WFL    Comprehension:   Auditory Comprehension: WFL    Reading Comprehension: To be assessed      Expression:   Primary Mode of Expression: Verbal  Verbal Expression: WFL    Written Expression: Moderate; Dominant Hand: Right  Impairments: Legibility      Pragmatics/Social Functioning: WFL      Cognition:  Overall Orientation : .WFL    Attention: [facility]    Memory: Moderate   Impairments: Short-term Memory    Problem Solving: To be assessed   Impairments: Managing Finances and Managing Medications    Numeric Reasoning: WFL    Abstract Reasoning: WFL    Safety/Judgement: Mild   Impairments: Impulsive    Voice:   Voice: WFL       Plan  Prognosis:  Speech Therapy Prognosis  Prognosis: Good  Prognosis Considerations: Age and Motivation  Individuals consulted and agree with results and recommendations: Patient   Safety Devices in place: Yes  Type of Devices: All fall risk precautions in place , Call light within reach, Bed alarm in place, Left in bed, and RN notified     Recommendations:   Requires SLP Intervention: yes   Duration / Frequency of Treatment: 30 min; 5 days per week for 14 days    Therapeutic Interventions:  Compensatory strategy training and carryover, recall/STM, problem solving, reasoning, exec function, thought organization, attention. Education:  Patient education: SLP educated the patient re: Role of SLP, rationale for completion of assessment, results of assessment, recommendations, and POC  Patient Education Responses:       Total Treatment Time / Charges       Time in Time out Total Time / units   Speech / Nishi Sher / Jluis Lacy Eval:  3893 1130 37 min/ 1 unit      Signature:  Coy BOLES-SLP  Clinical Fellow  [id number]

## 2023-02-19 LAB
BACTERIA: ABNORMAL /HPF
BILIRUBIN URINE: NEGATIVE
BLOOD, URINE: ABNORMAL
CLARITY: CLEAR
COLOR: YELLOW
EPITHELIAL CELLS, UA: ABNORMAL /HPF (ref 0–5)
GLUCOSE URINE: NEGATIVE MG/DL
KETONES, URINE: NEGATIVE MG/DL
LEUKOCYTE ESTERASE, URINE: NEGATIVE
MICROSCOPIC EXAMINATION: YES
NITRITE, URINE: NEGATIVE
PH UA: 6.5 (ref 5–8)
PROTEIN UA: NEGATIVE MG/DL
RBC UA: ABNORMAL /HPF (ref 0–4)
SPECIFIC GRAVITY UA: 1.01 (ref 1–1.03)
URINE REFLEX TO CULTURE: ABNORMAL
URINE TYPE: ABNORMAL
UROBILINOGEN, URINE: 0.2 E.U./DL
WBC UA: ABNORMAL /HPF (ref 0–5)

## 2023-02-19 PROCEDURE — 2580000003 HC RX 258: Performed by: STUDENT IN AN ORGANIZED HEALTH CARE EDUCATION/TRAINING PROGRAM

## 2023-02-19 PROCEDURE — 1280000000 HC REHAB R&B

## 2023-02-19 PROCEDURE — 6370000000 HC RX 637 (ALT 250 FOR IP): Performed by: STUDENT IN AN ORGANIZED HEALTH CARE EDUCATION/TRAINING PROGRAM

## 2023-02-19 PROCEDURE — 81001 URINALYSIS AUTO W/SCOPE: CPT

## 2023-02-19 RX ADMIN — METHOCARBAMOL TABLETS 750 MG: 750 TABLET, COATED ORAL at 20:02

## 2023-02-19 RX ADMIN — METHOCARBAMOL TABLETS 750 MG: 750 TABLET, COATED ORAL at 09:06

## 2023-02-19 RX ADMIN — METOPROLOL TARTRATE 25 MG: 25 TABLET, FILM COATED ORAL at 20:01

## 2023-02-19 RX ADMIN — METOPROLOL TARTRATE 25 MG: 25 TABLET, FILM COATED ORAL at 09:06

## 2023-02-19 RX ADMIN — ACETAMINOPHEN 325MG 650 MG: 325 TABLET ORAL at 09:06

## 2023-02-19 RX ADMIN — ACETAMINOPHEN 325MG 650 MG: 325 TABLET ORAL at 14:19

## 2023-02-19 RX ADMIN — POLYETHYLENE GLYCOL 3350 17 G: 17 POWDER, FOR SOLUTION ORAL at 09:05

## 2023-02-19 RX ADMIN — MAGNESIUM OXIDE TAB 400 MG (240 MG ELEMENTAL MG) 800 MG: 400 (240 MG) TAB at 09:06

## 2023-02-19 RX ADMIN — LISINOPRIL 10 MG: 10 TABLET ORAL at 09:06

## 2023-02-19 RX ADMIN — CLONIDINE HYDROCHLORIDE 0.1 MG: 0.1 TABLET ORAL at 20:00

## 2023-02-19 RX ADMIN — VENLAFAXINE HYDROCHLORIDE 150 MG: 37.5 CAPSULE, EXTENDED RELEASE ORAL at 09:05

## 2023-02-19 RX ADMIN — METHOCARBAMOL TABLETS 750 MG: 750 TABLET, COATED ORAL at 14:20

## 2023-02-19 RX ADMIN — Medication 9 MG: at 20:01

## 2023-02-19 RX ADMIN — ATORVASTATIN CALCIUM 20 MG: 10 TABLET, FILM COATED ORAL at 09:06

## 2023-02-19 RX ADMIN — SODIUM CHLORIDE, PRESERVATIVE FREE 10 ML: 5 INJECTION INTRAVENOUS at 22:21

## 2023-02-19 RX ADMIN — ACETAMINOPHEN 325MG 650 MG: 325 TABLET ORAL at 20:02

## 2023-02-19 RX ADMIN — SODIUM CHLORIDE, PRESERVATIVE FREE 10 ML: 5 INJECTION INTRAVENOUS at 09:06

## 2023-02-19 ASSESSMENT — PAIN SCALES - GENERAL
PAINLEVEL_OUTOF10: 6
PAINLEVEL_OUTOF10: 7
PAINLEVEL_OUTOF10: 7

## 2023-02-19 ASSESSMENT — PAIN DESCRIPTION - PAIN TYPE: TYPE: ACUTE PAIN

## 2023-02-19 ASSESSMENT — PAIN DESCRIPTION - LOCATION: LOCATION: HIP

## 2023-02-19 ASSESSMENT — PAIN DESCRIPTION - DESCRIPTORS: DESCRIPTORS: ACHING

## 2023-02-19 ASSESSMENT — PAIN DESCRIPTION - ORIENTATION: ORIENTATION: RIGHT

## 2023-02-19 ASSESSMENT — PAIN DESCRIPTION - FREQUENCY: FREQUENCY: CONTINUOUS

## 2023-02-19 ASSESSMENT — PAIN DESCRIPTION - ONSET: ONSET: ON-GOING

## 2023-02-19 ASSESSMENT — PAIN - FUNCTIONAL ASSESSMENT: PAIN_FUNCTIONAL_ASSESSMENT: ACTIVITIES ARE NOT PREVENTED

## 2023-02-19 NOTE — PLAN OF CARE
Problem: Safety - Adult  Goal: Free from fall injury  Outcome: Progressing  Flowsheets (Taken 2/19/2023 6925)  Free From Fall Injury:   Instruct family/caregiver on patient safety   Based on caregiver fall risk screen, instruct family/caregiver to ask for assistance with transferring infant if caregiver noted to have fall risk factors

## 2023-02-20 ENCOUNTER — APPOINTMENT (OUTPATIENT)
Dept: CT IMAGING | Age: 73
DRG: 559 | End: 2023-02-20
Attending: STUDENT IN AN ORGANIZED HEALTH CARE EDUCATION/TRAINING PROGRAM
Payer: MEDICARE

## 2023-02-20 LAB
ANION GAP SERPL CALCULATED.3IONS-SCNC: 12 MMOL/L (ref 3–16)
BASOPHILS ABSOLUTE: 0.1 K/UL (ref 0–0.2)
BASOPHILS RELATIVE PERCENT: 1 %
BUN BLDV-MCNC: 18 MG/DL (ref 7–20)
CALCIUM SERPL-MCNC: 9.5 MG/DL (ref 8.3–10.6)
CHLORIDE BLD-SCNC: 100 MMOL/L (ref 99–110)
CO2: 26 MMOL/L (ref 21–32)
CREAT SERPL-MCNC: 0.8 MG/DL (ref 0.6–1.2)
EOSINOPHILS ABSOLUTE: 0.3 K/UL (ref 0–0.6)
EOSINOPHILS RELATIVE PERCENT: 3.3 %
GFR SERPL CREATININE-BSD FRML MDRD: >60 ML/MIN/{1.73_M2}
GLUCOSE BLD-MCNC: 125 MG/DL (ref 70–99)
HCT VFR BLD CALC: 36 % (ref 36–48)
HEMOGLOBIN: 11.8 G/DL (ref 12–16)
LYMPHOCYTES ABSOLUTE: 1.4 K/UL (ref 1–5.1)
LYMPHOCYTES RELATIVE PERCENT: 17.9 %
MCH RBC QN AUTO: 30.7 PG (ref 26–34)
MCHC RBC AUTO-ENTMCNC: 32.6 G/DL (ref 31–36)
MCV RBC AUTO: 94.1 FL (ref 80–100)
MONOCYTES ABSOLUTE: 0.6 K/UL (ref 0–1.3)
MONOCYTES RELATIVE PERCENT: 7.4 %
NEUTROPHILS ABSOLUTE: 5.5 K/UL (ref 1.7–7.7)
NEUTROPHILS RELATIVE PERCENT: 70.4 %
PDW BLD-RTO: 13.4 % (ref 12.4–15.4)
PLATELET # BLD: 285 K/UL (ref 135–450)
PMV BLD AUTO: 8 FL (ref 5–10.5)
POTASSIUM REFLEX MAGNESIUM: 4.5 MMOL/L (ref 3.5–5.1)
RBC # BLD: 3.83 M/UL (ref 4–5.2)
SODIUM BLD-SCNC: 138 MMOL/L (ref 136–145)
WBC # BLD: 7.8 K/UL (ref 4–11)

## 2023-02-20 PROCEDURE — 97530 THERAPEUTIC ACTIVITIES: CPT

## 2023-02-20 PROCEDURE — 97129 THER IVNTJ 1ST 15 MIN: CPT

## 2023-02-20 PROCEDURE — 70450 CT HEAD/BRAIN W/O DYE: CPT

## 2023-02-20 PROCEDURE — 97116 GAIT TRAINING THERAPY: CPT

## 2023-02-20 PROCEDURE — 80048 BASIC METABOLIC PNL TOTAL CA: CPT

## 2023-02-20 PROCEDURE — 97112 NEUROMUSCULAR REEDUCATION: CPT

## 2023-02-20 PROCEDURE — 1280000000 HC REHAB R&B

## 2023-02-20 PROCEDURE — 6370000000 HC RX 637 (ALT 250 FOR IP): Performed by: STUDENT IN AN ORGANIZED HEALTH CARE EDUCATION/TRAINING PROGRAM

## 2023-02-20 PROCEDURE — 2580000003 HC RX 258: Performed by: STUDENT IN AN ORGANIZED HEALTH CARE EDUCATION/TRAINING PROGRAM

## 2023-02-20 PROCEDURE — 97535 SELF CARE MNGMENT TRAINING: CPT

## 2023-02-20 PROCEDURE — 36415 COLL VENOUS BLD VENIPUNCTURE: CPT

## 2023-02-20 PROCEDURE — 85025 COMPLETE CBC W/AUTO DIFF WBC: CPT

## 2023-02-20 PROCEDURE — 97130 THER IVNTJ EA ADDL 15 MIN: CPT

## 2023-02-20 RX ADMIN — CLONIDINE HYDROCHLORIDE 0.1 MG: 0.1 TABLET ORAL at 21:49

## 2023-02-20 RX ADMIN — LISINOPRIL 10 MG: 10 TABLET ORAL at 12:37

## 2023-02-20 RX ADMIN — METHOCARBAMOL TABLETS 750 MG: 750 TABLET, COATED ORAL at 17:13

## 2023-02-20 RX ADMIN — POLYETHYLENE GLYCOL 3350 17 G: 17 POWDER, FOR SOLUTION ORAL at 12:38

## 2023-02-20 RX ADMIN — ACETAMINOPHEN 325MG 650 MG: 325 TABLET ORAL at 21:50

## 2023-02-20 RX ADMIN — ACETAMINOPHEN 325MG 650 MG: 325 TABLET ORAL at 08:38

## 2023-02-20 RX ADMIN — SODIUM CHLORIDE, PRESERVATIVE FREE 5 ML: 5 INJECTION INTRAVENOUS at 17:14

## 2023-02-20 RX ADMIN — MAGNESIUM OXIDE TAB 400 MG (240 MG ELEMENTAL MG) 800 MG: 400 (240 MG) TAB at 10:12

## 2023-02-20 RX ADMIN — METHOCARBAMOL TABLETS 750 MG: 750 TABLET, COATED ORAL at 21:49

## 2023-02-20 RX ADMIN — ATORVASTATIN CALCIUM 20 MG: 10 TABLET, FILM COATED ORAL at 10:12

## 2023-02-20 RX ADMIN — VENLAFAXINE HYDROCHLORIDE 150 MG: 37.5 CAPSULE, EXTENDED RELEASE ORAL at 10:12

## 2023-02-20 RX ADMIN — FLUTICASONE PROPIONATE 1 SPRAY: 50 SPRAY, METERED NASAL at 12:37

## 2023-02-20 RX ADMIN — METHOCARBAMOL TABLETS 750 MG: 750 TABLET, COATED ORAL at 12:38

## 2023-02-20 RX ADMIN — LORAZEPAM 1 MG: 1 TABLET ORAL at 10:25

## 2023-02-20 RX ADMIN — METOPROLOL TARTRATE 25 MG: 25 TABLET, FILM COATED ORAL at 10:13

## 2023-02-20 RX ADMIN — METOPROLOL TARTRATE 25 MG: 25 TABLET, FILM COATED ORAL at 21:50

## 2023-02-20 RX ADMIN — ACETAMINOPHEN 325MG 650 MG: 325 TABLET ORAL at 14:05

## 2023-02-20 RX ADMIN — METHOCARBAMOL TABLETS 750 MG: 750 TABLET, COATED ORAL at 14:06

## 2023-02-20 RX ADMIN — SODIUM CHLORIDE, PRESERVATIVE FREE 10 ML: 5 INJECTION INTRAVENOUS at 21:52

## 2023-02-20 ASSESSMENT — PAIN SCALES - WONG BAKER
WONGBAKER_NUMERICALRESPONSE: 0

## 2023-02-20 ASSESSMENT — PAIN SCALES - GENERAL
PAINLEVEL_OUTOF10: 7
PAINLEVEL_OUTOF10: 8

## 2023-02-20 ASSESSMENT — PAIN DESCRIPTION - LOCATION: LOCATION: NECK

## 2023-02-20 NOTE — PROGRESS NOTES
Physical Therapy  Facility/Department: [facility]  Rehabilitation Physical Therapy Treatment Note    NAME: Lisbeth Mejia  : 1950 (81 y.o.)  MRN: [id number]  CODE STATUS: Full Code    Date of Service: 23       Restrictions:  Restrictions/Precautions: Fall Risk;General Precautions  Required Braces or Orthoses  Cervical: c-collar  Position Activity Restriction  Spinal Precautions: No Bending; No Lifting; No Twisting  Other position/activity restrictions: \"Ok to remove for meals and hygiene\"     SUBJECTIVE  Subjective  Subjective: Pt agreeable to therapy  Pain: Reports minimal amount of BUE discomfort         OBJECTIVE  Cognition  Overall Cognitive Status: WFL  Arousal/Alertness: Inconsistent responses to stimuli  Following Commands: Follows one step commands with repetition  Attention Span: Attends with cues to redirect  Memory: Decreased short term memory  Safety Judgement: Decreased awareness of need for safety  Problem Solving: Assistance required to implement solutions  Orientation  Overall Orientation Status: Within Normal Limits  Orientation Level: Oriented X4    Functional Mobility  Transfers  Additional Factors: Increased time to complete;Hand placement cues  Device: Walker  Sit to Stand  Assistance Level: Stand by assist;Contact guard assist  Skilled Clinical Factors: sit<>Stand x 10 reps from chair. Cues for hand placement. Increased time. Cues for increased eccentric control and to slow pace of activity to improve control  Stand to Sit  Assistance Level: Stand by assist;Contact guard assist      Environmental Mobility  Ambulation  Surface: Level surface  Device: Rolling walker  Distance: 250 ft x 3  Activity: Within Unit;Retrieve Items  Activity Comments: pt ambulating around unit x 3 reps. Second trial, pt retrieving cones to improve balance, BLE proprioception, and walker negotiation  Additional Factors: Verbal cues; Increased time to complete  Assistance Level: Contact guard assist;Stand by assist  Gait Deviations: Slow mary;Decreased step length bilateral;Narrow base of support; Unsteady gait  Skilled Clinical Factors: Cues for posture and to maintain KAREEM within RW. Cues for turns to maintain safety with RW. Balance: Alternating toe taps to 6\" step for 3 x 1 minute. First rep with BUE support on RW, 2-3 reps with unilateral support on RW. CGA to min A for balance especially with unilateral support. Done to promote BLE proprioception, coordination, and single leg stance. ASSESSMENT/PROGRESS TOWARDS GOALS       Assessment  Assessment: Pt tolerated therapy well this date. SBA to CGA for transfers and gait training with RW. CGA to min A for balance activities especially when removing UE support. Pt motivated to participate and anticipate will progress well. Will continue per POC.   Activity Tolerance: Patient tolerated treatment well  Discharge Recommendations: Home with assist PRN;Outpatient PT  PT Equipment Recommendations  Equipment Needed: No    Goals  Patient Goals   Patient Goals : I want to be able to take care of my animals  Short Term Goals  Time Frame for Short Term Goals: 2/24  Short Term Goal 1: Pt will complete bed mobility with supervision  Short Term Goal 2: Pt will sit to stand with SBA to RW  Short Term Goal 3: Pt will ambulate x 100' with RW with supervision  Short Term Goal 4: Pt will complete 12 stairs with min A  Long Term Goals  Time Frame for Long Term Goals : 3/3/23  Long Term Goal 1: Pt will be independent with bed mobility  Long Term Goal 2: Pt will ambulate x 150' with LRAD independently  Long Term Goal 3: Pt will complete 4 stairs with Mod I  Long Term Goal 4: Pt will be independent donning/doffing c-collar    PLAN OF CARE/SAFETY  Physcial Therapy Plan  General Plan: 5-7 times per week  Specific Instructions for Next Treatment: progress mobility and therex as tolerated  Current Treatment Recommendations: Functional mobility training;Transfer training;ADL/Self-care training;Gait training;Positioning;Modalities; Therapeutic activities; Patient/Caregiver education & training  Safety Devices  Type of Devices: Chair alarm in place;Call light within reach;Gait belt;Patient at risk for falls; Left in chair;Nurse notified; All fall risk precautions in place; Bed alarm in place    EDUCATION  Education  Education Given To: Patient  Education Provided: Role of Therapy;Plan of Care;Precautions;Transfer Training;Mobility Training;Energy Conservation  Education Provided Comments: safety awareness with functional mobility, RW management during ambulation , C-Collar use, spinal px, activity tolerance progresssions  Education Method: Demonstration;Verbal  Barriers to Learning: None  Education Outcome: Verbalized understanding;Continued education needed;Demonstrated understanding        Therapy Time   Individual Concurrent Group Co-treatment   Time In 1330         Time Out 1400         Minutes 30           Timed Code Treatment Minutes: 10 Muna Aragon, PT, DPT

## 2023-02-20 NOTE — PROGRESS NOTES
Physical Therapy  Facility/Department: [facility]  Rehabilitation Physical Therapy Treatment Note    NAME: Carey Varela  : 1950 (81 y.o.)  MRN: [id number]  CODE STATUS: Full Code    Date of Service: 23     Vitals:  126/78  84 bpm  97% SPO2    Restrictions:  Restrictions/Precautions: Fall Risk  Required Braces or Orthoses  Cervical: c-collar  Position Activity Restriction  Spinal Precautions: No Bending; No Lifting; No Twisting  Other position/activity restrictions: \"Ok to remove for meals and hygiene\"     SUBJECTIVE  Subjective  Subjective: Pt found seated in chair. Agreeable to PT. Also reports having a headache  Pain: reports 3/10 neck soreness           OBJECTIVE  Cognition  Overall Cognitive Status: Exceptions  Arousal/Alertness: Inconsistent responses to stimuli  Following Commands: Follows one step commands with repetition  Attention Span: Attends with cues to redirect  Memory: Decreased short term memory  Safety Judgement: Decreased awareness of need for safety  Problem Solving: Assistance required to implement solutions  Orientation  Overall Orientation Status: Within Functional Limits    Functional Mobility  Bed Mobility  Overall Assistance Level: Contact Guard Assist  Scooting  Assistance Level: Supervision  Skilled Clinical Factors: at chair  Balance  Sitting Balance: Supervision (at chair)  Standing Balance: Contact guard assistance (w/ RW)  Transfers  Additional Factors: Increased time to complete;Hand placement cues (VC for hand placement on RW at times)  Device: Walker (RW)  Sit to Stand  Assistance Level: Contact guard assist  Skilled Clinical Factors: from chair w/ RW  Stand to Sit  Assistance Level: Contact guard assist  Skilled Clinical Factors: to chair w/ RW      Environmental Mobility  Ambulation  Surface: Level surface  Device: Rolling walker  Distance: 245 ft + 150 ft  Activity: Within Unit  Additional Factors: Verbal cues; Increased time to complete (VC for RW management)  Assistance Level: Contact guard assist  Gait Deviations: Slow mary;Decreased step length bilateral;Narrow base of support; Unsteady gait  Skilled Clinical Factors: reliance of BUE support on RW, decreased step clearance bilaterally, shuffling gait pattern at times, decreased stance on BLE, demo'd scissoring at times while turning w/ RW (no overt LOB)    Gait Training:  -Lateral side-stepping over objects x navigating around cones. Performed for a total of 7 min w/o seated rest breaks. Required CGA throughout (no overt LOB). Lateral side-stepping performed with counter top for BUE support (performed R and L side-stepping). Navigating around cones performed w/ RW. Demo'd +++ time to complete turning w/ RW (demo'd scissoring gait patterns at times requiring VC for correction). Dynamic Balance:  -Multi-Directional Stepping x 15 reps BLE x 1 set. Performed w/o AD. Required CGA throughout (no overt LOB). VC needed for weight shifting. ASSESSMENT/PROGRESS TOWARDS GOALS       Assessment  Assessment: On this date, pt required CGA for STS w/ RW and CGA for amb w/ RW. Demo'd increased tolerance to activity with bouts of amb consisting of 245 ft + 150 ft. Pt did report some fatigue after bouts of functional mobility and requested seated rest breaks. Pt reports feeling unsteady on her feet, and demo's decreased bilateral step clearance as a result. Neck discomfort was not acutely exacerbated during session, and pt demo'd understanding of spinal px. Will continue to address functional limitations through PT in ARU. Activity Tolerance: Patient limited by pain; Patient tolerated treatment well;Patient limited by endurance  Discharge Recommendations: Home with assist PRN;Home with Home health PT  PT Equipment Recommendations  Equipment Needed: No    Goals  Patient Goals   Patient Goals : I want to be able to take care of my animals  Short Term Goals  Time Frame for Short Term Goals: 2/24  Short Term Goal 1: Pt will complete bed mobility with supervision  Short Term Goal 2: Pt will sit to stand with SBA to RW  Short Term Goal 3: Pt will ambulate x 100' with RW with supervision  Short Term Goal 4: Pt will complete 12 stairs with min A  Long Term Goals  Time Frame for Long Term Goals : 3/3/23  Long Term Goal 1: Pt will be independent with bed mobility  Long Term Goal 2: Pt will ambulate x 150' with LRAD independently  Long Term Goal 3: Pt will complete 4 stairs with Mod I  Long Term Goal 4: Pt will be independent donning/doffing c-collar    PLAN OF CARE/SAFETY  Physcial Therapy Plan  General Plan: 5-7 times per week  Specific Instructions for Next Treatment: progress mobility and therex as tolerated  Current Treatment Recommendations: Functional mobility training;Transfer training;ADL/Self-care training;Gait training;Positioning;Modalities; Therapeutic activities; Patient/Caregiver education & training  Safety Devices  Type of Devices: Chair alarm in place;Call light within reach;Gait belt;Patient at risk for falls; Left in chair;Nurse notified; All fall risk precautions in place; Bed alarm in place    EDUCATION  Education  Education Given To: Patient  Education Provided: Role of Therapy;Plan of Care;Precautions;Transfer Training  Education Provided Comments: safety awareness with functional mobility, RW management during ambulation , C-Collar use, spinal px, activity tolerance progresssions  Education Method: Demonstration;Verbal  Barriers to Learning: None  Education Outcome: Verbalized understanding;Continued education needed;Demonstrated understanding        Therapy Time   Individual Concurrent Group Co-treatment   Time In 0800         Time Out 0900         Minutes 60           Timed Code Treatment Minutes: [address] SPT, 02/20/23 at 11:34 AM

## 2023-02-20 NOTE — PROGRESS NOTES
Occupational Therapy  Facility/Department: [facility]  Rehabilitation Occupational Therapy Daily Treatment Note    Date: 23  Patient Name: Mandeep Marquez       Room: 4433/9022-81  MRN: [id number]  Account: [de-identified]   : 1950  (81 y.o.) Gender: female                    Past Medical History:  has a past medical history of Alcohol abuse, Anxiety and depression, Arthritis, Cancer ([address] Depression, Hyperlipidemia, Hypertension, Hypokalemia, IBS (irritable bowel syndrome), Overactive bladder, Pre-diabetes, Prolonged emergence from general anesthesia, Rheumatic heart disease, and Wears glasses. Past Surgical History:   has a past surgical history that includes Tonsillectomy; Tubal ligation; fracture surgery; Colonoscopy; bladder suspension; Ovary removal (Bilateral); Carpal tunnel release (Bilateral, ); Bilateral salpingoophorectomy; and cervical fusion (N/A, 2023). Restrictions  Restrictions/Precautions: Fall Risk;General Precautions  Other position/activity restrictions: \"Ok to remove for meals and hygiene\"  Required Braces or Orthoses  Cervical: c-collar  Required Braces or Orthoses?: Yes    Subjective  Subjective: Pt in chair upon OT arrival, pleasant and agreeable to OT session. Vitals WFL. Restrictions/Precautions: Fall Risk;General Precautions             Objective     Cognition  Overall Cognitive Status: WFL  Orientation  Overall Orientation Status: Within Normal Limits  Orientation Level: Oriented X4         ADL  Grooming/Oral Hygiene  Assistance Level: Stand by assist  Upper Extremity Dressing  Assistance Level: Stand by assist;Verbal cues; Increased time to complete  Lower Extremity Dressing  Assistance Level: Minimal assistance;Verbal cues  Putting On/Taking Off Footwear  Assistance Level: Minimal assistance;Verbal cues  Toileting  Assistance Level: Supervision  Toilet Transfers  Technique: Stand step  Equipment: Standard toilet  Assistance Level: Supervision          Functional Mobility  Device: Rolling walker  Activity: To/From bathroom  Assistance Level: Stand by assist  Sit to Stand  Assistance Level: Stand by assist  Stand to Sit  Assistance Level: Stand by assist         Assessment  Assessment  Assessment:     First Session: Pt tolerated treatment well, requiring min A for TB dressing, SBA for functional and ADL transfers, and SBA for standing grooming tasks. She was SPV for toileting tasks. Pt was able to correctly verbalize cervical precautions, however required VC to adhere to them throughout dressing tasks. Pt will require reinforcement of cervical precautions. Second Session: Pt requesting shower this afternoon. Able to perform TB bathing seated on TTB with SPV and occasional VC for cervical precautios. She required min A for UB dressing to thread BUE, as well as VC for sequencing for cervical precautions. Pt able to perform LB dressing with SBA, and donned socks with max A. Trialed reacher and sock aid once seated in chair. Pt able to doff socks using reacher, but had difficulty with hard sock aid 2/2 weakness and decreased coordination in BUE. She was able to don socks using soft sock aid. Pt required SBA for all transfers and SPV for toileting and grooming at sink. Overall, pt continues to be limited by weakness, coordination, activity tolerance, and cervical precautions. Continue POC. Activity Tolerance: Patient tolerated treatment well  Discharge Recommendations: Continue to assess pending progress  OT Equipment Recommendations  Other: CTA  Safety Devices  Safety Devices in place: Yes  Type of devices: All fall risk precautions in place;Call light within reach; Chair alarm in place;Gait belt;Left in chair;Nurse notified    Patient Education  Education  Education Given To: Patient  Education Provided: Role of Therapy;Plan of Care;Precautions; Safety;Transfer Training;Mobility Training;ADL Function  Education Method: Demonstration;Verbal  Barriers to Learning: None  Education Outcome: Verbalized understanding;Demonstrated understanding    Plan  Occupational Therapy Plan  Times Per Week: 5-7x/wk  Current Treatment Recommendations: Strengthening;ROM;Balance training;Functional mobility training; Endurance training;Patient/Caregiver education & training; Safety education & training;Pain management;Equipment evaluation, education, & procurement;Positioning;Self-Care / ADL;Return to work related activity;Home management training    Goals  Patient Goals   Patient goals : \"to get to take care of the litter box and trash\"  Short Term Goals  Time Frame for Short Term Goals: 1 week (2/24/23)  Short Term Goal 1: Pt will complete LB dressing with min A.- GOAL MET 2/20  Short Term Goal 2: Pt will complete toileting with Anand.- GOAL MET 2/20  Short Term Goal 3: Pt will complete bathing with min a.- GOAL MET 2/20  Short Term Goal 4: Pt will complete light household tasks with min A. Long Term Goals  Time Frame for Long Term Goals : 2 weeks (3/3/23)  Long Term Goal 1: Pt will complete LB dressing mod I,.  Long Term Goal 2: Pt will complete toileting mod I.  Long Term Goal 3: Pt will complete bathing with mod I.  Long Term Goal 4: Pt will complete light household tasks independent.       Therapy Time   Individual Concurrent Group Co-treatment   Time In 0900         Time Out 0930         Minutes 30         Timed Code Treatment Minutes: 30 Minutes     Second Session Therapy Time:   Individual Concurrent Group Co-treatment   Time In 1230         Time Out 1330         Minutes 60           Timed Code Treatment Minutes:  60 Minutes    Total Treatment Minutes:  [address] [facility]

## 2023-02-20 NOTE — PROGRESS NOTES
[facility]  2/20/2023  [id number]    Chief Complaint: Cervical myelopathy ([address]    Subjective:   No acute events overnight. Today Luann Perez is seen in her room with nursing present. She reports not remembering her fall over the weekend. She denies any current confusion. She reports posterior right headache in area where she hit her head. There is a laceration with several staples in place. She currently rates her headache as an 8 on a scale from 1-10. We discuss obtaining a repeat CT head. ROS: denies f/c, n/v, cp, sob    Objective:  Patient Vitals for the past 24 hrs:   BP Temp Temp src Pulse Resp SpO2 Weight   02/20/23 0615 -- -- -- -- -- -- 133 lb 4.8 oz (60.5 kg)   02/19/23 2002 -- -- -- -- -- 95 % --   02/19/23 2001 -- -- -- 94 -- -- --   02/19/23 2000 133/60 98.8 °F (37.1 °C) Oral 94 16 -- --     Gen: No distress, pleasant. HEENT: Normocephalic, atraumatic. CV: Regular rate and rhythm. Resp: No respiratory distress. Abd: Soft, nontender   Ext: No edema. Neuro: Alert, oriented, appropriately interactive. Skin: laceration posterior right head with staples in place    Wt Readings from Last 3 Encounters:   02/20/23 133 lb 4.8 oz (60.5 kg)   02/17/23 142 lb 13.7 oz (64.8 kg)   01/10/23 143 lb (64.9 kg)       Laboratory data:   Lab Results   Component Value Date    WBC 7.8 02/20/2023    HGB 11.8 (L) 02/20/2023    HCT 36.0 02/20/2023    MCV 94.1 02/20/2023     02/20/2023       Lab Results   Component Value Date/Time     02/20/2023 07:32 AM    K 4.5 02/20/2023 07:32 AM     02/20/2023 07:32 AM    CO2 26 02/20/2023 07:32 AM    BUN 18 02/20/2023 07:32 AM    CREATININE 0.8 02/20/2023 07:32 AM    GLUCOSE 125 02/20/2023 07:32 AM    CALCIUM 9.5 02/20/2023 07:32 AM        Therapy progress:  PT  Required Braces or Orthoses  Cervical: c-collar  Position Activity Restriction  Spinal Precautions: No Bending, No Lifting, No Twisting  Other position/activity restrictions:  \"Ok to remove for meals and hygiene\"  Objective     Sit to Stand: Minimal Assistance  Stand to Sit: Minimal Assistance  Device: [address]: Minimal assistance  Distance: 39'  OT  PT Equipment Recommendations  Equipment Needed: No  Toilet - Technique: Ambulating  Equipment Used: Grab bars  Assessment        SLP                Body mass index is 22.88 kg/m². Assessment and Plan:  Patient is a 82 yo F with pmh HTN, HLD, Pre-DM2, IBS, depression/anxiety who presented to [facility] on 2/14/2023 for urgent C3-5 laminectomy and and C3-6 posterior fusion. She was admitted to [facility] on 2/17/23 due to functional deficits below her baseline. Cervical Myelopathy  - s/p C3-5 laminectomy and C3-6 fusion on 2/14 with Dr. Hannah Keyes  - cervical collar when OOB  - multimodal pain control  - incision care  - vancomycin discontinued as drain is out  - PT, OT, ST    Fall  - hit posterior right head, laceration with staples in place  - initial CT head unremarkable  - continues to have headaches, will obtain repeat CT head     HTN  - lisinopril, metoprolol     HLD  - statin     Anxiety/Depression  - effexor     Bowels: adjust medications as needed for regular bowel movements     Bladder: Check PVR x 3. [facility] if PVR > 200ml or if any volume is > 500 ml. Sleep: melatonin provided prn. PPX  DVT: SCDs  GI: not indicated     Follow up appointments: Neurosurgery, PCP    Ximena Lara.  Sudeep Morin MD 2/20/2023, 12:33 PM

## 2023-02-20 NOTE — PROGRESS NOTES
Speech Language Pathology  [facility]: ACUTE REHAB UNIT  SPEECH-LANGUAGE PATHOLOGY      [x] Daily  [] Weekly Care Conference Note  [] Discharge    Staci Ornelas      RIR:4/5[birth year]17  YNV:[id number]  Rehab Dx/Hx: Cervical myelopathy ([facility] [address] [G95.9]    Precautions: falls  Home situation: lives alone  ST Dx: [] Aphasia  [] Dysarthria  [] Apraxia   [] Oropharyngeal dysphagia [x] Cognitive Impairment  [] Other:   Date of Admit: 2/17/2023  Room #: 9708/3728-00    Current functional status (updated daily):         Pt being seen for : [] Speech/Language Treatment  [] Dysphagia Treatment [x] Cognitive Treatment  [] Other:  Communication: [x]WFL  [] Aphasia  [] Dysarthria  [] Apraxia  [] Pragmatic Impairment [] Non-verbal  [] Hearing Loss  [] Other:   Cognition: [] WFL  [x] Mild  [] Moderate  [] Severe [] Unable to Assess  [] Other:  Memory: [] WFL  [x] Mild  [] Moderate  [] Severe [] Unable to Assess  [] Other:  Behavior: [x] Alert  [x] Cooperative  [x]  Pleasant  [] Confused  [] Agitated  [] Uncooperative  [] Distractible [] Motivated  [] Self-Limiting [] Anxious  [] Other:  Endurance:  [x] Adequate for participation in SLP sessions  [] Reduced overall  [] Lethargic  [] Other:  Safety: [] No concerns at this time  [x] Reduced insight into deficits  [x]  Reduced safety awareness [] Not following call light procedures  [] Unable to Assess  [] Other:    Current Diet Order:ADULT DIET;  Regular  Swallowing Precautions: Sit up for all meals and thereafter for 30 minutes        Date: 2/20/2023      Tx session 1  [id number] Tx session 2   Total Timed Code Min 30 min    Total Treatment Minutes 0 min    Individual Treatment Minutes 30 min    Group Treatment Minutes 0 0   Co-Treat Minutes 0 0   Variance/Reason:  N/a    Pain 8/10 soreness    Pain Intervention [] RN notified  [] Repositioned  [x] Intervention offered and patient declined  [] N/A  [] Other: [] RN notified  [] Repositioned  [] Intervention offered and patient declined  [] N/A  [] Other:   Subjective     The pt was pleasant and cooperative. Pt was seen reclined in bedside chair. Objective:  Goals       Short Term Goals  Time Frame for Short Term Goals: 10 Days (02/27/2023)    Goal 1: Pt will complete graded recall tasks using compensatory strategies with 80% acc given min cues       Recalling pictures:  - immediate recall: 12/12 (100%), no cues  - delayed recall (10 mins): 11/12 (92%), no cues    Goal 2: Pt will complete problem solving, safety, and thought organization tasks with 80% acc given min cues    Mental Math:  - 6/8 (75%) no cues, increased to 8/8 (100%) with mod cueing     Goal 3: Pt will complete verbal and visual reasoning tasks with 80% acc given min cues      Word deductions:  - 25/25 (100%), min cueing in the form of repeating the words    Name the Category:  - 15/15 (100%), no cues    Other areas targeted: N/a    Education:   Pt ed re: compensatory memory strategies    Safety Devices: [x] Call light within reach  [] Chair alarm activated  [x] Bed alarm activated  [] Other: [] Call light within reach  [] Chair alarm activated  [] Bed alarm activated  [] Other:    Assessment: The pt was seen partially reclined in bedside chair. Pt was pleasant and cooperative throughout treatment. Pt utilizing compensatory strategies independently throughout session (repeating back information, making connections, etc.). Pt benefits from cueing on mental math problems, but completed most other tasks independently. Pt reports having the most difficulty with memory tasks, but did well with the memory task today. Plan: Continue as per plan of care.       Additional Information:     Barriers toward progress: Cognitive deficit  Discharge recommendations:  [] Home independently  [x] Home with assistance []  24 hour supervision  [] ECF [] Other:  Continued Tx Upon Discharge: ? [] Yes [] No [x] TBD based on progress while on ARU [] Vital Stim indicated [] Other:   Estimated discharge date: TBD    Interventions used this date:  [] Speech/Language Treatment  [] Instruction in HEP [] Group [] Dysphagia Treatment [x] Cognitive Treatment   [] Other:       Total Time Breakdown / Charges    Time in Time out Total Time / units   Cognitive Tx 1100 1130 30 min / 2 units   Speech Tx      Dysphagia Tx          Electronically Signed by     Chavo Celeste MA, CF-SLP  Speech Language Pathologist

## 2023-02-21 PROCEDURE — 97116 GAIT TRAINING THERAPY: CPT

## 2023-02-21 PROCEDURE — 97130 THER IVNTJ EA ADDL 15 MIN: CPT

## 2023-02-21 PROCEDURE — 6370000000 HC RX 637 (ALT 250 FOR IP): Performed by: STUDENT IN AN ORGANIZED HEALTH CARE EDUCATION/TRAINING PROGRAM

## 2023-02-21 PROCEDURE — 1280000000 HC REHAB R&B

## 2023-02-21 PROCEDURE — 97112 NEUROMUSCULAR REEDUCATION: CPT

## 2023-02-21 PROCEDURE — 97129 THER IVNTJ 1ST 15 MIN: CPT

## 2023-02-21 PROCEDURE — 97535 SELF CARE MNGMENT TRAINING: CPT

## 2023-02-21 PROCEDURE — 97530 THERAPEUTIC ACTIVITIES: CPT

## 2023-02-21 PROCEDURE — 97110 THERAPEUTIC EXERCISES: CPT

## 2023-02-21 RX ORDER — METHOCARBAMOL 750 MG/1
750 TABLET, FILM COATED ORAL 4 TIMES DAILY PRN
Status: DISCONTINUED | OUTPATIENT
Start: 2023-02-21 | End: 2023-03-03 | Stop reason: HOSPADM

## 2023-02-21 RX ORDER — ENOXAPARIN SODIUM 100 MG/ML
40 INJECTION SUBCUTANEOUS DAILY
Status: DISCONTINUED | OUTPATIENT
Start: 2023-02-21 | End: 2023-03-03 | Stop reason: HOSPADM

## 2023-02-21 RX ORDER — ACETAMINOPHEN 500 MG
1000 TABLET ORAL EVERY 8 HOURS SCHEDULED
Status: DISCONTINUED | OUTPATIENT
Start: 2023-02-21 | End: 2023-03-03 | Stop reason: HOSPADM

## 2023-02-21 RX ORDER — OXYCODONE HYDROCHLORIDE 5 MG/1
5 TABLET ORAL EVERY 4 HOURS PRN
Status: DISCONTINUED | OUTPATIENT
Start: 2023-02-21 | End: 2023-03-03 | Stop reason: HOSPADM

## 2023-02-21 RX ADMIN — METOPROLOL TARTRATE 25 MG: 25 TABLET, FILM COATED ORAL at 08:42

## 2023-02-21 RX ADMIN — MAGNESIUM OXIDE TAB 400 MG (240 MG ELEMENTAL MG) 800 MG: 400 (240 MG) TAB at 08:42

## 2023-02-21 RX ADMIN — Medication 9 MG: at 20:10

## 2023-02-21 RX ADMIN — METHOCARBAMOL TABLETS 750 MG: 750 TABLET, COATED ORAL at 17:01

## 2023-02-21 RX ADMIN — LISINOPRIL 10 MG: 10 TABLET ORAL at 08:43

## 2023-02-21 RX ADMIN — METOPROLOL TARTRATE 25 MG: 25 TABLET, FILM COATED ORAL at 20:10

## 2023-02-21 RX ADMIN — ACETAMINOPHEN 325MG 650 MG: 325 TABLET ORAL at 13:52

## 2023-02-21 RX ADMIN — METHOCARBAMOL TABLETS 750 MG: 750 TABLET, COATED ORAL at 13:52

## 2023-02-21 RX ADMIN — POLYETHYLENE GLYCOL 3350 17 G: 17 POWDER, FOR SOLUTION ORAL at 08:42

## 2023-02-21 RX ADMIN — METHOCARBAMOL TABLETS 750 MG: 750 TABLET, COATED ORAL at 08:43

## 2023-02-21 RX ADMIN — VENLAFAXINE HYDROCHLORIDE 150 MG: 37.5 CAPSULE, EXTENDED RELEASE ORAL at 08:43

## 2023-02-21 RX ADMIN — METHOCARBAMOL 750 MG: 750 TABLET ORAL at 20:10

## 2023-02-21 RX ADMIN — ACETAMINOPHEN 325MG 650 MG: 325 TABLET ORAL at 02:37

## 2023-02-21 RX ADMIN — CLONIDINE HYDROCHLORIDE 0.1 MG: 0.1 TABLET ORAL at 20:09

## 2023-02-21 RX ADMIN — ATORVASTATIN CALCIUM 20 MG: 10 TABLET, FILM COATED ORAL at 08:43

## 2023-02-21 RX ADMIN — ACETAMINOPHEN 325MG 650 MG: 325 TABLET ORAL at 08:41

## 2023-02-21 ASSESSMENT — PAIN - FUNCTIONAL ASSESSMENT
PAIN_FUNCTIONAL_ASSESSMENT: ACTIVITIES ARE NOT PREVENTED

## 2023-02-21 ASSESSMENT — PAIN DESCRIPTION - ORIENTATION
ORIENTATION: RIGHT

## 2023-02-21 ASSESSMENT — PAIN SCALES - GENERAL
PAINLEVEL_OUTOF10: 8
PAINLEVEL_OUTOF10: 0
PAINLEVEL_OUTOF10: 5
PAINLEVEL_OUTOF10: 8
PAINLEVEL_OUTOF10: 2

## 2023-02-21 ASSESSMENT — PAIN SCALES - WONG BAKER
WONGBAKER_NUMERICALRESPONSE: 0
WONGBAKER_NUMERICALRESPONSE: 0

## 2023-02-21 ASSESSMENT — PAIN DESCRIPTION - DESCRIPTORS
DESCRIPTORS: ACHING
DESCRIPTORS: ACHING
DESCRIPTORS: BURNING

## 2023-02-21 ASSESSMENT — PAIN DESCRIPTION - FREQUENCY: FREQUENCY: CONTINUOUS

## 2023-02-21 ASSESSMENT — PAIN DESCRIPTION - LOCATION
LOCATION: HEAD
LOCATION: SHOULDER;NECK
LOCATION: NECK;SHOULDER
LOCATION: NECK;SHOULDER

## 2023-02-21 ASSESSMENT — PAIN DESCRIPTION - PAIN TYPE: TYPE: CHRONIC PAIN

## 2023-02-21 ASSESSMENT — PAIN DESCRIPTION - ONSET: ONSET: ON-GOING

## 2023-02-21 NOTE — PLAN OF CARE
Problem: Safety - Adult  Goal: Free from fall injury  Outcome: Progressing     Problem: Skin/Tissue Integrity - Adult  Goal: Skin integrity remains intact  Outcome: Progressing

## 2023-02-21 NOTE — PLAN OF CARE
Problem: Safety - Adult  Goal: Free from fall injury  2/20/2023 1939 by Eloy Flores RN  Outcome: Progressing  2/20/2023 1938 by Eloy Flores RN  Outcome: Progressing     Problem: Skin/Tissue Integrity - Adult  Goal: Skin integrity remains intact  2/20/2023 1939 by Eloy Flores RN  Outcome: Progressing  2/20/2023 1938 by Eloy Flores RN  Outcome: Progressing

## 2023-02-21 NOTE — PLAN OF CARE
Problem: Safety - Adult  Goal: Free from fall injury  Outcome: Progressing     Problem: Skin/Tissue Integrity - Adult  Goal: Skin integrity remains intact  2/21/2023 1349 by Dread Messina RN  Outcome: Progressing    Problem: Skin/Tissue Integrity - Adult  Goal: Incisions, wounds, or drain sites healing without S/S of infection  Outcome: Progressing     Problem: Pain  Goal: Verbalizes/displays adequate comfort level or baseline comfort level  Outcome: Progressing

## 2023-02-21 NOTE — PROGRESS NOTES
Spoke with Colletta Bond, patients ex-. Explained to Alo Jimenes that patient requested not to give him information at this time. Explained to patient that at this point she has Alo Jimenes listed as emergency contact and if he called in information would be released to him. She requested that we ask her first before we give him information. Alo Jimenes expressed understanding.

## 2023-02-21 NOTE — PROGRESS NOTES
Occupational Therapy  Facility/Department: [facility]  Rehabilitation Occupational Therapy Daily Treatment Note    Date: 23  Patient Name: Marcos Hernández       Room: 6856/9589-23  MRN: [id number]  Account: [de-identified]   : 1950  (81 y.o.) Gender: female                    Past Medical History:  has a past medical history of Alcohol abuse, Anxiety and depression, Arthritis, Cancer ([address] Depression, Hyperlipidemia, Hypertension, Hypokalemia, IBS (irritable bowel syndrome), Overactive bladder, Pre-diabetes, Prolonged emergence from general anesthesia, Rheumatic heart disease, and Wears glasses. Past Surgical History:   has a past surgical history that includes Tonsillectomy; Tubal ligation; fracture surgery; Colonoscopy; bladder suspension; Ovary removal (Bilateral); Carpal tunnel release (Bilateral, ); Bilateral salpingoophorectomy; and cervical fusion (N/A, 2023). Restrictions  Restrictions/Precautions: Fall Risk;General Precautions; Up as Tolerated  Other position/activity restrictions: \"Ok to remove for meals and hygiene\"  Required Braces or Orthoses  Cervical: c-collar  Required Braces or Orthoses?: Yes    Subjective  Subjective: Pt in chair at start of session, pleasant and agreeable to OT session. /71, HR 68, spO2 95% on RA  Restrictions/Precautions: Fall Risk;General Precautions; Up as Tolerated           ADL  Grooming/Oral Hygiene  Assistance Level: Stand by assist  Upper Extremity Dressing  Assistance Level: Minimal assistance; Increased time to complete;Verbal cues  Putting On/Taking Off Footwear  Assistance Level: Set-up; Stand by assist          Functional Mobility  Device: Rolling walker  Activity: To/From bathroom  Assistance Level: Stand by assist  Sit to Stand  Assistance Level: Stand by assist  Stand to Sit  Assistance Level: Stand by assist         Assessment  Assessment    Assessment: Pt tolerated session well, completing standing grooming tasks at sink with RW and SBA.  She was able to don gym shoes from home while seated in chair with set up and increased time. Educated pt on how to don/doff [place]-J collar while standing in front of mirror. Pt able to don/doff with min A and VC for sequencing and adhering to cervical precautions. Pt continues to be limited by weakness, balance, fine motor coordination, cervical precautions, activity tolerance, and coordination. Continue POC. Activity Tolerance: Patient tolerated treatment well  Discharge Recommendations: Continue to assess pending progress  OT Equipment Recommendations  Equipment Needed: Yes  Other: CTA: pt has grab bars in bathroom, shower chair, and hand held shower head  [facility] in place: Yes  Type of devices: All fall risk precautions in place;Call light within reach; Chair alarm in place;Gait belt;Patient at risk for falls; Left in chair;Nurse notified  Restraints  Initially in place: No    Patient Education  Education  Education Given To: Patient  Education Provided: Role of Therapy;Plan of Care;Precautions; Safety;ADL Function;Mobility Training;Transfer Training  Education Provided Comments: disease specific: benefits of dual tasking activity  Education Method: Demonstration;Verbal  Barriers to Learning: None  Education Outcome: Verbalized understanding;Demonstrated understanding    Plan  Occupational Therapy Plan  Times Per Week: 5-7x/wk  Current Treatment Recommendations: Strengthening;ROM;Balance training;Functional mobility training; Endurance training;Positioning;Equipment evaluation, education, & procurement;Patient/Caregiver education & training; Safety education & training;Pain management;Return to work related activity; Self-Care / ADL; Home management training;Coordination training    Goals  Patient Goals   Patient goals : \"to get to take care of the litter box and trash\"  Short Term Goals  Time Frame for Short Term Goals: 1 week (2/24/23)- all goals prog unless otherwise noted 2/21  Short Term Goal 1: Pt will complete LB dressing with min A- GOAL MET 2/20  Short Term Goal 2: Pt will complete toileting with min A- GOAL MET 2/20  Short Term Goal 3: Pt will complete bathing with min A- GOAL MET 2/20  Short Term Goal 4: Pt will complete light household tasks with min A  Long Term Goals  Time Frame for Long Term Goals : 2 weeks (3/3/23)  Long Term Goal 1: Pt will complete LB dressing mod I  Long Term Goal 2: Pt will complete toileting mod I  Long Term Goal 3: Pt will complete bathing with mod I  Long Term Goal 4: Pt will complete light household tasks independent        Therapy Time   Individual Concurrent Group Co-treatment   Time In 0800         Time Out 0830         Minutes 30         Timed Code Treatment Minutes: 30 Minutes       Alfredito Monae, OT

## 2023-02-21 NOTE — PATIENT CARE CONFERENCE
[facility]  Inpatient Rehabilitation  Weekly Team Conference Note    Date: 2023  Patient Name: Benjamín Linn        MRN: [id number]    : 1950  (81 y.o.)  Gender: female   Referring Practitioner: Micky Zavala MD  Diagnosis: cervical myelopathy      Interventions to be utilized toward barriers to discharge, per discipline:  [address] observed barriers to dc: Impulsivity, Limited safety awareness, and Upper extremity weakness  Nursing interventions: use of safety monitors bed/chair alarms, moved closer to nursing station, encourage strengthening exercises   Family Education: No  Fall Risk:  Yes    Stay with me?: Yes    PHYSICAL THERAPY  Physical therapy observed barriers to dc:    Baseline: indep with FWW   Current level: needs assist of 1 with walker, transfers and steps due to impaired balance/proprioception in BLE with gait/transfers   Barriers to DC:  lives alone, 2 steps to enter, impaired BLE proprioception, has pets at home.     Needs in order to achieve dc home/next level of care: indep with gait, transfers, steps      Physical therapy interventions:   Current Treatment Recommendations: Functional mobility training, Transfer training, ADL/Self-care training, Gait training, Positioning, Modalities, Therapeutic activities, Patient/Caregiver education & training    PT Goals:            Short Term Goals  Time Frame for Short Term Goals:   Short Term Goal 1: Pt will complete bed mobility with supervision  Short Term Goal 2: Pt will sit to stand with SBA to RW  Short Term Goal 3: Pt will ambulate x 100' with RW with supervision  Short Term Goal 4: Pt will complete 12 stairs with min A.   Goal Met 2023 pateint demonstrates up and down 16 steps wtih bilateral rails and min assist.            Long Term Goals  Time Frame for Long Term Goals : 3/3/23  Long Term Goal 1: Pt will be independent with bed mobility  Long Term Goal 2: Pt will ambulate x 150' with LRAD independently  Long Term Goal 3: Pt will complete 4 stairs with Mod I  Long Term Goal 4: Pt will be independent donning/doffing c-collar    PT Assessment:  Recommendation:   PT Equipment Recommendations  Equipment Needed: No  Assessment  Assessment: Patient seen for split session with patient demonstrating continued ataxia due to impaired LE proprioception but with increased endurance and stability to min assist for 518 feet on turf to level carpet with FWW, min assist with cues for transfers with FWW and able to negotiate 16 steps with bilateral rails non-alternating pattern with training. Educated patient in compensatory gait strategy with wide lennox, emphatic heel strike, tap touching  edge of step before decending steps to ensure foot placement. Patient with improving but continued unsteady ataxic gait requiring assist with gait, steps and transfers with [place]-J collar donned and employing spinal precautions. Suggested to patient she get and elevated feeding dish set for her pets so she does not need to stoop to the floor to reach the bowls with patient verbalizing understanding.         OCCUPATIONAL THERAPY  Occupational therapy observed barriers to dc:    Baseline: ind, working full time, 2 level house but able to live on main level, lives alone   Current level: SBA for STS, up to 20 minutes in stance during dynamic balance activity, educated on use of sock aide, cont to need education and practice for [employer]   Barriers to DC: balance/coordination    Needs in order to achieve dc home/next level of care: will need 24/7 initially prog to PRN with [address] [place] [address], will likely not need additional DME has shower chair and grab bars     Occupational Therapy interventions:  Current Treatment Recommendations: Strengthening, ROM, Balance training, Functional mobility training, Endurance training, Positioning, Equipment evaluation, education, & procurement, Patient/Caregiver education & training, Safety education & training, Pain management, Return to work related activity, Self-Care / ADL, Home management training, Coordination training    OT Goals:  Patient Goals   Patient goals : \"to get to take care of the litter box and trash\"  Short Term Goals  Time Frame for Short Term Goals: 1 week (2/24/23)- all goals prog unless otherwise noted 2/22  Short Term Goal 1: Pt will complete LB dressing with min A- GOAL MET 2/20  Short Term Goal 2: Pt will complete toileting with min A- GOAL MET 2/20  Short Term Goal 3: Pt will complete bathing with min A- GOAL MET 2/20  Short Term Goal 4: Pt will complete light household tasks with min A  Long Term Goals  Time Frame for Long Term Goals : 2 weeks (3/3/23)  Long Term Goal 1: Pt will complete LB dressing mod I  Long Term Goal 2: Pt will complete toileting mod I  Long Term Goal 3: Pt will complete bathing with mod I  Long Term Goal 4: Pt will complete light household tasks mod independent    OT Assessment:   Darryl Rodarte is progressing in therapy steadily, currently limited by decreased balance, safety awareness, poor endurance, decreased strength, and pain. Demo'd fair safety awareness, requiring cues for safety during turns PRN. Functional Mobility: SBA for STS, CGA prog to SBA for ambulation  ADL: denied need to complete  TA: dynamic dual task in stance with RW for 20 minutes, BUE exercises  Objective Assessment: pt noted decreased  strength Yousuf since symptoms began prior to Sx, noted it to be about the same since surgery   Activity was tolerated fairly well, pt required rest breaks PRN. Continue OT POC to address performance deficits in ADLs and functional mobility.         SPEECH THERAPY   Speech therapy observed barriers to dc:    Baseline: pt lives indep, working full time, active , manages own meds/finances and all household tasks    Current level: mild high level cog    Barriers to DC: None    Needs in order to achieve dc home/next level of care: carryover of compensatory strategies    Speech Therapy interventions:  Dysphagia: N/A  Speech/Language/Cognition: Compensatory strategy training and carryover, recall/STM, problem solving, reasoning, exec function, thought organization, attention. Dysphagia Goals:  N/A    Speech/Language/Cog Goals:  Time Frame for Long Term Goals: 14 Days (03/03/2023)  Goal 1: Pt will improve overall cognitive-linguistic abilities to promote safe and independent return home PLOF. 02/21: progressing, ongoing   Short Term Goals  Time Frame for Short Term Goals: 10 Days (02/27/2023)  Goal 1: Pt will complete graded recall recall tasks using compensatory strategies with 80% acc given min cues. 02/21: progressing, ongoing  Goal 2: Pt will complete problem solving, safety, and thought organization tasks with 80% acc given min cues. 02/21: progressing, ongoing  Goal 3: Pt will complete verbal and visual reasoning tasks with 80% acc given min cues. 02/21: progressing, ongoing  Time Frame for Short Term Goals: 10 Days (02/27/2023)    ST Assessment:  Pt pleasant and cooperative, agreeable to participate. Pt completed telling time with 100% acc, but did demonstrate difficulty with mildly complex money calculations given word problems with coins and bills. Pt and SLP also discussed medication management- pt able to verbalize her daily routine and system that she uses to manage her medications. Pt remains strongly motivated to improve. NUTRITION  Weight: 133 lb 4.8 oz (60.5 kg) / Body mass index is 22.88 kg/m². Diet Order: ADULT DIET; Regular  PO Meals Eaten (%): 76 - 100%  Education: No recommendation at this time      CASE MANAGEMENT  Assessment: 81 yr old female. Dx:Cervical myelopathy. Patient independent PLOF with AD. Lives alone in a 2 story home with a 3 step entry into home. Home has walk-in-shower with grab bars. Therapy recommendations are Home with assist PRN;Outpatient PT/OT.      Interdisciplinary Goals:   1.) Pt will complete toileting mod I  2.) Pt will carryover use of compensatory strategies for improved recall and safety across all disciplines given min cues   3.) Patient will employ wide lennox with transfers and gait for improved stability with gait and transfers without cues. [x]  Family Training discussed at conference and to be scheduled. Discharge Plan   Estimated discharge date: 3/3/2023  Destination: home health  Pass:No  Services at Discharge: [address], Occupational Therapy, Speech Therapy, and Nursing   Equipment at Discharge:  None needed. Team Members Present at Conference:  : Darlene Lemus RN  Occupational Therapist: Mandeep Kamara, OTR/L [id number]   Physical Leslie Germain PT  Speech Therapist: Segundo Acosta, [facility] SLP   Nurse: Jordan Crowe RN  Dietician: Garrett Orozco RDN, LD  : Sergio Durán OTR/L  Psychiatry: N/A    Family members present at conference: No      I led this team conference and I approve the established interdisciplinary plan of care as documented within the medical record of [facility].     MD: Electronically signed by Blanca Powell MD on 2/22/2023 at 1:39 PM

## 2023-02-21 NOTE — CARE COORDINATION
Clinicals faxed to insurance for .   Alanna Carvalho MPH, RRT  Clinical Liaison [address]  [phone] ([phone]   Electronically signed by Alanna Carvalho on 2/21/2023 at 3:29 PM

## 2023-02-21 NOTE — PROGRESS NOTES
Physical Therapy  Facility/Department: [facility]  Rehabilitation Physical Therapy Treatment Note    NAME: Artis Mendez  : 1950 (81 y.o.)  MRN: [id number]  CODE STATUS: Full Code    Date of Service: 23       Restrictions:  Restrictions/Precautions: Fall Risk;General Precautions; Up as Tolerated  Required Braces or Orthoses  Cervical: c-collar  Position Activity Restriction  Spinal Precautions: No Bending; No Lifting; No Twisting  Other position/activity restrictions: \"Ok to remove for meals and hygiene\"     SUBJECTIVE:  denies pain at rest.  Patient somewhat tearful during session relating how challenging her current situation is since her surgery and her worry and concern about getting back to her regular activities. Offered active listening and encouragement during session. Post Treatment Pain Screening denies pain post mobility   OBJECTIVE  Cognition  Overall Cognitive Status: WFL    Following Commands: Follows one step commands consistently  Attention Span: Appears intact  Memory: Decreased short term memory    Problem Solving: Assistance required to implement solutions due to new dx  Insights: Fully aware of deficits  Initiation: Requires cues for some  Sequencing: Does not require cues  Orientation  Overall Orientation Status: Within Normal Limits  Orientation Level: Oriented X4    Functional Mobility  Sit<>Stand from chair with FWW placed in front x3 with CGA patient unsteady with initial standing balance, given cues for wider lennox to assist with pre-extension phase of transfer and with stand to sit cued to keep wide lennox and to lean back with hips flexing in hips.       Environmental Mobility   GAIT:  Patient walks 518 feet,  140 feet with FWW x2 with L/R turns cues to slow when negotiating change in terrain to turf carpet and cued for wider lennox with up to min assist to facilitate medial lateral weight shift thru gait belt and cued for more emphatic heelstrike to help patient with positional awareness in BLE. Needed intermittent cues and faciliation to maintain wider lennox but noted more stability with turns and with increased gait speed in wider lennox. Step in wide lennox up to and back down from 6 inch step with bilateral rails 2 minutes with min assist  and cues for stepping. Neuro-re-ed : educated in neutral spine for bending for sit<>Stand with wider lennox needed and extensive education over session as to compensatory strategies for decreased proprioception in BLE. Sensation, tone, Strength: Motor Scoring Muscle RIGHT/5 LEFT/5   C5 elbow Flexors 5 5   C6 Wrist Extensors 5 5   C7 Elbow Extensors 4+ 4+   C8 Finger Flexors (FDP) 5 5   T1 Finger abductors ( little finger abd dig minimi) 5 5   L2 Hip flexors 5 5   L3 kneeExtensors  5 5   L4 Ankle Dorsiflexors 5 5   L5 Long Toe Extensors  5 5   S1 Ankle Plantarflexors 5 5    RIGHT LEFT   Sensation  Light touch      Intact BLE  Numbness Both hands dorsum and palm  Intact BLE   Numbness Both hands dorsum and palm    Deep Tendon Reflexes RIGHT LEFT   Biceps NT NT   Triceps NT NT   Brachioradialis NT NT   Patellar NT NT   Achilles NT NT   Tonal Commentary Normal  tone with quick stretch to gastroc soleus       Disposition:Patient with call light in reach, [place] J collar donned  and alarm in place at end of session with patient in chair   SPLIT SESSION:    Subjective:  pain 9/10 sharp pain from end of incision to right shoulder, intermittent, persisted throughout session.   Patient declines need for pain medication or positioning      TRANSFERS:  Sit<>Stand from chair with FWW placed in front x2 with CGA patient unsteady with initial standing balance, given cues for wider lennox to assist with pre-extension phase of transfer and with stand to sit cued to     GAIT:  Patient walks up to 140 feet with FWW x2 with L/R turns cues to slow when negotiating change in terrain to turf carpet and cued for wider lennox with up to min assist to facilitate medial lateral weight shift thru gait belt. Noted near scissoring x1 with cue for wider lennox given. Forward and backward stepping over  ankle weight  x30 each BLE with HHA min assist in wide lennox with noted decreased accuracy and coordination stepping with LLE vs RLE as noted to catch/slide with LLE when rapid reversal stepping backward. STAIRS:Patient  walks up and down 16 steps non-alt pattern bilateral rails with cues for tapping edge of step down going in wide lennox to ensure foot position as due to brace cannot see feet. Education:   Educated/reviewed spinal precautions and compensatory strategies for impaired LE coordiantion and proprioception with patient verbalizing understanding  and able to name precautions, needed intermittent cuing to consistently apply compensatory strategies for improving balance with gait and transfers requiring additional education to achieve goals    Disposition:Patient with call light in reach, [place] J collar donned  and alarm in place at end of session with patient in chair       ASSESSMENT/PROGRESS TOWARDS GOALS: Patient seen for split session with patient demonstrating continued ataxia due to impaired LE proprioception but with increased endurance and stability to min assist for 518 feet on turf to level carpet with FWW, min assist with cues for transfers with FWW and able to negotiate 16 steps with bilateral rails non-alternating pattern with training. Educated patient in compensatory gait strategy with wide lennox, emphatic heel strike, tap touching  edge of step before decending steps to ensure foot placement. Patient with improving but continued unsteady ataxic gait requiring assist with gait, steps and transfers with [place]-J collar donned and employing spinal precautions. Suggested to patient she get and elevated feeding dish set for her pets so she does not need to stoop to the floor to reach the bowls with patient verbalizing understanding.          Vital Signs  Heart Rate: 75  BP: 132/63  BP Location: Left upper arm  MAP (Calculated): 86  SpO2: 97 %  O2 Device: None (Room air)         Goals  Patient Goals   Patient Goals : I want to be able to take care of my animals  Short Term Goals  Time Frame for Short Term Goals: 2/24  Short Term Goal 1: Pt will complete bed mobility with supervision  Short Term Goal 2: Pt will sit to stand with SBA to RW  Short Term Goal 3: Pt will ambulate x 100' with RW with supervision  Short Term Goal 4: Pt will complete 12 stairs with min A  GOAL MET 2/21/2023 Patient demo 16 steps with min assist.   Long Term Goals  Time Frame for Long Term Goals : 3/3/23  Long Term Goal 1: Pt will be independent with bed mobility  Long Term Goal 2: Pt will ambulate x 150' with LRAD independently  Long Term Goal 3: Pt will complete 4 stairs with Mod I  Long Term Goal 4: Pt will be independent donning/doffing c-collar    PLAN OF CARE/SAFETY continue per shared poc 5/7 days /week               Therapy Time   Individual Concurrent Group Co-treatment   Time In 5073         Time Out 1400         Minutes 45           Timed Code Treatment Minutes: 45 Minutes   Second Session Therapy Time:   Individual Concurrent Group Co-treatment   Time In 3341         Time Out 1622         Minutes 18           Timed Code Treatment Minutes:  18    Total Treatment Minutes:  [address] [facility], [address] [place] [address], 02/21/23 at 1:19 PM

## 2023-02-21 NOTE — PROGRESS NOTES
Physical Therapy  Facility/Department: [facility]  Rehabilitation Physical Therapy Treatment Note    NAME: Ernesto Lobo  : 1950 (81 y.o.)  MRN: [id number]  CODE STATUS: Full Code    Date of Service: 23       Restrictions:  Restrictions/Precautions: Fall Risk;General Precautions; Up as Tolerated  Required Braces or Orthoses  Cervical: c-collar  Position Activity Restriction  Spinal Precautions: No Bending; No Lifting; No Twisting  Other position/activity restrictions: \"Ok to remove for meals and hygiene\"     SUBJECTIVE  Subjective  Subjective: pt found in gym, agreeable to session  Pain: reports minimal pain in neck/incision area          OBJECTIVE  Cognition  Overall Cognitive Status: Exceptions  Arousal/Alertness: Delayed responses to stimuli  Following Commands: Follows one step commands consistently  Attention Span: Appears intact  Memory: Decreased short term memory  Safety Judgement: Decreased awareness of need for safety  Problem Solving: Assistance required to implement solutions  Insights: Fully aware of deficits  Initiation: Requires cues for some  Sequencing: Does not require cues  Orientation  Overall Orientation Status: Within Normal Limits  Orientation Level: Oriented X4    Functional Mobility     Pt found in gym, reports minimal discomfort at posterior neck incision. Sit to stand EOM to RW with cgA  Gait x 18 ft with RW, CGA to // bars   In // bars: pt completed 1.5 min of forward/backwards stepping over 2\" cane with 1 UE support and cGA-Anand followed by 1.5 min forward/backwards stepping over 4\" cane with BUE support. Visual cues for wide KAREEM, cues for exaggerated heel strike forward stepping and increased step height. In // bars, pt completed reciprocal stepping over 5 canes of various heights with 1-2 UE support on bars, visual cues via colored dots provided for wide KAREEM.  Emphasis on increased heel strike, wide KAREEM and step height, grossly cGA, 2-3 instances of min A when completing with 1 UE support. Pt with decreased swing phase, hitting cane 3x during  (competed 10 laps of 5 canes). Completed with 1.5# weights donned BLE    Gait x 250 ft with RW, CGA with 1.5# weights donned BLE, cues for wide KAREEM, heel strike and increased swing phase to normalize gait pattern    Pt completed 6 reps of unsupported sit to stand from chair to Rw with CGA, cues to hinge at hips  Pt in gym at end of session with SLP. ASSESSMENT/PROGRESS TOWARDS GOALS  Vital Signs  Heart Rate: 75  Heart Rate Source: Monitor  BP: 132/63  BP Location: Left upper arm  MAP (Calculated): 86  SpO2: 97 %  O2 Device: None (Room air)    Assessment  Assessment: pt found in gym, agreeable to session. grossly cGa for mobility with RW this date. emphasis of session on increasing pt's KAREEM, heel strike and step legnth/height. neuro re-ed followed up with over ground walking and LE strengthening. no complaints of increased pain/discomfort.  pt left with SLP in gym at end of session  Activity Tolerance: Patient tolerated treatment well  Discharge Recommendations: Home with assist PRN;Outpatient PT  PT Equipment Recommendations  Equipment Needed: No    Goals  Patient Goals   Patient Goals : I want to be able to take care of my animals  Short Term Goals  Time Frame for Short Term Goals: 2/24  Short Term Goal 1: Pt will complete bed mobility with supervision  Short Term Goal 2: Pt will sit to stand with SBA to RW  Short Term Goal 3: Pt will ambulate x 100' with RW with supervision  Short Term Goal 4: Pt will complete 12 stairs with min A  Long Term Goals  Time Frame for Long Term Goals : 3/3/23  Long Term Goal 1: Pt will be independent with bed mobility  Long Term Goal 2: Pt will ambulate x 150' with LRAD independently  Long Term Goal 3: Pt will complete 4 stairs with Mod I  Long Term Goal 4: Pt will be independent donning/doffing c-collar    PLAN OF CARE/SAFETY  Physcial Therapy Plan  General Plan: 5-7 times per week  Specific Instructions for Next Treatment: progress mobility and therex as tolerated  Current Treatment Recommendations: Functional mobility training;Transfer training;ADL/Self-care training;Gait training;Positioning;Modalities; Therapeutic activities; Patient/Caregiver education & training  Safety Devices  Type of Devices: Chair alarm in place;Call light within reach;Gait belt;Patient at risk for falls; Left in chair;Nurse notified; All fall risk precautions in place; Bed alarm in place    EDUCATION  Education  Education Given To: Patient  Education Provided: Role of Therapy;Plan of Care;Precautions;Transfer Training;Mobility Training;Energy Conservation  Education Provided Comments: safety awareness with functional mobility, RW management during ambulation , C-Collar use, spinal px, activity tolerance progresssions  Education Method: Demonstration;Verbal  Barriers to Learning: None  Education Outcome: Verbalized understanding;Continued education needed;Demonstrated understanding        Therapy Time   Individual Concurrent Group Co-treatment   Time In 1400         Time Out 1430         Minutes 30           Timed Code Treatment Minutes: 69 Maida Thompson PT, 02/21/23 at 3:01 PM

## 2023-02-21 NOTE — PROGRESS NOTES
Speech Language Pathology  [facility]: ACUTE REHAB UNIT  SPEECH-LANGUAGE PATHOLOGY      [x] Daily  [] Weekly Care Conference Note  [] Discharge    Ritika Kunz      [id number]  CRQ:[id number]  Rehab Dx/Hx: Cervical myelopathy ([facility] [address] [G95.9]    Precautions: falls  Home situation: lives alone, manages own meds/finances and all household tasks, active , working full time   ST Dx: [] Aphasia  [] Dysarthria  [] Apraxia   [] Oropharyngeal dysphagia [x] Cognitive Impairment  [] Other:   Date of Admit: 2023  Room #: 0138/8542-82    Current functional status (updated daily):         Pt being seen for : [] Speech/Language Treatment  [] Dysphagia Treatment [x] Cognitive Treatment  [] Other:  Communication: [x]WFL  [] Aphasia  [] Dysarthria  [] Apraxia  [] Pragmatic Impairment [] Non-verbal  [] Hearing Loss  [] Other:   Cognition: [] WFL  [x] Mild  [] Moderate  [] Severe [] Unable to Assess  [] Other:  Memory: [] WFL  [x] Mild  [] Moderate  [] Severe [] Unable to Assess  [] Other:  Behavior: [x] Alert  [x] Cooperative  [x]  Pleasant  [] Confused  [] Agitated  [] Uncooperative  [] Distractible [] Motivated  [] Self-Limiting [] Anxious  [] Other:  Endurance:  [x] Adequate for participation in SLP sessions  [] Reduced overall  [] Lethargic  [] Other:  Safety: [] No concerns at this time  [x] Reduced insight into deficits  [x]  Reduced safety awareness [] Not following call light procedures  [] Unable to Assess  [] Other:    Current Diet Order:ADULT DIET;  Regular  Swallowing Precautions: Sit up for all meals and thereafter for 30 minutes        Date: 2023      Tx session 1  3330-5900 Tx session 2  All tx needs met in session 1      Total Timed Code Min 30    Total Treatment Minutes 30    Individual Treatment Minutes 30    Group Treatment Minutes 0    Co-Treat Minutes 0    Variance/Reason:  N/a    Pain /10 soreness    Pain Intervention [] RN notified  [] Repositioned  [x] Intervention offered and patient declined  [] N/A  [] Other: [] RN notified  [] Repositioned  [] Intervention offered and patient declined  [] N/A  [] Other:   Subjective     Pt alert and oriented, cooperative and agreeable to participate in therapy. Pt seen in community room. Objective:  Goals       Short Term Goals  Time Frame for Short Term Goals: 10 Days (02/27/2023)    Goal 1: Pt will complete graded recall tasks using compensatory strategies with 80% acc given min cues   Did not directly target. Goal 2: Pt will complete problem solving, safety, and thought organization tasks with 80% acc given min cues    Thought organization targeted on ELIF, see goal 4 below. Goal 3: Pt will complete verbal and visual reasoning tasks with 80% acc given min cues      Did not target. *New goal added 02/21/23*  Goal 4: Pt will complete executive function tasks (meds, money, math, time, etc) with 80% acc given min cues. Portions of the ELIF completed:  -telling time: 100% acc indep    -counting money: 70% acc indep improving to 100% acc given mod cues    -Discussed medication management: pt reported she used to use an organizer but when she started to lose control of her fine motor skills, she found a new system with opening the bottles daily,  in two baggies for AM and PM   -pt stated once she begins to gain her fine motor skills back, she will try the weekly sorting       Other areas targeted: N/a    Education:   Edu provided re: role of SLP, rationale for tx tasks provided, importance of med management       Safety Devices: [x] Call light within reach  [x] Chair alarm activated  [] Bed alarm activated  [] Other: [] Call light within reach  [] Chair alarm activated  [] Bed alarm activated  [] Other:    Assessment: Pt pleasant and cooperative, agreeable to participate. Pt completed telling time with 100% acc, but did demonstrate difficulty with mildly complex money calculations given word problems with coins and bills.  Pt and SLP also discussed medication management- pt able to verbalize her daily routine and system that she uses to manage her medications. Pt remains strongly motivated to improve. Plan: Continue as per plan of care. Additional Information:     Barriers toward progress: Cognitive deficit  Discharge recommendations:  [] Home independently  [x] Home with assistance []  24 hour supervision  [] ECF [] Other:  Continued Tx Upon Discharge: ? [] Yes [] No [x] TBD based on progress while on ARU [] Vital Stim indicated [] Other:   Estimated discharge date: TBD    Interventions used this date:  [] Speech/Language Treatment  [] Instruction in HEP [] Group [] Dysphagia Treatment [x] Cognitive Treatment   [] Other: Total Time Breakdown / Charges    Time in Time out Total Time / units   Cognitive Tx 1430 1500 30 min / 2 units   Speech Tx -- -- --   Dysphagia Tx -- -- --       Electronically Signed by     Trisha Carrillo. A CCC-SLP [id number]  Speech-Language Pathologist

## 2023-02-21 NOTE — PROGRESS NOTES
Occupational Therapy  Facility/Department: [facility]  Rehabilitation Occupational Therapy Daily Treatment Note    Date: 23  Patient Name: Prieto Zamorano       Room: 30/7135-84  MRN: [id number]  Account: [de-identified]   : 1950  (81 y.o.) Gender: female     Past Medical History:  has a past medical history of Alcohol abuse, Anxiety and depression, Arthritis, Cancer ([address] Depression, Hyperlipidemia, Hypertension, Hypokalemia, IBS (irritable bowel syndrome), Overactive bladder, Pre-diabetes, Prolonged emergence from general anesthesia, Rheumatic heart disease, and Wears glasses  . Past Surgical History:   has a past surgical history that includes Tonsillectomy; Tubal ligation; fracture surgery; Colonoscopy; bladder suspension; Ovary removal (Bilateral); Carpal tunnel release (Bilateral, ); Bilateral salpingoophorectomy; and cervical fusion (N/A, 2023). Restrictions  Restrictions/Precautions: Fall Risk;General Precautions  Other position/activity restrictions: \"Ok to remove for meals and hygiene\"  Required Braces or Orthoses  Cervical: c-collar  Required Braces or Orthoses?: Yes    Subjective  Subjective: pt in chair at arrival, agreeable to session, denied ADL needs,   Vitals and Pain: /71, HR 68, SPO2 on RA 95%; reportin 8/10 R shoulder pain, \"burning\"  Restrictions/Precautions: Fall Risk;General Precautions     Objective  Cognition  Overall Cognitive Status: Exceptions  Arousal/Alertness: Delayed responses to stimuli (possibly related to hearing)  Following Commands:  Follows one step commands consistently  Attention Span: Appears intact  Memory: Decreased short term memory  Safety Judgement: Decreased awareness of need for safety  Problem Solving: Assistance required to implement solutions  Insights: Fully aware of deficits  Initiation: Requires cues for some  Sequencing: Does not require cues  Orientation  Overall Orientation Status: Within Normal Limits  Orientation Level: Oriented X4 ADL: denied need to complete ADLs     Functional Mobility  Device: Rolling walker  Activity: To/From therapy gym  Assistance Level: Contact guard assist  Transfers  Surface: To chair with arms;From chair with arms  Additional Factors: Increased time to complete  Device: Walker  Sit to Stand  Assistance Level: Stand by assist  Stand to Sit  Assistance Level: Stand by assist     OT Exercises  Dynamic Standing Balance Exercises: x20 minutes in stance with letter board activity, retrieving 2 letters in alphabetical order from felt board and answering questions related to letter then transportting ~12 feet to counter; pt progressed CGA to SBA with RW, cues for slow and controlled turns     Pt completed the following exercises:   [x]AROM [] AAROM [] PROM; BUE while Sitting in chair, supported:   [] 5 reps [x] 2 sets x10 reps [] 15 reps   [] Shoulder elevation/depression(shoulder shrug)  [] Shoulder retraction (pull shoulder blades together)  [] Shoulder protraction (punch forward)  [] Shoulder flexion/extension    [] Shoulder abduction/adduction   [] Shoulder Rolls Clockwise/Counterclockwise  [] Shoulder Horizontal abduction/adduction  [] Elbow flexion/extension   [] Internal/External rotation   [x] Supination/pronation- 2#  [x] Wrist flexion/extension- 2#  [x] Finger extension with rubber band  [x]  with red and then blue resistive clips      strength as measured by Yuri Dynamometer   L hand: 30 psi  R hand: 32 psi    Assessment  Assessment  Assessment: Carey Varela is progressing in therapy steadily, currently limited by decreased balance, safety awareness, poor endurance, decreased strength, and pain. Demo'd fair safety awareness, requiring cues for safety during turns PRN.    Functional Mobility: SBA for STS, CGA prog to SBA for ambulation  ADL: denied need to complete  TA: dynamic dual task in stance with RW for 20 minutes, BUE exercises  Objective Assessment: pt noted decreased  strength Yousuf since symptoms began prior to Sx, noted it to be about the same since surgery   Activity was tolerated fairly well, pt required rest breaks PRN. Continue OT POC to address performance deficits in ADLs and functional mobility. Activity Tolerance: Patient tolerated treatment well  Discharge Recommendations: Continue to assess pending progress  OT Equipment Recommendations  Equipment Needed: Yes  Other: CTA: pt has grab bars in bathroom, shower chair, and hand held shower head  [address] in place: Yes  Type of devices: All fall risk precautions in place;Call light within reach; Chair alarm in place;Gait belt;Left in chair;Nurse notified    Patient Education  Education  Education Given To: Patient  Education Provided: Role of Therapy;Plan of Care;Precautions; Safety;Transfer Training;Mobility Training;ADL Function  Education Provided Comments: disease specific: benefits of dual tasking activity  Education Method: Demonstration;Verbal  Barriers to Learning: None  Education Outcome: Verbalized understanding;Demonstrated understanding    Plan  Occupational Therapy Plan  Times Per Week: 5-7x/wk  Current Treatment Recommendations: Strengthening;ROM;Balance training;Functional mobility training; Endurance training;Patient/Caregiver education & training; Safety education & training;Pain management;Equipment evaluation, education, & procurement;Positioning;Self-Care / ADL;Return to work related activity;Home management training    Goals  Patient Goals   Patient goals : \"to get to take care of the litter box and trash\"  Short Term Goals  Time Frame for Short Term Goals: 1 week (2/24/23)- all goals prog unless otherwise noted 2/21  Short Term Goal 1: Pt will complete LB dressing with min A- GOAL MET 2/20  Short Term Goal 2: Pt will complete toileting with min A- GOAL MET 2/20  Short Term Goal 3: Pt will complete bathing with min A- GOAL MET 2/20  Short Term Goal 4: Pt will complete light household tasks with min A  Long Term Goals  Time Frame for Long Term Goals : 2 weeks (3/3/23)  Long Term Goal 1: Pt will complete LB dressing mod I  Long Term Goal 2: Pt will complete toileting mod I  Long Term Goal 3: Pt will complete bathing with mod I  Long Term Goal 4: Pt will complete light household tasks independent       Therapy Time   Individual Concurrent Group Co-treatment   Time In 0900         Time Out 1000         Minutes 60         Timed Code Treatment Minutes: 61 Minutes       Gerrit Goldmann, OT

## 2023-02-21 NOTE — PROGRESS NOTES
Three staples removed and left open to air per verbal order from Dr. Juancho Arreaga. No bleeding or redness noted at site. Tolerated procedure well.

## 2023-02-21 NOTE — PROGRESS NOTES
Lianna Thomas  2/21/2023  [id number]    Chief Complaint: Cervical myelopathy ([address]    Subjective:   No acute events overnight. Today [facility] is seen with her sister present. She reports improvement in headache. Per report she was trying to get out of bed again last night. Patient has no recollection of events. She reports feeling fatigue in the morning. She denies confusion during the day. Patient not taking PRN oxycodone. She is getting scheduled muscle relaxer which might be making her confused at night so I will make this PRN. Also discussed staples on the back of her head. She has no recollection of getting staples Friday night or prior to coming to rehab. Unclear when staples were placed or if they were associated with her surgery. Blood near staples appears old. Had nursing remove staples as I suspect these are old. ROS: denies f/c, n/v, cp, sob    Objective:  Patient Vitals for the past 24 hrs:   BP Temp Temp src Pulse Resp SpO2   02/21/23 1453 132/63 -- -- 75 -- 97 %   02/21/23 1319 132/63 -- -- 75 -- 97 %   02/21/23 0841 122/71 -- -- 68 -- --   02/21/23 0806 122/71 98 °F (36.7 °C) Oral 68 16 95 %   02/20/23 2130 (!) 154/76 98.8 °F (37.1 °C) Oral 81 16 --     Gen: No distress, pleasant. HEENT: Normocephalic, atraumatic. CV: extremities well perfused  Resp: No respiratory distress. Abd: Soft, nontender   Ext: No edema. Neuro: Alert, oriented, appropriately interactive.    Skin: staples in place with old appearing blood    Wt Readings from Last 3 Encounters:   02/20/23 133 lb 4.8 oz (60.5 kg)   02/17/23 142 lb 13.7 oz (64.8 kg)   01/10/23 143 lb (64.9 kg)       Laboratory data:   Lab Results   Component Value Date    WBC 7.8 02/20/2023    HGB 11.8 (L) 02/20/2023    HCT 36.0 02/20/2023    MCV 94.1 02/20/2023     02/20/2023       Lab Results   Component Value Date/Time     02/20/2023 07:32 AM    K 4.5 02/20/2023 07:32 AM     02/20/2023 07:32 AM    CO2 26 02/20/2023 07:32 AM    BUN 18 02/20/2023 07:32 AM    CREATININE 0.8 02/20/2023 07:32 AM    GLUCOSE 125 02/20/2023 07:32 AM    CALCIUM 9.5 02/20/2023 07:32 AM        Therapy progress:  PT  Required Braces or Orthoses  Cervical: c-collar  Position Activity Restriction  Spinal Precautions: No Bending, No Lifting, No Twisting  Other position/activity restrictions: \"Ok to remove for meals and hygiene\"  Objective     Sit to Stand: Minimal Assistance  Stand to Sit: Minimal Assistance  Device: Rolling Walker  Assistance: Minimal assistance  Distance: 39'  OT  PT Equipment Recommendations  Equipment Needed: No  Toilet - Technique: Ambulating  Equipment Used: Grab bars  Assessment        SLP                Body mass index is 22.88 kg/m². Assessment and Plan:  Patient is a 80 yo F with pmh HTN, HLD, Pre-DM2, IBS, depression/anxiety who presented to [facility] on 2/14/2023 for urgent C3-5 laminectomy and and C3-6 posterior fusion. She was admitted to [facility] on 2/17/23 due to functional deficits below her baseline. Cervical Myelopathy  - s/p C3-5 laminectomy and C3-6 fusion on 2/14 with Dr. Mirta Hinson  - cervical collar when OOB  - multimodal pain control, made muscle relaxer PRN  - incision care  - vancomycin discontinued as drain is out  - PT, OT, ST    Fall  - hit posterior right head  - initial CT head unremarkable. Was having headaches so obtained repeat CT head and unremarkable. Posterior Right Head wound  - unclear when occurred, staples in place. Patient unsure of timing  - nursing remove staples     HTN  - lisinopril, metoprolol     HLD  - statin     Anxiety/Depression  - effexor     Bowels: adjust medications as needed for regular bowel movements     Bladder: Check PVR x 3. [facility] if PVR > 200ml or if any volume is > 500 ml. Sleep: melatonin provided prn. PPX  DVT: SCDs  GI: not indicated     Follow up appointments: Neurosurgery, PCP    Leatha Cox.  Tasha Cheatham MD 2/21/2023, 5:03 PM

## 2023-02-21 NOTE — PLAN OF CARE
Problem: Skin/Tissue Integrity - Adult  Goal: Skin integrity remains intact  2/20/2023 2355 by Trista Vela RN  Outcome: Progressing  2/20/2023 1939 by Britany Jennings RN  Outcome: Progressing  2/20/2023 1938 by Britany Jennings RN  Outcome: Progressing

## 2023-02-22 LAB
ANION GAP SERPL CALCULATED.3IONS-SCNC: 12 MMOL/L (ref 3–16)
BASOPHILS ABSOLUTE: 0 K/UL (ref 0–0.2)
BASOPHILS RELATIVE PERCENT: 0.6 %
BUN BLDV-MCNC: 19 MG/DL (ref 7–20)
CALCIUM SERPL-MCNC: 9.7 MG/DL (ref 8.3–10.6)
CHLORIDE BLD-SCNC: 102 MMOL/L (ref 99–110)
CO2: 21 MMOL/L (ref 21–32)
CREAT SERPL-MCNC: 0.7 MG/DL (ref 0.6–1.2)
EOSINOPHILS ABSOLUTE: 0.2 K/UL (ref 0–0.6)
EOSINOPHILS RELATIVE PERCENT: 2.7 %
GFR SERPL CREATININE-BSD FRML MDRD: >60 ML/MIN/{1.73_M2}
GLUCOSE BLD-MCNC: 117 MG/DL (ref 70–99)
HCT VFR BLD CALC: 37.5 % (ref 36–48)
HEMOGLOBIN: 12.5 G/DL (ref 12–16)
LYMPHOCYTES ABSOLUTE: 1.6 K/UL (ref 1–5.1)
LYMPHOCYTES RELATIVE PERCENT: 20.4 %
MCH RBC QN AUTO: 31.2 PG (ref 26–34)
MCHC RBC AUTO-ENTMCNC: 33.2 G/DL (ref 31–36)
MCV RBC AUTO: 94 FL (ref 80–100)
MONOCYTES ABSOLUTE: 0.5 K/UL (ref 0–1.3)
MONOCYTES RELATIVE PERCENT: 6.6 %
NEUTROPHILS ABSOLUTE: 5.4 K/UL (ref 1.7–7.7)
NEUTROPHILS RELATIVE PERCENT: 69.7 %
PDW BLD-RTO: 13.4 % (ref 12.4–15.4)
PLATELET # BLD: 345 K/UL (ref 135–450)
PMV BLD AUTO: 7.2 FL (ref 5–10.5)
POTASSIUM REFLEX MAGNESIUM: 4.7 MMOL/L (ref 3.5–5.1)
PREALBUMIN: 15.8 MG/DL (ref 20–40)
RBC # BLD: 3.99 M/UL (ref 4–5.2)
REASON FOR REJECTION: NORMAL
REJECTED TEST: NORMAL
SLIDE REVIEW: ABNORMAL
SODIUM BLD-SCNC: 135 MMOL/L (ref 136–145)
WBC # BLD: 7.8 K/UL (ref 4–11)

## 2023-02-22 PROCEDURE — 36415 COLL VENOUS BLD VENIPUNCTURE: CPT

## 2023-02-22 PROCEDURE — 6360000002 HC RX W HCPCS: Performed by: STUDENT IN AN ORGANIZED HEALTH CARE EDUCATION/TRAINING PROGRAM

## 2023-02-22 PROCEDURE — 97112 NEUROMUSCULAR REEDUCATION: CPT

## 2023-02-22 PROCEDURE — 80048 BASIC METABOLIC PNL TOTAL CA: CPT

## 2023-02-22 PROCEDURE — 97530 THERAPEUTIC ACTIVITIES: CPT

## 2023-02-22 PROCEDURE — 97129 THER IVNTJ 1ST 15 MIN: CPT

## 2023-02-22 PROCEDURE — 85025 COMPLETE CBC W/AUTO DIFF WBC: CPT

## 2023-02-22 PROCEDURE — 97116 GAIT TRAINING THERAPY: CPT

## 2023-02-22 PROCEDURE — 97535 SELF CARE MNGMENT TRAINING: CPT

## 2023-02-22 PROCEDURE — 97130 THER IVNTJ EA ADDL 15 MIN: CPT

## 2023-02-22 PROCEDURE — 84134 ASSAY OF PREALBUMIN: CPT

## 2023-02-22 PROCEDURE — 1280000000 HC REHAB R&B

## 2023-02-22 PROCEDURE — 97110 THERAPEUTIC EXERCISES: CPT

## 2023-02-22 PROCEDURE — 6370000000 HC RX 637 (ALT 250 FOR IP): Performed by: STUDENT IN AN ORGANIZED HEALTH CARE EDUCATION/TRAINING PROGRAM

## 2023-02-22 RX ORDER — LANOLIN ALCOHOL/MO/W.PET/CERES
9 CREAM (GRAM) TOPICAL NIGHTLY
Status: DISCONTINUED | OUTPATIENT
Start: 2023-02-22 | End: 2023-03-03 | Stop reason: HOSPADM

## 2023-02-22 RX ADMIN — ENOXAPARIN SODIUM 40 MG: 100 INJECTION SUBCUTANEOUS at 08:17

## 2023-02-22 RX ADMIN — Medication 9 MG: at 20:19

## 2023-02-22 RX ADMIN — MAGNESIUM OXIDE TAB 400 MG (240 MG ELEMENTAL MG) 800 MG: 400 (240 MG) TAB at 08:17

## 2023-02-22 RX ADMIN — LISINOPRIL 10 MG: 10 TABLET ORAL at 08:17

## 2023-02-22 RX ADMIN — VENLAFAXINE HYDROCHLORIDE 150 MG: 37.5 CAPSULE, EXTENDED RELEASE ORAL at 08:17

## 2023-02-22 RX ADMIN — POLYETHYLENE GLYCOL 3350 17 G: 17 POWDER, FOR SOLUTION ORAL at 08:17

## 2023-02-22 RX ADMIN — ACETAMINOPHEN 1000 MG: 500 TABLET ORAL at 04:42

## 2023-02-22 RX ADMIN — ATORVASTATIN CALCIUM 20 MG: 10 TABLET, FILM COATED ORAL at 08:17

## 2023-02-22 RX ADMIN — ACETAMINOPHEN 1000 MG: 500 TABLET ORAL at 20:19

## 2023-02-22 RX ADMIN — METOPROLOL TARTRATE 25 MG: 25 TABLET, FILM COATED ORAL at 08:17

## 2023-02-22 RX ADMIN — ACETAMINOPHEN 1000 MG: 500 TABLET ORAL at 14:05

## 2023-02-22 RX ADMIN — CLONIDINE HYDROCHLORIDE 0.1 MG: 0.1 TABLET ORAL at 20:19

## 2023-02-22 ASSESSMENT — PAIN - FUNCTIONAL ASSESSMENT
PAIN_FUNCTIONAL_ASSESSMENT: ACTIVITIES ARE NOT PREVENTED
PAIN_FUNCTIONAL_ASSESSMENT: ACTIVITIES ARE NOT PREVENTED

## 2023-02-22 ASSESSMENT — PAIN DESCRIPTION - ONSET
ONSET: ON-GOING
ONSET: ON-GOING

## 2023-02-22 ASSESSMENT — PAIN SCALES - GENERAL
PAINLEVEL_OUTOF10: 5
PAINLEVEL_OUTOF10: 0
PAINLEVEL_OUTOF10: 6
PAINLEVEL_OUTOF10: 6

## 2023-02-22 ASSESSMENT — PAIN DESCRIPTION - ORIENTATION
ORIENTATION: POSTERIOR
ORIENTATION: POSTERIOR

## 2023-02-22 ASSESSMENT — PAIN DESCRIPTION - DESCRIPTORS
DESCRIPTORS: THROBBING
DESCRIPTORS: THROBBING

## 2023-02-22 ASSESSMENT — PAIN DESCRIPTION - FREQUENCY
FREQUENCY: CONTINUOUS
FREQUENCY: CONTINUOUS

## 2023-02-22 ASSESSMENT — PAIN DESCRIPTION - LOCATION
LOCATION: NECK;SHOULDER
LOCATION: NECK

## 2023-02-22 ASSESSMENT — PAIN DESCRIPTION - PAIN TYPE
TYPE: SURGICAL PAIN
TYPE: SURGICAL PAIN

## 2023-02-22 NOTE — PROGRESS NOTES
Speech Language Pathology  [facility]: ACUTE REHAB UNIT  SPEECH-LANGUAGE PATHOLOGY      [x] Daily  [] Weekly Care Conference Note  [] Discharge    Theodora Martinez      ANTON:2/4/2087  KTM:[id number]  Rehab Dx/Hx: Cervical myelopathy ([facility] [address] [G95.9]    Precautions: falls  Home situation: lives alone, manages own meds/finances and all household tasks, active , working full time   ST Dx: [] Aphasia  [] Dysarthria  [] Apraxia   [] Oropharyngeal dysphagia [x] Cognitive Impairment  [] Other:   Date of Admit: 2/17/2023  Room #: 3305/5548-71    Current functional status (updated daily):         Pt being seen for : [] Speech/Language Treatment  [] Dysphagia Treatment [x] Cognitive Treatment  [] Other:  Communication: [x]WFL  [] Aphasia  [] Dysarthria  [] Apraxia  [] Pragmatic Impairment [] Non-verbal  [] Hearing Loss  [] Other:   Cognition: [] WFL  [x] Mild  [] Moderate  [] Severe [] Unable to Assess  [] Other:  Memory: [] WFL  [x] Mild  [] Moderate  [] Severe [] Unable to Assess  [] Other:  Behavior: [x] Alert  [x] Cooperative  [x]  Pleasant  [] Confused  [] Agitated  [] Uncooperative  [] Distractible [] Motivated  [] Self-Limiting [] Anxious  [] Other:  Endurance:  [x] Adequate for participation in SLP sessions  [] Reduced overall  [] Lethargic  [] Other:  Safety: [] No concerns at this time  [x] Reduced insight into deficits  [x]  Reduced safety awareness [] Not following call light procedures  [] Unable to Assess  [] Other:    Current Diet Order:ADULT DIET;  Regular  Swallowing Precautions: Sit up for all meals and thereafter for 30 minutes        Date: 2/22/2023      Tx session 1  [id number] Tx session 2  All tx needs met in session 1      Total Timed Code Min 30    Total Treatment Minutes 30    Individual Treatment Minutes 30    Group Treatment Minutes 0    Co-Treat Minutes 0    Variance/Reason:  N/a    Pain 5- neck and right shoulder     Pain Intervention [] RN notified  [] Repositioned  [x] Intervention offered and patient declined  [] N/A  [x] Other: pt reported she already received medications   [] RN notified  [] Repositioned  [] Intervention offered and patient declined  [] N/A  [] Other:   Subjective     Pt alert and oriented, cooperative and agreeable to participate in therapy. Pt seen in community room. Objective:  Goals       Short Term Goals  Time Frame for Short Term Goals: 10 Days (02/27/2023)    Goal 1: Pt will complete graded recall tasks using compensatory strategies with 80% acc given min cues   Memory & mental manipulation/ranking task:  -pt given a set of three words and asked to repeat them given a specific direction  -ex: malinda, plane, fly (rank from smallest to largest)  -100% acc indep    Goal 2: Pt will complete problem solving, safety, and thought organization tasks with 80% acc given min cues    Thought organization targeted during ranking, task, see goal 1 above. Safety situations/providing logical solutions:  -ex: why is it important to have a smoke detector in your home? What would you do if you had a fire in your home?  -100% acc indep      Goal 3: Pt will complete verbal and visual reasoning tasks with 80% acc given min cues      Did not target. *New goal added 02/21/23*  Goal 4: Pt will complete executive function tasks (meds, money, math, time, etc) with 80% acc given min cues.   Completed portions of the ELIF:  -solving math problems: 90% acc indep improving to 100% acc given min cues   -understanding medicine labels: 100% acc indep   -using a calendar: 100% acc indep  -reading instructions: 90% acc indep improving to 100% acc given min cues       Other areas targeted: N/a    Education:   Edu provided re: progress towards goals      Safety Devices: [x] Call light within reach  [x] Chair alarm activated  [] Bed alarm activated  [x] Other: AVASYS  [] Call light within reach  [] Chair alarm activated  [] Bed alarm activated  [] Other:    Assessment: Pt pleasant and cooperative, agreeable to participate. Pt completed all tx tasks provided today with % acc, occasional min cues required. Higher level recall and safety situations completed with 100% acc. Pt continues to demonstrate consistent progress towards all goals and remains strongly motivated to improve. Plan: Continue as per plan of care. Additional Information:     Barriers toward progress: Cognitive deficit  Discharge recommendations:  [] Home independently  [x] Home with assistance []  24 hour supervision  [] ECF [] Other:  Continued Tx Upon Discharge: ? [] Yes [] No [x] TBD based on progress while on ARU [] Vital Stim indicated [] Other:   Estimated discharge date: TBD    Interventions used this date:  [] Speech/Language Treatment  [] Instruction in HEP [] Group [] Dysphagia Treatment [x] Cognitive Treatment   [] Other: Total Time Breakdown / Charges    Time in Time out Total Time / units   Cognitive Tx 0930 1000 30 min / 2 units   Speech Tx -- -- --   Dysphagia Tx -- -- --       Electronically Signed by     Komal SHOEMAKER CCC-SLP [id number]  Speech-Language Pathologist

## 2023-02-22 NOTE — PROGRESS NOTES
Maia [facility]  2/22/2023  [id number]    Chief Complaint: Cervical myelopathy ([address]    Subjective:   No acute events overnight. Today Russ Garza is seen in her room. She reports continued poor sleep. We discuss scheduling her home melatonin. She also reports continued weakness in her hands. She reports feeling down that she is not making progress faster. Encouraged patient that she has been doing well with therapy and will continue to progress. ROS: denies f/c, n/v, cp, sob    Objective:  Patient Vitals for the past 24 hrs:   BP Temp Temp src Pulse Resp SpO2   02/22/23 0835 125/61 -- -- 82 -- 99 %   02/22/23 0817 133/60 97.5 °F (36.4 °C) Oral 80 16 96 %   02/21/23 2009 125/74 98.7 °F (37.1 °C) Oral 85 16 98 %   02/21/23 1453 132/63 -- -- 75 -- 97 %     Gen: No distress, pleasant. HEENT: Normocephalic, atraumatic. CV: extremities well perfused  Resp: No respiratory distress. Abd: Soft, nontender   Ext: No edema. Neuro: Alert, oriented, appropriately interactive. Skin: Posterior right scalp laceration appears to be healing well, appears old    Wt Readings from Last 3 Encounters:   02/20/23 133 lb 4.8 oz (60.5 kg)   02/17/23 142 lb 13.7 oz (64.8 kg)   01/10/23 143 lb (64.9 kg)       Laboratory data:   Lab Results   Component Value Date    WBC 7.8 02/22/2023    HGB 12.5 02/22/2023    HCT 37.5 02/22/2023    MCV 94.0 02/22/2023     02/22/2023       Lab Results   Component Value Date/Time     02/22/2023 07:13 AM    K 4.7 02/22/2023 07:13 AM     02/22/2023 07:13 AM    CO2 21 02/22/2023 07:13 AM    BUN 19 02/22/2023 07:13 AM    CREATININE 0.7 02/22/2023 07:13 AM    GLUCOSE 117 02/22/2023 07:13 AM    CALCIUM 9.7 02/22/2023 07:13 AM        Therapy progress:  PT  Required Braces or Orthoses  Cervical: mi[place] J  Position Activity Restriction  Spinal Precautions: No Bending, No Lifting, No Twisting  Other position/activity restrictions:  \"Ok to remove for meals and hygiene\"  Objective     Sit to Stand: Minimal Assistance  Stand to Sit: Minimal Assistance  Device: Rolling Walker  Assistance: Minimal assistance  Distance: 39'  OT  PT Equipment Recommendations  Equipment Needed: No  Toilet - Technique: Ambulating  Equipment Used: Grab bars  Assessment        SLP                Body mass index is 22.88 kg/m². Assessment and Plan:  Patient is a 80 yo F with pmh HTN, HLD, Pre-DM2, IBS, depression/anxiety who presented to [address] [place] [address] on 2/14/2023 for urgent C3-5 laminectomy and and C3-6 posterior fusion. She was admitted to [facility] on 2/17/23 due to functional deficits below her baseline. Cervical Myelopathy  - s/p C3-5 laminectomy and C3-6 fusion on 2/14 with Dr. Amilcar Cowan  - cervical collar when OOB  - multimodal pain control, made muscle relaxer PRN  - incision care  - vancomycin discontinued as drain is out  - PT, OT,     Fall  - hit posterior right head  - initial CT head unremarkable. Was having headaches so obtained repeat CT head and unremarkable. Posterior Right Head wound  - unclear when occurred, staples in place. Patient unsure of timing  - nursing removed staples 2/21     HTN  - lisinopril, metoprolol     HLD  - statin     Anxiety/Depression  - effexor     Bowels: adjust medications as needed for regular bowel movements     Bladder: Check PVR x 3. [facility] if PVR > 200ml or if any volume is > 500 ml. Sleep: melatonin scheduled     PPX  DVT: lovenox  GI: not indicated     Follow up appointments: Neurosurgery, PCP    Services:  PT, OT  EDOD: 3/3    Interdisciplinary team conference was held today with entire rehab treatment team including PT, OT, SLP, Dietician, RN, and SW. Discussion focused on progress toward rehab goals and discharge planning. Barriers: pain, endurance, comorbidities. Total treatment time >35 min with greater than 50% spent in care coordination. [facility] Arsenio Crigler, MD 2/22/2023, 2:05 PM

## 2023-02-22 NOTE — PROGRESS NOTES
Occupational Therapy  Facility/Department: [facility]  Rehabilitation Occupational Therapy Daily Treatment Note    Date: 23  Patient Name: Iván Kyle       Room: 6033/5011-50  MRN: [id number]  Account: [de-identified]   : 1950  (81 y.o.) Gender: female                    Past Medical History:  has a past medical history of Alcohol abuse, Anxiety and depression, Arthritis, Cancer ([address] Depression, Hyperlipidemia, Hypertension, Hypokalemia, IBS (irritable bowel syndrome), Overactive bladder, Pre-diabetes, Prolonged emergence from general anesthesia, Rheumatic heart disease, and Wears glasses. Past Surgical History:   has a past surgical history that includes Tonsillectomy; Tubal ligation; fracture surgery; Colonoscopy; bladder suspension; Ovary removal (Bilateral); Carpal tunnel release (Bilateral, ); Bilateral salpingoophorectomy; and cervical fusion (N/A, 2023). Restrictions  Restrictions/Precautions: Fall Risk;General Precautions; Up as Tolerated  Other position/activity restrictions: \"Ok to remove for meals and hygiene\"  Required Braces or Orthoses  Cervical: chapin REY  Required Braces or Orthoses?: Yes    Subjective  Subjective: Pt in chair at start of session, pleasant and agreeable to OT session. /61, HR 82, spO2 99% on RA; did not endorse pain during session  Restrictions/Precautions: Fall Risk;General Precautions; Up as Tolerated             Objective     Cognition  Overall Cognitive Status: Exceptions  Arousal/Alertness: Delayed responses to stimuli  Following Commands:  Follows one step commands consistently  Attention Span: Appears intact  Memory: Decreased short term memory  Safety Judgement: Decreased awareness of need for safety  Problem Solving: Assistance required to implement solutions  Insights: Fully aware of deficits  Initiation: Requires cues for some  Sequencing: Does not require cues  Orientation  Overall Orientation Status: Within Normal Limits  Orientation Level: Oriented X4         ADL  Grooming/Oral Hygiene  Assistance Level: Stand by assist  Skilled Clinical Factors: oral care in stance with RW support PRN with [place] J collar donned  Upper Extremity Bathing  Assistance Level: Minimal assistance  Skilled Clinical Factors: A for bathing posterior torso with long handled sponge, pt managed drying/rinsing/washing all other parts including use of hand held shower head  Lower Extremity Bathing  Assistance Level: Contact guard assist  Skilled Clinical Factors: while in stance for LB syed area washing  Upper Extremity Dressing  Assistance Level: Minimal assistance; Increased time to complete;Verbal cues  Skilled Clinical Factors: A for bringing miami j to midline, pt provided with hand held mirror to A in donning while seated/out of bathroom; A to bring overhead shirt over head/to prevent cervical flexion  Lower Extremity Dressing  Assistance Level: Contact guard assist  Skilled Clinical Factors: VC to sit to thread pants, CGA while in stance to manage underwear and pants up over hips  Putting On/Taking Off Footwear  Assistance Level: Set-up; Stand by assist  Skilled Clinical Factors: donning socks  Toileting  Equipment Provided: Toilet Tongs  Assistance Level: Stand by assist;Set-up; Verbal cues; Increased time to complete  Skilled Clinical Factors: pt managed syed care after urination, education on use of toilet tongs with set up for syed care after BM  Toilet Transfers  Technique:  (ambulating to standard toilet with RW and use of grab bars)  Assistance Level: Stand by assist  Tub/Shower Transfers  Type: Shower  Transfer From: Rolling walker  Transfer To: Tub transfer bench  Additional Factors: Verbal cues  Assistance Level: Contact guard assist          Functional Mobility  Device: Rolling walker  Activity: To/From bathroom  Assistance Level: Stand by assist  Transfers  Surface:  To chair with arms;From chair without arms;Standard toilet  Additional Factors: Increased time to complete  Device: Walker  Sit to [employer] Level: Stand by assist (one VC for hand placement)  Stand to Sit  Assistance Level: Stand by assist   OT Exercises  A/AROM Exercises: x10 BUE overhead shoulder press and chest press with 2# while seated in chair  Functional Mobility Circuit Training: ambulation to/from therapy gym with CGA-SBA with RW  Dynamic Standing Balance Exercises: 3, 45 sec trials of Blaze Pods at counter top with pt instructed to touch only the blue when given 4 choices, number of hits increased each trial with pt progressing to use of no RW for last 45 s bout and for 90 s bout, req CGA-Min for lateral stepping, average reaction time 1.7 s; 90 second trial with Blaze Pods arranged in a square ~10 feet apart, pt req CGA with RW to ambulate between, improved use of whole body turning to visually scan environment for proper light to tap, avg reaction/travel time 5.7s     Assessment  Assessment  Assessment: Pt cont to progress toward established goals, though cont to be limited by decreased balance and proprioception. Grossly pt is SBA for STS including toilet transfers though req CGA for shower transfers, SPV for toileting, Min A for UB ADLs 2/2 brace donning and over head shirt management to limit cervical flexion. Pt tolerated TB bathing in walk in shower well, demo'ing good carryover of long handled sponge, and new learning of use of toilet tongs after BM. Pt engaged in Kadenze activity to increase safety during dual tasking, moving outside KAREEM in random patterns and safety with turning to scan 2/2 spinal Precuations. Cont OT POC to progress pt safely toward goals and PLOF.        Activity Tolerance: Patient tolerated treatment well  Discharge Recommendations: 24 hour supervision or assist;Home with Home health OT  OT Equipment Recommendations  Equipment Needed: No  Other: CTA: pt has grab bars in bathroom, shower chair, and hand held shower head  [facility] in place: Yes  Type of devices: All fall risk precautions in place;Call light within reach; Chair alarm in place;Gait belt;Patient at risk for falls; Left in chair;Nurse notified    Patient Education  Education  Education Given To: Patient  Education Provided: Role of Therapy;Plan of Care;Precautions; Safety;ADL Function;Mobility Training;Transfer Training;Equipment; Fall Prevention Strategies  Education Provided Comments: disease specific: Blaze Pods, safety during ADLs  Education Method: Demonstration;Verbal  Barriers to Learning: None  Education Outcome: Verbalized understanding;Demonstrated understanding    Plan  Occupational Therapy Plan  Times Per Week: 5-7x/wk  Current Treatment Recommendations: Strengthening;ROM;Balance training;Functional mobility training; Endurance training;Positioning;Equipment evaluation, education, & procurement;Patient/Caregiver education & training; Safety education & training;Pain management;Return to work related activity; Self-Care / ADL; Home management training;Coordination training    Goals  Patient Goals   Patient goals : \"to get to take care of the litter box and trash\"  Short Term Goals  Time Frame for Short Term Goals: 1 week (2/24/23)- all goals prog unless otherwise noted 2/22  Short Term Goal 1: Pt will complete LB dressing with min A- GOAL MET 2/20  Short Term Goal 2: Pt will complete toileting with min A- GOAL MET 2/20  Short Term Goal 3: Pt will complete bathing with min A- GOAL MET 2/20  Short Term Goal 4: Pt will complete light household tasks with min A  Long Term Goals  Time Frame for Long Term Goals : 2 weeks (3/3/23)  Long Term Goal 1: Pt will complete LB dressing mod I  Long Term Goal 2: Pt will complete toileting mod I  Long Term Goal 3: Pt will complete bathing with mod I  Long Term Goal 4: Pt will complete light household tasks mod independent                 Therapy Time   Individual Concurrent Group Co-treatment   Time In 1230         Time Out 1300         Minutes 30 Timed Code Treatment Minutes: 30 Minutes     Second Session Therapy Time:   Individual Concurrent Group Co-treatment   Time In 1400         Time Out 1500         Minutes 60           Timed Code Treatment Minutes:  60 Minutes    Total Treatment Minutes:  90 minutes    Fransico Yousif OT

## 2023-02-22 NOTE — PROGRESS NOTES
Physical Therapy  Facility/Department: [facility]  Rehabilitation Physical Therapy Treatment Note    NAME: Mai Kitchen  : 1950 (81 y.o.)  MRN: [id number]  CODE STATUS: Full Code    Date of Service: 23       Restrictions:  Restrictions/Precautions: Fall Risk;General Precautions; Up as Tolerated  Required Braces or Orthoses  Cervical: miami J  Position Activity Restriction  Spinal Precautions: No Bending; No Lifting; No Twisting  Other position/activity restrictions: \"Ok to remove for meals and hygiene\"     SUBJECTIVE  Subjective  Pain: reports pain 6/10 burning at end of incision across to right shoulder        Post Treatment Pain Screening 6/10 burning in lower incision to right shoulder          OBJECTIVE       Functional Mobility  Bed Mobility  Overall Assistance Level: Contact Guard Assist  Roll Left  Assistance Level: Stand by assist  Skilled Clinical Factors: cued for log roll for shoulder and  hip to stay in alignment and for hooklying  Roll Right  Assistance Level: Stand by assist  Sit to Supine  Assistance Level: Stand by assist  Skilled Clinical Factors: sup<>sidelying<>sit with cues for log rolling. Supine to Sit  Assistance Level: Stand by assist  Balance  Sitting Balance: Supervision  Standing Balance: Contact guard assistance  Transfers  Surface: To bed; To chair with arms;From bed;From chair with arms  Additional Factors: Verbal cues (for wide lennox)  Device: Walker  Sit to Stand  Assistance Level: Stand by assist  Skilled Clinical Factors: bed<>chair with FWW x1, set up assist  for donning shoes in recliner and  cues for more upright posture with walker      Environmental Mobility  Ambulation  Surface: Level surface  Device: Rolling walker  Distance: 1036  Activity: Within Unit;outer hallway   Additional Factors: Verbal cues  Assistance Level: Stand by assist  Gait Deviations: Slow mary;Decreased step length bilateral;Narrow base of support; Unsteady gait  Skilled Clinical Factors: cues for more upright posture with walker , cued for wider lennox, facilitating upright posture by paraspinal mid thoracic stroking while walking no lob  Stairs  Stair Height: 6''  Device: Standard walker (at bottom of steps)  Number of Stairs: 16  Additional Factors: Verbal cues; Hand placement cues  Assistance Level: Stand by assist  Skilled Clinical Factors: cued  x4 to reach UE down steps when down going, mildly usteady with foot placement downgoing with steps necessitating SBA  Disposition: at end of session up to chair with call light in reach and chair alarm engaged. Therex: for LE strengthening: REcumbent stepper with LE only level 1.0 2.5 minutes and level 3.0 remainder of time wtih goal for ligh t o somewhat hard on  NELSON RPE for pacing 14 minutes total 1337 steps. PT Exercises  Exercise Equipment: REcumbent stepper wtih LE only level 1.0 2.5 minutes and level 3.0 remainder of time wtih goal for ligh t o somewhat hard on  NELSON RPE for pacing      Functional Gait Assessment    Patient: Fatimah Dover  : 1950  MRN: [id number]  Date: 2023  Electronically Signed by: Dominguez Jasmine PT    Gait level surface:    Instructions:  Walk at your normal speed from here to the next neetu (20)  Grading:  Neetu the lowest category that applies   []3 Normal:  Walks 20, no AD, good speed, no evidence for imbalance,    normal gait pattern   [x]2 Mild Impairment:  Walks 20, uses AD, slower speed, mild gait    deviations   []1 Moderate Impairment:  Walks 20, slow speed, abnormal gait    pattern, evidence for imbalance   []0 Severe Impairment:  Cannot walk 20 without assistance, severe    gait deviations or imbalance    Change in gait speed:  Instructions:  Begin walking at your normal pace (for 5), when I tell you to [facility], walk as fast as you can (for 5).   When I tell you slow, walk as slowly as you can (for 5)  Grading:  Neetu the lowest category that applies  [x]3 Normal:  Able to smoothly change walking speed without loss of balance or gait deviation, shows a significant difference in walking speeds between normal, fast, and slow speeds  []2 Mild Impairment:  Is able to change speed, but demonstrates mild gait deviations or no gait deviations, but is unable to achieve a significant change in velocity, or uses an assistive device  []1 Moderate Impairment:  Makes only minor adjustments to walking speed, or accomplishes a change in speed with significant gait deviations, or changes speed, but loses significant gait deviations, or changes speed but loses balance, but is able to recover and continue walking    []0 Severe Impairment:  Cannot change speeds, or loses balance and     has to reach for wall/be caught    Gait with horizontal head turns: NA due to miami J  but completed with lower trunk movement   Instructions:  Begin walking at your normal pace. When I tell you to [facility] right, keep walking straight, but turn your head to the right. Keep looking to the right until I tell you to [facility] left, then keep walking straight and turn your head to the left. Keep your head to the left until I tell you to [facility] straight, then keep walking straight, but return your head to the center  Grading:  Kulwindermarian Mon the lowest category that applies    []3 Normal:  Performs head turns smoothly with no change in gait  []2 Mild Impairment:  Performs head turns smoothly with a slight change in gait velocity (i.e. minor disruption to smooth gait path or uses AD  [x]1 Moderate Impairment:  Performs head turns with moderate change in gait velocity, slows down, staggers but recovers, can continue to walk  []0 Severe Impairment:  Performs head turns with severe disruption of gait (i.e. staggers, outside 15 path, loses balance, stops, reaches for wall)    Gait with vertical head turns: NA due to miami J but completed with lower trunk movement   Instructions:  Begin walking at your normal pace.   When I tell you to [facility] up, keep walking straight, but tip your head up. Keep looking up until I tell you look down, then keep walking straight and tip your head down. Keep your head down until I tell you look straight, then keep walking straight, but return your head to the center. Grading:  Tyrel the lowest category that applies    []3 Normal:  Performs head turns smoothly with no change in gait   []2 Mild Impairment:  Performs task with slight change in gait velocity (i.e. minor disruption to smooth gait path or uses AD  [x]1 Moderate Impairment:  Performs task with moderate change in gait velocity, slows down, staggers but recovers, can continue to walk  []0 Severe Impairment:  Performs task with severe disruption of gait (i.e. staggers, outside 15 path, loses balance, stops, reaches for wall    Gait and pivot turn: Instructions:  Begin walking at your normal pace. When I tell you, turn & stop, turn as quickly as you can to face the opposite direction and stop. Grading:  Tyrel the lowest category that applies    [x]3 Normal:  Pivot turns safely within 3 seconds and stops quickly with     no loss of balance   []2 Mild Impairment:  Pivot turns safely in > 3 seconds and stops with    no loss of balance  []1 Moderate Impairment:  Turns slowly, requires verbal cueing, requires several stops to catch balance following turn & stop  []0 Severe Impairment:  Cannot turn safely, requires assistance to turn    and stop    Step over obstacle: Instructions:  Begin walking at your normal speed. When you come to the shoebox, step over it, not around it, and keep walking. Grading:  Shelly Wang the lowest category that applies    []3 Normal:  Is able to step over the box without changing gait speed, no    evidence of imbalance. [x]2 Mild Impairment:  Is able to step over the box, but must slow down and adjust steps to clear box safely. []1 Moderate Impairment:  Is able to step over box but must stop, then step over, may require verbal cueing.   []0 Severe Impairment:  Cannot perform activity without assistance. Gait with narrow base of support:  Instructions:  Walk on the floor with arms folded across the chest, feet aligned heel to toe in tandem for a distance of 12 feet. The number of steps taken in a straight line are counted for a maximum of 10 steps. Grading:  Petr Silver the lowest category that applies    []3 Normal:  Is able to ambulate for 10 steps heel to toe with no staggering. []2 Mild Impairment:  Ambulates 7-9 steps  []1 Moderate Impairment:  Ambulates 4-7 steps  [x]0 Severe Impairment:  Ambulates less than 4 steps heel to toe or cannot perform without assistance    Gait with eyes closed:  Instructions:  Walk at normal speed from here to the next neetu (20ft) with your eyes closed. Grading:  Neetu the lowest category that applies  []3 Normal:  Walks 20 ft, no assistive devices, good speed, no evidence for imbalance, normal gait pattern, deviates no more than 6 in outside of 12 in walkway width. Ambulates 20 ft in less than 7 seconds. []2 Mild Impairment:  Walks 20 ft, uses assistive device, slower speed, mild gait deviations, deviates 6-10 in outside of 12 in walkway width. Ambulates 20 ft in less than 9 seconds but greater than 7 seconds. []1 Moderate Impairment:  Walks 20 ft, slow speed, abnormal gait pattern, evidence for imbalance, mild gait deviations, deviates 10-15 inches outside of 12 in walkway width. Requires more than 9 seconds to ambulate 20 ft  [x]0 Severe Impairment:  Cannot walk 20 ft without assistance, severe gait deviations or imbalance, deviates greater than 15 in outside of 12 in walkway width or will not attempt the task. Ambulating Backwards:  Instructions:  Walk backwards until I tell you to stop. Grading:  Neetu the lowest category that applies  []3 Normal:  Walks 20 ft, no assistive devices, good speed, no evidence for imbalance, normal gait pattern, deviates no more than 6 in outside of 12 in walkway width.   []2 Mild Impairment:  Walks 20 ft, uses assistive device, slower speed, mild gait deviations, deviates 6-10 in outside of 12 in walkway width. []1 Moderate Impairment:  Walks 20 ft, slow speed, abnormal gait pattern, evidence for imbalance, mild gait deviations, deviates 10-15 inches outside of 12 in walkway width. Requires more than 9 seconds to ambulate 20 ft  [x]0 Severe Impairment:  Cannot walk 20 ft without assistance, severe gait deviations or imbalance, deviates greater than 15 in outside of 12 in walkway width or will not attempt the task. Steps: Instructions:  Walk up these stairs as you would at home (use railing if necessary). At the top, turn around and walk down. Grading:  Nuha Outlaw the lowest category that applies  []3 Normal:  Alternating feet, no rail. []2 Mild Impairment:  Alternating feet, must use rail. [x]1 Moderate Impairment:  Two feet to a stair, must use rail  []0 Severe Impairment:  Cannot do safely    TOTAL SCORE:   13 /30 FAll risk modified for head turns and nods for lower trunk movement. Cut-Off Scores     Community-dwelling Older Adults:   (1 [facility], 2010; n = 28; aged 61 to 80, Older Adults)   Scores of ? 22/30 on the FGA were found to be effective in predicting falls, Sensitivity 85%, Specificity 86%   Scores of ? 20/30 on the FGA were optimal to predict older adults residing in community dwellings who would sustain unexplained falls in the next 6 months, Sensitivity 100%, Specificity 76%   Parkinson's Disease:   (Mary et al, 2011; n = 80; mean disease duration = 6.9 years (3.38), Parkinson's Disease)   Scores of 15/30 on the FGA indicate predictive ability to clinically identify fallers in Parkinsons patients when sensitivity and specificity was maximized     Information from [email or web address]       ASSESSMENT/PROGRESS TOWARDS GOALS:   Patient seen for gait training with FWW with cues for upright posture and stroking facilitation on thoracic PVM during gait of over 1000 feet with SBA, up and down 16 steps with bilateral rails and walker at bottom of steps with SBA; LE therex for strengthening on recumbent stepper for 14 minutes with cues for pacing, and transfers with FWW with SBA. Patient with unsteadiness improving with gait and transfers with FWW and steps with rail with patient employing wide amy with emphatic heel strike without cues but with cues needed for posture as patient otherwise with thoracic and hip flexion and over-reliance on UE on walker. Patient progressing with increased endurance and stability with walker but noted in the fall risk range on FGA in the context of impaired BLE position sense. See updated goals. Vital Signs  Heart Rate: 82  Heart Rate Source: Monitor  BP: 125/61  BP Location: Left upper arm  MAP (Calculated): 82  SpO2: 99 % (completed x5 DBE prior to BP, miami J collar donned during session)  O2 Device: None (Room air)       Second session    Pt found in recliner, agreeable to session. Vitals assessed. Bp= 113/57, pulse= 70 bpm, Spo2= 98% on room air  Sit to stands during session from recliner to Rw, chair to Rw with SBA and cues for hinge at hips/forward weight shift    Gait x 180 ft with RW, SBA with wide Amy, reciprocal gait, decreased B heel strike with no overt LOB , mild unsteadiness  Neuro re-ed: RLE forward/backwards stepping over tissue box with CL UE swing and IL UE support on // bars x 2 min with CGA. Attempted without UE support, pt unable to complete without max A. Cues for COG and upright posture. Repeated x 2 min with LLE steping    Neuro re-ed: begin shoulder width AMY on floor-> RLE forward step onto 6\" step-> LLE reciprocal step onto 2nd 6\" step-> back to start x 10 reps. 1 UE support and CGA, visual / tactile cues for AMY and cues for heel strike, completed with UE swing to simulate gait pattern.  Repeated x 10 with LLE leading sequence  Gait x 110 ft rw, SBA to room  Pt in recliner at end of session with call light/needs within reach and alarm donandrew. Goals  Patient Goals   Patient Goals : I want to be able to take care of my animals  Short Term Goals  Time Frame for Short Term Goals: 2/24  Short Term Goal 1: Pt will complete bed mobility with supervision. GOAL met 2/22/2023 patient able to complete bed mobility with S/SBA for cues for back care log roll with Leydi mallory. Short Term Goal 2: Pt will sit to stand with SBA to RW  Short Term Goal 3: Pt will ambulate x 100' with RW with supervision. GOAL met 2/22/2023  patient amb greater than 1000 feet wtih FWW and SBA in wide lennox. Short Term Goal 4: Pt will complete 12 stairs with min A.   Goal Met 2/21/2023 patient demonstrates up and down 16 steps wtih bilateral rails and min assist.  Long Term Goals  Time Frame for Long Term Goals : 3/3/23  Long Term Goal 1: Pt will be independent with bed mobility  Long Term Goal 2: Pt will ambulate x 150' with LRAD independently  Long Term Goal 3: Pt will complete 4 stairs with Mod I  Long Term Goal 4: Pt will be independent donning/doffing c-collar    [id number]  Education  Education Given To: Patient  Education Provided: Transfer Training;Mobility Training  Education Provided Comments: cued for wide lennox with transfers to facilitate good spine mechanics and for backing up to car transfer seat for set up for transfers, cued for pacing wtih recucmbent stepper, cued and faciltiated with paraspinal stroking upright posture, cued for hand placement downgoing steps for improved stability.   Challenged with  FGA  Education Method: Demonstration;Verbal  Barriers to Learning: None  Education Outcome: Verbalized understanding;Continued education needed;Demonstrated understanding        Therapy Time   Individual Concurrent Group Co-treatment   Time In 0830         Time Out 0930         Minutes 60           Timed Code Treatment Minutes: 61 Minutes       Nupur Infante, 02/22/23 at 2:26 PM     Second Session Therapy Time: Luciana Contreras PT, DPT (Second session only)   Individual Concurrent Group Co-treatment   Time In 1300         Time Out 1330         Minutes 30           Timed Code Treatment Minutes:  30    Total Treatment Minutes:  90

## 2023-02-23 PROCEDURE — 97129 THER IVNTJ 1ST 15 MIN: CPT

## 2023-02-23 PROCEDURE — 97530 THERAPEUTIC ACTIVITIES: CPT

## 2023-02-23 PROCEDURE — 1280000000 HC REHAB R&B

## 2023-02-23 PROCEDURE — 97110 THERAPEUTIC EXERCISES: CPT

## 2023-02-23 PROCEDURE — 97112 NEUROMUSCULAR REEDUCATION: CPT

## 2023-02-23 PROCEDURE — 97116 GAIT TRAINING THERAPY: CPT

## 2023-02-23 PROCEDURE — 97535 SELF CARE MNGMENT TRAINING: CPT

## 2023-02-23 PROCEDURE — 6360000002 HC RX W HCPCS: Performed by: STUDENT IN AN ORGANIZED HEALTH CARE EDUCATION/TRAINING PROGRAM

## 2023-02-23 PROCEDURE — 6370000000 HC RX 637 (ALT 250 FOR IP): Performed by: STUDENT IN AN ORGANIZED HEALTH CARE EDUCATION/TRAINING PROGRAM

## 2023-02-23 PROCEDURE — 97130 THER IVNTJ EA ADDL 15 MIN: CPT

## 2023-02-23 RX ORDER — ACETAMINOPHEN 325 MG/1
650 TABLET ORAL EVERY 4 HOURS PRN
Status: DISCONTINUED | OUTPATIENT
Start: 2023-02-23 | End: 2023-03-03 | Stop reason: HOSPADM

## 2023-02-23 RX ADMIN — ACETAMINOPHEN 1000 MG: 500 TABLET ORAL at 15:12

## 2023-02-23 RX ADMIN — VENLAFAXINE HYDROCHLORIDE 150 MG: 37.5 CAPSULE, EXTENDED RELEASE ORAL at 10:32

## 2023-02-23 RX ADMIN — ATORVASTATIN CALCIUM 20 MG: 10 TABLET, FILM COATED ORAL at 10:32

## 2023-02-23 RX ADMIN — ENOXAPARIN SODIUM 40 MG: 100 INJECTION SUBCUTANEOUS at 10:32

## 2023-02-23 RX ADMIN — MAGNESIUM OXIDE TAB 400 MG (240 MG ELEMENTAL MG) 800 MG: 400 (240 MG) TAB at 10:32

## 2023-02-23 RX ADMIN — ACETAMINOPHEN 1000 MG: 500 TABLET ORAL at 20:56

## 2023-02-23 RX ADMIN — METOPROLOL TARTRATE 25 MG: 25 TABLET, FILM COATED ORAL at 20:56

## 2023-02-23 RX ADMIN — POLYETHYLENE GLYCOL 3350 17 G: 17 POWDER, FOR SOLUTION ORAL at 10:32

## 2023-02-23 RX ADMIN — LISINOPRIL 10 MG: 10 TABLET ORAL at 10:32

## 2023-02-23 RX ADMIN — ACETAMINOPHEN 1000 MG: 500 TABLET ORAL at 04:58

## 2023-02-23 RX ADMIN — METOPROLOL TARTRATE 25 MG: 25 TABLET, FILM COATED ORAL at 10:33

## 2023-02-23 RX ADMIN — CLONIDINE HYDROCHLORIDE 0.1 MG: 0.1 TABLET ORAL at 20:56

## 2023-02-23 RX ADMIN — Medication 9 MG: at 20:56

## 2023-02-23 ASSESSMENT — PAIN SCALES - GENERAL
PAINLEVEL_OUTOF10: 0
PAINLEVEL_OUTOF10: 0
PAINLEVEL_OUTOF10: 7

## 2023-02-23 ASSESSMENT — PAIN DESCRIPTION - DESCRIPTORS: DESCRIPTORS: BURNING;ACHING

## 2023-02-23 ASSESSMENT — PAIN DESCRIPTION - LOCATION: LOCATION: NECK;INCISION

## 2023-02-23 ASSESSMENT — PAIN DESCRIPTION - ORIENTATION: ORIENTATION: MID

## 2023-02-23 ASSESSMENT — PAIN - FUNCTIONAL ASSESSMENT: PAIN_FUNCTIONAL_ASSESSMENT: ACTIVITIES ARE NOT PREVENTED

## 2023-02-23 NOTE — CARE COORDINATION
Weekly team care conference with interdisciplinary team on: 02/22/23    Planned Discharge Date: 03/03/23  Durable medical equipment needed: No needs  Discharge Plan: 24 hour supervision or assist;Home with Home health PT/OT    Discussion: CM went to see patient. Patient not in room at this time. CM left home care agency list on bedside table. CM updated white board with team conference DCP.      Marychuy Reyes RN

## 2023-02-23 NOTE — PROGRESS NOTES
Occupational Therapy  Facility/Department: [facility]  Rehabilitation Occupational Therapy Daily Treatment Note    Date: 23  Patient Name: Tristan Ventura       Room: [facility]/4482-20  MRN: [id number]  Account: [de-identified]   : 1950  (81 y.o.) Gender: female                    Past Medical History:  has a past medical history of Alcohol abuse, Anxiety and depression, Arthritis, Cancer ([address] Depression, Hyperlipidemia, Hypertension, Hypokalemia, IBS (irritable bowel syndrome), Overactive bladder, Pre-diabetes, Prolonged emergence from general anesthesia, Rheumatic heart disease, and Wears glasses. Past Surgical History:   has a past surgical history that includes Tonsillectomy; Tubal ligation; fracture surgery; Colonoscopy; bladder suspension; Ovary removal (Bilateral); Carpal tunnel release (Bilateral, ); Bilateral salpingoophorectomy; and cervical fusion (N/A, 2023). Restrictions  Restrictions/Precautions: Fall Risk;General Precautions; Up as Tolerated  Other position/activity restrictions: \"Ok to remove for meals and hygiene\"  Required Braces or Orthoses  Cervical: chapin LE  Required Braces or Orthoses?: Yes    Subjective  Subjective: Pt in chair at start of session, pleasant and agreeable to OT session. /67, , spO2 98% on RA; did not endorse pain during session  Restrictions/Precautions: Fall Risk;General Precautions; Up as Tolerated             Objective     Cognition  Overall Cognitive Status: Exceptions  Arousal/Alertness: Delayed responses to stimuli  Following Commands:  Follows one step commands consistently  Attention Span: Appears intact  Memory: Decreased short term memory  Safety Judgement: Decreased awareness of need for safety  Problem Solving: Assistance required to implement solutions  Insights: Fully aware of deficits  Initiation: Requires cues for some  Sequencing: Does not require cues  Orientation  Overall Orientation Status: Within Normal Limits  Orientation Level: Oriented X4         ADL  Grooming/Oral Hygiene  Assistance Level: Stand by assist  Skilled Clinical Factors: oral care in stance with RW support PRN with Leydi mallory    Instrumental ADL's  Meal Prep Level: Walker  Meal Prep Level of Assistance: Minimal assistance  Meal Preparation:   Coffee making in stance with CGA, pt retrieved coffee and filters from overhead cabinet, brought pot into sink to add water decreasing work of UE muscles by filling it while supported in sink. Added to coffee pot with good RUE control, increased time and effort to scoop grounds from zip top baggie into filter basket. Sequencing, problem solving, and verbal direction following WFL. Break and bake cookie making: Min A d/t need for OT to manage putting tray into and out of oven. D/t decreased  strength pt opened package with scissors. Sprayed Marianna onto sheet and divded 12 cookies evenly across baking sheet. Req cues to intiate pre heating the oven, pt ind read temp req and recalled time to cook several minutes later with no cues. Able to use spatula to remove from sheet with good safety awareness to use oven mitt to stabilize tray. Recommended that pt use stove top or microwave for meal prep for safety with spinal precautions. Pt agreeable. Discussed use of rolling cart for transporting items in kitchen. Educated on compensatory strategy for sliding pots across counter to stove top v carrying over. Functional Mobility  Device: Rolling walker  Activity: To/From bathroom; To/From therapy gym  Assistance Level: Stand by assist  Skilled Clinical Factors: verbal cues for widened KAREEM  Scooting  Assistance Level: Stand by assist  Transfers  Surface:  To chair with arms;From chair with arms  Additional Factors: Increased time to complete  Device: Walker  Sit to Stand  Assistance Level: Stand by assist (one VC for hand placement)  Stand to Sit  Assistance Level: Stand by assist     OT Exercises  A/AROM Exercises: x2 sets of 10 reps bicep curls with 5# bar; x10 forward circles with forearms pronated pt noted pulling across upper back, activity stopped  Functional Mobility Circuit Training: ambulation to/from therapy gym with CGA-SBA with RW multiple trials of at least 2 minutes each trial  Dynamic Standing Balance Exercises: in stance at white board, pt completed mental math problems by selecting playing cards to solve the problem, 100% accuracy, CGA while laterally stepping with VC to adhere to spinal Px; pt with decreased [address], unable to pick cards from surface when moving magnet; pt then traced path between magnets moving laterally across the board while naming objects corresponding to the color of magnet, pt reports Hx of carpal tunnel release on R hand and poor healing and cont weak  strength, pt noted to drop marker multiple times during task, CGA for balance with increased time; during task pt with 1# weight applied proximal to elbow to increase proprioception of BUE     Assessment  Assessment  Assessment: Reynaldo Watts is progressing in therapy well, currently limited by decreased balance, safety awareness, and decreased proprioception distally . Demo'd fair safety awareness, engaging in discussion regarding household and IADL safety with spinal Px. Functional Mobility: SBA for STS, CGA-SBA for multiple bouts of ambulation of ~2 minutes per trial   ADL: SBA prog to SPV for oral care in stance  TA: whiteboard activities req lateral stepping and unilaterally and bilateral shoulder flexion, meal prep tasks with CGA - Min A including use of oven  Activity was tolerated well, pt required no rest breaks, reporting that she is \"up more than she is down\" at home. Continue OT POC to address performance deficits in ADLs and functional mobility.          Activity Tolerance: Patient tolerated treatment well  Discharge Recommendations: 24 hour supervision or assist;Home with Home health OT  OT Equipment Recommendations  Equipment Needed: No  Other: CTA: pt has grab bars in bathroom, shower chair, and hand held shower head  [facility] in place: Yes  Type of devices: All fall risk precautions in place;Call light within reach; Chair alarm in place;Gait belt;Patient at risk for falls; Left in chair;Nurse notified    Patient Education  Education  Education Given To: Patient  Education Provided: Role of Therapy;Plan of Care;Precautions; Safety;ADL Function;Mobility Training;Transfer Training;Equipment; Fall Prevention Strategies  Education Provided Comments: disease specific: safety during IADLs, compensatory strategies in the kitchen  Education Method: Demonstration;Verbal  Barriers to Learning: None  Education Outcome: Verbalized understanding;Demonstrated understanding    Plan  Occupational Therapy Plan  Times Per Week: 5-7x/wk  Current Treatment Recommendations: Strengthening;ROM;Balance training;Functional mobility training; Endurance training;Positioning;Equipment evaluation, education, & procurement;Patient/Caregiver education & training; Safety education & training;Pain management;Return to work related activity; Self-Care / ADL; Home management training;Coordination training    Goals  Patient Goals   Patient goals : \"to get to take care of the litter box and trash\"  Short Term Goals  Time Frame for Short Term Goals: 1 week (2/24/23)- all goals prog unless otherwise noted 2/23  Short Term Goal 1: Pt will complete LB dressing with min A- GOAL MET 2/20  Short Term Goal 2: Pt will complete toileting with min A- GOAL MET 2/20  Short Term Goal 3: Pt will complete bathing with min A- GOAL MET 2/20  Short Term Goal 4: Pt will complete light household tasks with min A- GOAL MET 2/23  Short Term Goal 5: .  Long Term Goals  Time Frame for Long Term Goals : 2 weeks (3/3/23)  Long Term Goal 1: Pt will complete LB dressing mod I  Long Term Goal 2: Pt will complete toileting mod I  Long Term Goal 3: Pt will complete bathing with mod I  Long Term Goal 4: Pt will complete light household tasks mod independent               Therapy Time   Individual Concurrent Group Co-treatment   Time In 1030         Time Out 1100         Minutes 30         Timed Code Treatment Minutes: 30 Minutes     Second Session Therapy Time:   Individual Concurrent Group Co-treatment   Time In 3826         Time Out 1515         Minutes 60           Timed Code Treatment Minutes:  60 Minutes    Total Treatment Minutes:  90 minutes    Adalgisa Jacobo, OT

## 2023-02-23 NOTE — PROGRESS NOTES
Nutrition Assessment     Type and Reason for Visit: Reassess    Nutrition Recommendations/Plan:   Continue general diet   Encourage PO intakes as tolerated   Monitor nutrition adequacy, pertinent labs, bowel habits, wt changes, and clinical progress     Malnutrition Assessment:  Malnutrition Status: At risk for malnutrition (Comment)    Nutrition Assessment:  Follow up: Pt nutritionally improving AEB increased appetite and PO intakes of mainly %. Weights stable this admission. Encouraged continued good PO intakes for healing. Denied any N/V/D/C. No further nutrition questions or concerns at this time. Will monitor. Estimated Daily Nutrient Needs:  Energy (kcal):  0214-8016 kcal Weight Used for Energy Requirements: Current     Protein (g):  61-74 g Weight Used for Protein Requirements: Current (1.0-1.2 g/kg)        Fluid (ml/day):  7351-1226 mL Method Used for Fluid Requirements: 1 ml/kcal    Nutrition Related Findings:   Na 135. Active BS. BM on 2/22. Wound Type: Surgical Incision    Current Nutrition Therapies:    ADULT DIET;  Regular    Anthropometric Measures:  Height: 5' 4\" (162.6 cm)  Current Body Wt: 133 lb (60.3 kg)   BMI: 22.8    Nutrition Diagnosis:   No nutrition diagnosis at this time     Nutrition Interventions:   Food and/or Nutrient Delivery: Continue Current Diet  Nutrition Education/Counseling: No recommendation at this time  Coordination of Nutrition Care: Continue to monitor while inpatient, Interdisciplinary Rounds       Goals:  Previous Goal Met: Progressing toward Goal(s)  Goals: PO intake 50% or greater, prior to discharge       Nutrition Monitoring and Evaluation:   Behavioral-Environmental Outcomes: None Identified  Food/Nutrient Intake Outcomes: Food and Nutrient Intake  Physical Signs/Symptoms Outcomes: Biochemical Data, Nutrition Focused Physical Findings, Weight    Discharge Planning:    Continue current diet     Alvin Hubbard [address], [address] [place] [address],   Contact: [phone]

## 2023-02-23 NOTE — PROGRESS NOTES
Speech Language Pathology  [facility]: ACUTE REHAB UNIT  SPEECH-LANGUAGE PATHOLOGY      [x] Daily  [] Weekly Care Conference Note  [x] Discharge    Patient:Citlali Hunter Said      OXV:[birth year]  PLP:[id number]  Rehab Dx/Hx: Cervical myelopathy ([facility] [address] [G95.9]    Precautions: falls  Home situation: lives alone, manages own meds/finances and all household tasks, active , working full time   ST Dx: [] Aphasia  [] Dysarthria  [] Apraxia   [] Oropharyngeal dysphagia [x] Cognitive Impairment  [] Other:   Date of Admit: 2/17/2023  Room #: 7012/2360-65    Current functional status (updated daily):         Pt being seen for : [] Speech/Language Treatment  [] Dysphagia Treatment [x] Cognitive Treatment  [] Other:  Communication: [x]WFL  [] Aphasia  [] Dysarthria  [] Apraxia  [] Pragmatic Impairment [] Non-verbal  [] Hearing Loss  [] Other:   Cognition: [] WFL  [x] Mild  [] Moderate  [] Severe [] Unable to Assess  [] Other:  Memory: [] WFL  [x] Mild  [] Moderate  [] Severe [] Unable to Assess  [] Other:  Behavior: [x] Alert  [x] Cooperative  [x]  Pleasant  [] Confused  [] Agitated  [] Uncooperative  [] Distractible [] Motivated  [] Self-Limiting [] Anxious  [] Other:  Endurance:  [x] Adequate for participation in SLP sessions  [] Reduced overall  [] Lethargic  [] Other:  Safety: [] No concerns at this time  [x] Reduced insight into deficits  [x]  Reduced safety awareness [] Not following call light procedures  [] Unable to Assess  [] Other:    Current Diet Order:ADULT DIET;  Regular  Swallowing Precautions: Sit up for all meals and thereafter for 30 minutes        Date: 2/23/2023      Tx session 1  [id number] DISCHARGE SUMMARY     Total Timed Code Min 30    Total Treatment Minutes 30    Individual Treatment Minutes 30    Group Treatment Minutes 0    Co-Treat Minutes 0    Variance/Reason:  N/a    Pain 5- neck and right shoulder     Pain Intervention [] RN notified  [] Repositioned  [x] Intervention offered and patient declined  [] N/A  [x] Other: pt reported she already received medications   [] RN notified  [] Repositioned  [] Intervention offered and patient declined  [] N/A  [] Other:   Subjective     Pt alert and oriented, cooperative and agreeable to participate in therapy. Pt seen in bedside chair. Objective:  Goals       Short Term Goals  Time Frame for Short Term Goals: 10 Days (2023)    Goal 1: Pt will complete graded recall tasks using compensatory strategies with 80% acc given min cues   Pt able to recall 100% of medications she was taking PTA, and now during admission. Pt able to recall the purpose and dosage of each medication with 100% acc indep. \"My medications\" list was created for pt to keep. SLUMS:  -immediate recall: 100% acc indep  -short term recall: 80% acc indep improving to 100% acc 1 min later after SLP had already moved on to the next task      Goal met. Overall consistent demonstration and carryover of compensatory strategies for improved immediate and short term recall skills. Goal 2: Pt will complete problem solving, safety, and thought organization tasks with 80% acc given min cues    Targeted on the SLUMS:  -pt scored a 29/30 today compared to initial evaluation score of 24/30  Goal met. Goal 3: Pt will complete verbal and visual reasoning tasks with 80% acc given min cues      Targeted on the SLUMS. Goal met. *New goal added 23*  Goal 4: Pt will complete executive function tasks (meds, money, math, time, etc) with 80% acc given min cues. See goal 1 above in reference to medication discussion.      SLUMS:  -money calculations: 100% acc  -clock drawin% acc       Other areas targeted: N/a    Education:   Edu provided: progress towards goals, ongoing use of compensatory strategies, d/c recommendations       Safety Devices: [x] Call light within reach  [x] Chair alarm activated  [] Bed alarm activated  [x] Other: AVASYS  [] Call light within reach  [] Chair alarm activated  [] Bed alarm activated  [] Other:    Assessment: Tx session/Discharge Summary: pt pleasant and cooperative, agreeable to participate. Pt completed repeat SLUMS scoring a 29/30 today compared to initial evaluation score of 24/30. Pt has met all goals within POC. Pt able to recall 100% name of medications, dosage, and purpose. Pt has demonstrated consistent progress achieving % acc indep with all tasks provided. Pt with functional safety and insight. No further ST services are warranted. Pt in agreement. Plan: Pt is being discharged from [address] [place] [address] this date. Additional Information:     Barriers toward progress: Cognitive deficit  Discharge recommendations:  [] Home independently  [] Home with assistance []  24 hour supervision  [] ECF [x] Other: defer to PT/OT recs  Continued Tx Upon Discharge: ? [] Yes [x] No [] TBD based on progress while on ARU [] Vital Stim indicated [] Other:   Estimated discharge date: 02/23/23 from      Interventions used this date:  [] Speech/Language Treatment  [] Instruction in HEP [] Group [] Dysphagia Treatment [x] Cognitive Treatment   [] Other: Total Time Breakdown / Charges    Time in Time out Total Time / units   Cognitive Tx 1230 1300 30 min / 2 units   Speech Tx -- -- --   Dysphagia Tx -- -- --       Electronically Signed by     Abdirashid Washington. A CCC-SLP [id number]  Speech-Language Pathologist

## 2023-02-23 NOTE — PROGRESS NOTES
Physical Therapy  Facility/Department: [facility]  Rehabilitation Physical Therapy Treatment Note    NAME: Iván Kyle  : 1950 (81 y.o.)  MRN: [id number]  CODE STATUS: Full Code    Date of Service: 23       Restrictions:  Restrictions/Precautions: Fall Risk;General Precautions; Up as Tolerated  Required Braces or Orthoses  Cervical: [place] J  Position Activity Restriction  Spinal Precautions: No Bending; No Lifting; No Twisting  Other position/activity restrictions: \"Ok to remove for meals and hygiene\"     SUBJECTIVE  Subjective  Subjective: pt found in bed  Pain: 2/10 pain R shoulder, cervical incision          OBJECTIVE  Cognition  Overall Cognitive Status: Exceptions  Arousal/Alertness: Delayed responses to stimuli  Following Commands: Follows one step commands consistently  Attention Span: Appears intact  Memory: Decreased short term memory  Safety Judgement: Decreased awareness of need for safety  Problem Solving: Assistance required to implement solutions  Insights: Fully aware of deficits  Initiation: Requires cues for some  Sequencing: Does not require cues  Orientation  Overall Orientation Status: Within Normal Limits  Orientation Level: Oriented X4    Functional Mobility     Pt found in bed, vitals assessed  Supine to sit with mod ind  Sit to stand various surfaces (EOB, commode, chair to Rw) with supv    gait x 10 ft x 2 reps to commode with RW, SBA   Commode utilize, pt voided bowel and bladder. Pt completes own pericare and clothing management with 1-2 UE support and CGA-close SBA. Hand washing at sink without UE support and SBA    Gait x 500 ft x 2 reps with RW, SBA demonstrating wide KAREEM, reciprocal pattern with decreased swing phase/step height, improved yet decreased heel strike, consistent velocity and mary noted. Obstacle course: RLE+ LLE step over boxes reciprocally-> x10 BLE alternating foot taps onto 6\" step. - RLE+ LLE step over boxes reciprocally->Completed with 1 UE support and CGA, cues for motor planning and proprioception     RLE+ LLE step over boxes reciprocally-> RLE step up onto 6\" step-> RLE+ LLE step over boxes reciprocally with CGA and 1 UE support. Repeated activity with LLE stepping up onto box. Pt requires continued cues for proprioception and motor planning to step safely over obstacles. Pt completed stand pivot from recliner to SCIFIT with no AD, CGA with wide Amy  Pt completed 8 min on SCIFIT level 2 with BLEs only for increased cardiorespiratory endurance and LE strength/reciprocal motion Maintains rate of 100-105 steps/min. Pt ambulated 150 ft with no AD, CGA demonstrating initially wide AMY, decreased heel strike, lack of BUE arm swing. As pt fatigues, cadences becomes inconsistent, more narrow AMY with increased lateral instability with up to min A to correct. Stand pivot wc to recliner with no AD, CGA with max VC as pt as impaired proprioception and takes significantly long RLE step  Pt in recliner at end of session with call light/needs within reach and alarm donned. Second Session  Pt found in recliner, agreeable to session    Sit to stand recliner to no AD with SBA  Pt completed 2 min of RLE forward/lateral stepping to targets without UE support and CGA with no overt LOB. Repeated x 2 min with LLE stepping to targets forward/laterally with CGA-min A, more difficulty with LLE stepping and decreased swing phase    Pt ambulated 250 ft with no AD, initially CGA demonstrating reciprocal gait with wide AMY and relaxed Ues, regressing to min A as fatigue sets in with narrow AMY, decreased step height/length, inconsistent mary/step length. Therapist provided 8 min of STM per MD allowance to R and L Upper trap 2* increased muscle tightness and pt discomfort. Pt reports ease in discomfort following manual therapies    Pt in recliner at end of session with call light/needs within reach and alarm donned.   ASSESSMENT/PROGRESS TOWARDS GOALS  Vital Signs  Heart Rate: 94  Heart Rate Source: Monitor  BP: 115/61  BP Location: Left upper arm  MAP (Calculated): 79  SpO2: 95 %  O2 Device: None (Room air)    Assessment  Assessment: pt found in bed this am, pleasant and agreeable. SBA-supv for transfers, SBA for gait with RW. CGA for dyn stand/obstacle course activities with 1 UE support on rail. tolerates several lengthy bouts of gait withot fatigue. continue to rec home with initial 24/7 vs Prn pending progress and OPPT of ride is available. DME: RW if not owned  Activity Tolerance: Patient tolerated treatment well  Discharge Recommendations: Home with assist PRN;Outpatient PT  PT Equipment Recommendations  Equipment Needed: No    Goals  Patient Goals   Patient Goals : I want to be able to take care of my animals  Short Term Goals  Time Frame for Short Term Goals: 2/24  Short Term Goal 1: Pt will complete bed mobility with supervision. GOAL met 2/22/2023 patient able to complete bed mobility with S/SBA for cues for back care log roll with Leydi mallory. Short Term Goal 2: Pt will sit to stand with SBA to RW- GOAL MET 2/23 with supv and RW  Short Term Goal 3: Pt will ambulate x 100' with RW with supervision. GOAL met 2/22/2023  patient amb greater than 1000 feet wtih FWW and SBA in wide lennox.   Short Term Goal 4: Pt will complete 12 stairs with min A.   Goal Met 2/21/2023 patient demonstrates up and down 16 steps wtih bilateral rails and min assist.  Long Term Goals  Time Frame for Long Term Goals : 3/3/23  Long Term Goal 1: Pt will be independent with bed mobility  Long Term Goal 2: Pt will ambulate x 150' with LRAD independently  Long Term Goal 3: Pt will complete 4 stairs with Mod I  Long Term Goal 4: Pt will be independent donning/doffing c-collar    PLAN OF CARE/SAFETY  Physcial Therapy Plan  General Plan: 5-7 times per week  Specific Instructions for Next Treatment: progress mobility and therex as tolerated  Current Treatment Recommendations: Functional mobility training;Transfer training;ADL/Self-care training;Gait training;Positioning;Modalities;Therapeutic activities;Patient/Caregiver education & training  Safety Devices  Type of Devices: Chair alarm in place;Call light within reach;Gait belt;Patient at risk for falls;Left in chair;Nurse notified;All fall risk precautions in place;Bed alarm in place    EDUCATION  Education  Education Given To: Patient  Education Provided: Transfer Training;Mobility Training  Education Provided Comments: cued for wide lennox with transfers to facilitate good spine mechanics and for backing up to car transfer seat for set up for transfers, cued for pacing wtih recucmbent stepper, cued and faciltiated with paraspinal stroking upright posture, cued for hand placement downgoing steps for improved stability.  Challenged with  FGA  Education Method: Demonstration;Verbal  Barriers to Learning: None  Education Outcome: Verbalized understanding;Continued education needed;Demonstrated understanding        Therapy Time   Individual Concurrent Group Co-treatment   Time In 0800         Time Out 0900         Minutes 60           Timed Code Treatment Minutes: 60 Minutes     Second Session Therapy Time:   Individual Concurrent Group Co-treatment   Time In 0930         Time Out 1000         Minutes 30           Timed Code Treatment Minutes:  30    Total Treatment Minutes:  90    Rimma Escobar PT, 02/23/23 at 8:44 AM

## 2023-02-23 NOTE — PROGRESS NOTES
[place] [place]  2023  [id number]    Chief Complaint: Cervical myelopathy ([address]    Subjective:   No acute events overnight. Today Shavonne Bradley is seen working with therapy. She reports sleeping better with melatonin last night. She denies any headache today. She reports neck and upper back pain. ROS: denies f/c, n/v, cp, sob    Objective:  Patient Vitals for the past 24 hrs:   BP Temp Temp src Pulse Resp SpO2   23 1032 (!) 145/67 -- -- (!) 115 -- 98 %   23 0842 115/61 -- -- 94 -- 95 %   23 108/70 99 °F (37.2 °C) Oral 89 16 95 %     Gen: No distress, pleasant. HEENT: Normocephalic, atraumatic. CV: extremities well perfused  Resp: No respiratory distress. On room air  Abd: Soft, nondistended  Ext: No edema. Neuro: Alert, oriented, appropriately interactive. Remains standing at sink without LOB  Psych: appropriate mood and affect    Wt Readings from Last 3 Encounters:   23 133 lb 4.8 oz (60.5 kg)   23 142 lb 13.7 oz (64.8 kg)   01/10/23 143 lb (64.9 kg)       Laboratory data:   Lab Results   Component Value Date    WBC 7.8 2023    HGB 12.5 2023    HCT 37.5 2023    MCV 94.0 2023     2023       Lab Results   Component Value Date/Time     2023 07:13 AM    K 4.7 2023 07:13 AM     2023 07:13 AM    CO2 21 2023 07:13 AM    BUN 19 2023 07:13 AM    CREATININE 0.7 2023 07:13 AM    GLUCOSE 117 2023 07:13 AM    CALCIUM 9.7 2023 07:13 AM        Therapy progress:  PT  Required Braces or Orthoses  Cervical: miami J  Position Activity Restriction  Spinal Precautions: No Bending, No Lifting, No Twisting  Other position/activity restrictions:  \"Ok to remove for meals and hygiene\"  Objective     Sit to Stand: Minimal Assistance  Stand to Sit: Minimal Assistance  Device: [address]: Minimal assistance  Distance: 39'  OT  PT Equipment Recommendations  Equipment Needed: No  Toilet - Technique: Ambulating  Equipment Used: Grab bars  Assessment        SLP                Body mass index is 22.88 kg/m². Assessment and Plan:  Patient is a 80 yo F with pmh HTN, HLD, Pre-DM2, IBS, depression/anxiety who presented to [facility] on 2/14/2023 for urgent C3-5 laminectomy and and C3-6 posterior fusion. She was admitted to [facility] on 2/17/23 due to functional deficits below her baseline. Cervical Myelopathy  - s/p C3-5 laminectomy and C3-6 fusion on 2/14 with Dr. York Her  - cervical collar when OOB  - multimodal pain control, made muscle relaxer PRN due to confusion at night  - incision care  - vancomycin discontinued as drain is out  - PT, OT, ST    Fall  - hit posterior right head  - initial CT head unremarkable. Was having headaches so obtained repeat CT head and unremarkable. Posterior Right Head wound  - unclear when occurred, staples in place. Patient unsure of timing  - nursing removed staples 2/21     HTN  - lisinopril, metoprolol     HLD  - statin     Anxiety/Depression  - effexor     Bowels: adjust medications as needed for regular bowel movements     Bladder: Check PVR x 3. [facility] if PVR > 200ml or if any volume is > 500 ml. Sleep: melatonin scheduled     PPX  DVT: lovenox  GI: not indicated     Follow up appointments: Neurosurgery, PCP    Services:  PT, OT  EDOD: 3/3    Danyel Woodard MD 2/23/2023, 10:59 AM

## 2023-02-24 LAB
ANION GAP SERPL CALCULATED.3IONS-SCNC: 11 MMOL/L (ref 3–16)
BASOPHILS ABSOLUTE: 0.1 K/UL (ref 0–0.2)
BASOPHILS RELATIVE PERCENT: 0.9 %
BUN BLDV-MCNC: 20 MG/DL (ref 7–20)
CALCIUM SERPL-MCNC: 9.8 MG/DL (ref 8.3–10.6)
CHLORIDE BLD-SCNC: 103 MMOL/L (ref 99–110)
CO2: 25 MMOL/L (ref 21–32)
CREAT SERPL-MCNC: 0.8 MG/DL (ref 0.6–1.2)
EOSINOPHILS ABSOLUTE: 0.2 K/UL (ref 0–0.6)
EOSINOPHILS RELATIVE PERCENT: 2.5 %
GFR SERPL CREATININE-BSD FRML MDRD: >60 ML/MIN/{1.73_M2}
GLUCOSE BLD-MCNC: 115 MG/DL (ref 70–99)
HCT VFR BLD CALC: 35.6 % (ref 36–48)
HEMOGLOBIN: 11.4 G/DL (ref 12–16)
LYMPHOCYTES ABSOLUTE: 1.4 K/UL (ref 1–5.1)
LYMPHOCYTES RELATIVE PERCENT: 16.8 %
MCH RBC QN AUTO: 30.4 PG (ref 26–34)
MCHC RBC AUTO-ENTMCNC: 32.1 G/DL (ref 31–36)
MCV RBC AUTO: 94.8 FL (ref 80–100)
MONOCYTES ABSOLUTE: 0.8 K/UL (ref 0–1.3)
MONOCYTES RELATIVE PERCENT: 9 %
NEUTROPHILS ABSOLUTE: 6 K/UL (ref 1.7–7.7)
NEUTROPHILS RELATIVE PERCENT: 70.8 %
PDW BLD-RTO: 13.1 % (ref 12.4–15.4)
PLATELET # BLD: 346 K/UL (ref 135–450)
PMV BLD AUTO: 7.5 FL (ref 5–10.5)
POTASSIUM REFLEX MAGNESIUM: 4.5 MMOL/L (ref 3.5–5.1)
RBC # BLD: 3.75 M/UL (ref 4–5.2)
SODIUM BLD-SCNC: 139 MMOL/L (ref 136–145)
WBC # BLD: 8.5 K/UL (ref 4–11)

## 2023-02-24 PROCEDURE — 97116 GAIT TRAINING THERAPY: CPT

## 2023-02-24 PROCEDURE — 36415 COLL VENOUS BLD VENIPUNCTURE: CPT

## 2023-02-24 PROCEDURE — 85025 COMPLETE CBC W/AUTO DIFF WBC: CPT

## 2023-02-24 PROCEDURE — 97112 NEUROMUSCULAR REEDUCATION: CPT

## 2023-02-24 PROCEDURE — 6360000002 HC RX W HCPCS: Performed by: STUDENT IN AN ORGANIZED HEALTH CARE EDUCATION/TRAINING PROGRAM

## 2023-02-24 PROCEDURE — 97110 THERAPEUTIC EXERCISES: CPT

## 2023-02-24 PROCEDURE — 1280000000 HC REHAB R&B

## 2023-02-24 PROCEDURE — 97535 SELF CARE MNGMENT TRAINING: CPT

## 2023-02-24 PROCEDURE — 80048 BASIC METABOLIC PNL TOTAL CA: CPT

## 2023-02-24 PROCEDURE — 6370000000 HC RX 637 (ALT 250 FOR IP): Performed by: STUDENT IN AN ORGANIZED HEALTH CARE EDUCATION/TRAINING PROGRAM

## 2023-02-24 PROCEDURE — 97530 THERAPEUTIC ACTIVITIES: CPT

## 2023-02-24 RX ADMIN — METHOCARBAMOL 750 MG: 750 TABLET ORAL at 21:17

## 2023-02-24 RX ADMIN — ACETAMINOPHEN 1000 MG: 500 TABLET ORAL at 21:11

## 2023-02-24 RX ADMIN — MAGNESIUM OXIDE TAB 400 MG (240 MG ELEMENTAL MG) 800 MG: 400 (240 MG) TAB at 07:54

## 2023-02-24 RX ADMIN — VENLAFAXINE HYDROCHLORIDE 150 MG: 37.5 CAPSULE, EXTENDED RELEASE ORAL at 07:53

## 2023-02-24 RX ADMIN — ACETAMINOPHEN 1000 MG: 500 TABLET ORAL at 05:18

## 2023-02-24 RX ADMIN — CLONIDINE HYDROCHLORIDE 0.1 MG: 0.1 TABLET ORAL at 21:12

## 2023-02-24 RX ADMIN — METOPROLOL TARTRATE 25 MG: 25 TABLET, FILM COATED ORAL at 21:11

## 2023-02-24 RX ADMIN — OXYCODONE 5 MG: 5 TABLET ORAL at 20:29

## 2023-02-24 RX ADMIN — ENOXAPARIN SODIUM 40 MG: 100 INJECTION SUBCUTANEOUS at 07:53

## 2023-02-24 RX ADMIN — POLYETHYLENE GLYCOL 3350 17 G: 17 POWDER, FOR SOLUTION ORAL at 07:53

## 2023-02-24 RX ADMIN — ATORVASTATIN CALCIUM 20 MG: 10 TABLET, FILM COATED ORAL at 07:54

## 2023-02-24 RX ADMIN — ACETAMINOPHEN 1000 MG: 500 TABLET ORAL at 13:52

## 2023-02-24 ASSESSMENT — PAIN SCALES - GENERAL
PAINLEVEL_OUTOF10: 8
PAINLEVEL_OUTOF10: 0
PAINLEVEL_OUTOF10: 7
PAINLEVEL_OUTOF10: 6
PAINLEVEL_OUTOF10: 8
PAINLEVEL_OUTOF10: 8

## 2023-02-24 ASSESSMENT — PAIN - FUNCTIONAL ASSESSMENT: PAIN_FUNCTIONAL_ASSESSMENT: ACTIVITIES ARE NOT PREVENTED

## 2023-02-24 ASSESSMENT — PAIN DESCRIPTION - LOCATION
LOCATION: NECK;INCISION;SHOULDER
LOCATION: NECK

## 2023-02-24 ASSESSMENT — PAIN DESCRIPTION - ORIENTATION: ORIENTATION: MID;RIGHT

## 2023-02-24 ASSESSMENT — PAIN DESCRIPTION - DESCRIPTORS
DESCRIPTORS: BURNING
DESCRIPTORS: THROBBING
DESCRIPTORS: THROBBING

## 2023-02-24 NOTE — PLAN OF CARE
Problem: Safety - Adult  Goal: Free from fall injury  2/24/2023 1043 by Vidal Soria RN  Outcome: Progressing  Flowsheets (Taken 2/24/2023 1043)  Free From Fall Injury:   Instruct family/caregiver on patient safety   Based on caregiver fall risk screen, instruct family/caregiver to ask for assistance with transferring infant if caregiver noted to have fall risk factors

## 2023-02-24 NOTE — PROGRESS NOTES
Marbella Pal  2/24/2023  [id number]    Chief Complaint: Cervical myelopathy ([address]    Subjective:   No acute events overnight. Today Niraj Piper reports continued neck and shoulder soreness. Patient advised that muscle relaxer is available PRN. She reports sleeping poorly last night. She denies other acute complaints at this time. ROS: denies f/c, n/v, cp, sob    Objective:  Patient Vitals for the past 24 hrs:   BP Temp Temp src Pulse Resp SpO2   02/24/23 1345 -- -- -- -- -- 96 %   02/24/23 1340 130/75 98.1 °F (36.7 °C) Oral 75 18 --   02/24/23 0814 102/62 -- -- 65 -- 96 %   02/24/23 0747 102/62 98 °F (36.7 °C) Oral 65 18 --   02/24/23 0745 102/62 98 °F (36.7 °C) Oral 67 18 96 %   02/23/23 2045 (!) 153/71 98.2 °F (36.8 °C) Oral 80 18 99 %     Gen: No distress, pleasant. HEENT: Normocephalic, atraumatic. CV: RRR  Resp: No respiratory distress. Lungs CTAB  Abd: Soft, nondistended  Ext: No edema. Neuro: Alert, oriented, appropriately interactive. Psych: appropriate mood and affect    Wt Readings from Last 3 Encounters:   02/20/23 133 lb 4.8 oz (60.5 kg)   02/17/23 142 lb 13.7 oz (64.8 kg)   01/10/23 143 lb (64.9 kg)       Laboratory data:   Lab Results   Component Value Date    WBC 8.5 02/24/2023    HGB 11.4 (L) 02/24/2023    HCT 35.6 (L) 02/24/2023    MCV 94.8 02/24/2023     02/24/2023       Lab Results   Component Value Date/Time     02/24/2023 06:29 AM    K 4.5 02/24/2023 06:29 AM     02/24/2023 06:29 AM    CO2 25 02/24/2023 06:29 AM    BUN 20 02/24/2023 06:29 AM    CREATININE 0.8 02/24/2023 06:29 AM    GLUCOSE 115 02/24/2023 06:29 AM    CALCIUM 9.8 02/24/2023 06:29 AM        Therapy progress:  PT  Required Braces or Orthoses  Cervical: miami J  Position Activity Restriction  Spinal Precautions: No Bending, No Lifting, No Twisting  Other position/activity restrictions:  \"Ok to remove for meals and hygiene\"  Objective     Sit to Stand: Minimal Assistance  Stand to Sit: Minimal Assistance  Device: [address]: Minimal assistance  Distance: 39'  OT  PT Equipment Recommendations  Equipment Needed: No  Toilet - Technique: Ambulating  Equipment Used: Grab bars  Assessment        SLP                Body mass index is 22.88 kg/m². Assessment and Plan:  Patient is a 80 yo F with pmh HTN, HLD, Pre-DM2, IBS, depression/anxiety who presented to [facility] on 2/14/2023 for urgent C3-5 laminectomy and and C3-6 posterior fusion. She was admitted to [facility] on 2/17/23 due to functional deficits below her baseline. Cervical Myelopathy  - s/p C3-5 laminectomy and C3-6 fusion on 2/14 with Dr. Miryam Oconnell  - cervical collar when OOB  - multimodal pain control, made muscle relaxer PRN due to confusion at night  - incision care. Left message with Neurosurgery about follow up appointment for staple/suture removal. Awaiting call back  - vancomycin discontinued as drain is out  - PT, OT, ST    Fall  - hit posterior right head  - initial CT head unremarkable. Was having headaches so obtained repeat CT head and unremarkable. Posterior Right Head wound  - unclear when occurred, staples in place. Patient unsure of timing  - nursing removed staples 2/21     HTN  - lisinopril, metoprolol     HLD  - statin     Anxiety/Depression  - effexor     Bowels: adjust medications as needed for regular bowel movements     Bladder: Check PVR x 3. [address] if PVR > 200ml or if any volume is > 500 ml. Sleep: melatonin scheduled     PPX  DVT: lovenox  GI: not indicated     Follow up appointments: Neurosurgery, PCP    Services:  PT, OT  EDOD: 3/3    Morton Boast.  Dominga Fairbanks MD 2/24/2023, 3:24 PM

## 2023-02-24 NOTE — PROGRESS NOTES
Physical Therapy  Facility/Department: [facility]  Rehabilitation Physical Therapy Treatment Note    NAME: Arlin Paul  : 1950 (81 y.o.)  MRN: [id number]  CODE STATUS: Full Code    Date of Service: 23       Restrictions:  Restrictions/Precautions: Fall Risk;General Precautions; Up as Tolerated  Required Braces or Orthoses  Cervical: miami J  Position Activity Restriction  Spinal Precautions: No Bending; No Lifting; No Twisting  Other position/activity restrictions: \"Ok to remove for meals and hygiene\"     SUBJECTIVE  Subjective  Subjective: pt found in bed  Pain: 210 shoulder pain       OBJECTIVE  Cognition  Overall Cognitive Status: Exceptions  Arousal/Alertness: Delayed responses to stimuli  Following Commands: Follows one step commands consistently  Attention Span: Appears intact  Memory: Decreased short term memory  Safety Judgement: Decreased awareness of need for safety  Problem Solving: Assistance required to implement solutions  Insights: Fully aware of deficits  Initiation: Requires cues for some  Sequencing: Does not require cues  Orientation  Overall Orientation Status: Within Normal Limits  Orientation Level: Oriented X4    Functional Mobility     Pt found in bed, vitals assessed   Supine to sit with mod ind  Sit to stand Eob to RW with supv  Gait x 300 ft with Rw, SBA demo's wide KAREEM, decreased swing phase and step height, decreased heel strike, more consistent mary. Sit to supine with mod ind  Pt donned 2# weights Ble, completed 2x10 alternating supine ankle pumps, alternating heel slides,    Pt completed 2x10 supine SLR (weights doffed)    Stand pivot EOB to recline with close SBA  Pt in recliner at end of session with call light/needs within reach and alarm donned. Second Session  Pt found in recliner, agreeable to session. Reporting fatigue and decreased dyn standing balance 2* fatigue.      Sit to stand recliner to no AD, chair to no AD and RW with supv  Gait x 180 ft with no AD, min a demonstrating narrow KAREEM, inconsistent mary , multiple LOB laterally with up to min A to correct.     Dyn stand activity: 4 min 45 sec of alternating stepping RLE +LLE onto 6\" step-> reach for ring within spinal precautions-> place ring to specific target anterior/superior, CGA-min A.    3 min 30 sec of RLE + LLE in stagger stance with either LE leading in stagger stance on 6\" step (RLE for 1 min 30 sec, LLE for remainder of time), placing ring to specific target anterior/superior  with CGA-min a, pt at times reaching for wall for support.     Gait x 45 ft with RW, SBA demonstrating narrow KAREEM, mild unsteadiness without overt LOB  Pt ascended/descended 12 6\" steps with BHR, step to pattern and close sbA  Pt completed 10 min on SCIFIT level 2 with BLEs only for increased     Gait x 180 ft , RW , close SBA-CGA with emphasis on consistent mary, increased step height and heel strike with metronome as auditory cue with good carryover    Commode transfer supv, pt completed pericare and clothing management with SBA nad 1-0 UE support (including hand washing)    Sit to stand chair to no AD-> 5 mini squats without UE support, cues for proper form and CGA-> sit. Completed x 2 sets   Pt in recliner at end fo session with call light/needs within reach and alarm donned.  ASSESSMENT/PROGRESS TOWARDS GOALS  Vital Signs  Heart Rate: 65  Heart Rate Source: Monitor  BP: 102/62  BP Location: Left upper arm  MAP (Calculated): 75  SpO2: 96 %  O2 Device: None (Room air)    Assessment  Assessment: pt found in bed this am, pleasant adn agreeable. supv for transfers, SBa with RW for gait up to 300 ft. tolerated supine ther ex without complaints. continue to rec home with prn assist (supv if mobilizing in community) and OPPT pending transportation otherwise HHPT. DME: RW if not owned  Activity Tolerance: Patient tolerated treatment well  Discharge Recommendations: Home with assist PRN;Outpatient PT  PT Equipment  Recommendations  Equipment Needed: No    Goals  Patient Goals   Patient Goals : I want to be able to take care of my animals  Short Term Goals  Time Frame for Short Term Goals: 2/24  Short Term Goal 1: Pt will complete bed mobility with supervision. GOAL met 2/22/2023 patient able to complete bed mobility with S/SBA for cues for back care log roll with Leydi mallory. Short Term Goal 2: Pt will sit to stand with SBA to RW- GOAL MET 2/23 with supv and RW  Short Term Goal 3: Pt will ambulate x 100' with RW with supervision. GOAL met 2/22/2023  patient amb greater than 1000 feet wtih FWW and SBA in wide lennox. Short Term Goal 4: Pt will complete 12 stairs with min A.   Goal Met 2/21/2023 patient demonstrates up and down 16 steps wtih bilateral rails and min assist.  Long Term Goals  Time Frame for Long Term Goals : 3/3/23  Long Term Goal 1: Pt will be independent with bed mobility  Long Term Goal 2: Pt will ambulate x 150' with LRAD independently  Long Term Goal 3: Pt will complete 4 stairs with Mod I  Long Term Goal 4: Pt will be independent donning/doffing c-collar    PLAN OF CARE/SAFETY  Physcial Therapy Plan  General Plan: 5-7 times per week  Specific Instructions for Next Treatment: progress mobility and therex as tolerated  Current Treatment Recommendations: Functional mobility training;Transfer training;ADL/Self-care training;Gait training;Positioning;Modalities; Therapeutic activities; Patient/Caregiver education & training;Strengthening;Balance training;Home exercise program;Stair training; Safety education & training;Neuromuscular re-education; Endurance training;Pain management;Cognitive reorientation;Manual  Safety Devices  Type of Devices: Chair alarm in place;Call light within reach;Gait belt;Patient at risk for falls; Left in chair;Nurse notified; All fall risk precautions in place; Bed alarm in place    EDUCATION  Education  Education Given To: Patient  Education Provided: Transfer Training;Mobility Training  Education Provided Comments: cued for wide lennox with transfers to facilitate good spine mechanics and for backing up to car transfer seat for set up for transfers, cued for pacing wtih recucmbent stepper, cued and faciltiated with paraspinal stroking upright posture, cued for hand placement downgoing steps for improved stability.   Challenged with  FGA  Education Method: Demonstration;Verbal  Barriers to Learning: None  Education Outcome: Verbalized understanding;Continued education needed;Demonstrated understanding        Therapy Time   Individual Concurrent Group Co-treatment   Time In 0800         Time Out 0830         Minutes 30           Timed Code Treatment Minutes: 30 Minutes     Second Session Therapy Time:   Individual Concurrent Group Co-treatment   Time In 0900         Time Out 1000         Minutes 60           Timed Code Treatment Minutes:  60    Total Treatment Minutes:  90    Aroldo Arreola PT, 02/24/23 at 8:17 AM

## 2023-02-24 NOTE — PROGRESS NOTES
Occupational Therapy  Facility/Department: [facility]  Rehabilitation Occupational Therapy Daily Treatment Note    Date: 23  Patient Name: Redd Rosen       Room: 1974/6086-13  MRN: [id number]  Account: [de-identified]   : 1950  (81 y.o.) Gender: female                    Past Medical History:  has a past medical history of Alcohol abuse, Anxiety and depression, Arthritis, Cancer ([address] Depression, Hyperlipidemia, Hypertension, Hypokalemia, IBS (irritable bowel syndrome), Overactive bladder, Pre-diabetes, Prolonged emergence from general anesthesia, Rheumatic heart disease, and Wears glasses. Past Surgical History:   has a past surgical history that includes Tonsillectomy; Tubal ligation; fracture surgery; Colonoscopy; bladder suspension; Ovary removal (Bilateral); Carpal tunnel release (Bilateral, ); Bilateral salpingoophorectomy; and cervical fusion (N/A, 2023). Restrictions  Restrictions/Precautions: Fall Risk;General Precautions; Up as Tolerated  Other position/activity restrictions: \"Ok to remove for meals and hygiene\"  Required Braces or Orthoses  Cervical: chapin LE  Required Braces or Orthoses?: Yes    Subjective  Subjective: Pt in chair at start of session, pleasant and agreeable to OT session. /62, HR 65, spO2 96% on RA; did not endorse pain during session  Restrictions/Precautions: Fall Risk;General Precautions; Up as Tolerated             Objective     Cognition  Overall Cognitive Status: Exceptions  Arousal/Alertness: Delayed responses to stimuli  Following Commands:  Follows one step commands consistently  Attention Span: Appears intact  Memory: Decreased short term memory  Safety Judgement: Decreased awareness of need for safety  Problem Solving: Assistance required to implement solutions  Insights: Fully aware of deficits  Initiation: Requires cues for some  Sequencing: Does not require cues  Orientation  Overall Orientation Status: Within Normal Limits  Orientation Level: Oriented X4         ADL  Grooming/Oral Hygiene  Assistance Level: Stand by assist  Skilled Clinical Factors: oral care in stance with RW support PRN with Crow J collar donned  Upper Extremity Bathing  Assistance Level: Minimal assistance  Skilled Clinical Factors: A for bathing posterior torso with wash cloth, pt managed drying/rinsing/washing all other parts including use of hand held shower head  Lower Extremity Bathing  Assistance Level: Stand by assist  Skilled Clinical Factors: while in stance for LB syed area washing with use of grab bars  Upper Extremity Dressing  Assistance Level: Minimal assistance; Increased time to complete;Verbal cues  Skilled Clinical Factors: A for bringing chapin young to midline, pt with use of mirror; pt managed over head shirt exchange  Lower Extremity Dressing  Assistance Level: Stand by assist  Skilled Clinical Factors: SBA while in stance to manage underwear and pants up over hips, pt threaded clothing with figure four formation while seated on TTB  Putting On/Taking Off Footwear  Assistance Level: Set-up; Stand by assist  Skilled Clinical Factors: doffing shoes, donning shoes; figure four formation    Instrumental ADL's  Instrumental ADLs: Yes  Commmunity Re-entry  Community Re-Entry Level: 2 Rehab Kapil Re-entry Level of Assistance: Minimal assistance  Community Re-Entry: CGA with w/c follow to walk down ramp with cues for body mechanics; Min A with increased time for BLE management for going down ramp; pt stated \"that was harder than I thought it would be\"     Functional Mobility  Device: Rolling walker  Activity: To/From bathroom  Assistance Level: Stand by assist  Skilled Clinical Factors: SBA with RW, Min A for ambulation doorway to recliner without AD and no hand held assist  Scooting  Assistance Level: Stand by assist  Transfers  Surface: To chair with arms;From chair with arms; Wheelchair  Additional Factors: Increased time to complete  Device: Walker  Sit to Stand  Assistance Level: Stand by assist  Stand to Sit  Assistance Level: Stand by assist         Assessment  Assessment  First Session: Geovanna Garcia is progressing in therapy well, currently limited by decreased balance, poor endurance, decreased strength, and decreased proprioception . Demo'd fair safety awareness, requiring increased A for processing situations, pt asked OT for verification on safest approach. Functional Mobility: SBA for STS, SBA with RW for ambulation to/from bathroom; Min A for ambulation in room with no AD  ADL: Min A for UB dressing/bathing, SBA while in stance for LB dressing/bathing with use of grab bars as needed, SBA for oral care in stance  TA: up to [address] [place] [address] for ambulating up/down ramp in [facility] [place] for community re entry  Activity was tolerated well, pt required no undue rest breaks. Continue OT POC to address performance deficits in ADLs and functional mobility. Second Session: Pt met in room in chair at OT entry for PM session, agreeable and requesting to use commode. SBA for STS and ambulation into bathroom including toilet transfer with RW, SPV for toileting. SPV at sink for hand washing. Ambulated to therapy gym for FM tasks at table top. X5 gross grasp and key pinch rolls with both R and L hands. Pt reporting muscle soreness at superior shoulders/upper traps with extension movement on velcro. Pt educated on redtheraputty use and used maylin coordination to retrieve 10 beads from putty, given hand out for exercises to perform independently, expressed understanding. Pt reports difficulty using tweezers and desire to improve hand strength and coordination. Pt ambulated back to room with RW SBA, left in chair with alarm set, cont OT POC.       Activity Tolerance: Patient tolerated treatment well  Discharge Recommendations: 24 hour supervision or assist;Home with Home health OT  OT Equipment Recommendations  Equipment Needed: No  Other: CTA: pt has grab bars in bathroom, shower chair, and hand held shower head  Safety Devices  Safety Devices in place: Yes  Type of devices: All fall risk precautions in place;Call light within reach; Chair alarm in place;Gait belt;Patient at risk for falls; Left in chair;Nurse notified    Patient Education  Education  Education Given To: Patient  Education Provided: Role of Therapy;Plan of Care;Precautions; Safety;ADL Function;Mobility Training;Transfer Training;Equipment; Fall Prevention Strategies  Education Provided Comments: disease specific: purpose of walking ramp for community re entry  Education Method: Demonstration;Verbal  Barriers to Learning: None  Education Outcome: Verbalized understanding;Demonstrated understanding    Plan  Occupational Therapy Plan  Times Per Week: 5-7x/wk  Current Treatment Recommendations: Strengthening;ROM;Balance training;Functional mobility training; Endurance training;Positioning;Equipment evaluation, education, & procurement;Patient/Caregiver education & training; Safety education & training;Pain management;Return to work related activity; Self-Care / ADL; Home management training;Coordination training    Goals  Patient Goals   Patient goals : \"to get to take care of the litter box and trash\"  Short Term Goals  Time Frame for Short Term Goals: 1 week (2/24/23)- all goals prog unless otherwise noted 2/24  Short Term Goal 1: Pt will complete LB dressing with min A- GOAL MET 2/20  Short Term Goal 2: Pt will complete toileting with min A- GOAL MET 2/20  Short Term Goal 3: Pt will complete bathing with min A- GOAL MET 2/20  Short Term Goal 4: Pt will complete light household tasks with min A- GOAL MET 2/23  Long Term Goals  Time Frame for Long Term Goals : 2 weeks (3/3/23)  Long Term Goal 1: Pt will complete LB dressing mod I  Long Term Goal 2: Pt will complete toileting mod I  Long Term Goal 3: Pt will complete bathing with mod I  Long Term Goal 4: Pt will complete light household tasks mod independent               Therapy Time   Individual Concurrent Group Co-treatment   Time In 1030         Time Out 1130         Minutes 60         Timed Code Treatment Minutes: 60 Minutes     Second Session Therapy Time:   Individual Concurrent Group Co-treatment   Time In 1430         Time Out 1500         Minutes 30           Timed Code Treatment Minutes:  30 Minutes    Total Treatment Minutes:  90 minutes    Sejal Maher OT

## 2023-02-25 PROCEDURE — 1280000000 HC REHAB R&B

## 2023-02-25 PROCEDURE — 6360000002 HC RX W HCPCS: Performed by: STUDENT IN AN ORGANIZED HEALTH CARE EDUCATION/TRAINING PROGRAM

## 2023-02-25 PROCEDURE — 6370000000 HC RX 637 (ALT 250 FOR IP): Performed by: STUDENT IN AN ORGANIZED HEALTH CARE EDUCATION/TRAINING PROGRAM

## 2023-02-25 RX ADMIN — CLONIDINE HYDROCHLORIDE 0.1 MG: 0.1 TABLET ORAL at 21:45

## 2023-02-25 RX ADMIN — VENLAFAXINE HYDROCHLORIDE 150 MG: 37.5 CAPSULE, EXTENDED RELEASE ORAL at 08:30

## 2023-02-25 RX ADMIN — MAGNESIUM OXIDE TAB 400 MG (240 MG ELEMENTAL MG) 800 MG: 400 (240 MG) TAB at 08:29

## 2023-02-25 RX ADMIN — ENOXAPARIN SODIUM 40 MG: 100 INJECTION SUBCUTANEOUS at 08:31

## 2023-02-25 RX ADMIN — ACETAMINOPHEN 1000 MG: 500 TABLET ORAL at 21:45

## 2023-02-25 RX ADMIN — ATORVASTATIN CALCIUM 20 MG: 10 TABLET, FILM COATED ORAL at 08:30

## 2023-02-25 RX ADMIN — ACETAMINOPHEN 1000 MG: 500 TABLET ORAL at 13:27

## 2023-02-25 RX ADMIN — METOPROLOL TARTRATE 25 MG: 25 TABLET, FILM COATED ORAL at 21:45

## 2023-02-25 RX ADMIN — Medication 9 MG: at 21:45

## 2023-02-25 RX ADMIN — ACETAMINOPHEN 1000 MG: 500 TABLET ORAL at 06:54

## 2023-02-25 RX ADMIN — POLYETHYLENE GLYCOL 3350 17 G: 17 POWDER, FOR SOLUTION ORAL at 08:31

## 2023-02-25 ASSESSMENT — PAIN DESCRIPTION - DESCRIPTORS: DESCRIPTORS: THROBBING

## 2023-02-25 ASSESSMENT — PAIN DESCRIPTION - ORIENTATION
ORIENTATION: LEFT
ORIENTATION: RIGHT;MID

## 2023-02-25 ASSESSMENT — PAIN DESCRIPTION - LOCATION
LOCATION: BACK;NECK;SHOULDER
LOCATION: NECK;SHOULDER
LOCATION: NECK

## 2023-02-25 ASSESSMENT — PAIN SCALES - GENERAL
PAINLEVEL_OUTOF10: 0
PAINLEVEL_OUTOF10: 7
PAINLEVEL_OUTOF10: 0
PAINLEVEL_OUTOF10: 5
PAINLEVEL_OUTOF10: 2
PAINLEVEL_OUTOF10: 6

## 2023-02-25 NOTE — PROGRESS NOTES
[address] [facility] [place]     Rehab Dx/Hx: Cervical myelopathy [facility]) [G95.9]   HMS:  QTT:[id number]  Date of Admit: 2023  Room #: 6 [address] [place] [address] E is on Physical Therapy's weekend walker list.     Patient participated in [facility]?:   [x] Yes; see information below. [] No; patient refused participation due to     Scheduled Therapies this weekend?:  [] Yes, patient received scheduled therapies in addition to weekend walker activity  [x] No, weekend therapy was not regularly scheduled for this patient    Activity Completed:   [x] Walkin feet  [] Wheelchair mobility:  feet  [] Sat up in chair:  (amount of time)  [] Sat up in chair for meals  [] Other:     Assistance needed:   [] No assist / independent  [x] Supervision / Minimal assist  [] Max assistance  [] Other:     Device used:   [x] Walker: rolling type of walker  [] Cane  [] Wheelchair  [] None  [] Other    Patient tolerated activity well. Will monitor.      Signature: German Pope RN

## 2023-02-25 NOTE — PROGRESS NOTES
[address] [facility] A Mancos     Rehab Dx/Hx: Cervical myelopathy [facility]) [G95.9]   YBL:[id number]  [facility]:[id number]  Date of Admit: 2023  Room #: 6 [address] [place] [address] E is on Physical Therapy's weekend walker list.     Patient participated in [facility]?:   [x] Yes; see information below. [] No; patient refused participation due to     Scheduled Therapies this weekend?:  [] Yes, patient received scheduled therapies in addition to weekend walker activity  [x] No, weekend therapy was not regularly scheduled for this patient    Activity Completed:   [x] Walkin feet  [] Wheelchair mobility:  feet  [] Sat up in chair:  (amount of time)  [] Sat up in chair for meals  [] Other:     Assistance needed:   [] No assist / independent  [x] Supervision / Minimal assist  [] Max assistance  [] Other:     Device used:   [x] Walker: rolling type of walker  [] Cane  [] Wheelchair  [] None  [] Other    Patient tolerated activity well. Will monitor.      Signature: Anabella Hernandez RN

## 2023-02-25 NOTE — PLAN OF CARE
Problem: Pain  Goal: Verbalizes/displays adequate comfort level or baseline comfort level  Flowsheets (Taken 2/25/2023 0007)  Verbalizes/displays adequate comfort level or baseline comfort level: Administer analgesics based on type and severity of pain and evaluate response  Note: Pt given PRN muscle relaxer for neck/shoulder muscle pain. Pt offered extra pillow support, encouraged to reposition.

## 2023-02-25 NOTE — PLAN OF CARE
Problem: Safety - Adult  Goal: Free from fall injury  Outcome: Progressing     Problem: Pain  Goal: Verbalizes/displays adequate comfort level or baseline comfort level  2/25/2023 0925 by Deandre Dumont RN  Outcome: Progressing  2/25/2023 0007 by Nataliya Way RN  Flowsheets (Taken 2/25/2023 0007)  Verbalizes/displays adequate comfort level or baseline comfort level: Administer analgesics based on type and severity of pain and evaluate response  Note: Pt given PRN muscle relaxer for neck/shoulder muscle pain. Pt offered extra pillow support, encouraged to reposition.

## 2023-02-26 VITALS
SYSTOLIC BLOOD PRESSURE: 125 MMHG | WEIGHT: 133.3 LBS | RESPIRATION RATE: 17 BRPM | DIASTOLIC BLOOD PRESSURE: 72 MMHG | BODY MASS INDEX: 22.76 KG/M2 | HEART RATE: 64 BPM | TEMPERATURE: 98.3 F | OXYGEN SATURATION: 97 % | HEIGHT: 64 IN

## 2023-02-26 PROCEDURE — 6360000002 HC RX W HCPCS: Performed by: STUDENT IN AN ORGANIZED HEALTH CARE EDUCATION/TRAINING PROGRAM

## 2023-02-26 PROCEDURE — 1280000000 HC REHAB R&B

## 2023-02-26 PROCEDURE — 6370000000 HC RX 637 (ALT 250 FOR IP): Performed by: STUDENT IN AN ORGANIZED HEALTH CARE EDUCATION/TRAINING PROGRAM

## 2023-02-26 RX ADMIN — VENLAFAXINE HYDROCHLORIDE 150 MG: 37.5 CAPSULE, EXTENDED RELEASE ORAL at 08:23

## 2023-02-26 RX ADMIN — CLONIDINE HYDROCHLORIDE 0.1 MG: 0.1 TABLET ORAL at 20:42

## 2023-02-26 RX ADMIN — ENOXAPARIN SODIUM 40 MG: 100 INJECTION SUBCUTANEOUS at 08:23

## 2023-02-26 RX ADMIN — METOPROLOL TARTRATE 25 MG: 25 TABLET, FILM COATED ORAL at 20:41

## 2023-02-26 RX ADMIN — ACETAMINOPHEN 1000 MG: 500 TABLET ORAL at 13:30

## 2023-02-26 RX ADMIN — ACETAMINOPHEN 1000 MG: 500 TABLET ORAL at 20:41

## 2023-02-26 RX ADMIN — ACETAMINOPHEN 1000 MG: 500 TABLET ORAL at 06:01

## 2023-02-26 RX ADMIN — ATORVASTATIN CALCIUM 20 MG: 10 TABLET, FILM COATED ORAL at 08:23

## 2023-02-26 RX ADMIN — Medication 9 MG: at 20:41

## 2023-02-26 RX ADMIN — POLYETHYLENE GLYCOL 3350 17 G: 17 POWDER, FOR SOLUTION ORAL at 08:22

## 2023-02-26 RX ADMIN — MAGNESIUM OXIDE TAB 400 MG (240 MG ELEMENTAL MG) 800 MG: 400 (240 MG) TAB at 08:22

## 2023-02-26 ASSESSMENT — PAIN DESCRIPTION - LOCATION
LOCATION: NECK;SHOULDER
LOCATION: NECK;SHOULDER

## 2023-02-26 ASSESSMENT — PAIN SCALES - GENERAL
PAINLEVEL_OUTOF10: 5
PAINLEVEL_OUTOF10: 0
PAINLEVEL_OUTOF10: 3
PAINLEVEL_OUTOF10: 0

## 2023-02-26 ASSESSMENT — PAIN DESCRIPTION - ORIENTATION
ORIENTATION: RIGHT
ORIENTATION: RIGHT

## 2023-02-26 ASSESSMENT — PAIN DESCRIPTION - DESCRIPTORS: DESCRIPTORS: ACHING

## 2023-02-26 NOTE — PLAN OF CARE
Problem: Safety - Adult  Goal: Free from fall injury  2/26/2023 0741 by Mari Dubon RN  Outcome: Progressing  2/25/2023 2224 by Augie iSngh RN  Outcome: Progressing     Problem: Musculoskeletal - Adult  Goal: Return mobility to safest level of function  Outcome: Progressing

## 2023-02-26 NOTE — PLAN OF CARE
Problem: Safety - Adult  Goal: Free from fall injury  2/25/2023 2224 by Emerson Tellez RN  Outcome: Progressing

## 2023-02-26 NOTE — PROGRESS NOTES
300 [facility] A Rickman     Rehab Dx/Hx: Cervical myelopathy [facility]) [G95.9]   VNS:[id number]  MMP:[id number]  Date of Admit: 2023  Room #: 6 [address] [place] [address] E is on Physical Therapy's weekend walker list.     Patient participated in [facility]?:   [x] Yes; see information below. [] No; patient refused participation due to      Scheduled Therapies this weekend?:  [] Yes, patient received scheduled therapies in addition to weekend walker activity  [x] No, weekend therapy was not regularly scheduled for this patient    Activity Completed:   [x] Walkin feet  [] Wheelchair mobility:  feet  [] Sat up in chair:  (amount of time)  [] Sat up in chair for meals  [] Other:     Assistance needed:   [] No assist / independent  [x] Supervision / Minimal assist  [] Max assistance  [] Other:     Device used:   [x] Walker: rolling type of walker  [] Cane  [] Wheelchair  [] None  [] Other    Patient tolerated activity well. Will monitor.      Signature: Daniel Rocha RN

## 2023-02-27 LAB
ANION GAP SERPL CALCULATED.3IONS-SCNC: 10 MMOL/L (ref 3–16)
BASOPHILS ABSOLUTE: 0.1 K/UL (ref 0–0.2)
BASOPHILS RELATIVE PERCENT: 0.9 %
BUN BLDV-MCNC: 25 MG/DL (ref 7–20)
CALCIUM SERPL-MCNC: 10.3 MG/DL (ref 8.3–10.6)
CHLORIDE BLD-SCNC: 101 MMOL/L (ref 99–110)
CO2: 29 MMOL/L (ref 21–32)
CREAT SERPL-MCNC: 0.8 MG/DL (ref 0.6–1.2)
EOSINOPHILS ABSOLUTE: 0.2 K/UL (ref 0–0.6)
EOSINOPHILS RELATIVE PERCENT: 1.6 %
GFR SERPL CREATININE-BSD FRML MDRD: >60 ML/MIN/{1.73_M2}
GLUCOSE BLD-MCNC: 124 MG/DL (ref 70–99)
HCT VFR BLD CALC: 39.6 % (ref 36–48)
HEMOGLOBIN: 13.1 G/DL (ref 12–16)
LYMPHOCYTES ABSOLUTE: 1 K/UL (ref 1–5.1)
LYMPHOCYTES RELATIVE PERCENT: 10.6 %
MCH RBC QN AUTO: 31 PG (ref 26–34)
MCHC RBC AUTO-ENTMCNC: 33 G/DL (ref 31–36)
MCV RBC AUTO: 94.1 FL (ref 80–100)
MONOCYTES ABSOLUTE: 0.5 K/UL (ref 0–1.3)
MONOCYTES RELATIVE PERCENT: 5.2 %
NEUTROPHILS ABSOLUTE: 7.8 K/UL (ref 1.7–7.7)
NEUTROPHILS RELATIVE PERCENT: 81.7 %
PDW BLD-RTO: 13 % (ref 12.4–15.4)
PLATELET # BLD: 443 K/UL (ref 135–450)
PMV BLD AUTO: 7 FL (ref 5–10.5)
POTASSIUM REFLEX MAGNESIUM: 5 MMOL/L (ref 3.5–5.1)
RBC # BLD: 4.21 M/UL (ref 4–5.2)
SODIUM BLD-SCNC: 140 MMOL/L (ref 136–145)
WBC # BLD: 9.5 K/UL (ref 4–11)

## 2023-02-27 PROCEDURE — 97116 GAIT TRAINING THERAPY: CPT

## 2023-02-27 PROCEDURE — 80048 BASIC METABOLIC PNL TOTAL CA: CPT

## 2023-02-27 PROCEDURE — 97140 MANUAL THERAPY 1/> REGIONS: CPT

## 2023-02-27 PROCEDURE — 97530 THERAPEUTIC ACTIVITIES: CPT

## 2023-02-27 PROCEDURE — 6360000002 HC RX W HCPCS: Performed by: STUDENT IN AN ORGANIZED HEALTH CARE EDUCATION/TRAINING PROGRAM

## 2023-02-27 PROCEDURE — 97112 NEUROMUSCULAR REEDUCATION: CPT

## 2023-02-27 PROCEDURE — 6370000000 HC RX 637 (ALT 250 FOR IP): Performed by: STUDENT IN AN ORGANIZED HEALTH CARE EDUCATION/TRAINING PROGRAM

## 2023-02-27 PROCEDURE — 1280000000 HC REHAB R&B

## 2023-02-27 PROCEDURE — 36415 COLL VENOUS BLD VENIPUNCTURE: CPT

## 2023-02-27 PROCEDURE — 97110 THERAPEUTIC EXERCISES: CPT

## 2023-02-27 PROCEDURE — 97535 SELF CARE MNGMENT TRAINING: CPT

## 2023-02-27 PROCEDURE — 85025 COMPLETE CBC W/AUTO DIFF WBC: CPT

## 2023-02-27 RX ADMIN — CLONIDINE HYDROCHLORIDE 0.1 MG: 0.1 TABLET ORAL at 20:49

## 2023-02-27 RX ADMIN — MAGNESIUM OXIDE TAB 400 MG (240 MG ELEMENTAL MG) 800 MG: 400 (240 MG) TAB at 08:17

## 2023-02-27 RX ADMIN — ACETAMINOPHEN 1000 MG: 500 TABLET ORAL at 14:12

## 2023-02-27 RX ADMIN — METOPROLOL TARTRATE 25 MG: 25 TABLET, FILM COATED ORAL at 08:17

## 2023-02-27 RX ADMIN — Medication 9 MG: at 20:49

## 2023-02-27 RX ADMIN — VENLAFAXINE HYDROCHLORIDE 150 MG: 37.5 CAPSULE, EXTENDED RELEASE ORAL at 08:18

## 2023-02-27 RX ADMIN — ATORVASTATIN CALCIUM 20 MG: 10 TABLET, FILM COATED ORAL at 08:17

## 2023-02-27 RX ADMIN — LISINOPRIL 10 MG: 10 TABLET ORAL at 08:17

## 2023-02-27 RX ADMIN — ACETAMINOPHEN 1000 MG: 500 TABLET ORAL at 20:49

## 2023-02-27 RX ADMIN — POLYETHYLENE GLYCOL 3350 17 G: 17 POWDER, FOR SOLUTION ORAL at 08:17

## 2023-02-27 RX ADMIN — ACETAMINOPHEN 1000 MG: 500 TABLET ORAL at 05:22

## 2023-02-27 RX ADMIN — METOPROLOL TARTRATE 25 MG: 25 TABLET, FILM COATED ORAL at 20:49

## 2023-02-27 RX ADMIN — ENOXAPARIN SODIUM 40 MG: 100 INJECTION SUBCUTANEOUS at 08:17

## 2023-02-27 ASSESSMENT — PAIN SCALES - GENERAL
PAINLEVEL_OUTOF10: 0
PAINLEVEL_OUTOF10: 0
PAINLEVEL_OUTOF10: 5
PAINLEVEL_OUTOF10: 4
PAINLEVEL_OUTOF10: 0
PAINLEVEL_OUTOF10: 0

## 2023-02-27 ASSESSMENT — PAIN DESCRIPTION - FREQUENCY: FREQUENCY: CONTINUOUS

## 2023-02-27 ASSESSMENT — PAIN - FUNCTIONAL ASSESSMENT: PAIN_FUNCTIONAL_ASSESSMENT: ACTIVITIES ARE NOT PREVENTED

## 2023-02-27 ASSESSMENT — PAIN DESCRIPTION - ORIENTATION: ORIENTATION: MID;RIGHT

## 2023-02-27 ASSESSMENT — PAIN DESCRIPTION - PAIN TYPE: TYPE: SURGICAL PAIN

## 2023-02-27 ASSESSMENT — PAIN DESCRIPTION - LOCATION: LOCATION: NECK;SHOULDER

## 2023-02-27 ASSESSMENT — PAIN DESCRIPTION - DESCRIPTORS: DESCRIPTORS: ACHING

## 2023-02-27 ASSESSMENT — PAIN DESCRIPTION - ONSET: ONSET: ON-GOING

## 2023-02-27 NOTE — PROGRESS NOTES
Physical Therapy  Facility/Department: [facility]  Rehabilitation Physical Therapy Treatment Note    NAME: Silvia Thornton  : 1950 (81 y.o.)  MRN: [id number]  CODE STATUS: Full Code    Date of Service: 23       Restrictions:  Restrictions/Precautions: Fall Risk;General Precautions; Up as Tolerated  Required Braces or Orthoses  Cervical: miami J  Position Activity Restriction  Spinal Precautions: No Bending; No Lifting; No Twisting  Other position/activity restrictions: \"Ok to remove for meals and hygiene\"     SUBJECTIVE  Subjective  Subjective: pt found in recliner  Pain: 310 pain R shoulder        OBJECTIVE  Cognition  Overall Cognitive Status: WFL  Arousal/Alertness: Delayed responses to stimuli  Following Commands: Follows one step commands consistently  Attention Span: Appears intact  Memory: Decreased short term memory  Safety Judgement: Decreased awareness of need for safety  Problem Solving: Assistance required to implement solutions  Insights: Fully aware of deficits  Initiation: Does not require cues  Sequencing: Does not require cues  Orientation  Overall Orientation Status: Within Normal Limits  Orientation Level: Oriented X4    Functional Mobility     Pt found in recliner, vitals assessed. Sit to stand from recliner and chair to Rw with supv  Gait x 180 ft with RW, SBA demonstrating reciprocal pattern with LLE coming close to midline during swing phase, decreased step height    Neuro re-ed: pt completed 8 laps of 4-square stepping activity with cues for ble proprioception and increased step height/length with 1 UE support and close SBA, stepping over 2\" canes    Neuro re-ed; pt ambulated forward/backwards x 10 laps each (each lap 10 ft ) with 1 UE support on // bar and feet straddling balance beam to promote wide KAREEM.  Pt cued to heel strike when ambulating forward and toe strike when going backwards for increased proprioception and KAREEM    Gait x 150 ft with RW, close SBA  Pt in recliner at end of session with call light/needs within reach and alarm donned. Second Session  Pt found in recliner this pm, reporting tightness in B upper traps (R>L). Therapist provided manual STM tx to R Upper trap with pt stating a decrease in muscular tightness following. Emphasis of session on gait with wide KAREEM and LLE placement and strengthening. Rec home with initial 24/7 and OPPT if transportation available. DME: RW    Sit to stand from recliner and chair to RW throughout session with supv  Gait x 200 ft with RW, SBA    Neuro re-ed: pt standing on // bar ramp (to facilitate ankle reactions), completed dyn stand activity: reaching for cone on R side with RUE-> placing in Lue at midline-> placing cone to table on L side with mini squat 2x6 reps. Grossly Cga for activity. Neuro re-ed: pt ambulated 250 ft with green TB donned LLE to promote increased L foot KAREEM during gait with CGA for increased motor recruitment. Immediately followed by 150 ft of gait without resistance and RW, SBA with improved KAREEM and L foot placement noted. Pt completed 10 min on SCIFIT level 2 with BLEs only for increased cardiorespiratory endurance and LE strength, maintains rate of 80-90 steps/min    Gait x 150 ft with RW, SBA  With pt seated in recliner, therapist provided x8 min of manual massage tx and STM and trigger point release to R upper trap to decrease c/o tightness with pt stating relief following manual tx    Pt completed 10 reps of BLE standing heel raises, BLE alternating marches, mini squat with BUE support and CGA  Pt in recliner at end of session with call light/needs within reach and alarm donned. ASSESSMENT/PROGRESS TOWARDS GOALS  Vital Signs  Heart Rate: 85  Heart Rate Source: Monitor  BP: 112/75  BP Location: Right upper arm  MAP (Calculated): 87  SpO2: 98 %  O2 Device: None (Room air)    Assessment  Assessment: pt found in recliner this am, agreeable to session.  L LE scissoring during gait without AD, emphasis on neuro r-eed of wide KAREEM and LLE proprioception. SBA -supv with RW, min a without AD. conitnue to rec home with initial 24/7 progressing to prn and OPPT if able. DME : RW  Activity Tolerance: Patient tolerated treatment well  Discharge Recommendations: Home with assist PRN;Outpatient PT  PT Equipment Recommendations  Equipment Needed: No    Goals  Patient Goals   Patient Goals : I want to be able to take care of my animals  Short Term Goals  Time Frame for Short Term Goals: 2/24  Short Term Goal 1: Pt will complete bed mobility with supervision. GOAL met 2/22/2023 patient able to complete bed mobility with S/SBA for cues for back care log roll with Leydi mallory. Short Term Goal 2: Pt will sit to stand with SBA to RW- GOAL MET 2/23 with supv and RW  Short Term Goal 3: Pt will ambulate x 100' with RW with supervision. GOAL met 2/22/2023  patient amb greater than 1000 feet wtih FWW and SBA in wide kareem. Short Term Goal 4: Pt will complete 12 stairs with min A.   Goal Met 2/21/2023 patient demonstrates up and down 16 steps wtih bilateral rails and min assist.  Long Term Goals  Time Frame for Long Term Goals : 3/3/23  Long Term Goal 1: Pt will be independent with bed mobility  Long Term Goal 2: Pt will ambulate x 150' with LRAD independently  Long Term Goal 3: Pt will complete 4 stairs with Mod I  Long Term Goal 4: Pt will be independent donning/doffing c-collar    PLAN OF CARE/SAFETY  Physcial Therapy Plan  General Plan: 5-7 times per week  Current Treatment Recommendations: Functional mobility training;Transfer training;ADL/Self-care training;Gait training;Positioning;Modalities; Therapeutic activities; Patient/Caregiver education & training;Strengthening;Balance training;Home exercise program;Stair training; Safety education & training;Neuromuscular re-education; Endurance training;Pain management;Cognitive reorientation;Manual  Safety Devices  Type of Devices: Chair alarm in place;Call light within reach;Gait belt;Patient at risk for falls; Left in chair;Nurse notified; All fall risk precautions in place; Bed alarm in place    EDUCATION  Education  Education Given To: Patient  Education Provided: Transfer Training;Mobility Training  Education Provided Comments: cued for wide lennox with transfers to facilitate good spine mechanics and for backing up to car transfer seat for set up for transfers, cued for pacing wtih recucmbent stepper, cued and faciltiated with paraspinal stroking upright posture, cued for hand placement downgoing steps for improved stability.   Challenged with  FGA  Education Method: Demonstration;Verbal  Barriers to Learning: None  Education Outcome: Verbalized understanding;Continued education needed;Demonstrated understanding        Therapy Time   Individual Concurrent Group Co-treatment   Time In 0900         Time Out 0930         Minutes 30           Timed Code Treatment Minutes: 30 Minutes     Second Session Therapy Time:   Individual Concurrent Group Co-treatment   Time In 1230         Time Out 1330         Minutes 60           Timed Code Treatment Minutes: 60     Total Treatment Minutes:  90    Antonio Ramey PT, 02/27/23 at 9:31 AM

## 2023-02-27 NOTE — PLAN OF CARE
Problem: Safety - Adult  Goal: Free from fall injury  2/27/2023 0913 by Nicolle Norman RN  Outcome: Progressing  2/26/2023 2203 by Janae Telles RN  Outcome: Progressing     Problem: Pain  Goal: Verbalizes/displays adequate comfort level or baseline comfort level  Outcome: Progressing

## 2023-02-27 NOTE — PROGRESS NOTES
[facility]  [facility]  [id number]    Chief Complaint: Cervical myelopathy ([address]    Subjective:   No acute events overnight. Today Augusta Agrawal reports continued neck and shoulder soreness. Patient advised that muscle relaxer is available PRN. She reports sleeping poorly last night. She denies other acute complaints at this time. ROS: denies f/c, n/v, cp, sob    Objective:  Patient Vitals for the past 24 hrs:   BP Temp Temp src Pulse Resp SpO2   02/26/23 2030 125/72 98.3 °F (36.8 °C) Oral -- 17 97 %   02/26/23 0815 (!) 110/52 98.5 °F (36.9 °C) Oral 64 18 94 %   02/25/23 2130 129/69 99 °F (37.2 °C) Oral 79 18 99 %       Gen: No distress, pleasant. HEENT: Normocephalic, atraumatic. CV: RRR  Resp: No respiratory distress. Lungs CTAB  Abd: Soft, nondistended  Ext: No edema. Neuro: Alert, oriented, appropriately interactive. Psych: appropriate mood and affect    Wt Readings from Last 3 Encounters:   02/20/23 133 lb 4.8 oz (60.5 kg)   02/17/23 142 lb 13.7 oz (64.8 kg)   01/10/23 143 lb (64.9 kg)       Laboratory data:   Lab Results   Component Value Date    WBC 8.5 02/24/2023    HGB 11.4 (L) 02/24/2023    HCT 35.6 (L) 02/24/2023    MCV 94.8 02/24/2023     02/24/2023       Lab Results   Component Value Date/Time     02/24/2023 06:29 AM    K 4.5 02/24/2023 06:29 AM     02/24/2023 06:29 AM    CO2 25 02/24/2023 06:29 AM    BUN 20 02/24/2023 06:29 AM    CREATININE 0.8 02/24/2023 06:29 AM    GLUCOSE 115 02/24/2023 06:29 AM    CALCIUM 9.8 02/24/2023 06:29 AM        Therapy progress:  PT  Required Braces or Orthoses  Cervical: miami J  Position Activity Restriction  Spinal Precautions: No Bending, No Lifting, No Twisting  Other position/activity restrictions:  \"Ok to remove for meals and hygiene\"  Objective     Sit to Stand: Minimal Assistance  Stand to Sit: Minimal Assistance  Device: [address]: Minimal assistance  Distance: 39'  OT  PT Equipment Recommendations  Equipment Needed: No  Toilet - Technique: Ambulating  Equipment Used: Watchfinder          Body mass index is 22.88 kg/m². Assessment and Plan:  Patient is a 82 yo F with pmh HTN, HLD, Pre-DM2, IBS, depression/anxiety who presented to [facility] on 2/14/2023 for urgent C3-5 laminectomy and and C3-6 posterior fusion. She was admitted to [facility] on 2/17/23 due to functional deficits below her baseline. Cervical Myelopathy  - s/p C3-5 laminectomy and C3-6 fusion on 2/14 with Dr. Bella Hurst  - cervical collar when OOB  - multimodal pain control, made muscle relaxer PRN due to confusion at night  - incision care. Left message with Neurosurgery about follow up appointment for staple/suture removal. Awaiting call back  - vancomycin discontinued as drain is out  - PT, OT, ST    Fall  - hit posterior right head  - initial CT head unremarkable. Was having headaches so obtained repeat CT head and unremarkable. Posterior Right Head wound  - unclear when occurred, staples in place. Patient unsure of timing  - nursing removed staples 2/21     HTN  - lisinopril, metoprolol     HLD  - statin     Anxiety/Depression  - effexor     Bowels: adjust medications as needed for regular bowel movements     Bladder: Check PVR x 3. [address] if PVR > 200ml or if any volume is > 500 ml. Sleep: melatonin scheduled     PPX  DVT: lovenox  GI: not indicated     Follow up appointments: Neurosurgery, PCP    Services:  PT, OT  EDOD: 3/3    Seun Everett.  MD Jr 2/26/2023, 9:28 PM

## 2023-02-27 NOTE — PROGRESS NOTES
Occupational Therapy  Facility/Department: [facility]  Rehabilitation Occupational Therapy Daily Treatment Note    Date: 23  Patient Name: Keesha Samson       Room: 5546/4295-49  MRN: [id number]  Account: [de-identified]   : 1950  (81 y.o.) Gender: female                    Past Medical History:  has a past medical history of Alcohol abuse, Anxiety and depression, Arthritis, Cancer ([address] Depression, Hyperlipidemia, Hypertension, Hypokalemia, IBS (irritable bowel syndrome), Overactive bladder, Pre-diabetes, Prolonged emergence from general anesthesia, Rheumatic heart disease, and Wears glasses. Past Surgical History:   has a past surgical history that includes Tonsillectomy; Tubal ligation; fracture surgery; Colonoscopy; bladder suspension; Ovary removal (Bilateral); Carpal tunnel release (Bilateral, ); Bilateral salpingoophorectomy; and cervical fusion (N/A, 2023). Restrictions  Restrictions/Precautions: Fall Risk;General Precautions; Up as Tolerated  Other position/activity restrictions: \"Ok to remove for meals and hygiene\"  Required Braces or Orthoses  Cervical: chapin LE  Required Braces or Orthoses?: Yes    Subjective  Subjective: pt in chair at start of session, agreeable to shower, reporting pain in neck and shoulder less than 5/10 when up and moving. Restrictions/Precautions: Fall Risk;General Precautions; Up as Tolerated      VITALS: BP: 115/62, SPO2 99% on RA, HR 82        Objective     Cognition  Overall Cognitive Status: WFL  Arousal/Alertness: Delayed responses to stimuli  Following Commands:  Follows one step commands consistently  Attention Span: Appears intact  Memory: Decreased short term memory  Safety Judgement: Decreased awareness of need for safety  Problem Solving: Assistance required to implement solutions  Insights: Fully aware of deficits  Initiation: Does not require cues  Sequencing: Does not require cues  Orientation  Overall Orientation Status: Within Normal Limits  Orientation Level: Oriented X4         ADL  Grooming/Oral Hygiene  Assistance Level: Supervision  Skilled Clinical Factors: oral care in stance with RW support PRN with Leydi LE collar donned  Upper Extremity Bathing  Assistance Level: Supervision  Skilled Clinical Factors: seated on TTB, long handled sponge, hand held shower head  Lower Extremity Bathing  Assistance Level: Stand by assist  Skilled Clinical Factors: while in stance for LB syed area washing with use of grab bars  Upper Extremity Dressing  Assistance Level: Minimal assistance; Increased time to complete;Verbal cues  Skilled Clinical Factors: A for bringing leydi le to midline, pt with use of mirror; pt managed over head shirt exchange  Lower Extremity Dressing  Assistance Level: Stand by assist  Skilled Clinical Factors: SBA while in stance to manage underwear and pants up over hips, pt threaded clothing with figure four formation while seated on TTB  Putting On/Taking Off Footwear  Assistance Level: Set-up  Skilled Clinical Factors: pt educated on use of long handled shoe horn and zipper pull for footwear management, demo'd understanding donning 2 different shoes with set up  [address] Level: Set-up; Verbal cues; Increased time to complete;Supervision  Skilled Clinical Factors: pt managed syed care after urination/BM  Toilet Transfers  Technique:  (ambulation with RW)  Equipment: Standard toilet  Assistance Level: Supervision  Tub/Shower Transfers  Type: Shower  Transfer From: Rolling walker  Transfer To: Tub transfer bench  Additional Factors: Verbal cues  Assistance Level: Stand by assist    Functional Mobility  Device: Rolling walker  Activity: To/From bathroom; To/From therapy gym  Assistance Level: Stand by assist  Transfers  Surface: To chair with arms;From chair with arms;From chair without arms; To chair without arms  Device: Walker  Sit to Stand  Assistance Level: Stand by assist  Stand to Sit  Assistance Level: Stand by assist OT Exercises  Disease-specific Exercises: Fine Motor Exercises:   Theraputty: retrieved 10 pony beads from red theraputty with bimanual coordination  Rubber bands and peg board: use of  R pronated palmar grasp initally to use tweezers to stretch rubber bands across peg board with L hand stabilizing, able to stretch band 75% of the way across board both horizontally and vertically. Pt educated on grasp patterns and cued to use tripod grasp, pt with compensatory positioning of stabilizing against middle finger during task, agreeable to cont though reported this technique to be more challenging than the pronated palmar grasp  Pony Beads and Peg Board: compensatory pinch pattern of lateral v tip pinch to place 4 rows of beans on peg board with sig inc time, pt reports inc frustration with task though understands benefits  Functional Latch Wooden board: opened and closed all latches/locks on wooden board with bi manual coordination. OT to stabilize board either on table top or upright. Pt with inc time and effort for chain lock, reporting that she has this style at home and had difficulty prior to Sx. Compensatory technique of sliding R hand down chain from origin point to grasp end to thread into lock. Pen Again, ergonomic tool: use of adapted writing tool to increased functionality of hand writing, pt reports increased ease of pen use and that during worksheet she would have dropped traditional pen. 100% accuracy on Safety wkst. Name and date [facility] for legibility       Assessment  Assessment  Assessment: Keesha Samson is progressing in therapy steadily, currently limited by decreased balance and decreased strength. Demo'd good safety awareness, requiring no cues for spinal Px during session.    Functional Mobility: SBA for STS, ambulation with RW, and toilet/shower transfer  ADL: SPV for toileting, SPV for seated dressing and bathing, SBA for dressing/bathing in stance, SPV for grooming in stance; education and regina'stephon understanding of long handled shoe horn and zipper pull for footwear management   TA: fine motor activities seated at table top  Activity was tolerated well, pt required no undue rest breaks. Continue OT POC to address performance deficits in ADLs and functional mobility. Activity Tolerance: Patient tolerated treatment well  Discharge Recommendations: 24 hour supervision or assist;Home with Home health OT  OT Equipment Recommendations  Equipment Needed: Yes  Mobility Devices: ADL Assistive Devices  ADL Assistive Devices: Reacher;Long-handled Shoe Horn  Other: CTA: pt has grab bars in bathroom, shower chair, and hand held shower head  [facility] in place: Yes  Type of devices: All fall risk precautions in place;Call light within reach; Chair alarm in place;Gait belt;Patient at risk for falls; Left in chair;Nurse notified    Patient Education  Education  Education Given To: Patient  Education Provided: Role of Therapy;Plan of Care;Precautions; Safety;ADL Function;Mobility Training;Transfer Training;Equipment; Fall Prevention Strategies  Education Provided Comments: disease specific: use of AD for ADLs  Education Method: Demonstration;Verbal  Barriers to Learning: None  Education Outcome: Verbalized understanding;Demonstrated understanding    Plan  Occupational Therapy Plan  Times Per Week: 5-7x/wk  Current Treatment Recommendations: Strengthening;ROM;Balance training;Functional mobility training; Endurance training;Positioning;Equipment evaluation, education, & procurement;Patient/Caregiver education & training; Safety education & training;Pain management;Return to work related activity; Self-Care / ADL; Home management training;Coordination training    Goals  Patient Goals   Patient goals : \"to get to take care of the litter box and trash\"  Short Term Goals  Time Frame for Short Term Goals: 1 week (2/24/23)- all goals prog unless otherwise noted 2/27  Short Term Goal 1: Pt will complete LB dressing with min A- GOAL MET 2/20  Short Term Goal 2: Pt will complete toileting with min A- GOAL MET 2/20  Short Term Goal 3: Pt will complete bathing with min A- GOAL MET 2/20  Short Term Goal 4: Pt will complete light household tasks with min A- GOAL MET 2/23  Long Term Goals  Time Frame for Long Term Goals : 2 weeks (3/3/23)  Long Term Goal 1: Pt will complete LB dressing mod I  Long Term Goal 2: Pt will complete toileting mod I  Long Term Goal 3: Pt will complete bathing with mod I  Long Term Goal 4: Pt will complete light household tasks mod independent               Therapy Time   Individual Concurrent Group Co-treatment   Time In 1000         Time Out 1130         Minutes 90         Timed Code Treatment Minutes: [address] Minutes       Sharita Marcano, OT

## 2023-02-27 NOTE — PROGRESS NOTES
Nathaly Bojorquez  2/27/2023  [id number]    Chief Complaint: Cervical myelopathy ([address]    Subjective:  Patient seen this AM.  Patient did well over the weekend, with no problems noted. Repeat labs today look good and are stable. Patient with no complaints this morning. No acute events overnight. Her neck pain is under control with her meds. Plan to D/C on Friday. ROS: denies f/c, n/v, cp, sob    Objective:  Patient Vitals for the past 24 hrs:   BP Temp Temp src Pulse Resp SpO2   02/27/23 0800 (!) 120/56 98.1 °F (36.7 °C) Oral 68 18 93 %   02/26/23 2030 125/72 98.3 °F (36.8 °C) Oral -- 17 97 %       Gen: No distress, pleasant. HEENT: Normocephalic, atraumatic. CV: RRR  Resp: No respiratory distress. Lungs CTAB  Abd: Soft, nondistended  Ext: No edema. Neuro: Alert, oriented, appropriately interactive. Psych: appropriate mood and affect    Wt Readings from Last 3 Encounters:   02/20/23 133 lb 4.8 oz (60.5 kg)   02/17/23 142 lb 13.7 oz (64.8 kg)   01/10/23 143 lb (64.9 kg)       Laboratory data:   Lab Results   Component Value Date    WBC 9.5 02/27/2023    HGB 13.1 02/27/2023    HCT 39.6 02/27/2023    MCV 94.1 02/27/2023     02/27/2023       Lab Results   Component Value Date/Time     02/27/2023 06:31 AM    K 5.0 02/27/2023 06:31 AM     02/27/2023 06:31 AM    CO2 29 02/27/2023 06:31 AM    BUN 25 02/27/2023 06:31 AM    CREATININE 0.8 02/27/2023 06:31 AM    GLUCOSE 124 02/27/2023 06:31 AM    CALCIUM 10.3 02/27/2023 06:31 AM        Therapy progress:  PT  Required Braces or Orthoses  Cervical: mi[place] J  Position Activity Restriction  Spinal Precautions: No Bending, No Lifting, No Twisting  Other position/activity restrictions:  \"Ok to remove for meals and hygiene\"  Objective     Sit to Stand: Minimal Assistance  Stand to Sit: Minimal Assistance  Device: [address]: Minimal assistance  Distance: 39'  OT  PT Equipment Recommendations  Equipment Needed: No  Toilet - Technique: Ambulating  Equipment Used: [employer]          Body mass index is 22.88 kg/m². Assessment and Plan:  Patient is a 82 yo F with pmh HTN, HLD, Pre-DM2, IBS, depression/anxiety who presented to [facility] on 2/14/2023 for urgent C3-5 laminectomy and and C3-6 posterior fusion. She was admitted to [facility] on 2/17/23 due to functional deficits below her baseline. Cervical Myelopathy  - s/p C3-5 laminectomy and C3-6 fusion on 2/14 with Dr. Maurice Mcneill  - cervical collar when OOB  - multimodal pain control, made muscle relaxer PRN due to confusion at night  - incision care. Left message with Neurosurgery about follow up appointment for staple/suture removal. Awaiting call back  - vancomycin discontinued as drain is out  - PT, OT, ST    Fall  - hit posterior right head  - initial CT head unremarkable. Was having headaches so obtained repeat CT head and unremarkable. Posterior Right Head wound  - unclear when occurred, staples in place. Patient unsure of timing  - nursing removed staples 2/21     HTN  - lisinopril, metoprolol     HLD  - statin     Anxiety/Depression  - effexor     Bowels: adjust medications as needed for regular bowel movements     Bladder: Check PVR x 3. [address] if PVR > 200ml or if any volume is > 500 ml. Sleep: melatonin scheduled     PPX  DVT: lovenox  GI: not indicated     Follow up appointments: Neurosurgery, PCP    Services: HH PT, OT  EDOD: 3/3    Margarita Norris MD 2/27/2023, 8:20 AM

## 2023-02-28 PROCEDURE — 97112 NEUROMUSCULAR REEDUCATION: CPT

## 2023-02-28 PROCEDURE — 6360000002 HC RX W HCPCS: Performed by: STUDENT IN AN ORGANIZED HEALTH CARE EDUCATION/TRAINING PROGRAM

## 2023-02-28 PROCEDURE — 97110 THERAPEUTIC EXERCISES: CPT

## 2023-02-28 PROCEDURE — 1280000000 HC REHAB R&B

## 2023-02-28 PROCEDURE — 97530 THERAPEUTIC ACTIVITIES: CPT

## 2023-02-28 PROCEDURE — 97116 GAIT TRAINING THERAPY: CPT

## 2023-02-28 PROCEDURE — 6370000000 HC RX 637 (ALT 250 FOR IP): Performed by: STUDENT IN AN ORGANIZED HEALTH CARE EDUCATION/TRAINING PROGRAM

## 2023-02-28 RX ADMIN — CLONIDINE HYDROCHLORIDE 0.1 MG: 0.1 TABLET ORAL at 20:05

## 2023-02-28 RX ADMIN — Medication 9 MG: at 20:05

## 2023-02-28 RX ADMIN — ATORVASTATIN CALCIUM 20 MG: 10 TABLET, FILM COATED ORAL at 08:05

## 2023-02-28 RX ADMIN — ACETAMINOPHEN 1000 MG: 500 TABLET ORAL at 13:42

## 2023-02-28 RX ADMIN — VENLAFAXINE HYDROCHLORIDE 150 MG: 37.5 CAPSULE, EXTENDED RELEASE ORAL at 08:05

## 2023-02-28 RX ADMIN — ENOXAPARIN SODIUM 40 MG: 100 INJECTION SUBCUTANEOUS at 08:04

## 2023-02-28 RX ADMIN — MAGNESIUM OXIDE TAB 400 MG (240 MG ELEMENTAL MG) 800 MG: 400 (240 MG) TAB at 08:05

## 2023-02-28 RX ADMIN — POLYETHYLENE GLYCOL 3350 17 G: 17 POWDER, FOR SOLUTION ORAL at 08:03

## 2023-02-28 RX ADMIN — ACETAMINOPHEN 1000 MG: 500 TABLET ORAL at 20:06

## 2023-02-28 RX ADMIN — ACETAMINOPHEN 1000 MG: 500 TABLET ORAL at 05:07

## 2023-02-28 RX ADMIN — METOPROLOL TARTRATE 25 MG: 25 TABLET, FILM COATED ORAL at 20:06

## 2023-02-28 ASSESSMENT — PAIN SCALES - GENERAL
PAINLEVEL_OUTOF10: 4
PAINLEVEL_OUTOF10: 5
PAINLEVEL_OUTOF10: 3
PAINLEVEL_OUTOF10: 0

## 2023-02-28 ASSESSMENT — PAIN DESCRIPTION - DESCRIPTORS
DESCRIPTORS: ACHING;SORE
DESCRIPTORS: ACHING;DISCOMFORT

## 2023-02-28 ASSESSMENT — PAIN DESCRIPTION - ORIENTATION
ORIENTATION: POSTERIOR
ORIENTATION: RIGHT

## 2023-02-28 ASSESSMENT — PAIN DESCRIPTION - PAIN TYPE: TYPE: ACUTE PAIN;SURGICAL PAIN

## 2023-02-28 ASSESSMENT — PAIN DESCRIPTION - FREQUENCY: FREQUENCY: INTERMITTENT

## 2023-02-28 ASSESSMENT — PAIN DESCRIPTION - LOCATION
LOCATION: INCISION;NECK;SHOULDER
LOCATION: NECK

## 2023-02-28 ASSESSMENT — PAIN DESCRIPTION - ONSET: ONSET: GRADUAL

## 2023-02-28 NOTE — PATIENT CARE CONFERENCE
[facility]  Inpatient Rehabilitation  Weekly Team Conference Note    Date: 3/1/2023  Patient Name: Basia Espana        MRN: [id number]    : 1950  (81 y.o.)  Gender: female   Referring Practitioner: Aurelia Davey MD  Diagnosis: cervical myelopathy      Interventions to be utilized toward barriers to discharge, per discipline:  [address] observed barriers to dc: Limited safety awareness, Decreased endurance, Lower extremity weakness, and Medication managment  Nursing interventions: Assist with ADLs, medication management  Family Education: No  Fall Risk:  Yes    Stay with me?: Yes    PHYSICAL THERAPY  Physical therapy observed barriers to dc:       Baseline: indep with FWW              Current level: ind bed mobility, supv transfers, SBA-supv with RW > 150 ft, CGA-min a without AD, CGA-SBA x16 steps with BHR              Barriers to DC:  lives alone, 2 steps to enter, impaired BLE proprioception, has pets at home. Needs in order to achieve dc home/next level of care: indep with gait, transfers, steps       Physical therapy interventions:   Current Treatment Recommendations: Functional mobility training, Transfer training, ADL/Self-care training, Gait training, Positioning, Modalities, Therapeutic activities, Patient/Caregiver education & training, Strengthening, Balance training, Home exercise program, Stair training, Safety education & training, Neuromuscular re-education, Endurance training, Pain management, Cognitive reorientation, Manual    PT Goals:            Short Term Goals  Time Frame for Short Term Goals:   Short Term Goal 1: Pt will complete bed mobility with supervision. GOAL met 2023 patient able to complete bed mobility with S/SBA for cues for back care log roll with Leydi mallory.   Short Term Goal 2: Pt will sit to stand with SBA to RW- GOAL MET  with supv and RW  Short Term Goal 3: Pt will ambulate x 100' with RW with supervision. GOAL met 2/22/2023  patient amb greater than 1000 feet wtih FWW and SBA in wide kareem. Short Term Goal 4: Pt will complete 12 stairs with min A.   Goal Met 2/21/2023 patient demonstrates up and down 16 steps wtih bilateral rails and min assist.            Long Term Goals  Time Frame for Long Term Goals : 3/3/23  Long Term Goal 1: Pt will be independent with bed mobility  Long Term Goal 2: Pt will ambulate x 150' with LRAD independently  Long Term Goal 3: Pt will complete 4 stairs with Mod I  Long Term Goal 4: Pt will be independent donning/doffing c-collar    PT Assessment:  Recommendation:   PT Equipment Recommendations  Equipment Needed: No    Assessment: pt found in recliner this am, agreeable to session. L LE scissoring during gait without AD, emphasis on neuro r-eed of wide KAREEM and LLE proprioception. SBA -supv with RW, min a without AD. conitnue to rec home with initial 24/7 progressing to prn and OPPT if able.  DME : [facility]       OCCUPATIONAL THERAPY  Occupational therapy observed barriers to dc:    Baseline: ind, working full time, 2 level house but able to live on main level, lives alone              Current level: SBA for STS, up to 20 minutes in stance during dynamic balance activity, SBA for most ADLs, cont req Min A for adjustment of [place] J; SPV for oral care, SPV for seated ADLs, SBA when in stance for ADLs, set up for footwear, SPV for toileting               Needs in order to achieve dc home/next level of care: will need 24/7 initially prog to PRN with [address] [place] [address], will likely not need additional DME has shower chair and grab bars    Occupational Therapy interventions:  Current Treatment Recommendations: Strengthening, ROM, Balance training, Functional mobility training, Endurance training, Positioning, Equipment evaluation, education, & procurement, Patient/Caregiver education & training, Safety education & training, Pain management, Return to work related activity, Self-Care / ADL, Home management training, Coordination training    OT Goals:  Patient Goals   Patient goals : \"to get to take care of the litter box and trash\"  Short Term Goals  Time Frame for Short Term Goals: 1 week (2/24/23) unless otherwise noted  Short Term Goal 1: Pt will complete LB dressing with min A- GOAL MET 2/20  Short Term Goal 2: Pt will complete toileting with min A- GOAL MET 2/20  Short Term Goal 3: Pt will complete bathing with min A- GOAL MET 2/20  Short Term Goal 4: Pt will complete light household tasks with min A- GOAL MET 2/23  Short Term Goal 5: .  Long Term Goals  Time Frame for Long Term Goals : 2 weeks (3/3/23) unless otherwise noted  Long Term Goal 1: Pt will complete LB dressing mod I  Long Term Goal 2: Pt will complete toileting mod I  Long Term Goal 3: Pt will complete bathing with mod I  Long Term Goal 4: Pt will complete light household tasks mod independent    OT Assessment:   Pt agreeable to session, ambulated to gym with RW SBA. 10 minutes in stance for IADL of laundry folding, demo and education on button aide, pt reports this to be easier v no AD. Practice of floor transfer with CGA on floor mat up to stable chair. Education/discussion for safety with floor transfers in event of fall in light of spinal precautions. Pt left in chair, needs within reach, alarm set. CONT OT POC. SPEECH THERAPY (intentionally left blank if not actively being seen by this service):      NUTRITION  Weight: 133 lb 4.8 oz (60.5 kg) / Body mass index is 22.88 kg/m². Diet Order: ADULT DIET; Regular  PO Meals Eaten (%): 76 - 100%  Education: No recommendation at this time      CASE MANAGEMENT  Assessment:  81 yr old female. Dx:Cervical myelopathy. Patient independent PLOF with AD. Lives alone in a 2 story home with a 3 step entry into home. Home has walk-in-shower with grab bars. Therapy recommendations are Home with assist PRN with Outpatient PT/OT. No DME.     Interdisciplinary Goals:   1.) pt will ambulate about unit with LRAD and mod ind.  2.) Pt will complete toileting mod I      []  Family Training discussed at conference and to be scheduled. Discharge Plan   Estimated discharge date: 3/3/2023  Destination: home with 24 hr supervision and OP therapy  Pass:No  Services at Discharge: Outpatient Physical Therapy, Occupational Therapy  Equipment at Discharge:  RW (pt owns). Team Members Present at Conference:  : Kingston Garrett RN    Occupational Therapist: Milad Chandra, OTR/L [id number]   Physical Therapist: Yevgeniy Casillas PT, DPT   Speech Therapist: N/A  Nurse: Sean Rivera RN  Dietician: Renita Levine RDN, LD  : Yolanda Byrd, OTR/L  Psychiatry: N/A    Family members present at conference: No      I led this team conference and I approve the established interdisciplinary plan of care as documented within the medical record of [employer]. MD: Chan Hamilton.  José Miguel Swift MD 3/1/2023, 2:32 PM

## 2023-02-28 NOTE — PROGRESS NOTES
Physical Therapy  Facility/Department: [facility]  Rehabilitation Physical Therapy Treatment Note    NAME: Sae Dangelo  : 1950 (81 y.o.)  MRN: [id number]  CODE STATUS: Full Code    Date of Service: 23       Restrictions:  Restrictions/Precautions: Fall Risk;General Precautions; Up as Tolerated  Required Braces or Orthoses  Cervical: mi[place] J  Position Activity Restriction  Spinal Precautions: No Bending; No Lifting; No Twisting  Other position/activity restrictions: \"Ok to remove for meals and hygiene\"     SUBJECTIVE  Subjective  Subjective: pt found in recliner this am  Pain: 5/10 pain in neck and R shoulder. OBJECTIVE  Cognition  Overall Cognitive Status: WFL  Arousal/Alertness: Delayed responses to stimuli  Following Commands: Follows one step commands consistently  Attention Span: Appears intact  Memory: Decreased short term memory  Safety Judgement: Decreased awareness of need for safety  Problem Solving: Assistance required to implement solutions  Insights: Fully aware of deficits  Initiation: Does not require cues  Sequencing: Does not require cues  Orientation  Overall Orientation Status: Within Normal Limits  Orientation Level: Oriented X4    Functional Mobility     Pt found in recliner, vitals assessed    Sit to stand recliner to RW with supv-mod ind  Gait x 180 ft + 150 ft with RW, supv demonstrating reciprocal pattern, inconsistent L heel strike with no overt LOB, consistent mary. Sit <> supine with ind  Pt completed 15 reps of BLE supine alternating heel slides, SLR, hip abduction  Stand pivot EOB to recliner with RW and supv-mod ind  Pt in recliner at end of session with call light/needs within reach and alarm donned. Second Session  Pt found in recliner, agreeable to session. No c/o pain, does not limit pt participation. CGA-min a when ambulating without AD, supv for ambulation with AD. Continue to rec home with prn and OPPT if transportation available.  DME; none, owns RW    Sit to stand from recliner to RW or no AD and chair to no AD with supv-mod ind  Gait x 180 ft with RW, supv demo's above gait deficits. Neuro re-ed: with pt in stagger stance with back leg on // bar ramp, pt reaching for beanbag on table on the L with LUE-> place in RUE at midline-> throw to target 6 ft away x 8 reps. Repeated with opposite LE leading in stagger stance x 8 reps, grossly cAG-min A for dyn stand balance. Completed to facilitate ankle reactions/strategy. Pt ascended/descended 12 6\" steps with step to pattern, BHR and supv. Pt ambulated 150 ft with RW, resistance donned about L LE to promote wide AMY with increased motor recruitment, SBA. Pt ambulated 150 ft with no AD, resistance donned about LLE (green TB) for increased AMY, CGA-min A. Pt ambulated 150 ft with no AD, no resistance and requires initially min A 2* variable AMY progressing ot CGA as pt found consistent rhythm and cues for wide Amy. Pt completed 10 min on SCIFIT on level 2.5 with BLEs only for increased cardiorespiratory endurance, LE reciprocal motion and strength. Maintains rate of 100-105 steps/min. Gait x 300 ft with RW, supv   Pt in recliner at end of session with call light/needs within reach and alarm donned. ASSESSMENT/PROGRESS TOWARDS GOALS  Vital Signs  Heart Rate: 62  Heart Rate Source: Monitor  BP: 106/61  BP Location: Right upper arm  MAP (Calculated): 76  SpO2: 98 %  O2 Device: None (Room air)    Assessment  Assessment: pt found in recliner, reporting moderate pain in neck nad R shoulder, RN made aware. supv-mod ind transfers, supv gait with RW, tolerated supine ther ex without complaints. continue to rec home with prn assist (when ambulating in community) nad OPPT. DME: none, owns RW  Activity Tolerance: Patient tolerated treatment well  Discharge Recommendations: Home with assist PRN;Outpatient PT  PT Equipment Recommendations  Equipment Needed: No    Goals  Patient Goals   Patient Goals :  I want to be able to take care of my animals  Short Term Goals  Time Frame for Short Term Goals: 2/24  Short Term Goal 1: Pt will complete bed mobility with supervision. GOAL met 2/22/2023 patient able to complete bed mobility with S/SBA for cues for back care log roll with Leydi mallory. Short Term Goal 2: Pt will sit to stand with SBA to RW- GOAL MET 2/23 with supv and RW  Short Term Goal 3: Pt will ambulate x 100' with RW with supervision. GOAL met 2/22/2023  patient amb greater than 1000 feet wtih FWW and SBA in wide lennox. Short Term Goal 4: Pt will complete 12 stairs with min A.   Goal Met 2/21/2023 patient demonstrates up and down 16 steps wtih bilateral rails and min assist.  Long Term Goals  Time Frame for Long Term Goals : 3/3/23  Long Term Goal 1: Pt will be independent with bed mobility  Long Term Goal 2: Pt will ambulate x 150' with LRAD independently  Long Term Goal 3: Pt will complete 4 stairs with Mod I  Long Term Goal 4: Pt will be independent donning/marking c-collar    PLAN OF CARE/SAFETY  Physcial Therapy Plan  General Plan: 5-7 times per week  Current Treatment Recommendations: Functional mobility training;Transfer training;ADL/Self-care training;Gait training;Positioning;Modalities; Therapeutic activities; Patient/Caregiver education & training;Strengthening;Balance training;Home exercise program;Stair training; Safety education & training;Neuromuscular re-education; Endurance training;Pain management;Cognitive reorientation;Manual  Safety Devices  Type of Devices: Chair alarm in place;Call light within reach;Gait belt;Patient at risk for falls; Left in chair;Nurse notified; All fall risk precautions in place; Bed alarm in place    EDUCATION  Education  Education Given To: Patient  Education Provided: Transfer Training;Mobility Training  Education Provided Comments: cued for wide lennox with transfers to facilitate good spine mechanics and for backing up to car transfer seat for set up for transfers, cued for pacing wtih recucmbent stepper, cued and faciltiated with paraspinal stroking upright posture, cued for hand placement downgoing steps for improved stability.   Challenged with  FGA  Education Method: Demonstration;Verbal  Barriers to Learning: None  Education Outcome: Verbalized understanding;Continued education needed;Demonstrated understanding        Therapy Time   Individual Concurrent Group Co-treatment   Time In 0730         Time Out 0800         Minutes 30           Timed Code Treatment Minutes: 30 Minutes     Second Session Therapy Time:   Individual Concurrent Group Co-treatment   Time In 1000         Time Out 1100         Minutes 60           Timed Code Treatment Minutes:  60    Total Treatment Minutes:  90    Laura Paredes PT, 02/28/23 at 7:48 AM

## 2023-02-28 NOTE — CARE COORDINATION
Clinicals faxed to insurance for .   Vonda Park MPH, RRT  Clinical Liaison [address]  (K)[phone]  (S)[phone]   Electronically signed by Vonda Park on 2/28/2023 at 10:12 AM

## 2023-02-28 NOTE — PROGRESS NOTES
Josee Raines  2/28/2023  [id number]    Chief Complaint: Cervical myelopathy ([address]    Subjective:  Patient seen this AM.  Patient feeling better, and thinks she is making good progress in therapies. Repeat labs yesterday look good and are stable. No acute events overnight. Patient will be discussed in rehab conference tomorrow with the entire rehab team. Her neck pain is under control with her meds. Plan to D/C on Friday. ROS: denies f/c, n/v, cp, sob    Objective:  Patient Vitals for the past 24 hrs:   BP Temp Temp src Pulse Resp SpO2   02/27/23 2045 129/72 98.3 °F (36.8 °C) Oral 65 18 99 %   02/27/23 0929 112/75 -- -- 85 -- 98 %   02/27/23 0800 (!) 120/56 98.1 °F (36.7 °C) Oral 68 18 93 %       Gen: No distress, pleasant. HEENT: Normocephalic, atraumatic. CV: RRR  Resp: No respiratory distress. Lungs CTAB  Abd: Soft, nondistended  Ext: No edema. Neuro: Alert, oriented, appropriately interactive. Psych: appropriate mood and affect    Wt Readings from Last 3 Encounters:   02/20/23 133 lb 4.8 oz (60.5 kg)   02/17/23 142 lb 13.7 oz (64.8 kg)   01/10/23 143 lb (64.9 kg)       Laboratory data:   Lab Results   Component Value Date    WBC 9.5 02/27/2023    HGB 13.1 02/27/2023    HCT 39.6 02/27/2023    MCV 94.1 02/27/2023     02/27/2023       Lab Results   Component Value Date/Time     02/27/2023 06:31 AM    K 5.0 02/27/2023 06:31 AM     02/27/2023 06:31 AM    CO2 29 02/27/2023 06:31 AM    BUN 25 02/27/2023 06:31 AM    CREATININE 0.8 02/27/2023 06:31 AM    GLUCOSE 124 02/27/2023 06:31 AM    CALCIUM 10.3 02/27/2023 06:31 AM        Therapy progress:  PT  Required Braces or Orthoses  Cervical: [place] J  Position Activity Restriction  Spinal Precautions: No Bending, No Lifting, No Twisting  Other position/activity restrictions:  \"Ok to remove for meals and hygiene\"  Objective     Sit to Stand: Minimal Assistance  Stand to Sit: Minimal Assistance  Device: Rolling Walker  Assistance: Minimal assistance  Distance: 39'  OT  PT Equipment Recommendations  Equipment Needed: No  Toilet - Technique: Ambulating  Equipment Used: [employer]          Body mass index is 22.88 kg/m². Assessment and Plan:  Patient is a 80 yo F with pmh HTN, HLD, Pre-DM2, IBS, depression/anxiety who presented to [facility] on 2/14/2023 for urgent C3-5 laminectomy and and C3-6 posterior fusion. She was admitted to [facility] on 2/17/23 due to functional deficits below her baseline. Cervical Myelopathy  - s/p C3-5 laminectomy and C3-6 fusion on 2/14 with Dr. Alvarado Vasquez  - cervical collar when OOB  - multimodal pain control, made muscle relaxer PRN due to confusion at night  - incision care. Left message with Neurosurgery about follow up appointment for staple/suture removal. Awaiting call back  - vancomycin discontinued as drain is out  - PT, OT, ST    Fall  - hit posterior right head  - initial CT head unremarkable. Was having headaches so obtained repeat CT head and unremarkable. Posterior Right Head wound  - unclear when occurred, staples in place. Patient unsure of timing  - nursing removed staples 2/21     HTN  - lisinopril, metoprolol     HLD  - statin     Anxiety/Depression  - effexor     Bowels: adjust medications as needed for regular bowel movements     Bladder: Check PVR x 3. [address] if PVR > 200ml or if any volume is > 500 ml. Sleep: melatonin scheduled     PPX  DVT: lovenox  GI: not indicated     Follow up appointments: Neurosurgery, PCP    Services:  PT, OT  EDOD: 3/3    Lisa Norris MD 2/28/2023, 6:10 AM

## 2023-02-28 NOTE — PLAN OF CARE
Problem: Safety - Adult  Goal: Free from fall injury  2/28/2023 0847 by Nicolle Norman RN  Outcome: Progressing  2/27/2023 2338 by Janay Childers RN  Outcome: Progressing     Problem: Skin/Tissue Integrity - Adult  Goal: Incisions, wounds, or drain sites healing without S/S of infection  Outcome: Progressing

## 2023-02-28 NOTE — PROGRESS NOTES
Occupational Therapy  Facility/Department: [facility]  Rehabilitation Occupational Therapy Daily Treatment Note    Date: 23  Patient Name: Kira Evans       Room: 0668/8885-74  MRN: [id number]  Account: [de-identified]   : 1950  (81 y.o.) Gender: female                    Past Medical History:  has a past medical history of Alcohol abuse, Anxiety and depression, Arthritis, Cancer ([address] Depression, Hyperlipidemia, Hypertension, Hypokalemia, IBS (irritable bowel syndrome), Overactive bladder, Pre-diabetes, Prolonged emergence from general anesthesia, Rheumatic heart disease, and Wears glasses. Past Surgical History:   has a past surgical history that includes Tonsillectomy; Tubal ligation; fracture surgery; Colonoscopy; bladder suspension; Ovary removal (Bilateral); Carpal tunnel release (Bilateral, ); Bilateral salpingoophorectomy; and cervical fusion (N/A, 2023). Restrictions  Restrictions/Precautions: Fall Risk;General Precautions; Up as Tolerated  Other position/activity restrictions: \"Ok to remove for meals and hygiene\"  Required Braces or Orthoses  Cervical: [place] LE  Required Braces or Orthoses?: Yes    Subjective  Subjective: pt in chair at start of session, agreeable, reporting pain less than 5/10 in shoulder when up and moving  Restrictions/Precautions: Fall Risk;General Precautions; Up as Tolerated     VITALS: BP: 104/61, SPO2 98% on RA, HR 60            Objective     Cognition  Overall Cognitive Status: WFL  Arousal/Alertness: Delayed responses to stimuli  Following Commands:  Follows one step commands consistently  Attention Span: Appears intact  Memory: Decreased short term memory  Safety Judgement: Decreased awareness of need for safety  Problem Solving: Assistance required to implement solutions  Insights: Fully aware of deficits  Initiation: Does not require cues  Sequencing: Does not require cues  Orientation  Overall Orientation Status: Within Normal Limits  Orientation Level: Oriented X4         ADL: not completed during AM session       Instrumental ADL's  Instrumental ADLs: Yes  Light Housekeeping  Light Housekeeping Level: [employer] Level of Assistance: Contact guard assistance  Light Housekeeping: SBA grossly, CGA for posterior lean/LOB with pt able to correct; 10 minutes in stance collecting clothing from various heights and folding/hanging as appropriate; pt demo'd use of button tool after demo     Functional Mobility  Device: Rolling walker  Activity: To/From therapy gym  Assistance Level: Stand by assist  Transfers  Surface: To chair with arms;From chair with arms  Device: Walker  Sit to Stand  Assistance Level: Stand by assist  Stand to Sit  Assistance Level: Stand by assist  Floor Transfer  Assistance Level: Contact guard assist  Skilled Clinical Factors: disucssion with pt about fall recovery; visual demo from OT; pt demo'd ability to slide down from chair to knees, turn around to sit on buttocks; bring knees underneath self and up to quadruped position, crawl to chair, place BUE on stable chair, bring one foot to floor and transfer into chair with CGA; increased effort for final push from floor/bringing second foot to floor but [facility]; pt states this is similar to how she recovers from falls at home, demo'd good adherence to spinal precuations during trial         Assessment  Assessment  Assessment First Session: Pt agreeable to session, ambulated to gym with RW SBA. 10 minutes in stance for IADL of laundry folding, demo and education on button aide, pt reports this to be easier v no AD. Practice of floor transfer with CGA on floor mat up to stable chair. Education/discussion for safety with floor transfers in event of fall in light of spinal precautions. Pt left in chair, needs within reach, alarm set. CONT OT POC. Assessment Second Session: Pt met in chair in PM, cont to decline need for ADLs.  CGA for stand step to w/c with no AD for dep push to ramp near MOB.   Pt ambulated down and up ramp as appropriate with CGA progressing to SBA on incline. Rec verbal cues to use coloring on floor to increase KAREEM. Pt with tendency to drift right during ambulation. Dynamic balance activity in // bars with swiss ball positioned on top of bars. Pt initally cued to use reciprocal step and push pattern, too cog challenging for pt, pt cued to simply step and push. With vertical mirror for feedback pt able to watch feet placement d/t dec proprioception. Activity down graded to include balance beam between pt's feet and tumble form to push to increased visual field for feedback. Pt made >5 passes forward and backward in // bars CGA up to SBA at best with one instance of Min A to correct posterior LOB. At end, pt cued to ambulated forward in // bars with one hand for support on bar, pt reported that this \"felt more natural\" with increase step length and KAREEM noted. Pt then completed one border on Peg A Pattern with inc time req to place pegs and inc effort to push fully into board. Pt completed resistive clip pattern while seated with inc shoulder flexion as pattern progressed up vertical bar. Hand strength re tested this date with [employer]    Right Hand  Left Hand    2/21  32 psi 30 psi   2/28 35 psi 38.3 psi     Pt left in chair at end of session, alarm set, needs within reach. Pt cont to be limited by decreased balance and proprioception during functional ambulation, decreased motor control and strength of B hands during ADLs. Cont OT POC to progress pt towards PLOF safely.      Activity Tolerance: Patient tolerated treatment well  Discharge Recommendations: 24 hour supervision or assist;Home with Home health OT  OT Equipment Recommendations  Equipment Needed: Yes  Mobility Devices: ADL Assistive Devices  ADL Assistive Devices: Reacher;Long-handled Shoe Horn  Other: CTA: pt has grab bars in bathroom, shower chair, and hand held shower head  Safety Devices  Safety Devices in place: Yes  Type of devices: All fall risk precautions in place;Call light within reach; Chair alarm in place;Gait belt;Patient at risk for falls; Left in chair;Nurse notified    Patient Education  Education  Education Given To: Patient  Education Provided: Role of Therapy;Plan of Care;Precautions; Safety;ADL Function;Mobility Training;Transfer Training;Equipment; Fall Prevention Strategies  Education Provided Comments: disease specific: use of AD for ADLs, fall recovery  Education Method: Demonstration;Verbal  Barriers to Learning: None  Education Outcome: Verbalized understanding;Demonstrated understanding    Plan  Occupational Therapy Plan  Times Per Week: 5-7x/wk  Current Treatment Recommendations: Strengthening;ROM;Balance training;Functional mobility training; Endurance training;Positioning;Equipment evaluation, education, & procurement;Patient/Caregiver education & training; Safety education & training;Pain management;Return to work related activity; Self-Care / ADL; Home management training;Coordination training    Goals  Patient Goals   Patient goals : \"to get to take care of the litter box and trash\"  Short Term Goals  Time Frame for Short Term Goals: 1 week (2/24/23)- all goals prog unless otherwise noted 2/28  Short Term Goal 1: Pt will complete LB dressing with min A- GOAL MET 2/20  Short Term Goal 2: Pt will complete toileting with min A- GOAL MET 2/20  Short Term Goal 3: Pt will complete bathing with min A- GOAL MET 2/20  Short Term Goal 4: Pt will complete light household tasks with min A- GOAL MET 2/23  Long Term Goals  Time Frame for Long Term Goals : 2 weeks (3/3/23)  Long Term Goal 1: Pt will complete LB dressing mod I  Long Term Goal 2: Pt will complete toileting mod I  Long Term Goal 3: Pt will complete bathing with mod I  Long Term Goal 4: Pt will complete light household tasks mod independent               Therapy Time   Individual Concurrent Group Co-treatment   Time In 0930         Time Out 1000         Minutes 30         Timed Code Treatment Minutes: 30 Minutes       Second Session Therapy Time:   Individual Concurrent Group Co-treatment   Time In 1230         Time Out 1330         Minutes 60           Timed Code Treatment Minutes:  60 Minutes    Total Treatment Minutes:  90 minutes      Jr Castaneda OT

## 2023-03-01 LAB
ANION GAP SERPL CALCULATED.3IONS-SCNC: 10 MMOL/L (ref 3–16)
BASOPHILS ABSOLUTE: 0.1 K/UL (ref 0–0.2)
BASOPHILS RELATIVE PERCENT: 1.1 %
BUN BLDV-MCNC: 27 MG/DL (ref 7–20)
CALCIUM SERPL-MCNC: 10 MG/DL (ref 8.3–10.6)
CHLORIDE BLD-SCNC: 102 MMOL/L (ref 99–110)
CO2: 27 MMOL/L (ref 21–32)
CREAT SERPL-MCNC: 1 MG/DL (ref 0.6–1.2)
EOSINOPHILS ABSOLUTE: 0.2 K/UL (ref 0–0.6)
EOSINOPHILS RELATIVE PERCENT: 3.7 %
GFR SERPL CREATININE-BSD FRML MDRD: >60 ML/MIN/{1.73_M2}
GLUCOSE BLD-MCNC: 113 MG/DL (ref 70–99)
GLUCOSE BLD-MCNC: 158 MG/DL (ref 70–99)
HCT VFR BLD CALC: 35.2 % (ref 36–48)
HEMOGLOBIN: 11.9 G/DL (ref 12–16)
LYMPHOCYTES ABSOLUTE: 1.5 K/UL (ref 1–5.1)
LYMPHOCYTES RELATIVE PERCENT: 26.3 %
MCH RBC QN AUTO: 31.9 PG (ref 26–34)
MCHC RBC AUTO-ENTMCNC: 33.7 G/DL (ref 31–36)
MCV RBC AUTO: 94.4 FL (ref 80–100)
MONOCYTES ABSOLUTE: 0.6 K/UL (ref 0–1.3)
MONOCYTES RELATIVE PERCENT: 9.9 %
NEUTROPHILS ABSOLUTE: 3.4 K/UL (ref 1.7–7.7)
NEUTROPHILS RELATIVE PERCENT: 59 %
PDW BLD-RTO: 13.2 % (ref 12.4–15.4)
PERFORMED ON: ABNORMAL
PLATELET # BLD: 455 K/UL (ref 135–450)
PMV BLD AUTO: 6.9 FL (ref 5–10.5)
POTASSIUM REFLEX MAGNESIUM: 5.2 MMOL/L (ref 3.5–5.1)
PREALBUMIN: 17.9 MG/DL (ref 20–40)
RBC # BLD: 3.73 M/UL (ref 4–5.2)
SODIUM BLD-SCNC: 139 MMOL/L (ref 136–145)
WBC # BLD: 5.7 K/UL (ref 4–11)

## 2023-03-01 PROCEDURE — 1280000000 HC REHAB R&B

## 2023-03-01 PROCEDURE — 97112 NEUROMUSCULAR REEDUCATION: CPT

## 2023-03-01 PROCEDURE — 97530 THERAPEUTIC ACTIVITIES: CPT

## 2023-03-01 PROCEDURE — 6370000000 HC RX 637 (ALT 250 FOR IP): Performed by: STUDENT IN AN ORGANIZED HEALTH CARE EDUCATION/TRAINING PROGRAM

## 2023-03-01 PROCEDURE — 97110 THERAPEUTIC EXERCISES: CPT

## 2023-03-01 PROCEDURE — 84134 ASSAY OF PREALBUMIN: CPT

## 2023-03-01 PROCEDURE — 97535 SELF CARE MNGMENT TRAINING: CPT

## 2023-03-01 PROCEDURE — 6360000002 HC RX W HCPCS: Performed by: STUDENT IN AN ORGANIZED HEALTH CARE EDUCATION/TRAINING PROGRAM

## 2023-03-01 PROCEDURE — 85025 COMPLETE CBC W/AUTO DIFF WBC: CPT

## 2023-03-01 PROCEDURE — 80048 BASIC METABOLIC PNL TOTAL CA: CPT

## 2023-03-01 PROCEDURE — 97116 GAIT TRAINING THERAPY: CPT

## 2023-03-01 PROCEDURE — 36415 COLL VENOUS BLD VENIPUNCTURE: CPT

## 2023-03-01 RX ADMIN — ACETAMINOPHEN 1000 MG: 500 TABLET ORAL at 05:59

## 2023-03-01 RX ADMIN — MAGNESIUM OXIDE TAB 400 MG (240 MG ELEMENTAL MG) 800 MG: 400 (240 MG) TAB at 09:54

## 2023-03-01 RX ADMIN — ACETAMINOPHEN 1000 MG: 500 TABLET ORAL at 15:07

## 2023-03-01 RX ADMIN — ACETAMINOPHEN 650 MG: 325 TABLET ORAL at 09:52

## 2023-03-01 RX ADMIN — CLONIDINE HYDROCHLORIDE 0.1 MG: 0.1 TABLET ORAL at 20:16

## 2023-03-01 RX ADMIN — METOPROLOL TARTRATE 25 MG: 25 TABLET, FILM COATED ORAL at 20:16

## 2023-03-01 RX ADMIN — Medication 9 MG: at 20:16

## 2023-03-01 RX ADMIN — ACETAMINOPHEN 1000 MG: 500 TABLET ORAL at 20:15

## 2023-03-01 RX ADMIN — VENLAFAXINE HYDROCHLORIDE 150 MG: 37.5 CAPSULE, EXTENDED RELEASE ORAL at 09:53

## 2023-03-01 RX ADMIN — OXYCODONE 5 MG: 5 TABLET ORAL at 20:16

## 2023-03-01 RX ADMIN — POLYETHYLENE GLYCOL 3350 17 G: 17 POWDER, FOR SOLUTION ORAL at 09:54

## 2023-03-01 RX ADMIN — ATORVASTATIN CALCIUM 20 MG: 10 TABLET, FILM COATED ORAL at 09:53

## 2023-03-01 RX ADMIN — ENOXAPARIN SODIUM 40 MG: 100 INJECTION SUBCUTANEOUS at 09:54

## 2023-03-01 ASSESSMENT — PAIN SCALES - GENERAL
PAINLEVEL_OUTOF10: 6
PAINLEVEL_OUTOF10: 4
PAINLEVEL_OUTOF10: 1
PAINLEVEL_OUTOF10: 3
PAINLEVEL_OUTOF10: 7
PAINLEVEL_OUTOF10: 7
PAINLEVEL_OUTOF10: 4

## 2023-03-01 ASSESSMENT — PAIN DESCRIPTION - DESCRIPTORS
DESCRIPTORS: ACHING
DESCRIPTORS: ACHING;SORE
DESCRIPTORS: ACHING
DESCRIPTORS: ACHING;SORE

## 2023-03-01 ASSESSMENT — PAIN DESCRIPTION - FREQUENCY
FREQUENCY: INTERMITTENT

## 2023-03-01 ASSESSMENT — PAIN DESCRIPTION - ONSET
ONSET: GRADUAL
ONSET: GRADUAL

## 2023-03-01 ASSESSMENT — PAIN DESCRIPTION - PAIN TYPE
TYPE: ACUTE PAIN;SURGICAL PAIN
TYPE: SURGICAL PAIN
TYPE: ACUTE PAIN;SURGICAL PAIN

## 2023-03-01 ASSESSMENT — PAIN DESCRIPTION - LOCATION
LOCATION: NECK
LOCATION: NECK;SHOULDER
LOCATION: NECK
LOCATION: NECK;SHOULDER

## 2023-03-01 ASSESSMENT — PAIN - FUNCTIONAL ASSESSMENT
PAIN_FUNCTIONAL_ASSESSMENT: ACTIVITIES ARE NOT PREVENTED
PAIN_FUNCTIONAL_ASSESSMENT: PREVENTS OR INTERFERES SOME ACTIVE ACTIVITIES AND ADLS

## 2023-03-01 ASSESSMENT — PAIN DESCRIPTION - ORIENTATION
ORIENTATION: POSTERIOR;RIGHT
ORIENTATION: POSTERIOR

## 2023-03-01 NOTE — PROGRESS NOTES
Occupational Therapy  Facility/Department: [facility]  Rehabilitation Occupational Therapy Daily Treatment Note    Date: 3/1/23  Patient Name: Patricia Lin       Room: 61/4303-94  MRN: [id number]  Account: [de-identified]   : 1950  (81 y.o.) Gender: female     Past Medical History:  has a past medical history of Alcohol abuse, Anxiety and depression, Arthritis, Cancer ([address] Depression, Hyperlipidemia, Hypertension, Hypokalemia, IBS (irritable bowel syndrome), Overactive bladder, Pre-diabetes, Prolonged emergence from general anesthesia, Rheumatic heart disease, and Wears glasses. Past Surgical History:   has a past surgical history that includes Tonsillectomy; Tubal ligation; fracture surgery; Colonoscopy; bladder suspension; Ovary removal (Bilateral); Carpal tunnel release (Bilateral, ); Bilateral salpingoophorectomy; and cervical fusion (N/A, 2023). Restrictions  Restrictions/Precautions: Fall Risk;General Precautions; Up as Tolerated  Other position/activity restrictions: \"Ok to remove for meals and hygiene\"  Required Braces or Orthoses  Cervical: [place] REY  Required Braces or Orthoses?: Yes    Subjective  Subjective: pt seated in recliner and agreeable to OT services upon arrival. pt denies pain at this time.      Objective  Vitals: taken beginning of session pt seated in recliner  /59 HR 69 SPO2 97% RA    Cognition  Overall Cognitive Status: WFL    Orientation  Overall Orientation Status: Within Normal Limits  Orientation Level: Oriented X4     ADL  Upper Extremity Bathing  Assistance Level: Supervision  Skilled Clinical Factors: seated on TTB, long handled sponge, hand held shower head  Lower Extremity Bathing  Assistance Level: Supervision  Skilled Clinical Factors: seated on TTB and stance with grab bars to cleanse syed area  Upper Extremity Dressing  Assistance Level: Supervision  Skilled Clinical Factors: SPV shirt exchange, dep [place] J collar due to lack of mirror in shower  Lower Extremity Dressing  Assistance Level: Supervision  Skilled Clinical Factors: seated and stance RW underwear and pant exchange  Putting On/Taking Off Footwear  Assistance Level: Supervision  Skilled Clinical Factors: seated sock and shoe exchange  Toileting  Assistance Level: Supervision  Skilled Clinical Factors: standard toilet, grab bars, RW  Toilet Transfers  Technique:  (ambulation with RW)  Equipment: Standard toilet  Additional Factors: With handrails  Assistance Level: Supervision  Tub/Shower Transfers  Type: Shower  Transfer From: Rolling walker  Transfer To: Tub transfer bench  Assistance Level: Supervision  Skilled Clinical Factors: RW    Instrumental ADL's  Instrumental ADLs: Yes  Pet Care  Pet Care Level: Walker  Pet Care Level of Assistance: Supervision  Pet Care: simulation prep dog food/cat food     Functional Mobility  Device: Rolling walker  Activity: To/From bathroom; To/From therapy gym, in [place] and lobby  Assistance Level: Supervision    Transfers  Surface: To chair with arms;From chair with arms  Device: Walker  Sit to Stand  Assistance Level: Supervision  Stand to Sit  Assistance Level: Supervision     Assessment  First session: At this time pt is grossly SPV for ADLs/transfers/ambulation with RW. At this time pt is functioning below PLOF and will continue to benefit from skilled OT services in the acute rehab setting to increase pt safety and ind w/ ADLs/transfers/ambulation. Continue OT POC. Second session: Pt in chair, agreeable to session. Pt SPV for STS, SBA for ambulation to/from gym. Performed >15 minutes in stance with CGA progressing to very close SBA for ambulation during activity with no AD. Mini squat for clip retrieval from low mat, 180* turn, stepping over 4.5\" balance beam or stepping up to 4\" step to place clips of varying resistance in predetermined patterns.  Pt stepping up with both R and L feet, progressing to advancing to step up during ambulation v stopping, then stepping up. Pt reports increased comfort with ambulating without AD, especially during dual task (ie. Simulating kitchen environment with animals). OT did not notice scissoring gait during this session. Pt reports that kitchen is small and she typically furniture walks while in kitchen. SBA for return to room with RW, left in chair with needs within reach, alarm set. Activity Tolerance: Patient tolerated treatment well  Discharge Recommendations: 24 hour supervision or assist;Outpatient OT  OT Equipment Recommendations  Equipment Needed: Yes  Mobility Devices: ADL Assistive Devices  ADL Assistive Devices: Reacher;Long-handled Shoe Horn  Other: CTA: pt has grab bars in bathroom, shower chair, and hand held shower head  Safety Devices in place: Yes  Type of devices: All fall risk precautions in place;Call light within reach; Chair alarm in place;Gait belt;Patient at risk for falls; Left in chair;Nurse notified  Restraints initially in place: No    Patient Education  Education Given To: Patient  Education Provided: Role of Therapy;Plan of Care;Precautions; Safety;ADL Function;Mobility Training;Transfer Training;Equipment; Fall Prevention Strategies; Energy Conservation  Education Method: Demonstration;Verbal  Barriers to Learning: None  Education Outcome: Verbalized understanding;Demonstrated understanding;Continued education needed  Disease specific: Pt educated on benefits of OOB activity to decrease risks associated with prolonged bedrest while in hospital. Pt verbalized understanding. Plan  Occupational Therapy Plan  Times Per Week: 5-7x/wk  Current Treatment Recommendations: Strengthening;ROM;Balance training;Functional mobility training; Endurance training;Positioning;Equipment evaluation, education, & procurement;Patient/Caregiver education & training; Safety education & training;Pain management;Return to work related activity; Self-Care / ADL; Home management training;Coordination training    Goals - all non-met goals ongoing as of 3/01/2023  Patient Goals   Patient goals : \"to get to take care of the litter box and trash\"  Short Term Goals  Time Frame for Short Term Goals: 1 week (2/24/23) unless otherwise noted  Short Term Goal 1: Pt will complete LB dressing with min A- GOAL MET 2/20  Short Term Goal 2: Pt will complete toileting with min A- GOAL MET 2/20  Short Term Goal 3: Pt will complete bathing with min A- GOAL MET 2/20  Short Term Goal 4: Pt will complete light household tasks with min A- GOAL MET 2/23  Long Term Goals  Time Frame for Long Term Goals : 2 weeks (3/3/23) unless otherwise noted  Long Term Goal 1: Pt will complete LB dressing mod I  Long Term Goal 2: Pt will complete toileting mod I  Long Term Goal 3: Pt will complete bathing with mod I  Long Term Goal 4: Pt will complete light household tasks mod independent    Therapy Time   Individual Concurrent Group Co-treatment   Time In 0800         Time Out 0900         Minutes 60         Timed Code Treatment Minutes: 60 Minutes    Second Session Therapy Time:   Individual Concurrent Group Co-treatment   Time In 1030         Time Out 1100         Minutes 30           Timed Code Treatment Minutes:  30 Minutes    Total Treatment Minutes: 90 minutes      Kiki Owusu OTR/L  Second Session: Martha Lopez OTR/L [id number]

## 2023-03-01 NOTE — PROGRESS NOTES
Physical Therapy  Facility/Department: [facility]  Rehabilitation Physical Therapy Treatment Note    NAME: Diego Bolivar  : 1950 (81 y.o.)  MRN: [id number]  CODE STATUS: Full Code    Date of Service: 3/1/23       Restrictions:  Restrictions/Precautions: Fall Risk;General Precautions; Up as Tolerated  Required Braces or Orthoses  Cervical: [place] J  Position Activity Restriction  Spinal Precautions: No Bending; No Lifting; No Twisting  Other position/activity restrictions: \"Ok to remove for meals and hygiene\"     SUBJECTIVE  Subjective  Subjective: pt found in recliner  Pain: 3/10 pain          OBJECTIVE  Cognition  Overall Cognitive Status: WFL  Arousal/Alertness: Delayed responses to stimuli  Following Commands: Follows one step commands consistently  Attention Span: Appears intact  Memory: Decreased short term memory  Safety Judgement: Decreased awareness of need for safety  Problem Solving: Assistance required to implement solutions  Insights: Fully aware of deficits  Initiation: Does not require cues  Sequencing: Does not require cues  Orientation  Overall Orientation Status: Within Normal Limits  Orientation Level: Oriented X4    Functional Mobility     Pt found in recliner, vitals assessed. Sit to stand from various surfaces with spv-mod ind    Gait x 180 ft with no AD, CGA (brief moments of min A) with cues for wide KAREEM and consistent mary. Pt completed 3 min of forward/backwards stepping over 4\" elevated cane with 1 -0 UE support and cGA. Cues and visual cues provided for wide KAREEM, improved mary/ balance noted    Dyn stand activity: begin shoulder width KAREEM-> RLE step onto 1st 6\" step-> LLE reciprocal step onto 2nd 6\" step (resisted by green TB)-> back to start. X 15 reps with CGA and 1 UE support. Gait x 200 ft with no AD< CGA with metronome to facilitate consistent mary with improved KAREEM and heel strike.    Pt in recliner at end of session with call light/needs within reach and alarm donned. Second Session  Pt found in recliner, agreeable to session  Sit to stand transfers completed with ind    Stand pivot with no AD and SBA  Sit <> supine ind  Pt donned 1.5# weights BLE, completed 10 reps of BLE supine    -alternating heel slides   - hip abduction   -SLR   -bridge with cues for neutral pelvis    Gait x 300 ft (125 ft outdoor unlevel pavement surface) with RW, SBA-spuv demonstrating appropriate KAREEM, reciprocal gait pattern  X10 sit to stand from low surface without UE support, improved hip hinge and ind for increased LE strenghtening. Pt completed 8 min dyn stand activity:  beanbag from table-> ambulate to same colored balance disc-> step onto disc and throw BB to target-> step off and repeat. Use of RW, pt able to motor plan safe stepping onto foam disc (attempts to initially step onto disc around RW)    Gait x 10 min > 700ft with RW, supv about hospital and gift shop for community ambulation with no LOB, good environment awareness. Pt in recliner at end of session with call light/needs within reach and alarm donned. ASSESSMENT/PROGRESS TOWARDS GOALS  Vital Signs  Heart Rate: 80  Heart Rate Source: Monitor  BP: 126/60  BP Location: Right upper arm  MAP (Calculated): 82  SpO2: 97 %  O2 Device: None (Room air)    Assessment  Assessment: pt found in recliner, pleasant and agreeable. grossly spv-mod ind transfer, spv with RW for gait adn cGA without AD. emphasis of dyn stand activities and neuro re-ed on wide KAREEM adn LLE awareness.  continue to rec home with initial 24/7 and OPPR progressing to prn assist. DME: none, owns personal RW  Activity Tolerance: Patient tolerated treatment well  Discharge Recommendations: Home with assist PRN;Outpatient PT  PT Equipment Recommendations  Equipment Needed: No    Goals  Patient Goals   Patient Goals : I want to be able to take care of my animals  Short Term Goals  Time Frame for Short Term Goals: 2/24  Short Term Goal 1: Pt will complete bed mobility with supervision. GOAL met 2/22/2023 patient able to complete bed mobility with S/SBA for cues for back care log roll with Leydi mallory. Short Term Goal 2: Pt will sit to stand with SBA to RW- GOAL MET 2/23 with supv and RW  Short Term Goal 3: Pt will ambulate x 100' with RW with supervision. GOAL met 2/22/2023  patient amb greater than 1000 feet wtih FWW and SBA in wide lennox. Short Term Goal 4: Pt will complete 12 stairs with min A.   Goal Met 2/21/2023 patient demonstrates up and down 16 steps wtih bilateral rails and min assist.  Long Term Goals  Time Frame for Long Term Goals : 3/3/23  Long Term Goal 1: Pt will be independent with bed mobility  Long Term Goal 2: Pt will ambulate x 150' with LRAD independently  Long Term Goal 3: Pt will complete 4 stairs with Mod I  Long Term Goal 4: Pt will be independent donning/doffing c-collar    PLAN OF CARE/SAFETY  Physcial Therapy Plan  General Plan: 5-7 times per week  Specific Instructions for Next Treatment: progress mobility and therex as tolerated  Current Treatment Recommendations: Functional mobility training;Transfer training;ADL/Self-care training;Gait training;Positioning;Modalities; Therapeutic activities; Patient/Caregiver education & training;Strengthening;Balance training;Home exercise program;Stair training; Safety education & training;Neuromuscular re-education; Endurance training;Pain management;Cognitive reorientation;Manual  Safety Devices  Type of Devices: Chair alarm in place;Call light within reach;Gait belt;Patient at risk for falls; Left in chair;Nurse notified; All fall risk precautions in place; Bed alarm in place    EDUCATION  Education  Education Given To: Patient  Education Provided: Transfer Training;Mobility Training  Education Provided Comments: cued for wide lennox with transfers to facilitate good spine mechanics and for backing up to car transfer seat for set up for transfers, cued for pacing wtih recucmbent stepper, cued and faciltiated with paraspinal stroking upright posture, cued for hand placement downgoing steps for improved stability.   Challenged with  FGA  Education Method: Demonstration;Verbal  Barriers to Learning: None  Education Outcome: Verbalized understanding;Continued education needed;Demonstrated understanding        Therapy Time   Individual Concurrent Group Co-treatment   Time In 1300         Time Out 1330         Minutes 30           Timed Code Treatment Minutes: 30 Minutes     Second Session Therapy Time:   Individual Concurrent Group Co-treatment   Time In  1400         Time Out  1500         Minutes  60           Timed Code Treatment Minutes:  60    Total Treatment Minutes:  90    Marguerite Fu PT, 03/01/23 at 3:36 PM

## 2023-03-01 NOTE — PROGRESS NOTES
Department of Physical Medicine & Rehabilitation  Progress Note    Patient Identification:  Minh Grimm  [id number]  : 1950  Admit date: 2023    Chief Complaint: Cervical myelopathy ([address]    Subjective:   No acute events overnight. Patient seen this am sitting up in room. She reports steady progress with therapies. Pain controlled. Asks appropriate questions about dc planning. Education provided. She is agreeable to outpatient therapies. Labs reviewed. ROS: No f/c, n/v, cp     Objective:  Patient Vitals for the past 24 hrs:   BP Temp Temp src Pulse Resp SpO2   23 0945 108/65 98.3 °F (36.8 °C) Oral 75 16 98 %   23 2000 121/76 98.4 °F (36.9 °C) Oral 74 18 99 %     Const: Alert. No distress, pleasant. HEENT: Normocephalic, atraumatic. Normal sclera/conjunctiva. MMM. CV: Regular rate and rhythm. Resp: No respiratory distress. Lungs CTAB. Abd: Soft, nontender, nondistended, NABS+   Ext: No edema. MSK: cervical collar in place  Neuro: Alert, oriented, appropriately interactive. Psych: Cooperative, appropriate mood and affect    Laboratory data: Available via EMR.    Last 24 hour lab  Recent Results (from the past 24 hour(s))   CBC with Auto Differential    Collection Time: 23  7:05 AM   Result Value Ref Range    WBC 5.7 4.0 - 11.0 K/uL    RBC 3.73 (L) 4.00 - 5.20 M/uL    Hemoglobin 11.9 (L) 12.0 - 16.0 g/dL    Hematocrit 35.2 (L) 36.0 - 48.0 %    MCV 94.4 80.0 - 100.0 fL    MCH 31.9 26.0 - 34.0 pg    MCHC 33.7 31.0 - 36.0 g/dL    RDW 13.2 12.4 - 15.4 %    Platelets 749 (H) 007 - 450 K/uL    MPV 6.9 5.0 - 10.5 fL    Neutrophils % 59.0 %    Lymphocytes % 26.3 %    Monocytes % 9.9 %    Eosinophils % 3.7 %    Basophils % 1.1 %    Neutrophils Absolute 3.4 1.7 - 7.7 K/uL    Lymphocytes Absolute 1.5 1.0 - 5.1 K/uL    Monocytes Absolute 0.6 0.0 - 1.3 K/uL    Eosinophils Absolute 0.2 0.0 - 0.6 K/uL    Basophils Absolute 0.1 0.0 - 0.2 K/uL   Basic Metabolic Panel w/ Reflex to MG Collection Time: 03/01/23  7:06 AM   Result Value Ref Range    Sodium 139 136 - 145 mmol/L    Potassium reflex Magnesium 5.2 (H) 3.5 - 5.1 mmol/L    Chloride 102 99 - 110 mmol/L    CO2 27 21 - 32 mmol/L    Anion Gap 10 3 - 16    Glucose 113 (H) 70 - 99 mg/dL    BUN 27 (H) 7 - 20 mg/dL    Creatinine 1.0 0.6 - 1.2 mg/dL    Est, Glom Filt Rate >60 >60    Calcium 10.0 8.3 - 10.6 mg/dL       Therapy progress:  Physical therapy:  Bed Mobility:  Overall Assistance Level: Contact Guard Assist  Sit>supine:  Assistance Level: Stand by assist  Skilled Clinical Factors: sup<>sidelying<>sit with cues for log rolling. Supine>sit:  Assistance Level: Stand by assist  Transfers:  Surface: To bed, To chair with arms, From bed, From chair with arms  Additional Factors: Verbal cues (for wide lennox)  Device: Walker  Sit>stand:  Assistance Level: Stand by assist  Skilled Clinical Factors: bed<>chair with FWW x1, set up assist  for donning shoes in recliner and  cues for more upright posture with walker  Stand>sit:  Assistance Level: Stand by assist, Contact guard assist  Skilled Clinical Factors: to chair w/ RW  Bed<>chair     Stand Pivot:     Lateral transfer:     Car transfer:     Ambulation:  Surface: Level surface  Device: Rolling walker  Distance: 1036  Activity: Within Unit, Retrieve Items  Activity Comments: pt ambulating around unit x 3 reps.  Second trial, pt retrieving cones to improve balance, BLE proprioception, and walker negotiation  Additional Factors: Verbal cues  Assistance Level: Stand by assist  Gait Deviations: Slow mary, Decreased step length bilateral, Narrow base of support, Unsteady gait  Skilled Clinical Factors: cues for more upright posture with walker , cued for wider lennox, facilitating upright posture by paraspinal mid thoracic stroking while walking no lob  Stairs:  Stair Height: 6''  Device: Standard walker (at bottom of steps)  Number of Stairs: 16  Additional Factors: Verbal cues, Hand placement cues  Assistance Level: Stand by assist  Skilled Clinical Factors: cued  x4 to reach UE down steps when down going, mildly usteady with foot placement downgoing with steps necessitating SBA  Curb: Wheelchair:     Assessment:  Assessment: pt found in recliner, reporting moderate pain in neck nad R shoulder, RN made aware. supv-mod ind transfers, supv gait with RW, tolerated supine ther ex without complaints. continue to rec home with prn assist (when ambulating in community) nad OPPT. DME: none, owns RW  Activity Tolerance: Patient tolerated treatment well  Discharge Recommendations: Home with assist PRN, Outpatient PT      Occupational therapy:   Feeding     Grooming/Oral Hygiene  Assistance Level: Supervision  Skilled Clinical Factors: oral care in stance with RW support PRN with Superior J collar donned  UE Bathing  Assistance Level: Supervision  Skilled Clinical Factors: seated on TTB, long handled sponge, hand held shower head  LE Bathing  Assistance Level: Supervision  Skilled Clinical Factors: seated on TTB and stance with grab bars to cleanse syed area  UE Dressing  Assistance Level: Supervision  Skilled Clinical Factors: SPV shirt exchange, dep miami J collar due to lack of mirror in shower  LE Dressing  Assistance Level: Supervision  Skilled Clinical Factors: seated and stance RW underwear and pant exchange  Putting On/Taking Off Footwear  Assistance Level: Supervision  Skilled Clinical Factors: seated sock and shoe exchange  Toileting  Equipment Provided: Toilet Tongs  Assistance Level: Supervision  Skilled Clinical Factors: standard toilet, grab bars, RW  Assessment:    Speech therapy:            PT  Required Braces or Orthoses  Cervical: miami J  Position Activity Restriction  Spinal Precautions: No Bending, No Lifting, No Twisting  Other position/activity restrictions:  \"Ok to remove for meals and hygiene\"  Objective     Sit to Stand: Minimal Assistance  Stand to Sit: Minimal Assistance  Device: Rolling Walker  Assistance: Minimal assistance  Distance: 39'  OT  PT Equipment Recommendations  Equipment Needed: No  Toilet - Technique: Ambulating  Equipment Used: Grab bars  Assessment        SLP          Body mass index is 22.88 kg/m². Assessment and Plan:  Patient is a 80 yo F with pmh HTN, HLD, Pre-DM2, IBS, depression/anxiety who presented to [facility] on 2/14/20[facility] for urgent C3-5 laminectomy and and C3-6 posterior fusion. She was admitted to [facility] on 2/17/23 due to functional deficits below her baseline. Cervical Myelopathy  - s/p C3-5 laminectomy and C3-6 fusion on 2/14 with Dr. Nelda Cha  - cervical collar when OOB  - multimodal pain control, made muscle relaxer PRN due to confusion at night  - incision care. Left message with Neurosurgery about follow up appointment for staple/suture removal. Awaiting call back  - vancomycin discontinued as drain is out  - PT, OT, ST     Fall  - hit posterior right head  - initial CT head unremarkable. Was having headaches so obtained repeat CT head and unremarkable. Posterior Right Head wound  - unclear when occurred, staples in place. Patient unsure of timing  - nursing removed staples 2/21     HTN  - metoprolol  - hold lisinopril due to soft BPs and mild hyperK    Hyperkalemia  -Hold lisinopril  -repeat BMP in am     HLD  - statin     Anxiety/Depression  - effexor     Bowels: adjust medications as needed for regular bowel movements     Bladder: Check PVR x 3. [address] if PVR > 200ml or if any volume is > 500 ml. Sleep: melatonin scheduled     PPX  DVT: lovenox  GI: not indicated    Rehab Progress: Interdisciplinary team conference was held today with entire rehab treatment team including PT, OT, SLP (if applicable), Dietician, RN, and SW. Discussion focused on progress toward rehab goals and discharge planning. Still some scissoring with ambulation due to decreased proprioception. Working with and without walker.  Hand strength improving, intermittently requiring assist for brace management. Separate conference then held with patient/family (if available), questions answered and concerns addressed. Total treatment time >35 min with greater than 50% spent in care coordination. Anticipated Dispo: home, sister staying with her initially  Services: Outpatient PT, OT  DME: Has all  ELOS: 3/3      Luda Cage.  Courtney Mensah MD 3/1/2023, 10:50 AM

## 2023-03-02 LAB
ANION GAP SERPL CALCULATED.3IONS-SCNC: 9 MMOL/L (ref 3–16)
BUN BLDV-MCNC: 23 MG/DL (ref 7–20)
CALCIUM SERPL-MCNC: 10 MG/DL (ref 8.3–10.6)
CHLORIDE BLD-SCNC: 102 MMOL/L (ref 99–110)
CO2: 29 MMOL/L (ref 21–32)
CREAT SERPL-MCNC: 0.9 MG/DL (ref 0.6–1.2)
GFR SERPL CREATININE-BSD FRML MDRD: >60 ML/MIN/{1.73_M2}
GLUCOSE BLD-MCNC: 100 MG/DL (ref 70–99)
POTASSIUM REFLEX MAGNESIUM: 5.1 MMOL/L (ref 3.5–5.1)
SODIUM BLD-SCNC: 140 MMOL/L (ref 136–145)

## 2023-03-02 PROCEDURE — 97535 SELF CARE MNGMENT TRAINING: CPT

## 2023-03-02 PROCEDURE — 6370000000 HC RX 637 (ALT 250 FOR IP): Performed by: STUDENT IN AN ORGANIZED HEALTH CARE EDUCATION/TRAINING PROGRAM

## 2023-03-02 PROCEDURE — 97530 THERAPEUTIC ACTIVITIES: CPT

## 2023-03-02 PROCEDURE — 80048 BASIC METABOLIC PNL TOTAL CA: CPT

## 2023-03-02 PROCEDURE — 97110 THERAPEUTIC EXERCISES: CPT

## 2023-03-02 PROCEDURE — 1280000000 HC REHAB R&B

## 2023-03-02 PROCEDURE — 97116 GAIT TRAINING THERAPY: CPT

## 2023-03-02 PROCEDURE — 6360000002 HC RX W HCPCS: Performed by: STUDENT IN AN ORGANIZED HEALTH CARE EDUCATION/TRAINING PROGRAM

## 2023-03-02 PROCEDURE — 97112 NEUROMUSCULAR REEDUCATION: CPT

## 2023-03-02 PROCEDURE — 36415 COLL VENOUS BLD VENIPUNCTURE: CPT

## 2023-03-02 RX ORDER — OXYCODONE HYDROCHLORIDE 5 MG/1
5 TABLET ORAL EVERY 12 HOURS PRN
Qty: 6 TABLET | Refills: 0 | Status: SHIPPED | OUTPATIENT
Start: 2023-03-02 | End: 2023-03-05

## 2023-03-02 RX ADMIN — CLONIDINE HYDROCHLORIDE 0.1 MG: 0.1 TABLET ORAL at 21:13

## 2023-03-02 RX ADMIN — VENLAFAXINE HYDROCHLORIDE 150 MG: 37.5 CAPSULE, EXTENDED RELEASE ORAL at 08:18

## 2023-03-02 RX ADMIN — ATORVASTATIN CALCIUM 20 MG: 10 TABLET, FILM COATED ORAL at 08:25

## 2023-03-02 RX ADMIN — METOPROLOL TARTRATE 25 MG: 25 TABLET, FILM COATED ORAL at 08:19

## 2023-03-02 RX ADMIN — OXYCODONE 5 MG: 5 TABLET ORAL at 21:13

## 2023-03-02 RX ADMIN — ACETAMINOPHEN 650 MG: 325 TABLET ORAL at 08:18

## 2023-03-02 RX ADMIN — MAGNESIUM OXIDE TAB 400 MG (240 MG ELEMENTAL MG) 800 MG: 400 (240 MG) TAB at 08:19

## 2023-03-02 RX ADMIN — ACETAMINOPHEN 1000 MG: 500 TABLET ORAL at 13:05

## 2023-03-02 RX ADMIN — ENOXAPARIN SODIUM 40 MG: 100 INJECTION SUBCUTANEOUS at 08:18

## 2023-03-02 RX ADMIN — Medication 9 MG: at 21:13

## 2023-03-02 RX ADMIN — METOPROLOL TARTRATE 25 MG: 25 TABLET, FILM COATED ORAL at 21:13

## 2023-03-02 RX ADMIN — POLYETHYLENE GLYCOL 3350 17 G: 17 POWDER, FOR SOLUTION ORAL at 08:19

## 2023-03-02 RX ADMIN — ACETAMINOPHEN 1000 MG: 500 TABLET ORAL at 21:13

## 2023-03-02 ASSESSMENT — PAIN DESCRIPTION - LOCATION
LOCATION: NECK;SHOULDER
LOCATION: NECK;SHOULDER
LOCATION: SHOULDER;NECK

## 2023-03-02 ASSESSMENT — PAIN DESCRIPTION - DESCRIPTORS
DESCRIPTORS: ACHING;DISCOMFORT;SORE
DESCRIPTORS: ACHING;BURNING
DESCRIPTORS: ACHING;NAGGING;TENDER

## 2023-03-02 ASSESSMENT — PAIN SCALES - GENERAL
PAINLEVEL_OUTOF10: 6
PAINLEVEL_OUTOF10: 7
PAINLEVEL_OUTOF10: 5
PAINLEVEL_OUTOF10: 5
PAINLEVEL_OUTOF10: 6

## 2023-03-02 ASSESSMENT — PAIN DESCRIPTION - ORIENTATION
ORIENTATION: RIGHT
ORIENTATION: POSTERIOR;RIGHT
ORIENTATION: RIGHT

## 2023-03-02 ASSESSMENT — PAIN DESCRIPTION - PAIN TYPE
TYPE: ACUTE PAIN
TYPE: ACUTE PAIN;SURGICAL PAIN

## 2023-03-02 ASSESSMENT — PAIN - FUNCTIONAL ASSESSMENT
PAIN_FUNCTIONAL_ASSESSMENT: PREVENTS OR INTERFERES SOME ACTIVE ACTIVITIES AND ADLS

## 2023-03-02 ASSESSMENT — PAIN DESCRIPTION - ONSET: ONSET: ON-GOING

## 2023-03-02 ASSESSMENT — PAIN DESCRIPTION - FREQUENCY: FREQUENCY: INTERMITTENT

## 2023-03-02 NOTE — PROGRESS NOTES
Occupational Therapy  Facility/Department: [address] [place] [address]  Rehabilitation Occupational Therapy Daily Treatment Note    Date: 3/2/23  Patient Name: Lurdes Varela       Room: [address] [place] [address]  MRN: [id number]  Account: [de-identified]   : 1950  (81 y.o.) Gender: female                    Past Medical History:  has a past medical history of Alcohol abuse, Anxiety and depression, Arthritis, Cancer ([address] Depression, Hyperlipidemia, Hypertension, Hypokalemia, IBS (irritable bowel syndrome), Overactive bladder, Pre-diabetes, Prolonged emergence from general anesthesia, Rheumatic heart disease, and Wears glasses. Past Surgical History:   has a past surgical history that includes Tonsillectomy; Tubal ligation; fracture surgery; Colonoscopy; bladder suspension; Ovary removal (Bilateral); Carpal tunnel release (Bilateral, ); Bilateral salpingoophorectomy; and cervical fusion (N/A, 2023). Restrictions  Restrictions/Precautions: Fall Risk;General Precautions; Up as Tolerated  Other position/activity restrictions: \"Ok to remove for meals and hygiene\"  Required Braces or Orthoses  Cervical: chapin REY  Required Braces or Orthoses?: Yes    Subjective  Subjective: pt seated in recliner and agreeable to OT services upon arrival. endorsing 5/10 pain in neck and shoulders stating \"it never really goes away\"  Restrictions/Precautions: Fall Risk;General Precautions; Up as Tolerated   VITALS: BP: 112/74, SPO2 99% on RA, HR 68           Objective     Cognition  Overall Cognitive Status: WFL  Arousal/Alertness: Delayed responses to stimuli  Following Commands:  Follows one step commands consistently  Attention Span: Appears intact  Memory: Decreased short term memory  Safety Judgement: Decreased awareness of need for safety  Problem Solving: Assistance required to implement solutions  Insights: Fully aware of deficits  Initiation: Does not require cues  Sequencing: Does not require cues  Orientation  Overall Orientation Status: Within Normal Limits  Orientation Level: Oriented X4         ADL  Feeding  Assistance Level: Independent  Grooming/Oral Hygiene  Assistance Level: Independent  Skilled Clinical Factors: oral care in stance  Upper Extremity Bathing  Assistance Level: Modified independent  Skilled Clinical Factors: seated on TTB, long handled sponge, hand held shower head  Lower Extremity Bathing  Assistance Level: Modified independent  Skilled Clinical Factors: seated on TTB and stance with grab bars to cleanse syed area  Upper Extremity Dressing  Assistance Level: Modified independent  Skilled Clinical Factors: seated shirt exchange  Lower Extremity Dressing  Assistance Level: Modified independent  Skilled Clinical Factors: seated to thread underwear and long pants, in stance to manage up over hips  Putting On/Taking Off Footwear  Assistance Level: Modified independent  Skilled Clinical Factors: managed slip on shoes and personal socks with good use of figre 4 formation  Toileting  Assistance Level: Independent  Skilled Clinical Factors: managed clothing over hips and syed care  Toilet Transfers  Technique:  (via ambulation with RW)  Equipment: Standard toilet  Assistance Level: Modified independent  Tub/Shower Transfers  Type: Shower  Transfer From: Rolling walker  Transfer To: Tub transfer bench  Assistance Level: Modified independent        Functional Mobility  Device: Rolling walker  Activity: To/From bathroom; To/From therapy gym  Assistance Level: Supervision  Transfers  Surface:  To chair with arms;From chair with arms  Device: Walker  Sit to Stand  Assistance Level: Supervision  Stand to Sit  Assistance Level: Supervision     OT Exercises  Disease-specific Exercises: Fine Motor: pt educated on in hand manipulation activites to perform with pencil/marker, given written education, additional copy in hard chart; demo'd understanding; pt with use of tip pinch to collect card from top of stack and perform supination to put in separate thelma     FINE MOTOR ASSESSMENTS             Age  Female norms     Right  Left   66-70 19.90 21.44   The 9 hole peg test is an assessment instrument to assess finger dexterity of the affect hand(s). Above is normative date for male and females based on age range and hand. Pt formally assessed with the following scores:   Right hand  Left hand   Initial assessment (Date: 3/2) 2 min 24 s  Pt with tendency to pinch with middle and thumb v middle and index finger d/t index finger pain. 1 min 17 s     Hand strength re tested this date with Yuri Dynamometer     Right Hand  Left Hand    2/21  32 psi 30 psi   2/28 35 psi 38.3 psi   3/01 32.5 psi 37 psi   Pt with Symptoms of CMC OA limiting FMC and strength in R hand. Assessment  Assessment  Assessment: Pt has progressed in therapy well. At eval on 2/18/23 pt req grossly Max A for UB ADLs and Total A for LB ADLs. At that time, pt req Max A with RW for toilet transfer. At last treatment session on 3/2/23. Pt performed UB ADLs with Mod I and LB ADLs with Mod I. Pt able to perfrom toilet transfer with Mod I with use of RW. Pt demo's good safety awareness with ADLs and functional transfers, req no cues from OT for maintaining cervical Px during activity. Pt's hand strength has increased L > R. R hand is limited by pain 2/2 CMC OA symptoms. Cont to be limited by decreased [facility] in R hand but pt has demo'd good understanding and implementation of compensatory strategies and AE. At this time pt is safe to d/c to home with initial 24/7 with outpatient OT to progress pt safely toward PLOF.      Activity Tolerance: Patient tolerated treatment well  Discharge Recommendations: 24 hour supervision or assist;Outpatient OT  OT Equipment Recommendations  Equipment Needed: Yes  Mobility Devices: ADL Assistive Devices  ADL Assistive Devices: Reacher;Long-handled Shoe Horn  Other: pt has grab bars in bathroom, shower chair, and hand held shower head  Safety Devices  Safety Devices in place: Yes  Type of devices: All fall risk precautions in place;Call light within reach; Chair alarm in place;Gait belt;Patient at risk for falls; Left in chair;Nurse notified    Patient Education  Education  Education Given To: Patient  Education Provided: Role of Therapy;Plan of Care;Precautions; Safety;ADL Function;Mobility Training;Transfer Training;Equipment; Fall Prevention Strategies; Energy Conservation;Home Exercise Program  Education Provided Comments: disease specific: fine motor practice at home  Education Method: Demonstration;Verbal  Barriers to Learning: None  Education Outcome: Verbalized understanding;Demonstrated understanding    Plan  Occupational Therapy Plan  Times Per Week: 5-7x/wk  Current Treatment Recommendations: Strengthening;ROM;Balance training;Functional mobility training; Endurance training;Positioning;Equipment evaluation, education, & procurement;Patient/Caregiver education & training; Safety education & training;Pain management;Return to work related activity; Self-Care / ADL; Home management training;Coordination training    Goals  Patient Goals   Patient goals : \"to get to take care of the litter box and trash\"  Short Term Goals  Time Frame for Short Term Goals: 1 week (2/24/23) unless otherwise noted  Short Term Goal 1: Pt will complete LB dressing with min A- GOAL MET 2/20  Short Term Goal 2: Pt will complete toileting with min A- GOAL MET 2/20  Short Term Goal 3: Pt will complete bathing with min A- GOAL MET 2/20  Short Term Goal 4: Pt will complete light household tasks with min A- GOAL MET 2/23  Long Term Goals  Time Frame for Long Term Goals : 2 weeks (3/3/23) unless otherwise noted  Long Term Goal 1: Pt will complete LB dressing mod I-- GOAL MET 3/02  Long Term Goal 2: Pt will complete toileting mod I-- GOAL MET 3/02  Long Term Goal 3: Pt will complete bathing with mod I-- GOAL MET 3/02  Long Term Goal 4: Pt will complete light household tasks mod independent-- GOAL MET 3/02, pt gathered clothing prior to shower      Therapy Time   Individual Concurrent Group Co-treatment   Time In 1330         Time Out 1500         Minutes 90         Timed Code Treatment Minutes: 80 Minutes       Cindy Beach

## 2023-03-02 NOTE — DISCHARGE INSTR - COC
Continuity of Care Form    Patient Name: Abigail Arechiga   :  1950  MRN:  [id number]    Admit date:  2023  Discharge date:  23    Code Status Order: Full Code   Advance Directives:     Admitting Physician:  Radha Waller MD  PCP: Shaista Dunn DO    Discharging Nurse: Jean Iyer RN  [address] Unit/Room#: 4444/7890-28  Discharging Unit Phone Number: [phone]    Emergency Contact:   Extended Emergency Contact Information  Primary Emergency Contact: [address] Phone: [phone]  Mobile Phone: [phone]  Relation: Boyfriend   needed? No  Secondary Emergency Contact: Citlaly Hernandez  Address: [address] [place] [address] Phone: [phone]  Relation: Child    Past Surgical History:  Past Surgical History:   Procedure Laterality Date    BILATERAL SALPINGOOPHORECTOMY      during bladder surgery    BLADDER SUSPENSION      also removed uterus and fallopian tubes    CARPAL TUNNEL RELEASE Bilateral     CERVICAL FUSION N/A 2023    DECOMPRESSIVE POSTERIOR CERVICAL LAMINECTOMY C3, C4, C5 WITH LATERAL MASS DANII AND SCREW FUSION C3-C6 performed by Tiffanie Brito MD at [place] [address]      bilateral arm fracture    OVARY REMOVAL Bilateral     TONSILLECTOMY      TUBAL LIGATION         Immunization History:   Immunization History   Administered Date(s) Administered    Influenza Vaccine, unspecified formulation 10/24/2016    Influenza Virus Vaccine 2011    Pneumococcal Polysaccharide ([id number]) 2011, 2014       Active Problems:  Patient Active Problem List   Diagnosis Code    Hypertension I10    Depression F32. A    Hyperlipidemia E78.5    Hypophosphatemia E83.39    Alcohol abuse F10.10    Anxiety F41.9    Tobacco use Z72.0    Hyperglycemia R73.9    Osteopenia M85.80    Tinnitus aurium, right H93.11    Sensorineural hearing loss (SNHL) of both ears H90.3    Cervical spondylosis with myelopathy M47.12    Cervical myelopathy (HCC) G95.9       Isolation/Infection:   Isolation            No Isolation          Patient Infection Status       Infection Onset Added Last Indicated Last Indicated By Review Planned Expiration Resolved Resolved By    None active    Resolved    COVID-19 (Rule Out) 06/14/22 06/14/22 06/14/22 COVID-19 & Influenza Combo (Ordered)   06/14/22 Rule-Out Test Resulted            Nurse Assessment:  Last Vital Signs: BP (!) 97/57   Pulse 69   Temp 98.3 °F (36.8 °C) (Oral)   Resp 16   Ht 5' 4\" (1.626 m)   Wt 133 lb 4.8 oz (60.5 kg)   SpO2 97%   BMI 22.88 kg/m²     Last documented pain score (0-10 scale): Pain Level: 5  Last Weight:   Wt Readings from Last 1 Encounters:   02/20/23 133 lb 4.8 oz (60.5 kg)     Mental Status:  oriented    IV Access:  - None    Nursing Mobility/ADLs:  Walking   Independent  Transfer  Independent  Bathing  Independent  Dressing  Independent  Toileting  Independent  Feeding  Independent  Med Admin  Independent  Med Delivery   whole    Wound Care Documentation and Therapy:  Incision 02/14/23 Neck Posterior (Active)   Wound Image   02/27/23 1213   Dressing Status Clean;Dry; Intact 03/02/23 0803   Dressing Change Due 02/18/23 02/17/23 2045   Incision Cleansed Not Cleansed 03/02/23 0803   Dressing/Treatment Open to air 03/02/23 0803   Closure Steri-Strips;Open to air 03/02/23 0803   Margins Approximated 03/02/23 0803   Incision Assessment Epithelialization;Erythema 03/02/23 0803   Drainage Amount None 03/02/23 0803   Drainage Description Thin;Serous 02/27/23 2045   Odor None 03/02/23 0803   Wandy-incision Assessment Intact 03/02/23 0803   Number of days: 16        Elimination:  Continence:    Bowel: Yes  Bladder: Yes  Urinary Catheter: None   Colostomy/Ileostomy/Ileal Conduit: No       Date of Last BM: 03/03/23    Intake/Output Summary (Last 24 hours) at 3/2/2023 1619  Last data filed at 3/1/2023 1758  Gross per 24 hour   Intake 200 ml Output --   Net 200 ml     I/O last 3 completed shifts: In: 450 [P.O.:450]  Out: -     Safety Concerns: At Risk for Falls    Impairments/Disabilities:      None    Nutrition Therapy:  Current Nutrition Therapy:   - Oral Diet:  General    Routes of Feeding: Oral  Liquids: Thin Liquids  Daily Fluid Restriction: no  Last Modified Barium Swallow with Video (Video Swallowing Test): not done    Treatments at the Time of Hospital Discharge:   Respiratory Treatments:   Oxygen Therapy:  is not on home oxygen therapy. Ventilator:    - No ventilator support    Rehab Therapies: Physical Therapy and Occupational Therapy  Weight Bearing Status/Restrictions: No weight bearing restrictions  Other Medical Equipment (for information only, NOT a DME order):  braces neck  Other Treatments:     Patient's personal belongings (please select all that are sent with patient):  Blaise    RN SIGNATURE:  Electronically signed by Jose Daniel Nieto on 3/2/23 at 4:50 PM EST    CASE MANAGEMENT/SOCIAL WORK SECTION    Inpatient Status Date: 02/17/23    Readmission Risk Assessment Score:  Readmission Risk              Risk of Unplanned Readmission:  18       Outpatient therapy script and location list provided on 03/02>copy in hard chart    / signature: Electronically signed by Len Roa RN on 3/3/23 at 8:40 AM EST    PHYSICIAN SECTION    Prognosis: Good    Condition at Discharge: Stable    Rehab Potential (if transferring to Rehab): Good    Recommended Labs or Other Treatments After Discharge:    Outpatient PT and OT  Follow-up with Dr. Pablo Martin  Cervical collar until cleared by [facility]    Update Admission H&P: No change in H&P    PHYSICIAN SIGNATURE:  Electronically signed by Harshal Mensah MD on 3/2/23 at 4:20 PM EST

## 2023-03-02 NOTE — PROGRESS NOTES
Department of Physical Medicine & Rehabilitation  Progress Note    Patient Identification:  Shannan Sesay  [id number]  : 1950  Admit date: 2023    Chief Complaint: Cervical myelopathy ([address]    Subjective:   No acute events overnight. Patient seen this afternoon sitting up in room. She reports feeling well and looking forward to discharge home tomorrow. We discussed plans for outpatient therapy, medications, and follow-ups. Labs reviewed. ROS: No f/c, n/v, cp     Objective:  Patient Vitals for the past 24 hrs:   BP Temp Temp src Pulse Resp SpO2   23 1052 (!) 97/57 -- -- 69 -- --   23 0800 123/69 98.3 °F (36.8 °C) Oral 81 16 --   23 2000 133/63 98.3 °F (36.8 °C) Oral 83 18 97 %     Const: Alert. No distress, pleasant. HEENT: Normocephalic, atraumatic. Normal sclera/conjunctiva. MMM. CV: Regular rate and rhythm. Resp: No respiratory distress. Lungs CTAB. Abd: Soft, nontender, nondistended, NABS+   Ext: No edema. MSK: cervical collar in place  Neuro: Alert, oriented, appropriately interactive. Psych: Cooperative, appropriate mood and affect    Laboratory data: Available via EMR.    Last 24 hour lab  Recent Results (from the past 24 hour(s))   POCT Glucose    Collection Time: 23  7:59 PM   Result Value Ref Range    POC Glucose 158 (H) 70 - 99 mg/dl    Performed on ACCU-CHEK    Basic Metabolic Panel w/ Reflex to MG    Collection Time: 23  6:30 AM   Result Value Ref Range    Sodium 140 136 - 145 mmol/L    Potassium reflex Magnesium 5.1 3.5 - 5.1 mmol/L    Chloride 102 99 - 110 mmol/L    CO2 29 21 - 32 mmol/L    Anion Gap 9 3 - 16    Glucose 100 (H) 70 - 99 mg/dL    BUN 23 (H) 7 - 20 mg/dL    Creatinine 0.9 0.6 - 1.2 mg/dL    Est, Glom Filt Rate >60 >60    Calcium 10.0 8.3 - 10.6 mg/dL       Therapy progress:  Physical therapy:  Bed Mobility:  Overall Assistance Level: Contact Guard Assist  Sit>supine:  Assistance Level: Stand by assist  Skilled Clinical Factors: sup<>sidelying<>sit with cues for log rolling. Supine>sit:  Assistance Level: Stand by assist  Transfers:  Surface: To bed, To chair with arms, From bed, From chair with arms  Additional Factors: Verbal cues (for wide lennox)  Device: Walker  Sit>stand:  Assistance Level: Stand by assist  Skilled Clinical Factors: bed<>chair with FWW x1, set up assist  for donning shoes in recliner and  cues for more upright posture with walker  Stand>sit:  Assistance Level: Stand by assist, Contact guard assist  Skilled Clinical Factors: to chair w/ RW  Bed<>chair     Stand Pivot:     Lateral transfer:     Car transfer:     Ambulation:  Surface: Level surface  Device: Rolling walker  Distance: 1036  Activity: Within Unit, Retrieve Items  Activity Comments: pt ambulating around unit x 3 reps. Second trial, pt retrieving cones to improve balance, BLE proprioception, and walker negotiation  Additional Factors: Verbal cues  Assistance Level: Stand by assist  Gait Deviations: Slow mary, Decreased step length bilateral, Narrow base of support, Unsteady gait  Skilled Clinical Factors: cues for more upright posture with walker , cued for wider lennox, facilitating upright posture by paraspinal mid thoracic stroking while walking no lob  Stairs:  Stair Height: 6''  Device: Standard walker (at bottom of steps)  Number of Stairs: 16  Additional Factors: Verbal cues, Hand placement cues  Assistance Level: Stand by assist  Skilled Clinical Factors: cued  x4 to reach UE down steps when down going, mildly usteady with foot placement downgoing with steps necessitating SBA  Curb: Wheelchair:     Assessment:  Assessment: pt found in recliner this am, agreeable to session. set to d/c natalie with therapy recommending home with initial 24/7 and OPPT progressing to prn assist. pt is ind with bed mobility, mod ind transfers, supv for gait with RW and CGA without AD, supv [facility].  pt reports no questions / conerns, states sister will be providng supv/asssist. DM:E none; owns RW  Activity Tolerance: Patient tolerated treatment well  Discharge Recommendations: 24 hour supervision or assist, Outpatient PT      Occupational therapy:   Feeding  Assistance Level: Independent  Grooming/Oral Hygiene  Assistance Level: Independent  Skilled Clinical Factors: oral care in stance  UE Bathing  Assistance Level: Modified independent  Skilled Clinical Factors: seated on TTB, long handled sponge, hand held shower head  LE Bathing  Assistance Level: Modified independent  Skilled Clinical Factors: seated on TTB and stance with grab bars to cleanse syed area  UE Dressing  Assistance Level: Modified independent  Skilled Clinical Factors: seated shirt exchange  LE Dressing  Assistance Level: Modified independent  Skilled Clinical Factors: seated to thread underwear and long pants, in stance to manage up over hips  Putting On/Taking Off Footwear  Assistance Level: Modified independent  Skilled Clinical Factors: managed slip on shoes and personal socks with good use of figre 4 formation  Toileting  Equipment Provided: Toilet Tongs  Assistance Level: Independent  Skilled Clinical Factors: managed clothing over hips and syed care  Assessment:    Speech therapy:            PT  Required Braces or Orthoses  Cervical: miami J  Position Activity Restriction  Spinal Precautions: No Bending, No Lifting, No Twisting  Other position/activity restrictions: \"Ok to remove for meals and hygiene\"  Objective     Sit to Stand: Minimal Assistance  Stand to Sit: Minimal Assistance  Device: Rolling Walker  Assistance: Minimal assistance  Distance: 39'  OT  PT Equipment Recommendations  Equipment Needed: No  Toilet - Technique: Ambulating  Equipment Used: Grab bars  Assessment        SLP          Body mass index is 22.88 kg/m².     Assessment and Plan:  Patient is a 80 yo F with pmh HTN, HLD, Pre-DM2, IBS, depression/anxiety who presented to [facility] on 2/14/2023 for urgent C3-5 laminectomy and and C3-6 posterior fusion. She was admitted to [facility] on 2/17/23 due to functional deficits below her baseline. Cervical Myelopathy  - s/p C3-5 laminectomy and C3-6 fusion on 2/14 with Dr. Corbin Henry  - cervical collar when OOB  - multimodal pain control, made muscle relaxer PRN due to confusion at night  - incision care. Left message with Neurosurgery about follow up appointment for staple/suture removal. Awaiting call back  - vancomycin discontinued as drain is out  - PT, OT, ST     Fall  - hit posterior right head  - initial CT head unremarkable. Was having headaches so obtained repeat CT head and unremarkable. Posterior Right Head wound  - unclear when occurred, staples in place. Patient unsure of timing  - nursing removed staples 2/21     HTN  - metoprolol, clonidine  - holding lisinopril due to soft BPs and mild hyperK -- trend remains controlled    Hyperkalemia  -Hold lisinopril  -Improved on labs 3/2     HLD  - statin     Anxiety/Depression  - effexor     Bowels: adjust medications as needed for regular bowel movements     Bladder: Check PVR x 3. [facility] if PVR > 200ml or if any volume is > 500 ml. Sleep: melatonin scheduled     PPX  DVT: lovenox  GI: not indicated    Rehab Progress: Making progress. Still some scissoring with ambulation due to decreased proprioception. Working with and without walker. Hand strength improving, intermittently requiring assist for brace management. Anticipated Dispo: home, sister staying with her initially  Services: Outpatient PT, OT  DME: Has all  ELOS: 3/3      Johnathon Hummel.  Lionel Escudero MD 3/2/2023, 4:17 PM

## 2023-03-02 NOTE — PROGRESS NOTES
Physical Therapy  Discharge Summary    Name:Citlali Prasad  ICP:[id number]  EWD:[birth year]  Treatment Diagnosis: decreased mobility  Diagnosis: cervical myelopathy    Restrictions/Precautions  Restrictions/Precautions: Fall Risk, General Precautions, Up as Tolerated  Required Braces or Orthoses?: Yes           Position Activity Restriction  Spinal Precautions: No Bending, No Lifting, No Twisting  Other position/activity restrictions: \"Ok to remove for meals and hygiene\"     Goals:                  Short Term Goals  Time Frame for Short Term Goals: 2/24  Short Term Goal 1: Pt will complete bed mobility with supervision. GOAL met 2/22/2023 patient able to complete bed mobility with S/SBA for cues for back care log roll with Leydi mallory. Short Term Goal 2: Pt will sit to stand with SBA to RW- GOAL MET 2/23 with supv and RW  Short Term Goal 3: Pt will ambulate x 100' with RW with supervision. GOAL met 2/22/2023  patient amb greater than 1000 feet wtih FWW and SBA in wide lennox. Short Term Goal 4: Pt will complete 12 stairs with min A.   Goal Met 2/21/2023 patient demonstrates up and down 16 steps wtih bilateral rails and min assist.            Long Term Goals  Time Frame for Long Term Goals : 3/3/23  Long Term Goal 1: Pt will be independent with bed mobility- GOAL MET  Long Term Goal 2: Pt will ambulate x 150' with LRAD independently- GOAL NOT MET< requries supv with RW  Long Term Goal 3: Pt will complete 4 stairs with Mod I- gOAL NOT MET< supv  Long Term Goal 4: Pt will be independent donning/doffing c-collar- GOAL MET    Pt. Met 4/4 short term goals and 2/4 long term goals. Pt. Currently ambulates >150 feet with supv and RW  Up/down 12 steps with supv  Up/down curb step with supv  Sit to/from stand with mod ind  Bed mobility with ind  Recommend OPPT in order to increase ind/ safety with gait and dyn stand balance   Pt. Safe to return home with assistance from family 24/7 initially progressing to prn.    Provided pt. with home with standing HEP. See note for details.    Electronically signed by Mckenzie Stephens PT on 3/2/2023 at 3:09 PM

## 2023-03-02 NOTE — PROGRESS NOTES
Occupational Therapy  Discharge Summary     Name:Citlali Kerr [facility]  RJQ:[id number]  :1950  Treatment Diagnosis: decreased mobility  Diagnosis: cervical myelopathy    Restrictions/Precautions  Restrictions/Precautions: Fall Risk, General Precautions, Up as Tolerated  Required Braces or Orthoses?: Yes           Position Activity Restriction  Spinal Precautions: No Bending, No Lifting, No Twisting  Other position/activity restrictions: \"Ok to remove for meals and hygiene\"     Goals:   Patient Goals   Patient goals : \"to get to take care of the litter box and trash\"  Short Term Goals  Time Frame for Short Term Goals: 1 week (23) unless otherwise noted  Short Term Goal 1: Pt will complete LB dressing with min A- GOAL MET   Short Term Goal 2: Pt will complete toileting with min A- GOAL MET   Short Term Goal 3: Pt will complete bathing with min A- GOAL MET   Short Term Goal 4: Pt will complete light household tasks with min A- GOAL MET   Long Term Goals  Time Frame for Long Term Goals : 2 weeks (3/3/23) unless otherwise noted  Long Term Goal 1: Pt will complete LB dressing mod I-- GOAL MET 3/02  Long Term Goal 2: Pt will complete toileting mod I-- GOAL MET 3/02  Long Term Goal 3: Pt will complete bathing with mod I-- GOAL MET 3/02  Long Term Goal 4: Pt will complete light household tasks mod independent-- GOAL MET 3/02, pt gathered clothing prior to shower    Pt. Met 4/4 short term goals and 4/4 long term goals. ADL:  Feeding  Assistance Level: Independent  Grooming/Oral Hygiene  Assistance Level:  Independent  Skilled Clinical Factors: oral care in stance  Upper Extremity Bathing  Assistance Level: Modified independent  Skilled Clinical Factors: seated on TTB, long handled sponge, hand held shower head  Lower Extremity Bathing  Assistance Level: Modified independent  Skilled Clinical Factors: seated on TTB and stance with grab bars to cleanse syed area  Upper Extremity Dressing  Assistance Level: Modified independent  Skilled Clinical Factors: seated shirt exchange  Lower Extremity Dressing  Assistance Level: Modified independent  Skilled Clinical Factors: seated to thread underwear and long pants, in stance to manage up over hips  Putting On/Taking Off Footwear  Assistance Level: Modified independent  Skilled Clinical Factors: managed slip on shoes and personal socks with good use of figre 4 formation  Toileting  Assistance Level: Independent  Skilled Clinical Factors: managed clothing over hips and syed care  Toilet Transfers  Technique:  (via ambulation with RW)  Equipment: Standard toilet  Assistance Level: Modified independent  Tub/Shower Transfers  Type: Shower  Transfer From: Rolling walker  Transfer To: Tub transfer bench  Assistance Level: Modified independent          Functional Mobility  Device: Rolling walker  Activity: To/From bathroom;To/From therapy gym  Assistance Level: Supervision  Transfers  Surface: To chair with arms;From chair with arms  Device: Walker  Sit to Stand  Assistance Level: Supervision  Stand to Sit  Assistance Level: Supervision   OT Exercises  Disease-specific Exercises: Fine Motor: pt educated on in hand manipulation activites to perform with pencil/marker, given written education, additional copy in hard chart; demo'd understanding; pt with use of tip pinch to collect card from top of stack and perform supination to put in separate pile       Assessment:   Assessment  Assessment: Pt has progressed in therapy well. At eval on 2/18/23 pt req grossly Max A for UB ADLs and Total A for LB ADLs. At that time, pt req Max A with RW for toilet transfer. At last treatment session on 3/2/23. Pt performed UB ADLs with Mod I and LB ADLs with Mod I. Pt able to perfrom toilet transfer with Mod I with use of RW. Pt demo's good safety awareness with ADLs and functional transfers, req no cues from OT for maintaining cervical Px during activity. Pt's hand strength has increased L >  R. R hand is limited by pain 2/2 CMC OA symptoms. Cont to be limited by decreased [address] in R hand but pt has demo'd good understanding and implementation of compensatory strategies and AE. At this time pt is safe to d/c to home with initial 24/7 with outpatient OT to progress pt safely toward PLOF. Activity Tolerance: Patient tolerated treatment well  Discharge Recommendations: 24 hour supervision or assist;Outpatient OT  OT Equipment Recommendations  Equipment Needed: Yes  Mobility Devices: ADL Assistive Devices  ADL Assistive Devices: Reacher;Long-handled Shoe Horn  Other: pt has grab bars in bathroom, shower chair, and hand held shower head  [facility] in place: Yes  Type of devices: All fall risk precautions in place;Call light within reach; Chair alarm in place;Gait belt;Patient at risk for falls; Left in chair;Nurse notified           Home Exercise Program  Provided Pt with hard copy of in hand manipulation activities and thera putty HEP    Signs and Symptoms of Delirium (from CAM)  A. Acute Onset Mental Status Change  Is there evidence of an acute change in mental status from the patient's baseline? [x] 0. No [] 1. Yes    B. Inattention: Did the patient have difficulty focusing attention, for example being easily distractible or having difficulty keeping track of what was being said? [x] 0. Behavior not present    [] 1. Behavior continuously present, does not fluctuate   [] 2. Behavior present, fluctuates (comes and goes, changes in severity)    C. Disorganized thinking: Was the patient's thinking disorganized or incoherent (rambling or irrelevant conversation, unclear or illogical flow of ideas, or unpredictable switching from subject to subject)? [x] 0. Behavior not present    [] 1. Behavior continuously present, does not fluctuate   [] 2. Behavior present, fluctuates (comes and goes, changes in severity)    D.  Altered level of consciousness: Did the patient have altered level of consciousness as indicated by any of the following criteria? Vigilant: startled easily to any sound or touch  Lethargic: repeatedly dozed off when being asked questions, but responded to voice or touch  Stuporous: very difficult to arouse and keep aroused for the interview  Comatose: could not be aroused  [x] 0. Behavior not present    [] 1. Behavior continuously present, does not fluctuate   [] 2.  Behavior present, fluctuates (comes and goes, changes in severity)      Electronically signed by Jr Castaneda OT on 3/2/2023 at 3:06 PM

## 2023-03-02 NOTE — PROGRESS NOTES
Physical Therapy  Facility/Department: [facility]  Rehabilitation Physical Therapy Treatment Note    NAME: Lurdes Varela  : 1950 (81 y.o.)  MRN: [id number]  CODE STATUS: Full Code    Date of Service: 3/2/23       Restrictions:  Restrictions/Precautions: Fall Risk;General Precautions; Up as Tolerated  Required Braces or Orthoses  Cervical: miami J  Position Activity Restriction  Spinal Precautions: No Bending; No Lifting; No Twisting  Other position/activity restrictions: \"Ok to remove for meals and hygiene\"     SUBJECTIVE  Subjective  Subjective: pt found in recliner, initially denies dizziness but expresses during session ( see note)  Pain: 5/10 pain neck/ R shoulder       OBJECTIVE  Cognition  Overall Cognitive Status: WFL  Arousal/Alertness: Delayed responses to stimuli  Following Commands: Follows one step commands consistently  Attention Span: Appears intact  Memory: Decreased short term memory  Safety Judgement: Decreased awareness of need for safety  Problem Solving: Assistance required to implement solutions  Insights: Fully aware of deficits  Initiation: Does not require cues  Sequencing: Does not require cues  Orientation  Overall Orientation Status: Within Normal Limits  Orientation Level: Oriented X4    Functional Mobility     Pt found in recliner, 5/10 pain. Vitals assessed. Sit to stand from various surfaces to and to no AD with ind/ mod ind    Gait x 700 ft with no AD, CGA with music provided for audio cue for consistent rhythm , occasional cue for wide KAREEM/L heel strike but no overt LOB. Pt hold BUEs in high guard position. Pt ascended/descended 12 6\" steps with BHR, step to pattern and supv  Car transfer completed with mod ind and RW    Pt completed item retrieval task about unti x 250 ft with supv for mobility, pt demo's good environment scanning within spinal precautions, use of reacher to retrieve cone from ground x 2/6 reps with supv.      Dyn stand activity: forward step over cane-> weight shift forward with reach anterior/superior to ring-> step back over cane and place ring to target on Il side-> repeat with alternating Les stepping x 4 min 33 sec. Pt initially CGA-close SBA however at end of activity,2 lateral LOB with mod A to correct, pt expressing dizziness   BP sitting= 131/75   BP standing= 100/66  Therapist notified RN and deferred further standing 2* safety concerns  Stand pivot wc to recliner with RW and mod ind  Pt completed 2x10 BLE seated ankle pumps, LAQ, marches, hip abduction with TKE  Pt in recliner at end of session with call light/needs within reach and alarm donned     Second Session  Pt found in recliner. ,denies dizziness. SHRUTHI = 123/74, pulse= 73 bpm. Pt reports she will ask for pain meds after this session, 5/10 rates in neck/R shoulder. Pt remained standing for majority of session, no seated rests     Sit to stand recliner to RW with mod ind  Gait x 180 ft with mod ind demo's reciprocal gait pattern, decreased step height/wide KAREEM, consistent mary    Pt donned 1.5# weight BLE, completed 2x10 BLE standing ankle pumps,marches, hip abduction, hip extension, mini squat (not in HEP). Pt provided with standing HEP consisting of above stated there x to complete at home 1-2x/day, 15-20 reps each with BUE support on stable surface (table, counter) with understanding stated and demonstrated. Gait x 110 ft with RW, supv  Pt in recliner at end of session with call light/needs within reach and alarm donned. ASSESSMENT/PROGRESS TOWARDS GOALS  Vital Signs  Heart Rate: 69  Heart Rate Source: Monitor  BP: (!) 97/57  BP Location: Left upper arm  MAP (Calculated): 70  O2 Device: None (Room air)    Assessment  Assessment: pt found in recliner this am, agreeable to session.  set to d/c natalie with therapy recommending home with initial 24/7 and OPPT progressing to prn assist. pt is ind with bed mobility, mod ind transfers, supv for gait with RW and CGA without AD, supv with stair navigation wtih BHR. pt reports no questions / conerns, states sister will be providng supv/asssist. DM:E none; owns RW  Activity Tolerance: Patient tolerated treatment well  Discharge Recommendations: 24 hour supervision or assist;Outpatient PT  PT Equipment Recommendations  Equipment Needed: No    Goals  Patient Goals   Patient Goals : I want to be able to take care of my animals  Short Term Goals  Time Frame for Short Term Goals: 2/24  Short Term Goal 1: Pt will complete bed mobility with supervision. GOAL met 2/22/2023 patient able to complete bed mobility with S/SBA for cues for back care log roll with Leydi mallory. Short Term Goal 2: Pt will sit to stand with SBA to RW- GOAL MET 2/23 with supv and RW  Short Term Goal 3: Pt will ambulate x 100' with RW with supervision. GOAL met 2/22/2023  patient amb greater than 1000 feet wtih FWW and SBA in wide lennox. Short Term Goal 4: Pt will complete 12 stairs with min A.   Goal Met 2/21/2023 patient demonstrates up and down 16 steps wtih bilateral rails and min assist.  Long Term Goals  Time Frame for Long Term Goals : 3/3/23  Long Term Goal 1: Pt will be independent with bed mobility- GOAL MET  Long Term Goal 2: Pt will ambulate x 150' with LRAD independently- GOAL NOT MET< requries supv with RW  Long Term Goal 3: Pt will complete 4 stairs with Mod I- gOAL NOT MET< supv  Long Term Goal 4: Pt will be independent donning/doffing c-collar- GOAL MET    PLAN OF CARE/SAFETY  Physcial Therapy Plan  General Plan: 5-7 times per week  Specific Instructions for Next Treatment: progress mobility and therex as tolerated  Current Treatment Recommendations: Functional mobility training;Transfer training;ADL/Self-care training;Gait training;Positioning;Modalities; Therapeutic activities; Patient/Caregiver education & training;Strengthening;Balance training;Home exercise program;Stair training; Safety education & training;Neuromuscular re-education; Endurance training;Pain management;Cognitive reorientation;Manual  Safety Devices  Type of Devices: Chair alarm in place;Call light within reach;Gait belt;Patient at risk for falls; Left in chair;Nurse notified; All fall risk precautions in place; Bed alarm in place    EDUCATION  Education  Education Given To: Patient  Education Provided: Transfer Training;Mobility Training  Education Provided Comments: cued for wide lennox with transfers to facilitate good spine mechanics and for backing up to car transfer seat for set up for transfers, cued for pacing wtih recucmbent stepper, cued and faciltiated with paraspinal stroking upright posture, cued for hand placement downgoing steps for improved stability.   Challenged with  FGA  Education Method: Demonstration;Verbal  Barriers to Learning: None  Education Outcome: Verbalized understanding;Continued education needed;Demonstrated understanding        Therapy Time   Individual Concurrent Group Co-treatment   Time In 1000         Time Out 1100         Minutes 60           Timed Code Treatment Minutes: [address] 61 Time:   Individual Concurrent Group Co-treatment   Time In 1230         Time Out 1300         Minutes 30           Timed Code Treatment Minutes:  30    Total Treatment Minutes:  90    Mckenzie Stephens PT, 03/02/23 at 11:44 AM

## 2023-03-03 VITALS
TEMPERATURE: 97.4 F | RESPIRATION RATE: 16 BRPM | BODY MASS INDEX: 22.76 KG/M2 | DIASTOLIC BLOOD PRESSURE: 63 MMHG | WEIGHT: 133.3 LBS | SYSTOLIC BLOOD PRESSURE: 105 MMHG | HEIGHT: 64 IN | OXYGEN SATURATION: 98 % | HEART RATE: 68 BPM

## 2023-03-03 LAB
ANION GAP SERPL CALCULATED.3IONS-SCNC: 8 MMOL/L (ref 3–16)
BASOPHILS ABSOLUTE: 0.1 K/UL (ref 0–0.2)
BASOPHILS RELATIVE PERCENT: 1.3 %
BUN BLDV-MCNC: 20 MG/DL (ref 7–20)
CALCIUM SERPL-MCNC: 10.2 MG/DL (ref 8.3–10.6)
CHLORIDE BLD-SCNC: 102 MMOL/L (ref 99–110)
CO2: 29 MMOL/L (ref 21–32)
CREAT SERPL-MCNC: 0.9 MG/DL (ref 0.6–1.2)
EOSINOPHILS ABSOLUTE: 0.2 K/UL (ref 0–0.6)
EOSINOPHILS RELATIVE PERCENT: 4.4 %
GFR SERPL CREATININE-BSD FRML MDRD: >60 ML/MIN/{1.73_M2}
GLUCOSE BLD-MCNC: 110 MG/DL (ref 70–99)
HCT VFR BLD CALC: 36.3 % (ref 36–48)
HEMOGLOBIN: 11.7 G/DL (ref 12–16)
LYMPHOCYTES ABSOLUTE: 1.2 K/UL (ref 1–5.1)
LYMPHOCYTES RELATIVE PERCENT: 22.6 %
MCH RBC QN AUTO: 30.5 PG (ref 26–34)
MCHC RBC AUTO-ENTMCNC: 32.3 G/DL (ref 31–36)
MCV RBC AUTO: 94.3 FL (ref 80–100)
MONOCYTES ABSOLUTE: 0.5 K/UL (ref 0–1.3)
MONOCYTES RELATIVE PERCENT: 9.5 %
NEUTROPHILS ABSOLUTE: 3.4 K/UL (ref 1.7–7.7)
NEUTROPHILS RELATIVE PERCENT: 62.2 %
PDW BLD-RTO: 12.9 % (ref 12.4–15.4)
PLATELET # BLD: 511 K/UL (ref 135–450)
PMV BLD AUTO: 7.2 FL (ref 5–10.5)
POTASSIUM REFLEX MAGNESIUM: 4.7 MMOL/L (ref 3.5–5.1)
RBC # BLD: 3.85 M/UL (ref 4–5.2)
SODIUM BLD-SCNC: 139 MMOL/L (ref 136–145)
WBC # BLD: 5.5 K/UL (ref 4–11)

## 2023-03-03 PROCEDURE — 80048 BASIC METABOLIC PNL TOTAL CA: CPT

## 2023-03-03 PROCEDURE — 85025 COMPLETE CBC W/AUTO DIFF WBC: CPT

## 2023-03-03 PROCEDURE — 6370000000 HC RX 637 (ALT 250 FOR IP): Performed by: STUDENT IN AN ORGANIZED HEALTH CARE EDUCATION/TRAINING PROGRAM

## 2023-03-03 PROCEDURE — 36415 COLL VENOUS BLD VENIPUNCTURE: CPT

## 2023-03-03 PROCEDURE — 6360000002 HC RX W HCPCS: Performed by: STUDENT IN AN ORGANIZED HEALTH CARE EDUCATION/TRAINING PROGRAM

## 2023-03-03 RX ADMIN — ACETAMINOPHEN 1000 MG: 500 TABLET ORAL at 05:51

## 2023-03-03 RX ADMIN — ATORVASTATIN CALCIUM 20 MG: 10 TABLET, FILM COATED ORAL at 07:55

## 2023-03-03 RX ADMIN — MAGNESIUM OXIDE TAB 400 MG (240 MG ELEMENTAL MG) 800 MG: 400 (240 MG) TAB at 07:55

## 2023-03-03 RX ADMIN — VENLAFAXINE HYDROCHLORIDE 150 MG: 37.5 CAPSULE, EXTENDED RELEASE ORAL at 07:56

## 2023-03-03 RX ADMIN — ENOXAPARIN SODIUM 40 MG: 100 INJECTION SUBCUTANEOUS at 07:55

## 2023-03-03 RX ADMIN — METOPROLOL TARTRATE 25 MG: 25 TABLET, FILM COATED ORAL at 07:55

## 2023-03-03 RX ADMIN — POLYETHYLENE GLYCOL 3350 17 G: 17 POWDER, FOR SOLUTION ORAL at 07:56

## 2023-03-03 ASSESSMENT — PAIN DESCRIPTION - ORIENTATION: ORIENTATION: POSTERIOR;RIGHT

## 2023-03-03 ASSESSMENT — PAIN DESCRIPTION - ONSET: ONSET: ON-GOING

## 2023-03-03 ASSESSMENT — PAIN SCALES - GENERAL
PAINLEVEL_OUTOF10: 6
PAINLEVEL_OUTOF10: 5
PAINLEVEL_OUTOF10: 5

## 2023-03-03 ASSESSMENT — PAIN DESCRIPTION - DESCRIPTORS: DESCRIPTORS: ACHING;SORE

## 2023-03-03 ASSESSMENT — PAIN DESCRIPTION - FREQUENCY: FREQUENCY: INTERMITTENT

## 2023-03-03 ASSESSMENT — PAIN DESCRIPTION - LOCATION: LOCATION: NECK;SHOULDER

## 2023-03-03 ASSESSMENT — PAIN DESCRIPTION - PAIN TYPE: TYPE: ACUTE PAIN;SURGICAL PAIN

## 2023-03-03 ASSESSMENT — PAIN - FUNCTIONAL ASSESSMENT: PAIN_FUNCTIONAL_ASSESSMENT: PREVENTS OR INTERFERES SOME ACTIVE ACTIVITIES AND ADLS

## 2023-03-03 NOTE — DISCHARGE SUMMARY
Physical Medicine & Rehabilitation  Discharge Summary     Patient Identification:  Petey Gaming  : 1950  Admit date: 2023  Discharge date: 3/3/2023  Attending provider: Ester Hall MD       Primary care provider: Ze Carranza DO     Discharge Diagnoses:   Patient Active Problem List   Diagnosis    Hypertension    Depression    Hyperlipidemia    Hypophosphatemia    Alcohol abuse    Anxiety    Tobacco use    Hyperglycemia    Osteopenia    Tinnitus aurium, right    Sensorineural hearing loss (SNHL) of both ears    Cervical spondylosis with myelopathy    Cervical myelopathy (HCC)       Discharge Functional Status:      Physical therapy:  Supine to Sit: Independent  Sit to Supine: Independent      Sit to Stand: Independent  Chair/Bed to Chair Transfer: Independent  Car Transfer: Independent     Ambulation 10 ft: Supervision or touching assistance  Ambulation 50 ft: Supervision or touching assistance  Ambulation 150 ft: Supervision or touching assistance  Stairs - 1 Step: Supervision or touching assistance  Stairs - 4 Step: Supervision or touching assistance  Stairs - 12 Step: Supervision or touching assistance    Occupational therapy:  Eating: Independent  Oral Hygiene: Independent  Bathing: Independent  Upper Body Dressing: Independent  Lower Body Dressing: Independent     Toilet Transfer: Independent  Toilet Hygiene: Independent    Speech therapy:         Inpatient Rehabilitation Course:   Petey Gaming is a 81 y.o. female admitted to inpatient rehabilitation on 2023 s/p Cervical myelopathy ([address] The patient participated in an aggressive multidisciplinary inpatient rehabilitation program involving 3 hours of therapy per day, at least 5 days per week.      Discharge Exam:  Patient Vitals for the past 24 hrs:   BP Temp Temp src Pulse Resp SpO2   23 0759 105/63 97.4 °F (36.3 °C) Oral 68 16 98 %   23 2100 (!) 158/75 98.3 °F (36.8 °C) Oral 64 18 98 %     Patient discharged prior to my arrival. Exam unable to be completed. Significant Diagnostics:   Lab Results   Component Value Date    CREATININE 0.9 03/03/2023    BUN 20 03/03/2023     03/03/2023    K 4.7 03/03/2023     03/03/2023    CO2 29 03/03/2023       Lab Results   Component Value Date    WBC 5.5 03/03/2023    HGB 11.7 (L) 03/03/2023    HCT 36.3 03/03/2023    MCV 94.3 03/03/2023     (H) 03/03/2023       Disposition:  ***    Follow-up:  See after visit summary from hospitalization    Discharge Medications:     Medication List        CHANGE how you take these medications      oxyCODONE 5 MG immediate release tablet  Commonly known as: ROXICODONE  Take 1 tablet by mouth every 12 hours as needed for Pain for up to 3 days. Max Daily Amount: 10 mg  What changed: when to take this            CONTINUE taking these medications      atorvastatin 20 MG tablet  Commonly known as: LIPITOR  TAKE ONE TABLET BY MOUTH DAILY     cloNIDine 0.2 MG tablet  Commonly known as: CATAPRES  Take 0.5 tablets by mouth nightly Historical med. Dose corrected.      fluticasone 50 MCG/ACT nasal spray  Commonly known as: FLONASE     LORazepam 1 MG tablet  Commonly known as: Ativan  Take 1 tablet by mouth every 6 hours as needed for Anxiety     magnesium oxide 400 MG tablet  Commonly known as: MAG-OX     Melatonin 10 MG Tabs     metoprolol tartrate 25 MG tablet  Commonly known as: LOPRESSOR  Take 1 tablet by mouth 2 times daily     MIRALAX PO     venlafaxine 75 MG extended release capsule  Commonly known as: EFFEXOR XR            STOP taking these medications      lisinopril 10 MG tablet  Commonly known as: PRINIVIL;ZESTRIL     methocarbamol 750 MG tablet  Commonly known as: ROBAXIN     Potassium Gluconate 550 MG Tabs               Where to Get Your Medications        These medications were sent to [facility] [id number] - [place], [place] - [address], [address]      Phone: [phone]   metoprolol tartrate 25 MG tablet  oxyCODONE 5 MG immediate release tablet           To comply with [facility] bylaw R.II.[id number]:  Discharge order placed in advance to facilitate patient's discharge needs. Hans Hayward MD    * This document was created using dictation software. While all precautions were taken to ensure accuracy, errors may have occurred. Please disregard any typographical errors.

## 2023-03-03 NOTE — PROGRESS NOTES
Patient was discharged home and escorted off the unit by wheelchair. Vital signs and assessment are stable at the time of discharge. Discharge instructions were explained to patient and AVS has been signed. Patient was instructed on home medications and follow-up appointments. Patient has no complaints at the time of discharge.

## 2023-03-03 NOTE — PLAN OF CARE
Problem: Discharge Planning  Goal: Discharge to home or other facility with appropriate resources  Outcome: Completed     Problem: Safety - Adult  Goal: Free from fall injury  Outcome: Completed     Problem: Skin/Tissue Integrity - Adult  Goal: Skin integrity remains intact  Outcome: Completed  Flowsheets (Taken 3/3/2023 0911)  Skin Integrity Remains Intact: Monitor for areas of redness and/or skin breakdown  Goal: Incisions, wounds, or drain sites healing without S/S of infection  Outcome: Completed  Flowsheets (Taken 3/3/2023 0911)  Incisions, Wounds, or Drain Sites Healing Without Sign and Symptoms of Infection: ADMISSION and DAILY: Assess and document risk factors for pressure ulcer development  Goal: Oral mucous membranes remain intact  Outcome: Completed  Flowsheets (Taken 3/3/2023 0911)  Oral Mucous Membranes Remain Intact:   Implement preventative oral hygiene regimen   Assess oral mucosa and hygiene practices   Implement oral medicated treatments as ordered     Problem: Musculoskeletal - Adult  Goal: Return mobility to safest level of function  Outcome: Completed  Flowsheets (Taken 3/3/2023 0911)  Return Mobility to Safest Level of Function:   Assess patient stability and activity tolerance for standing, transferring and ambulating with or without assistive devices   Assist with transfers and ambulation using safe patient handling equipment as needed   Ensure adequate protection for wounds/incisions during mobilization  Goal: Maintain proper alignment of affected body part  Outcome: Completed  Flowsheets (Taken 3/3/2023 0911)  Maintain proper alignment of affected body part:   Support and protect limb and body alignment per provider's orders   Instruct and reinforce with patient and family use of appropriate assistive device and precautions (e.g. spinal or hip dislocation precautions)  Goal: Return ADL status to a safe level of function  Outcome: Completed  Flowsheets (Taken 3/3/2023 0911)  Return ADL Status to a Safe Level of Function:   Assess activities of daily living deficits and provide assistive devices as needed   Obtain physical therapy/occupational therapy consults as needed   Administer medication as ordered     Problem: Gastrointestinal - Adult  Goal: Maintains or returns to baseline bowel function  Outcome: Completed  Goal: Maintains adequate nutritional intake  Outcome: Completed     Problem: Infection - Adult  Goal: Absence of infection at discharge  Outcome: Completed  Flowsheets (Taken 3/3/2023 0911)  Absence of infection at discharge:   Assess and monitor for signs and symptoms of infection   Monitor lab/diagnostic results   Monitor all insertion sites i.e., indwelling lines, tubes and drains  Goal: Absence of infection during hospitalization  Outcome: Completed     Problem: Pain  Goal: Verbalizes/displays adequate comfort level or baseline comfort level  3/3/2023 0914 by Solo García RN  Outcome: Completed  Flowsheets (Taken 3/3/2023 0751)  Verbalizes/displays adequate comfort level or baseline comfort level:   Assess pain using appropriate pain scale   Implement non-pharmacological measures as appropriate and evaluate response   Administer analgesics based on type and severity of pain and evaluate response  3/2/2023 2231 by Rosas Mercado RN  Outcome: Progressing     Problem: Nutrition Deficit:  Goal: Optimize nutritional status  Outcome: Completed

## 2023-03-03 NOTE — DISCHARGE INSTR - DIET

## 2023-03-03 NOTE — PROGRESS NOTES
[facility] Discharge Assessment  [facility]    Patient:Citlali Dan     Rehab Dx/Hx: Cervical myelopathy ([facility] [address] [G95.9]   EYZ:[birth year]  [facility]:[id number]  Date of Admit: 2/17/2023     Pain Effect on Sleep:  Over the past 5 days, how much of the time has pain made it hard for you to sleep at night?  []  0. Does not apply - I have not had any pain or hurting in the past 5 days  []  1. Rarely or not at all  [x]  2. Occasionally  []  3. Frequently  []  4. Almost constantly  []  8. Unable to answer    Over the past 5 days, how often have you limited your participation in rehabilitation therapy sessions due to pain?  []  0. Does not apply - I have not received rehabilitation therapy in the past 5 days  [x]  1. Rarely or not at all  []  2. Occasionally  []  3. Frequently  []  4. Almost constantly  []  8. Unable to answer    Pain Interference with Day-to-Day Activities:  Over the past 5 days, how often have you limited your day-to-day activities (excluding rehabilitation therapy session)? [x]  1. Rarely or not at all  []  2. Occasionally  []  3. Frequently  []  4. Almost constantly  []  8. Unable to answer      High-Risk Drug Classes: Use and Indication   Is taking: Check if the pt is taking any medications by pharmacological classification  Indication noted: If column 1 is checked, check if there is an indication noted for all meds in the drug class Is taking  (check all that apply) Indication noted (check all that apply)   Antipsychotic [] []   Anticoagulant [x] [x]   Antibiotic [] []   Opioid [x] [x]   Antiplatelet [] []   Hypoglycemic (including insulin) [] []   None of the above [] []     Special Treatments, Procedures, and Programs   Check all of the following treatments, procedures, and programs that apply on discharge. On discharge (check all that apply)   Cancer Treatments    A1. Chemotherapy []   A2. IV []   A3. Oral []   A10. Other []   B1. Radiation []   Respiratory Therapies    C1.  Oxygen Therapy []   C2. Continuous []   C3. Intermittent []   C4. High-concentration []   D1. Suctioning []   D2. Scheduled []   D3. As needed []   E1. Tracheostomy Care []   F1. Invasive Mechanical Ventilator (ventilator or respirator) []   G1. Non-invasive Mechanical Ventilator []   G2. BiPAP []   G3. CPAP []   Other    H1. IV Medications []   H2. Vasoactive medications []   H3. Antibiotics []   H4. Anticoagulation []   H10. Other []   I1. Transfusions []   J1. Dialysis []   J2. Hemodialysis []   J3. Peritoneal dialysis []   O1. IV access []   O2. Peripheral []   O3. Midline []   O4.  Central (PICC, tunneled, port) []   None of the above [x]     Signature:  Erick Shelley RN, BSN

## 2023-03-03 NOTE — CARE COORDINATION
03/03/23 2025 [facility] At/After Discharge Outpatient;PT;OT   Condition of Participation: Discharge Planning   The Plan for Transition of Care is related to the following treatment goals: Outpatient therapy list   The Patient and/or Patient Representative was provided with a Choice of Provider? Patient   The Patient and/Or Patient Representative agree with the Discharge Plan? Yes   Freedom of Choice list was provided with basic dialogue that supports the patient's individualized plan of care/goals, treatment preferences, and shares the quality data associated with the providers?   Yes

## 2023-03-03 NOTE — PROGRESS NOTES
ACUTE REHAB UNIT: [address] [facility]     Rehab Dx/Hx: Cervical myelopathy [facility]) [G95.9]   DONOVAN:[birth year]  SLE:[id number]  Date of Admit: 2/17/2023   Date of Discharge: 3/3/2023    Subjective:   Order for patient discharge. Reviewed discharge summary (AVS) with patient and _______ including medications, physical instructions (safety) and diet. Pt in stable condition. Reconciled Medication List - Patient:   Medications were reviewed by RN at time of discharge with patient and/or family:  []  Via EMR   []  Via health information exchange  []  Verbal (e.g. in person, telephone, video conferencing)  [x]  Paper-based (e.g. fax, copies, printouts)   []  Other Methods (e.g. texting, email, CDs)    Reconciled Medication List - Subsequent provider:   [] No, current reconciled medication list not provided to the subsequent provider. [x] Yes, current reconciled medication list provided to the subsequent provider. [] Via EMR    [] Via health information exchange  [] Verbal (e.g. in person, telephone, video conferencing)  [x] Paper-based (e.g. fax, copies, printouts)   [] Other Methods (e.g. texting, email, CDs)    Discharge disposition:  Pt was discharged via:  [x]  Wheelchair  []  Stretcher  []  Safe ambulation and use of assistive device  []  Other:     Pt was discharged to:  [x]  Private car  []  Transportation service to next destination  []  Other:     Discharge destination:  [x]  Home  []  [facility]  []  [facility] [address]  []  Other:        Discharge BIMS:  Number of word repeated after first attempt:  []  0. None []  1. One []  2. Two [x]  3. Three    Able to report correct year:  []  0. Missed by >5 years, or no answer  []  1. Missed by 2-5 years  []  2. Missed by 1 year  [x]  3. Correct    Able to report correct month:   []  0. Missed by >1 month, or no answer  []  1. Missed by 6 days to 1 month  [x]  2.  Accurate within 5 days    Able to report correct day of the week:  []  0. Incorrect or no answer  [x]  1. Correct    Recall:   Able to recall \"sock\":  []  0. No could not recall  []  1. Yes, after cueing  [x]  2. Yes, no cue required  Able to recall \"blue\":  []  0. No could not recall  []  1. Yes, after cueing  [x]  2. Yes, no cue required  Able to recall \"bed\":  []  0. No could not recall  []  1. Yes, after cueing  [x]  2. Yes, no cue required      Patient Mood Interview    Symptom Presence  0. No (enter 0 in column 2)  1. Yes (enter 0-3 in column 2)  9. No response (leave column 2 blank  Symptom Frequency  0. Never or 1 day  1. 2-6 days (several days)  2. 7-11 days (half or more days)  3. 12-14 days (nearly everyday) 1. Symptom Presence 2. Symptom Frequency    Enter scores in boxes   Little interest or pleasure in doing things   0    Feeling down, depressed, or hopeless   0    If either A or B is coded 2 or 3, CONTINUE asking the questions below. If not, END the interview. Trouble falling or staying asleep, or sleeping too much   0    Feeling tired or having little energy   0    Poor appetite or overeating   0    Feeling bad about yourself - or that you are a failure or have let yourself or your family down   0    Trouble concentrating on things, such as reading the newspaper or watching television   0    Moving or speaking so slowly that other people could have noticed. Or the opposite- being so fidgety or restless that you have been moving around a lot more than usual.   0    Thoughts that you would be better off dead, or of hurting yourself in some way. Total Severity: Add scores for all frequency responses in column 2    0   Social isolation (from [place])  How often do you feel lonely or isolated from those around you?  [] 0. Never  [] 1. Rarely  [x] 2. Sometimes  [] 3. Often  [] 4. Always  [] 8. Patient unable to respond.            Discharging Nurse Signature:

## 2023-03-03 NOTE — CARE COORDINATION
Case Management Discharge Summary  [facility] - Acute Rehab Unit    Patient:Citlali Dan     Rehab D[facility]: [facility] [G95.9]       Today's Date: 3/3/2023    Discharge to:  Home with outpatient therapy    Pre-certification completed:  [x] No       [] Yes     [] N/A   Hospital Exemption Notification (HENS) completed:  [x] No       [] Yes     [] N/A   IMM given:  03/03/23       New Durable Medical Equipment ordered/agency:  No needs   Transportation:  Private       Confirmed discharge plan with:  Patient: [] No       [x] Yes  Family:  patient to notify    Outpatient script and location list provided on 03/02  RN, name: Sandip Olvera RN       Has lack of transportation kept you from medical appointments, meetings, work, or ADL's? [] Yes, it has kept me from medical appointments or from getting my meds  [] Yes, It has kept me from non-medical meetings, appointments, work, or getting things I need  [x] No  [] Pt unable to respond  [] Pt declines to respond     How often do you need to have someone help you when you read instructions, pamphlets, or other written material from your doctor or pharmacy? [] Never                             [] Always  [] Rarely                            [] Patient declines to respond  [x] Sometimes                    [] Patient unable to respond  [] Often       Note: Discharging nurse to complete HENNY, reconcile AVS, and place final copy with patient's discharge packet. RN to ensure that written prescriptions for  Level II medications are sent with patient to the facility as per protocol.           Signature:  Zoe Aguilar RN

## 2023-03-06 NOTE — DISCHARGE SUMMARY
Physical Medicine & Rehabilitation  Discharge Summary     Patient Identification:  Nancy Donovan  : 1950  Admit date: 2023  Discharge date: 3/3/2023   Attending provider: Pranay Sánchez MD        Primary care provider: Heidi Espino DO     Discharge Diagnoses:   Patient Active Problem List   Diagnosis    Hypertension    Depression    Hyperlipidemia    Hypophosphatemia    Alcohol abuse    Anxiety    Tobacco use    Hyperglycemia    Osteopenia    Tinnitus aurium, right    Sensorineural hearing loss (SNHL) of both ears    Cervical spondylosis with myelopathy    Cervical myelopathy (HCC)       History of Present Illness/Acute Hospital Course:  Patient is a 80 yo F with pmh HTN, HLD, Pre-DM2, IBS, depression/anxiety who initially presented 2023 for urgent cervical spine surgery. She presented recently as an outpatient with progressing BUE numbness, coordination difficulty, and frequent falls. Imaging revealed cervical spondylosis with severe canal stenosis, cord compression, and cord signal change. Therefore decision was made to undergo urgent C3-5 laminectomy and and C3-6 posterior fusion ( with Dr. Jarvis Miles). She was admitted to [facility] on 23 due to functional deficits below her baseline. Inpatient Rehabilitation Course:   Nancy Donovan is a 81 y.o. female admitted to inpatient rehabilitation on 2023 with Cervical myelopathy ([facility] [address] The patient participated in an aggressive multidisciplinary inpatient rehabilitation program involving 3 hours of therapy per day, at least 5 days per week. She made good progress with regard to strength, coordination, balance compensatory strategies. Now overall modified independent. Discharging home with sister. Outpatient therapy and DME ordered as below. Patient will follow-up with PCP and Neurosurgery.      Medical Management:    Cervical Myelopathy  - s/p C3-5 laminectomy and C3-6 fusion on  with Dr. Jarvis Miles  - cervical collar when OOB  - multimodal pain control, made muscle relaxer PRN due to confusion at night  - incision care per  Neurosurgery  - PT, OT     Fall  - hit posterior right head  - initial CT head unremarkable. Was having headaches so obtained repeat CT head and unremarkable.      Posterior Right Head wound  - unclear when occurred, staples in place. Patient unsure of timing  - nursing removed staples 2/21     HTN  - metoprolol, clonidine  - holding lisinopril due to soft BPs and mild hyperK -- trend remains controlled     Hyperkalemia  -Hold lisinopril  -Improved on labs 3/2     HLD  - statin     Anxiety/Depression  - effexor         Discharge Exam:  Const: Alert. No distress, pleasant.   HEENT: Normocephalic, atraumatic. Normal sclera/conjunctiva. MMM.   CV: Regular rate and rhythm.   Resp: No respiratory distress. Lungs CTAB.   Abd: Soft, nontender, nondistended, NABS+   Ext: No edema.   MSK: cervical collar in place  Neuro: Alert, oriented, appropriately interactive.   Psych: Cooperative, appropriate mood and affect      Discharge Functional Status:    Physical therapy:  Bed Mobility:  Overall Assistance Level: Contact Guard Assist  Sit>supine:  Assistance Level: Stand by assist  Skilled Clinical Factors: sup<>sidelying<>sit with cues for log rolling.  Supine>sit:  Assistance Level: Stand by assist  Transfers:  Surface: To bed, To chair with arms, From bed, From chair with arms  Additional Factors: Verbal cues (for wide lennox)  Device: Walker  Sit>stand:  Assistance Level: Stand by assist  Skilled Clinical Factors: bed<>chair with FWW x1, set up assist  for donning shoes in recliner and  cues for more upright posture with walker  Stand>sit:  Assistance Level: Stand by assist, Contact guard assist  Skilled Clinical Factors: to chair w/ RW  Bed<>chair     Stand Pivot:     Lateral transfer:     Car transfer:     Ambulation:  Surface: Level surface  Device: Rolling walker  Distance: 1036  Activity: Within Unit, Retrieve Items  Activity  Comments: pt ambulating around unit x 3 reps. Second trial, pt retrieving cones to improve balance, BLE proprioception, and walker negotiation  Additional Factors: Verbal cues  Assistance Level: Stand by assist  Gait Deviations: Slow mary, Decreased step length bilateral, Narrow base of support, Unsteady gait  Skilled Clinical Factors: cues for more upright posture with walker , cued for wider lennox, facilitating upright posture by paraspinal mid thoracic stroking while walking no lob  Stairs:  Stair Height: 6''  Device: Standard walker (at bottom of steps)  Number of Stairs: 16  Additional Factors: Verbal cues, Hand placement cues  Assistance Level: Stand by assist  Skilled Clinical Factors: cued  x4 to reach UE down steps when down going, mildly usteady with foot placement downgoing with steps necessitating SBA  Curb:     Wheelchair:     Assessment:  Assessment: pt found in recliner this am, agreeable to session. set to d/c natalie with therapy recommending home with initial 24/7 and OPPT progressing to prn assist. pt is ind with bed mobility, mod ind transfers, supv for gait with RW and CGA without AD, supv with stair navigation wtih BHR. pt reports no questions / conerns, states sister will be providng supv/asssist. DM:E none; owns RW  Activity Tolerance: Patient tolerated treatment well  Discharge Recommendations: 24 hour supervision or assist, Outpatient PT      Occupational therapy:   Feeding  Assistance Level: Independent  Grooming/Oral Hygiene  Assistance Level: Independent  Skilled Clinical Factors: oral care in stance  UE Bathing  Assistance Level: Modified independent  Skilled Clinical Factors: seated on TTB, long handled sponge, hand held shower head  LE Bathing  Assistance Level: Modified independent  Skilled Clinical Factors: seated on TTB and stance with grab bars to cleanse syed area  UE Dressing  Assistance Level: Modified independent  Skilled Clinical Factors: seated shirt exchange  LE  Dressing  Assistance Level: Modified independent  Skilled Clinical Factors: seated to thread underwear and long pants, in stance to manage up over hips  Putting On/Taking Off Footwear  Assistance Level: Modified independent  Skilled Clinical Factors: managed slip on shoes and personal socks with good use of figre 4 formation  Toileting  Equipment Provided: Toilet Tongs  Assistance Level: Independent  Skilled Clinical Factors: managed clothing over hips and syed care  Transfers:  Toilet Transfers  Technique:  (via ambulation with RW)  Equipment: Standard toilet  Additional Factors: With handrails  Assistance Level: Modified independent  Tub/Shower Transfers  Type: Shower  Transfer From: Rolling walker  Transfer To: Tub transfer bench  Additional Factors: Verbal cues  Assistance Level: Modified independent  Skilled Clinical Factors: RW  IADLs:  Meal Prep  Meal Prep Level: Walker  Meal Prep Level of Assistance: Minimal assistance  Meal Preparation:   Money Management     Light Housekeeping  Light Housekeeping Level: Walker  Light Housekeeping Level of Assistance: Contact guard assistance  Light Housekeeping: SBA grossly, CGA for posterior lean/LOB with pt able to correct; 10 minutes in stance collecting clothing from various heights and folding/hanging as appropriate; pt demo'd use of button tool after demo  Dependent Care     Community Re-Entry  Community Re-Entry Level: Walker  Community Re-entry Level of Assistance: Minimal assistance  Community Re-Entry: CGA with w/c follow to walk down ramp with cues for body mechanics; Min A with increased time for BLE management for going down ramp; pt stated \"that was harder than I thought it would be\"  Pet Care  Pet Care Level: Walker  Pet Care Level of Assistance: Supervision  Pet Care: simulation prep dog food/cat food  Health Management     Assessment:  Assessment: Pt has progressed in therapy well. At eval on 2/18/23 pt req grossly Max A for UB ADLs and Total A for LB  ADLs. At that time, pt req Max A with RW for toilet transfer. At last treatment session on 3/2/23. Pt performed UB ADLs with Mod I and LB ADLs with Mod I. Pt able to perfrom toilet transfer with Mod I with use of RW. Pt demo's good safety awareness with ADLs and functional transfers, req no cues from OT for maintaining cervical Px during activity. Pt's hand strength has increased L > R. R hand is limited by pain 2/2 CMC OA symptoms. Cont to be limited by decreased [address] in R hand but pt has demo'd good understanding and implementation of compensatory strategies and AE. At this time pt is safe to d/c to home with initial 24/7 with outpatient OT to progress pt safely toward PLOF. Activity Tolerance: Patient tolerated treatment well  Discharge Recommendations: 24 hour supervision or assist, Outpatient OT    Speech therapy:              Significant Diagnostics:   Lab Results   Component Value Date    CREATININE 0.9 03/03/2023    BUN 20 03/03/2023     03/03/2023    K 4.7 03/03/2023     03/03/2023    CO2 29 03/03/2023       Lab Results   Component Value Date    WBC 5.5 03/03/2023    HGB 11.7 (L) 03/03/2023    HCT 36.3 03/03/2023    MCV 94.3 03/03/2023     (H) 03/03/2023       Disposition:  Home, sister staying with her initially  Services: Outpatient PT, OT  DME: Has all      Discharge Condition: Stable    Follow-up:  See after visit summary from hospitalization    Discharge Medications:     Medication List        CONTINUE taking these medications      atorvastatin 20 MG tablet  Commonly known as: LIPITOR  TAKE ONE TABLET BY MOUTH DAILY     cloNIDine 0.2 MG tablet  Commonly known as: CATAPRES  Take 0.5 tablets by mouth nightly Historical med. Dose corrected.      fluticasone 50 MCG/ACT nasal spray  Commonly known as: FLONASE     LORazepam 1 MG tablet  Commonly known as: Ativan  Take 1 tablet by mouth every 6 hours as needed for Anxiety     magnesium oxide 400 MG tablet  Commonly known as: MAG-OX Melatonin 10 MG Tabs     metoprolol tartrate 25 MG tablet  Commonly known as: LOPRESSOR  Take 1 tablet by mouth 2 times daily     MIRALAX PO     venlafaxine 75 MG extended release capsule  Commonly known as: EFFEXOR XR            STOP taking these medications      lisinopril 10 MG tablet  Commonly known as: PRINIVIL;ZESTRIL     methocarbamol 750 MG tablet  Commonly known as: ROBAXIN     oxyCODONE 5 MG immediate release tablet  Commonly known as: ROXICODONE     Potassium Gluconate 550 MG Tabs            ASK your doctor about these medications      oxyCODONE 5 MG immediate release tablet  Commonly known as: ROXICODONE  Take 1 tablet by mouth every 12 hours as needed for Pain for up to 3 days. Max Daily Amount: 10 mg  Ask about: Should I take this medication? Where to Get Your Medications        These medications were sent to [facility] [id number] - [address] - P [phone] - F [phone]  [address] [facility] [address]      Phone: [phone]   metoprolol tartrate 25 MG tablet  oxyCODONE 5 MG immediate release tablet           I spent over 35 minutes on this discharge encounter between counseling, coordination of care, and medication reconciliation. To comply with [facility] yaya R.II.4.1:   Discharge order placed in advance to facilitate patients discharge needs.       Yudy Parekh MD

## 2023-03-07 ENCOUNTER — HOSPITAL ENCOUNTER (OUTPATIENT)
Dept: OCCUPATIONAL THERAPY | Age: 73
Setting detail: THERAPIES SERIES
Discharge: HOME | End: 2023-03-07
Payer: MEDICARE

## 2023-03-07 ENCOUNTER — HOSPITAL ENCOUNTER (OUTPATIENT)
Dept: PHYSICAL THERAPY | Age: 73
Setting detail: THERAPIES SERIES
Discharge: HOME | End: 2023-03-07
Payer: MEDICARE

## 2023-03-07 PROCEDURE — 97530 THERAPEUTIC ACTIVITIES: CPT

## 2023-03-07 PROCEDURE — 97162 PT EVAL MOD COMPLEX 30 MIN: CPT

## 2023-03-07 PROCEDURE — 97110 THERAPEUTIC EXERCISES: CPT

## 2023-03-07 PROCEDURE — 97535 SELF CARE MNGMENT TRAINING: CPT

## 2023-03-07 PROCEDURE — 97166 OT EVAL MOD COMPLEX 45 MIN: CPT

## 2023-03-07 NOTE — FLOWSHEET NOTE
Physical Therapy Daily Treatment Note    [x]Daily Treatment Note    []Progress Note    [] Discharge Summary         Date:  3/7/2023    Patient Name:  Gaetano Goltz    :  1950  MRN: [id number]      Medical/Treatment Diagnosis Information:  Diagnosis: G95.9 - Cervical myelopathy ([facility] [address]  Treatment Diagnosis: B LE spasticiy, impaired balance, decreased LE functional strength    Insurance/Certification information:  PT Insurance Information: An[employer] Medicare    Physician Information:  Referring Provider (secondary): Jovita Monge MD    Plan of care signed :  []  Yes  [x] No  [x]  Cosign []  Fax    Date of Patient follow up with Physician:     Is this a Progress Report:     []  Yes  [x]  No      If Yes:  Date Range for reporting period:  Beginning 3/7/23  Ending    Progress report will be due (10 Rx or 30 days whichever is less):   81    Recertification will be due (POC Duration  / 90 days whichever is less): 23      Visit # Insurance Allowable Auth Required     16 requested Owen Doe [x]  Yes []  No        Functional Scale/Score:  Measure Used:     Neck disability index  Score:   46%               Date Assessed:  3/7/23        Measure Used:     ABC  Score:   26.25%               Date Assessed:  3/7/23           Latex Allergy:  [x]NO      []YES  Preferred Language for Healthcare:   [x]English       []other:    RESTRICTIONS/PRECAUTIONS:  Cervical spinal precautions, c-collar    SUBJECTIVE:  See eval    Pain level:    Pt does not complain of pain this session      Plan Moving Forward/ For next visit:    Initiate functional training for improved safety/independence with mobility   Issue/progress HEP for improved functional strength and mobility   Progress balance and functional strengthening as tolerated       OBJECTIVE: See eval        Exercises/Interventions:     Exercises in bold performed in department today.   Items not bolded are carried forward from prior visits for continuity of the record. Exercise/Equipment Sets/Reps/Resistance Comments/cues          THERAPEUTIC EXERCISE                                                                        NEUROMUSCULAR RE-ED                                                                      THERAPEUTIC ACTIVITY                                                                      GAIT                                   Therapeutic Exercise/Home Exercise Program:   0 minutes    Therapeutic Activity:  25 minutes  Pt educated on role and purpose of PT, discussed goals for therapy and rationale for HEP to be provided/progressed. Education on mechanism of injury to spinal cord and functional impact on balance and mobility. Discussed healing process and possible compensation for weakness, spasticity and motor control deficits. Educated on fall prevention, home set up and use of walker, recommended rolling walker for decreased energy expenditure with ambulation and improved stability over rolator. Gait: 0 minutes    Neuromuscular Re-Education:  0 minutes      Canalith Repositioning Procedure:  0 minutes     Manual Therapy:  0 minutes    Modalities: 0 minutes        ASSESSMENT:  See eval     Persisting Functional Limitations/Impairments:  [] Sleeping                      [x] Standing  [x] Transfers                     [x] Walking    [x] Kneeling                      [x] Stairs    [x] Squatting / bending     [x] ADLs  [x] Reaching                     [x] Lifting  [x] Housework                  [x] Driving  [] Job related tasks         [x] Sports / recreation   [] Other:     GOALS: Goals established 3/7/23   Patient stated goal: \" Patient Goals : be able to maintain balance \"  [] Progressing: [] Met: [] Not Met: [] Adjusted     Therapist goals for Patient:   Short Term Goals: To be achieved in: 4 weeks   - Independent in HEP and progression per patient tolerance, in order to prevent re-injury.    [] Progressing: [] Met: [] Not Met: [] Adjusted   - Patient will demonstrate good head/neck postural control without pain without cervical collar  [] Progressing: [] Met: [] Not Met: [] Adjusted        Long Term Goals: To be achieved in: 8 weeks   - ABC score to improve to 80% confidence or better to assist with reaching prior level of function. [] Progressing: [] Met: [] Not Met: [] Adjusted   - Patient will demonstrate functional LE strength WFL able to complete sit <> stand and chair transfers with no UE support. [] Progressing: [] Met: [] Not Met: [] Adjusted   - Pt to ambulate functional community distances of 150-250 ft or more with use of no AD Independent  [] Progressing: [] Met: [] Not Met: [] Adjusted   - Pt to negotiate up/down 12 stairs with UL HR Independent  [] Progressing: [] Met: [] Not Met: [] Adjusted   - Improve Angelo score to 45 or better to meet cut off for decreased risk of falls and facilitate improvement in movement, function, and ADLs as indicated by Functional Deficits. [] Progressing: [] Met: [] Not Met: [] Adjusted   -  pt to report confidence walking over indoor/outdoor surfaces performing daily and work related tasks with no AD    [] Progressing: [] Met: [] Not Met: [] Adjusted      Overall Progression Towards Functional goals/ Treatment Progress Update:  [] Patient is progressing as expected towards functional goals listed. [] Progression is slowed due to complexities/Impairments listed. [] Progression has been slowed due to co-morbidities.   [x] Plan just implemented, too soon to assess goals progression <30days   [] Goals require adjustment due to lack of progress  [] Patient is not progressing as expected and requires additional follow up with physician  [] Patient has met goals as marked above   [] Other    Prognosis for POC: [x] Good [] Fair  [] Poor    Patient requires continued skilled intervention: [x] Yes  [] No    Treatment/Activity Tolerance:  [x] Patient able to complete treatment  [] Patient limited by fatigue  [] Patient limited by pain    [] Patient limited by other medical complications  [] Other:         PLAN: See eval  [] Continue per plan of care [] Alter current plan (see comments above)  [x] Plan of care initiated [] Hold pending MD visit [] Discharge        Therapeutic Exercise and NMR EXR  [] (33687) Provided verbal/tactile cueing for activities related to strengthening, flexibility, endurance, ROM  for improvements in proximal strength and core control with self care, mobility, lifting and ambulation.  [] (02619) Provided verbal/tactile cueing for activities related to improving balance, coordination, kinesthetic sense, posture, motor skill, proprioception  to assist with core control in self care, mobility, lifting, and ambulation.      Therapeutic Activities and Gait:    [x] (22424 or 61436) Provided verbal/tactile cueing for activities related to improving balance, coordination, kinesthetic sense, posture, motor skill, proprioception and motor activation to allow for proper function  with self care and ADLs  [] (49041) Provided training and instruction to the patient for proper core and proximal hip recruitment and positioning with ambulation re-education     Home Exercise Program:    [] (93615) Reviewed/Progressed HEP activities related to strengthening, flexibility, endurance, ROM of core, proximal hip and LE for functional self-care, mobility, lifting and ambulation   [] (10718) Reviewed/Progressed HEP activities related to improving balance, coordination, kinesthetic sense, posture, motor skill, proprioception of core, proximal hip and LE for self care, mobility, lifting, and ambulation      Manual Treatments:  PROM / STM / Oscillations-Mobs:  G-I, II, III, IV (PA's, Inf., Post.)  [] (38662) Provided manual therapy to mobilize soft tissue/joints for the purpose of modulating pain, promoting relaxation,  increasing ROM, reducing/eliminating soft tissue swelling/inflammation/restriction, improving soft tissue extensibility and allowing for proper ROM for normal function with self care, mobility, lifting and ambulation. CRP:  [] (03412) Canalith Repositioning procedure for the assessment, treatment and education of BPPV    Modalities:   [] Ultrasound   [] Estim    [] Mechanical traction    [] Vaso-pneumatic device   [] Ionto     Charges:  Timed Code Treatment Minutes: 25   Total Treatment Minutes: 45          [x] EVAL    [] Dry Needling  [] XA(55069)   x     [] EStim Unattended 17610  [] NMR (42651)  x     [] Estim Attended  93873  [] Manual (90487)  x      [place]n [address]  [x] TA    x   2  [] Ultrasound  [] Gait   x                    [] Vaso  [] CRP    [] Ionto           [] Other:        Electronically signed by: Linda Cantu, PT , DPT, NCS, [id number]      Note: If patient does not return for scheduled/ recommended follow up visits, this note will serve as a discharge from care along with most recent update on progress.

## 2023-03-07 NOTE — PROGRESS NOTES
[facility] [email or web address] [facility] Therapy                [address]                    Phone: [phone]       Fax:   [phone]       Physical Therapy Certification    Dear Referring Provider (secondary): Bill Dexter MD,    We had the pleasure of evaluating the following patient for physical therapy services at [place] [address]. A summary of our findings can be found in the initial assessment below. This includes our plan of care. If you have any questions or concerns regarding these findings, please do not hesitate to contact me at the office phone number above. Thank you for the referral.       Provider Signature:_______________________________Date:__________________  By signing above (or electronic signature), therapist's plan is approved by physician      Patient: Albaro Jefferson   : 1950   MRN: [id number]    Referring Provider (secondary): Bill Dexter MD        Evaluation Date: 3/7/2023        Medical Diagnosis Information:  Diagnosis: G95.9 - Cervical myelopathy ([facility] [address]   Treatment Diagnosis: B LE spasticiy, impaired balance, decreased LE functional strength                                         Insurance information: PT Insurance Information: An[employer] Medicare      Precautions/ Contra-indications:   Cervical precautions with miami J collar     Adhesive allergy: NO  Latex Allergy:  NO    Preferred Language for Healthcare:   [x]English       []other:    C-SSRS Triggered by Intake questionnaire (Past 2 wk assessment):   [] No, Questionnaire did not trigger screening.    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 [facility] [place] [employer]    Sport/recreational activities: fishing, walking the dog, reading, eat ice cream, yard work    Patient goal for therapy:  \" Patient Goals : be able to maintain balance \"     SUBJECTIVE  History of Present Illness:   Pt went to see doctor in December because she was falling. Had noted changes in her balance and was diagnosed with cervical spinal cord compression, had surgery in February. Prior to surgery was having difficulty walking, with UE functional control, bowel and bladder incontinence. Feels her legs and balance have improved since surgery uses walker most of the time but does furniture walk in home. Per [facility] summary: \"Patient is a 82 yo F with pmh HTN, HLD, Pre-DM2, IBS, depression/anxiety who initially presented 2/14/2023 for urgent cervical spine surgery. She presented recently as an outpatient with progressing BUE numbness, coordination difficulty, and frequent falls. Imaging revealed cervical spondylosis with severe canal stenosis, cord compression, and cord signal change. Therefore decision was made to undergo urgent C3-5 laminectomy and and C3-6 posterior fusion (2/14 with Dr. La Mixon). She was admitted to [facility] on 2/17/23 due to functional deficits below her baseline. \"     Current Functional Limitations:   [x]Yes   []No  Functional complaints: impaired balance, falls, UE motor control and sensory impairment      PLOF:   [x]No functional limitations   []Pre-existing limitations :   Pt's sleep is affected?    []Yes   [x]No     Social support/Environment:    Family/caregiver support:       YES     Home environment:   two story home - does not have to access upstairs  Steps to enter first floor:    2 steps to enter   Steps to second floor:  Full flight of 12-13  Bathroom:    Walk-in Shower, Grab bars, and Shower Chair   Equipment owned:   Standard Walker (SW) and Rollator     Baseline function/PLOF:  History of falls     Yes  Pt. Able to drive     Yes - prior, unable at this time d/t cervical spinal precautions  Pt independent with ADLs  Yes   Pt. Required assistance from family for: Independent PTA    Pt. independent for transfers and gait and walked with No Device    Pain       Patient does not report pain this session  Pt. reports bowel and bladder changes:   NO    OBJECTIVE FINDINGS      Cognitive Status    A&O to   x4  Able to follow:   2 step commands    Communication   WNL    Tone     hypertonic L LE and R LE    Trunk Control      Good    Vision:     WNL    Hearing:     impaired    Gait Testing:     Gait   Level of Assistance needed:     CGA  Gait Deviations (firm surface/linoleum): Increased KAREEM, deviated path, staggers, and intermittent variable   Assistive Device Used:     no AD    Pt also able to ambulate with standard walker and SBA/SPV. Poor device management with trying to push walker along floor and frequently catching. Balance is improved with walker though struggles with standard as she learned to use rolling walker in ARU.      Stairs  Level of Assistance needed:      SBA  Pt able to negotiate up/down 4 stairs:  step to pattern: ascending with left, descending with right and handrail bilaterally  Assistive Device Used:      no AD  Deficits noted:      Inconsistent LE placement with L LE grazing stair on step down frequently though no significant LOB with B UE support    Sensation       Light touch:     WNL L LE and R LE      Strength/ROM    [x]All ROM WNL except as marked below    [x]All strength measured on 5 point scale    [x]UE ROM/strength deferred to OT    \"*\" Indicates pain with movement  *gross assessment in sitting   Movement Left Right []AROM  []PROM   Hip Flexion 4 4     Hip Extension Not tested Not tested     Hip Abduction 4 4     Hip Adduction 4+ 4+     Knee Flexion 4+ 4+     Knee Extension 5 5     Ankle Dorsiflexion 4 4+     Ankle Plantarflexion 5 5       Reflexes  Reflex Left Right   Achilles (S1,2) 2+ 2+     Coordination Testing:       Finger to Nose:        NT  Alternating pronation/supination:   WFL  Finger/Thumb opposition:  NT  Heel to shin (sitting or supine):      WFL  Alternating Toe Tapping:       Impaired    Flexibility     Grossly WFL, will formally assess in coming visits if impacting function    Functional Mobility    Transitional Movement Assistance Level   Sit to stand Supervision   Stand to sit Supervision   Bed to chair  Supervision     Balance  Static Sitting:  Normal  Dynamic Sitting: Normal  Static Standing:  Good -   Dynamic Standing: Good -       [x] Patient history, allergies, meds reviewed. Medical chart reviewed. See intake form. Review Of Systems (ROS):  [x]Performed Review of systems (Integumentary, CardioPulmonary, Neurological) by intake and observation. Intake form has been scanned into medical record. Patient has been instructed to contact their primary care physician regarding ROS issues if not already being addressed at this time.         Co-morbidities/Complexities (which will affect course of rehabilitation):   []None           Arthritic conditions   []Rheumatoid arthritis (M05.9)  [x]Osteoarthritis (M19.91)   Cardiovascular conditions   [x]Hypertension (I10)  []Hyperlipidemia (E78.5)  []Angina pectoris (I20)  []Atherosclerosis (I70)   Musculoskeletal conditions   []Disc pathology   []Congenital spine pathologies   []Prior surgical intervention  []Osteoporosis (M81.8)  []Osteopenia (M85.8)   Endocrine conditions   []Hypothyroid (E03.9)  []Hyperthyroid Gastrointestinal conditions   []Constipation (I06.04)   Metabolic conditions   []Morbid obesity (E66.01)  []Diabetes type 1(E10.65) or 2 (E11.65)   []Neuropathy (G60.9)     Pulmonary conditions   []Asthma (J45)  []Coughing   []COPD (J44.9)   Psychological Disorders  [x]Anxiety (F41.9)  [x]Depression (F32.9)   []Other:   [x]Other:   personal history of cancer         Barriers to/and or personal factors that will affect rehab potential:              Age  Co-Morbidities  Past PT/medical experience  Justification:     Falls Risk Assessment (30 days):   [] Falls Risk assessed and no intervention required. [x] Falls Risk assessed and Patient requires intervention due to being higher risk     [x]Angelo Balance Scale    Angelo Balance Score: 28    [x] below 45/56 indicating increased risk of falls    [x]Timed Up and Go (TUG)    17.26 seconds    [x] greater than 12 seconds to complete indicating increased risk of falls   [] Falls education provided, including: use of walker, adaptation to rolling walker rather than standard     ASSESSMENT: Pt is 82 yo Female presenting to OP PT clinic with medical diagnosis of Diagnosis: G95.9 - Cervical myelopathy ([facility] [address] Prior to onset, pt was independent, both driving and working and completing ADLS without assistance or assistive device. Presents today with Treatment Diagnosis: B LE spasticiy, impaired balance, decreased LE functional strength. Impacting function and limiting safety and independence with functional mobility. Would benefit from OP PT to address below impairments and restore function.      Functional Impairments:    Assistance required with functional mobility/gait for safety below baseline  Decreased core/proximal hip strength and neuromuscular control   Decreased UE/LE functional strength   Hypertonic UE/LE     Functional Activity Limitations   Reduced ability to tolerate prolonged functional positions  Reduced ability or difficulty with changes of positions or transfers between positions  Reduced ability to maintain good posture and demonstrate good body mechanics with sitting, bending, and lifting  Reduced ability or tolerance with driving and/or computer work  Reduced ability to perform lifting, carrying tasks  Reduced ability to squat  Reduced ability to forward bend  Reduced ability to ambulate prolonged functional periods/distances/surfaces  Reduced ability to ascend/descend stairs  Reduced ability to run, hop or jump  Reduced ability to self-correct for losses of balance     Participation Restrictions  Reduced participation in self care activities  Reduced participation in home management activities  Reduced participation in work activities  Reduced participation in social activities. Classification :   Signs/symptoms consistent with impaired motor function and sensory integrity associated w/non-progressive disorders of CNS    Prognosis/Rehab Potential:    Good    Tolerance of evaluation/treatment:   Good     Physical Therapy Evaluation Complexity Justification   [x] A history of present problem with:  [] no personal factors and/or comorbidities that impact the plan of care;  [x]1-2 personal factors and/or comorbidities that impact the plan of care  []3 personal factors and/or comorbidities that impact the plan of care  [x] An examination of body systems using standardized tests and measures addressing any of the following: body structures and functions (impairments), activity limitations, and/or participation restrictions;:  [] a total of 1-2 or more elements   [] a total of 3 or more elements   [x] a total of 4 or more elements   [x] A clinical presentation with:  [] stable and/or uncomplicated characteristics   [x] evolving clinical presentation with changing characteristics - neuro symptoms still changing since surgery  [] unstable and unpredictable characteristics;   [x] Clinical decision making of moderate complexity using standardized patient assessment instrument and/or measurable assessment of functional outcome.     EVAL (MOD) [id number] (typically 30 minutes face-to-face)      PLAN:      Plan Moving Forward/ For next visit:    Initiate functional training for improved safety/independence with mobility   Issue/progress HEP for improved functional strength and mobility   Progress balance and functional strengthening as tolerated       Frequency/Duration:  2 days per week for 8 Weeks:  Interventions:  Therapeutic exercise including: strength and ROM for Upper extremity, Lower extremity and Core   Manual therapy as indicated for UE, LE and spine to include: Dry Needling/IASTM, STM, PROM, Gr I-IV mobilizations, manipulation. Neuromuscular re-education activation and proprioception for BLEs, balance, and coordination  Therapeutic activity that may include: bed mobility training, transfer training, ADL training, IADL training  Gait training  Home Management training of patient and/or caregiver  Modalities as needed that may include: thermal agents, E-stim, Biofeedback, US, iontophoresis as indicated  Patient education on joint protection, postural re-education, activity modification, progression of HEP. GOALS: Goals established 3/7/23       Patient stated goal: \" Patient Goals : be able to maintain balance \"  [] Progressing: [] Met: [] Not Met: [] Adjusted    Therapist goals for Patient:   Short Term Goals: To be achieved in: 4 weeks   - Independent in HEP and progression per patient tolerance, in order to prevent re-injury. [] Progressing: [] Met: [] Not Met: [] Adjusted   - Patient will demonstrate good head/neck postural control without pain without cervical collar  [] Progressing: [] Met: [] Not Met: [] Adjusted      Long Term Goals: To be achieved in: 8 weeks   - ABC score to improve to 80% confidence or better to assist with reaching prior level of function. [] Progressing: [] Met: [] Not Met: [] Adjusted   - Patient will demonstrate functional LE strength WFL able to complete sit <> stand and chair transfers with no UE support.    [] Progressing: [] Met: [] Not Met: [] Adjusted   - Pt to ambulate functional community distances of 150-250 ft or more with use of no AD Independent  [] Progressing: [] Met: [] Not Met: [] Adjusted   - Pt to negotiate up/down 12 stairs with UL HR Independent  [] Progressing: [] Met: [] Not Met: [] Adjusted   - Improve Angelo score to 45 or better to meet cut off for decreased risk of falls and facilitate improvement in movement, function, and ADLs as indicated by Functional Deficits.   [] Progressing: [] Met: [] Not Met: [] Adjusted   -  pt to report confidence walking over indoor/outdoor surfaces performing daily and work related tasks with no AD    [] Progressing: [] Met: [] Not Met: [] Adjusted      Electronically signed by:  Ashli Tompkins, PT , DPT, NCS, [id number]

## 2023-03-07 NOTE — PROGRESS NOTES
[address] [facility]  [address] [place] [address], [place], [facility]  Office: [phone]   Fax: [phone]       Occupational Therapy Certification    Dear Dr. Katie Barclay,    We had the pleasure of evaluating the following patient for occupational therapy services at [address] [facility]. A summary of our findings can be found in the initial assessment below. This includes our plan of care. If you have any questions or concerns regarding these findings, please do not hesitate to contact me at the office phone number above. Thank you for the referral.       Provider Signature:_______________________________Date:__________________  By signing above (or electronic signature), therapist's plan is approved by physician      Patient: Shellie Carreno   : 1950   MRN: [id number]       Evaluation Date: 3/7/2023        Medical Diagnosis/ICD 10:  Cervical Myelopathy G95.9    Treatment Diagnosis:  Impaired coordination and strength impeding ADLs and IADLs. Onset Date:  23 (C3-5 laminectomy and and C3-6 posterior fusion ( with Dr. Suarez )    Referral Date:  3/2/23    Referring Physician:               Katie Barclay MD                                               (Secondary) Bharathi Dahl MD orthopedic surgeon-has follow-up 3/13/23    Insurance information: [facility]  Visit # Insurance Allowable Auth Required    Radha Rodriguez [x]  Yes     []  No            Precautions/ Contraindications:   Safety awareness:WFLs  Required Braces or Orthoses  Cervical: miami J  Position Activity Restriction  Spinal Precautions: No Bending, No Lifting, No Twisting  Other position/activity restrictions:  \"Ok to remove for meals and hygiene\"    Adhesive allergy: NO  Latex Allergy:  [x]NO      []YES     Preferred Language for Healthcare:   [x]English       []other:    C-SSRS Triggered by Intake questionnaire (Past 2 wk assessment):   [] No, Questionnaire did not trigger screening. [x] Yes, Patient intake triggered further evaluation      [x] C-SSRS Screening completed  [] PCP notified via Plan of Care  [] Emergency services notified    Plan of care sent to provider:      []Faxed   [x]Co-signature 3/7/23   (attempts: 1[x] 2 []3[])         Relevant Medical History: Patient is a 82 yo F with pmh HTN, HLD, Pre-DM2, IBS, depression/anxiety who initially presented 2/14/2023 for urgent cervical spine surgery. She presented recently as an outpatient with progressing BUE numbness, coordination difficulty, and frequent falls. Imaging revealed cervical spondylosis with severe canal stenosis, cord compression, and cord signal change. Therefore decision was made to undergo urgent C3-5 laminectomy and and C3-6 posterior fusion (2/14 with Dr. Rachel Cummings). She was admitted to Inpatient Rehab on 2/17/23 due to functional deficits below her baseline. She was discharged from there on 3/3/23    Functional Outcome Measure:   3/7/2023: QuickDASH: Total score: 46; Disability/Symptom score: 79.55%    Patient goal for therapy:  \"I want to be able to go back to work without using a walker-get back to daily living without difficulty. \"      SUBJECTIVE   Patient stated complaint: Impaired coordination    Pain Scale: 0/10  Easing factors: tylenol-took 2 this morning  Provocative factors: sitting too long without support for her neck     Type: []Constant   [x]Intermittent  []Radiating [x]Localized-Right neck to shoulder at times with certain movements []other:    OCCUPATIONAL PROFILE    Occupational History (Life Experiences Including Prior Level of Function):   Pt independent with BADLs and IADLs prior to surgery    Performance Patterns (Routines, Roles, Rituals, & Habits):   Recent Fall  - hit posterior right head with head wound  - initial CT head unremarkable. Was having headaches so obtained repeat CT head and unremarkable.      Physical and Social Environments (which aid or inhibit performance in desired occupations):  Current BADL status  Eating: Independent  Grooming: Independent  Bathing:   Independent (modified-sits on shower seat)   Dressing:   Independent-has AE but is not using it, wears slip-on shoes, uses figure 4 to don socks   Toileting:   Independent    Current IADL status   Home tasks:  cooking her meals, feeding dog and cat, does laundry (does not carry loads of laundry)   Work/School:  Worked full time taking care of adults with disabilties. Does not have to push or transfer clients. Has a select group of independent clients since her symptoms began.    Community tasks:  Has not gone out since being home   Driving:   Someone drove her today      Personal, Temporal, and Virtual Contexts (which aid or inhibit performance in desired occupations):    Personal Interests and Values:    Jose Willis is currently staying with her  Family/caregiver support:    [address] environment:   two story home  Steps to enter first floor:    2 steps to enter and hand rail unilateral   Steps to second floor: N/A-does not use-laundry is in her bathroom  Bathroom:    Walk-in Shower, Grab bars, Shower Chair , and grab bar by the toilet  Bedroom:     No issues     Equipment owned:   [employer] [employer] (), shower chair      OBJECTIVE     Hand dominance: Right  Inspection: Wearing Cervical collar-wears at all times except shower and dressing  Posture: head leans slightly to the left      Range of Motion    AROM         RIGHT   LEFT      3/7/2023   3/7/2023    Shoulder:   flex [facility]   WFL                       ABD WNL   WNL    Elbow:      ext/flex WNL   WNL    Forearm:  sup/pron WNL   WNL    Wrist:       ext/flex WNL   WNL    Digits: WNL   WNL    Comments:      Strength  Muscle  (*denotes pain) Right   Left     3/7/2023   3/7/2023    Shoulder Flexion  4+   4+    Shoulder Abduction  4+   4+    Elbow Flexion 5   5    Elbow Extension 5   5 Pronation 5   5    Supination 5   5    Wrist Flexion 5   5    Wrist Extension 5   4+    Comments:      Assessment of UE tone/spasticity:  WFL       Strength (Dynamometry) and Pinch Strength  In lbs. Right  Left   Date 3/7/2023    3/7/2023      - Position Two 33,36    27,26               Lateral Pinch Assess next visit    Assess next visit     [place] PInch Assess next visit    Assess next visit               Coordination           9 Hole Peg Test 63 sec    83 sec      WRMT 78 sec    37 sec     Comments: Has rhuematoid arthritis with pain in R pointer finger which she tries to avoid suing at times    Joint mobility:    [x]Normal    []Hypo   []Hyper      Splinting Needs: none           Functional Mobility/Transfers: SBA with standard walker-PT is recommending       Sensation:  History of carpal tunnel surgery in B hands 6/23/22  Reports numbness and tingling in B hands    Proprioception:  WFL    Visual Assessment:    Reports history of:  cataracts     Wears glasses: All the time-transition lenses      Visual Tracking: WFL   Visual fields: WFL       Visual Perceptual Status:  MVPT:  to be assessed    Trail making A: to be assessed  Trail making B: to be assessed    Functional Cognition:   WFL for tasks completed  Demo impaired:     Communication skills: WFLs    Hearing:    Hard of hearing in on right  Has no hearing aids      [x] Patient history, allergies, meds reviewed. Medical chart reviewed. See intake form. Review Of Systems (ROS):  [x]Performed Review of systems (Integumentary, Cardiopulmonary, Neurological) by intake and observation. Intake form has been scanned into medical record. Patient has been instructed to contact their primary care physician regarding ROS issues if not already being addressed at this time.       Co-morbidities/Complexities (which will affect course of rehabilitation):   []None        []Hx of COVID   Arthritic conditions   [x]Rheumatoid arthritis (M05.9)  []Osteoarthritis (M19.91)  []Gout   Cardiovascular conditions   [x]Hypertension (I10)  [x]Hyperlipidemia (E78.5)  []Angina pectoris (I20)  []Atherosclerosis (I70)  []Pacemaker  []Hx of CABG/stent/  cardiac surgeries Musculoskeletal conditions   [x]Disc pathology   []Congenital spine pathologies   [x]Osteoporosis (M81.8)  []Osteopenia (M85.8)  []Scoliosis   Endocrine conditions   []Hypothyroid (E03.9)  []Hyperthyroid Gastrointestinal conditions   []Constipation (D58.52)   Metabolic conditions   []Morbid obesity (E66.01)  []Diabetes type 1(E10.65) or 2 (E11.65)   []Neuropathy (G60.9)   Cardio/Pulmonary conditions   []Asthma (J45)  []Coughing   []COPD (J44.9)  []CHF  []A-fib Psychological Disorders  [x]Anxiety (F41.9)  [x]Depression (F32.9)   []Other:   Developmental Disorders  []Autism (F84.0)  []CP (G80)  []Down Syndrome (Q90.9)  []Developmental delay     Neurological conditions  []Prior Stroke (I69.30)  []Parkinson's (G20)  []Encephalopathy (G93.40)  []MS (G35)  []Post-polio (G14)  []SCI  []TBI  []ALS Other conditions  []Fibromyalgia (M79.7)  []Vertigo  []Syncope  []Kidney Failure  []Cancer      []currently undergoing                treatment  []Pregnancy  []Incontinence Prior surgeries  []involved limb  [x]previous spinal surgery  [] section birth  []hysterectomy  []bowel / bladder surgery  []other relevant surgeries   Visual Conditions  []Macular degeneration  []Glaucoma  []Diabetic retinopathy  []Legally blind []Other:                   Barriers to/and or personal factors that will affect rehab potential:              Age, Motivation/Lack of Motivation, Co-Morbidities, and Professional/Work barriers    Falls Risk Assessment (30 days):   [x] Falls Risk assessed and no intervention required.   [] Falls risk assessed (via clinical reasoning) and patient requires intervention due to being higher risk for falls  [] Tug Score (>12 s at risk):  [] Falls education provided, including         ASSESSMENT:    Pt is a 81year old female, referred to outpatient occupational therapy with diagnosis of cervical myelopathy. Pt presents with impaired fine motor coordination, ADLs, IADLs, and strength. Pt will benefit from outpatient OT to maximize safety and independence with daily living tasks. Recommend outpatient OT 2x/week for 8 weeks. Functional Impairments and Activity Limitations (from functional questionnaire, intake, and interview)  Reduced participation in functional mobility  Reduced participation in Instrumental ADLs  Reduced participation in Basic ADLs  Reduced endurance  Reduced fine motor control  Reduced sensation  Reduced coordination  Reduced strength  Reduced balance    Participation Restrictions: possibly transportation as pt does not drive     Classification :    Neurological    Prognosis/Rehab Potential:     []Excellent   [x]Good    []Fair   []Poor    Tolerance of evaluation/treatment:    [x]Excellent   []Good    []Fair   []Poor     Occupational Therapy Evaluation Complexity Justification   [x] A Occupational Profile/Medical and Therapy History  3 personal factors and/or comorbidities that impact the plan of care; extensive review of physical cognitive, psychosocial performance  [x] Patient Assessment:  a total of 3-5 performance deficits relating to physical, cognitive, psychosocial limitations/restrictions  [x] A clinical presentation with:  moderate analytical complexity, detailed assessments, minimal to moderate modification of assessments, may have co-morbidities  [x] Clinical decision making of Moderate complexity using standardized patient assessment instrument and/or measurable assessment of functional outcome.     [] EVAL (LOW) 36646 (typically 15 minutes face-to-face)  [x] EVAL (MOD) 72063 (typically 30 minutes face-to-face)  [] EVAL (HIGH) 27562 (typically 45 minutes face-to-face)  [] RE-EVAL 48703      PLAN    Frequency/Duration:  2 days per week for 8 Weeks:    Interventions:  Therapeutic Exercise: strength training, ROM, endurance training, functional mobility training  Neuromuscular re-education: facilitation of normal movement patterns, visual-perceptual and cognitive training to restore limitations caused by neurological insult. Patient/caregiver education and training regarding Basic Activities of Daily Living  Patient/caregiver education regarding Instrumental Activities of Daily Living, including: home management  Therapeutic Activity: functional task participation that incorporates multiple parameters and treatment interventions. Community re-entry, Return to work, Return to driving  Patient/caregiver education on joint protection, postural re-education, activity modification, progression of HEP. Adaptive Equipment Education and Training  Modalities as needed that may include: Paraffin      GOALS: Goals established 3/7/23   Patient stated goal: Be able to return to work. [] Progressing: [] Met: [] Not Met: [] Adjusted    Therapist goals for Patient  Short Term Goals:  to be met in 4 weeks (by 3/6/2023)  Pt will participate in cont assessment of pinch strength. Pt will increase on right  strength to 40lbs to improve performance with daily living tasks that require power grasp. :   Pt will demo improved [facility] for daily living tasks by completing NHPT with on right in 50 seconds or less. Pt will demo improved in hand manipulation skills for daily living tasks by completing WRMT (flipping discs) with on right in 65 seconds or less. Pt will increase on left  strength to 30 lbs to improve performance with daily living tasks that require power grasp. :   Pt will demo improved [facility] for daily living tasks by completing NHPT with on left in 75 seconds or less. Pt will demo improved in hand manipulation skills for daily living tasks by completing WRMT (flipping discs) with on left in 33 seconds or less.     Pt will report consistent compliance with HEP at least 4x/wk    Long Term Goals:  to be met in 8 weeks (by 4/4/2023)  Pt will report successful completion of 1 small shopping trip with mod I. Pt will rate satisfaction with signature as 5/10. Pt will increase on right  strength to 45lbs to improve performance with daily living tasks that require power grasp. Pt will demo improved [address] for daily living tasks by completing NHPT with on right in 40 seconds or less. Pt will demo improved in hand manipulation skills for daily living tasks by completing WRMT (flipping discs) with on right in 50 seconds or less. Pt will increase on left  strength to 35 lbs to improve performance with daily living tasks that require power grasp. :   Pt will demo improved [address] for daily living tasks by completing NHPT with on left in 65 seconds or less. Pt will demo improved in hand manipulation skills for daily living tasks by completing WRMT (flipping discs) with on left in 28 seconds or less. Pt will report a QuickDASH Symptom Severity Scale score of 50% or less indicating increased safety and functional independence in desired occupational pursuits by discharge.       Electronically signed by:  Elsy Esquivel Isidoro 89, OTR/L  #[id number]

## 2023-03-07 NOTE — FLOWSHEET NOTE
[facility]  [address], [place], [facility]  Office: [phone]   Fax: [phone]    Outpatient Occupational Therapy     [x] Daily Treatment Note   [] Progress Note   [] Discharge Note    Date:  3/7/2023    Patient Name:  Albaro Jefferson         YOB: 1950    Medical Diagnosis/ICD 10:  Cervical Myelopathy G95.9    Treatment Diagnosis:  Impaired coordination and strength impeding ADLs and IADLs. Onset Date:  2/14/23 (C3-5 laminectomy and and C3-6 posterior fusion (2/14 with Dr. Sofiya Hanley)    Referral Date:    3/2/23    Referring Physician:   MD Bill Harrington MD orthopedic surgeon    Visits Allowed/Insurance/Certification information:   [facility]  Visit # Insurance Allowable Auth Required   1/ 16 Claudy Rowland [x]  Yes     []  No              Restrictions/Precautions:  Required Braces or Orthoses  Cervical: miami J  Position Activity Restriction  Spinal Precautions: No Bending, No Lifting, No Twisting  Other position/activity restrictions: \"Ok to remove for meals and hygiene\"    Plan of care sent to provider:    []Faxed   [x]Co-signature (date: 3/7/23)    (attempts: 1[x] 2 []3[])        Plan of care signed:    []Yes (date:  )           [x]No       Progress Note covers period from (if applicable):    [x]NA    []From          To           Next Progress Note due:   4/6/23    Visit# / total visits:  1/16    Functional Outcome Measure:  3/7/2023: QuickDASH: Total score: 46; Disability/Symptom score: 79.55%    Subjective: Pt reports she has RA which can sometimes cause pain when she is using her R hand. Pain level: No pain reported today    Plan for Next Session:  Assess pinch strength, coordination activities,  strengthening, UE HEP    Objective:       Exercises:    Exercises in bold performed in department today.   Items not bolded are carried forward from prior visits for continuity of the record. Exercise/Equipment Resistance/Repetitions HEP Other comments      ADL/IADL TRAINING       AE/DME Educated pt re: where she can obtain wheels for her standard walker (recommended by PT). Discussed current ADL status and reviewed precautions/neck brace use during functional tasks. []        []        []        []       NEUROMUSCULAR RE-EDU and THERAPEUTIC ACTIVITY          []        []        []         THERAPEUTIC EXERCISE and MANUAL TECHNIQUES        HEP Reviewed HEP from [facility]-to continue with theraputty and  ball. Instructed pt to use her finger tips to  items vs. Her current carson  technique. []        []        []       MODALITIES         []        []       SPLINTING         []      Home Exercise Program:   theraputty and  ball    Treatment/Activity Tolerance:    Patients response to treatment:   [x]Patient tolerated treatment well []Patient limited by fatigue   []Patient limited by pain  []Patient limited by other medical complications   []Other:     Assessment: Pt is a 81year old female, referred to outpatient occupational therapy with diagnosis of cervical myelopathy. Pt presents with impaired fine motor coordination, ADLs, IADLs, and strength. Pt will benefit from outpatient OT to maximize safety and independence with daily living tasks. Recommend outpatient OT 2x/week for 8 weeks. Goals:   Goals established 3/7/23   Patient stated goal: Be able to return to work. [] Progressing: [] Met: [] Not Met: [] Adjusted     Therapist goals for Patient  Short Term Goals:  to be met in 4 weeks (by 3/6/2023)  Pt will participate in cont assessment of pinch strength. Pt will increase on right  strength to 40lbs to improve performance with daily living tasks that require power grasp. :   Pt will demo improved [address] for daily living tasks by completing NHPT with on right in 50 seconds or less.     Pt will demo improved in hand manipulation skills for daily living tasks by completing WRMT (flipping discs) with on right in 65 seconds or less. Pt will increase on left  strength to 30 lbs to improve performance with daily living tasks that require power grasp. :   Pt will demo improved [address] for daily living tasks by completing NHPT with on left in 75 seconds or less. Pt will demo improved in hand manipulation skills for daily living tasks by completing WRMT (flipping discs) with on left in 33 seconds or less. Pt will report consistent compliance with HEP at least 4x/wk     Long Term Goals:  to be met in 8 weeks (by 4/4/2023)  Pt will report successful completion of 1 small shopping trip with mod I. Pt will rate satisfaction with signature as 5/10. Pt will increase on right  strength to 45lbs to improve performance with daily living tasks that require power grasp. Pt will demo improved [address] [place] for daily living tasks by completing NHPT with on right in 40 seconds or less. Pt will demo improved in hand manipulation skills for daily living tasks by completing WRMT (flipping discs) with on right in 50 seconds or less. Pt will increase on left  strength to 35 lbs to improve performance with daily living tasks that require power grasp. :   Pt will demo improved [address] for daily living tasks by completing NHPT with on left in 65 seconds or less. Pt will demo improved in hand manipulation skills for daily living tasks by completing WRMT (flipping discs) with on left in 28 seconds or less. Pt will report a QuickDASH Symptom Severity Scale score of 50% or less indicating increased safety and functional independence in desired occupational pursuits by discharge.          Prognosis: [x]Good   []Fair   []Poor    Patient Requires Follow-up:  [x]Yes  []No    Plan: [x]Plan of care initiated     [x]Continue per plan of care    [] Alter current plan (see comments)    []Hold pending MD visit []Discharge      --- ---- --- ---- --- ---- --- ---- --- ---- --- ---- --- ---- --- ---- --- ---- --- --- ---    Therapeutic Exercise/Home Exercise Program:   15 minutes    Therapeutic Activity:  0 minutes    ADL Training:  15 minutes      Neuromuscular Re-Education:  0 minutes      Manual Therapy:  0 minutes    Splintin minutes     Modalities:  0 minutes    Time in: 900 Time out:  945    Timed Code Treatment Minutes:  15 min. eval plus 30 min. treatment = 45 mins. Total Treatment Minutes:  45 mins.     Medicare Cap total YTD:   [x]N/A    Workers Comp Time Stamp  [x]N/A   Time In:   Time Out:    Electronically signed by:  Costa Esquivel Isidoro 89, OTR/L  #[id number]

## 2023-03-09 ENCOUNTER — HOSPITAL ENCOUNTER (OUTPATIENT)
Dept: PHYSICAL THERAPY | Age: 73
Setting detail: THERAPIES SERIES
Discharge: HOME | End: 2023-03-09
Payer: MEDICARE

## 2023-03-09 ENCOUNTER — HOSPITAL ENCOUNTER (OUTPATIENT)
Dept: OCCUPATIONAL THERAPY | Age: 73
Setting detail: THERAPIES SERIES
Discharge: HOME | End: 2023-03-09
Payer: MEDICARE

## 2023-03-09 PROCEDURE — 97112 NEUROMUSCULAR REEDUCATION: CPT

## 2023-03-09 PROCEDURE — 97530 THERAPEUTIC ACTIVITIES: CPT

## 2023-03-09 PROCEDURE — 97110 THERAPEUTIC EXERCISES: CPT

## 2023-03-09 NOTE — FLOWSHEET NOTE
[address] [place] [address] and Therapy  [place] [address], [place], [facility]  Office: [phone]   Fax: [phone]    Outpatient Occupational Therapy     [x] Daily Treatment Note   [] Progress Note   [] Discharge Note    Date:  3/9/2023    Patient Name:  Karla Barreto         YOB: 1950    Medical Diagnosis/ICD 10:  Cervical Myelopathy G95.9    Treatment Diagnosis:  Impaired coordination and strength impeding ADLs and IADLs. Onset Date:  2/14/23 (C3-5 laminectomy and and C3-6 posterior fusion (2/14 with Dr. West Brochghislaine)    Referral Date:    3/2/23    Referring Physician:   MD Mariela Pickering MD orthopedic surgeon    Visits Allowed/Insurance/Certification information:   [employer] Medicare  Visit # Insurance Allowable Auth Required   2/ 16 Petey Barbara [x]  Yes     []  No              Restrictions/Precautions:  Required Braces or Orthoses  Cervical: miami J  Position Activity Restriction  Spinal Precautions: No Bending, No Lifting, No Twisting  Other position/activity restrictions: \"Ok to remove for meals and hygiene\"    Plan of care sent to provider:    []Faxed   [x]Co-signature (date: 3/7/23)    (attempts: 1[x] 2 []3[])        Plan of care signed:    [x]Yes (date: 3/7/23 )           []No       Progress Note covers period from (if applicable):    [x]NA    []From          To           Next Progress Note due:   4/6/23    Visit# / total visits:  2/16    Functional Outcome Measure:  3/7/2023: QuickDASH: Total score: 46; Disability/Symptom score: 79.55%    Subjective: Pt reports she has difficulty placing her pills into a mediset box. She has had difficulty this week dropping pills on the counter then trying to pick them back up. She reports she had difficulty with hand function prior to the neck surgery.    Mouse    Pain level: No pain reported today    Plan for Next Session:  pinch strength, B UE coordination activities, B UE  strengthening, UE HEP plus shoulder    Objective:     Strength  Muscle  (*denotes pain) Right      Left        3/7/2023      3/7/2023      Shoulder Flexion  4+      4+      Shoulder Abduction  4+      4+      Elbow Flexion 5      5      Elbow Extension 5      5      Pronation 5      5      Supination 5      5      Wrist Flexion 5      5      Wrist Extension 5      4+      Comments:      Strength (Dynamometry) and Pinch Strength              In lbs. 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 Exercises:    Exercises in bold performed in department today. Items not bolded are carried forward from prior visits for continuity of the record. Exercise/Equipment Resistance/Repetitions HEP Other comments      ADL/IADL TRAINING       AE/DME Educated pt re: where she can obtain wheels for her standard walker (recommended by PT). Discussed current ADL status and reviewed precautions/neck brace use during functional tasks. []        []        []        []       NEUROMUSCULAR RE-EDU and THERAPEUTIC ACTIVITY        fmc Attempted placing small pegs in pegboard copying a  moderate level (level 5) design on a separate card. Noted that her R hand/fingers deviated ulnarly which caused her to go into a carson pinch vs. 3pt. Recommended a Last 2 Left Soft Hand-Based Ulnar Deviation Insert for Right Hand to improve Joint Alignment and facilitate a pincer grasp.   Pt ended up using her L hand to help position the pegs into her R finger and thumb to promote this pinch. Moderate assist was needed with this task. She had to move the pegs over to copy correctly, and she used her R hand to do so. She dropped multiple pegs during the task. She then removed each peg with her R hand using a pincer grasp with verbal cues. []        []        []         THERAPEUTIC EXERCISE and MANUAL TECHNIQUES        HEP Reviewed HEP from [facility]-to continue with theraputty and  ball. Instructed pt to use her finger tips to  items vs. Her current carson  technique. []        []        []       MODALITIES         []        []       SPLINTING         []      Home Exercise Program:   theraputty and  ball    Treatment/Activity Tolerance:    Patients response to treatment:   [x]Patient tolerated treatment well []Patient limited by fatigue   []Patient limited by pain  []Patient limited by other medical complications   []Other:     Assessment: Impaired visual perception noted today during pegboard task. Pt's ulnar deviation in her R hand during activity impedes her fmc. She may benefit from a ulnar deviation splint, which she plans to obtain on her own. Pt continues to present with impaired fine motor coordination, ADLs, IADLs, and strength. Pt will benefit from continued outpatient OT to maximize safety and independence with daily living tasks. Goals:   Goals established 3/7/23   Patient stated goal: Be able to return to work. [] Progressing: [] Met: [] Not Met: [] Adjusted     Therapist goals for Patient  Short Term Goals:  to be met in 4 weeks (by 3/6/2023)  MET 3/9/23  Pt will increase on right  strength to 40lbs to improve performance with daily living tasks that require power grasp. :   Pt will demo improved [address] for daily living tasks by completing NHPT with on right in 50 seconds or less.     Pt will demo improved in hand manipulation skills for daily living tasks by completing WRMT (flipping discs) with on right in 65 seconds or less. Pt will increase on left  strength to 30 lbs to improve performance with daily living tasks that require power grasp. :   Pt will demo improved [facility] for daily living tasks by completing NHPT with on left in 75 seconds or less. Pt will demo improved in hand manipulation skills for daily living tasks by completing WRMT (flipping discs) with on left in 33 seconds or less. Pt will report consistent compliance with HEP at least 4x/wk  Pt will demonstrate improved lateral pinch to 12lbs. To promote functional use of B UEs. Added 3/9/23  Pt will demonstrate improved 3-point pinch to 12lbs. To promote functional use of B UEs. Added 3/9/23     Long Term Goals:  to be met in 8 weeks (by 4/4/2023)  Pt will report successful completion of 1 small shopping trip with mod I. Pt will rate satisfaction with signature as 5/10. Pt will increase on right  strength to 45lbs to improve performance with daily living tasks that require power grasp. Pt will demo improved [facility] for daily living tasks by completing NHPT with on right in 40 seconds or less. Pt will demo improved in hand manipulation skills for daily living tasks by completing WRMT (flipping discs) with on right in 50 seconds or less. Pt will increase on left  strength to 35 lbs to improve performance with daily living tasks that require power grasp. :   Pt will demo improved [facility] for daily living tasks by completing NHPT with on left in 65 seconds or less. Pt will demo improved in hand manipulation skills for daily living tasks by completing WRMT (flipping discs) with on left in 28 seconds or less. Pt will report a QuickDASH Symptom Severity Scale score of 50% or less indicating increased safety and functional independence in desired occupational pursuits by discharge.          Prognosis: [x]Good   []Fair   []Poor    Patient Requires Follow-up:  [x]Yes  []No    Plan: []Plan of care initiated     [x]Continue per plan of care    [] Alter current plan (see comments)    []Hold pending MD visit []Discharge      ---  ---- --- ---- --- ---- --- ---- --- ---- --- ---- --- ---- --- ---- --- ---- --- --- ---    Therapeutic Exercise/Home Exercise Program:    minutes    Therapeutic Activity:  45 minutes    ADL Training:   minutes      Neuromuscular Re-Education:  0 minutes      Manual Therapy:  0 minutes    Splintin minutes     Modalities:  0 minutes    Time in: 0182 Time out:  1115    Timed Code Treatment Minutes: 45 mins. Total Treatment Minutes:  45 mins.     Medicare Cap total YTD:   [x]N/A    Workers Comp Time Stamp  [x]N/A   Time In:   Time Out:    Electronically signed by:  Prashant Esquivel [address] 89, OTR/L  #[id number]

## 2023-03-09 NOTE — FLOWSHEET NOTE
Physical Therapy Daily Treatment Note    [x]Daily Treatment Note    []Progress Note    [] Discharge Summary         Date:  3/9/2023    Patient Name:  Ion Colmenares    :  1950  MRN: [id number]      Medical/Treatment Diagnosis Information:  Diagnosis: G95.9 - Cervical myelopathy ([facility] [address]  Treatment Diagnosis: B LE spasticiy, impaired balance, decreased LE functional strength    Insurance/Certification information:  PT Insurance Information: [employer]    Physician Information:  Referring Provider (secondary): Misael Enrique MD    Plan of care signed :  [x]  Yes  [] No  [x]  Cosign []  Fax    Date of Patient follow up with Physician:     Is this a Progress Report:     []  Yes  [x]  No      If Yes:  Date Range for reporting period:  Beginning 3/7/23  Ending    Progress report will be due (10 Rx or 30 days whichever is less):   46    Recertification will be due (POC Duration  / 90 days whichever is less): 23      Visit # Insurance Allowable Auth Required    14 visits approved through 23 [x]  Yes []  No        Functional Scale/Score:  Measure Used:     Neck disability index  Score:   46%               Date Assessed:  3/7/23        Measure Used:     ABC  Score:   26.25%               Date Assessed:  3/7/23           Latex Allergy:  [x]NO      []YES  Preferred Language for Healthcare:   [x]English       []other:    RESTRICTIONS/PRECAUTIONS:  Cervical spinal precautions, c-collar    SUBJECTIVE:  Pt reports mild intermittent shoulder pain though no significant mobility difficulties. No falls. Has rolling walker coming in the mail today.      Pain level:    Pt does not complain of pain this session      Plan Moving Forward/ For next visit:    Initiate functional training for improved safety/independence with mobility   Issue/progress HEP for improved functional strength and mobility   Progress balance and functional strengthening as tolerated       OBJECTIVE: See eval        Exercises/Interventions:     Exercises in bold performed in department today. Items not bolded are carried forward from prior visits for continuity of the record. Exercise/Equipment Sets/Reps/Resistance Comments/cues          THERAPEUTIC EXERCISE       NuStep 5 min level 5 LE only for aerobic warm up     Squats 10 x 3                                                         NEUROMUSCULAR RE-ED       Ambulatory balance challenges    - skater stepping fwd/retro    - march with CL knee tap    - monster walk fwd/retro 50 ft x 2 each CGA/SBA for balance - one instance of mod A needed to correct posterior LOB with skater stepping      4 square stepping over PVC frame 2 min x 2 CGA for balance, progressing looking straight ahead to eyes closed                                                      THERAPEUTIC ACTIVITY                                                                      GAIT                                   Therapeutic Exercise/Home Exercise Program:   10 minutes    Therapeutic Activity:  0 minutes  Pt educated on role and purpose of PT, discussed goals for therapy and rationale for HEP to be provided/progressed. Education on mechanism of injury to spinal cord and functional impact on balance and mobility. Discussed healing process and possible compensation for weakness, spasticity and motor control deficits. Educated on fall prevention, home set up and use of walker, recommended rolling walker for decreased energy expenditure with ambulation and improved stability over rolator. Gait: 0 minutes    Neuromuscular Re-Education:  30 minutes      Canalith Repositioning Procedure:  0 minutes     Manual Therapy:  0 minutes    Modalities: 0 minutes        ASSESSMENT:  PT session focused on dynamic balance and functional LE strength/motor control. Pt tolerated session well with no adverse reaction to treatment or indications/complaints of increased pain.  Good tolerance to activity and balance challenges. Had most difficulty with eyes closed and backwards walking challenges with visual feedback removed/decreased. Persisting Functional Limitations/Impairments:  [] Sleeping                      [x] Standing  [x] Transfers                     [x] Walking    [x] Kneeling                      [x] Stairs    [x] Squatting / bending     [x] ADLs  [x] Reaching                     [x] Lifting  [x] Housework                  [x] Driving  [] Job related tasks         [x] Sports / recreation   [] Other:     GOALS: Goals established 3/7/23   Patient stated goal: \" Patient Goals : be able to maintain balance \"  [] Progressing: [] Met: [] Not Met: [] Adjusted     Therapist goals for Patient:   Short Term Goals: To be achieved in: 4 weeks   - Independent in HEP and progression per patient tolerance, in order to prevent re-injury. [] Progressing: [] Met: [] Not Met: [] Adjusted   - Patient will demonstrate good head/neck postural control without pain without cervical collar  [] Progressing: [] Met: [] Not Met: [] Adjusted        Long Term Goals: To be achieved in: 8 weeks   - ABC score to improve to 80% confidence or better to assist with reaching prior level of function. [] Progressing: [] Met: [] Not Met: [] Adjusted   - Patient will demonstrate functional LE strength WFL able to complete sit <> stand and chair transfers with no UE support. [] Progressing: [] Met: [] Not Met: [] Adjusted   - Pt to ambulate functional community distances of 150-250 ft or more with use of no AD Independent  [] Progressing: [] Met: [] Not Met: [] Adjusted   - Pt to negotiate up/down 12 stairs with UL HR Independent  [] Progressing: [] Met: [] Not Met: [] Adjusted   - Improve Angelo score to 45 or better to meet cut off for decreased risk of falls and facilitate improvement in movement, function, and ADLs as indicated by Functional Deficits.   [] Progressing: [] Met: [] Not Met: [] Adjusted   -  pt to report confidence walking over indoor/outdoor surfaces performing daily and work related tasks with no AD    [] Progressing: [] Met: [] Not Met: [] Adjusted      Overall Progression Towards Functional goals/ Treatment Progress Update:  [] Patient is progressing as expected towards functional goals listed. [] Progression is slowed due to complexities/Impairments listed. [] Progression has been slowed due to co-morbidities. [x] Plan just implemented, too soon to assess goals progression <30days   [] Goals require adjustment due to lack of progress  [] Patient is not progressing as expected and requires additional follow up with physician  [] Patient has met goals as marked above   [] Other    Prognosis for POC: [x] Good [] Fair  [] Poor    Patient requires continued skilled intervention: [x] Yes  [] No    Treatment/Activity Tolerance:  [x] Patient able to complete treatment  [] Patient limited by fatigue  [] Patient limited by pain    [] Patient limited by other medical complications  [] Other:         PLAN: See eval  [x] Continue per plan of care [] Alter current plan (see comments above)  [] Plan of care initiated [] Hold pending MD visit [] Discharge        Therapeutic Exercise and NMR EXR  [x] (61005) Provided verbal/tactile cueing for activities related to strengthening, flexibility, endurance, ROM  for improvements in proximal strength and core control with self care, mobility, lifting and ambulation. [x] (25938) Provided verbal/tactile cueing for activities related to improving balance, coordination, kinesthetic sense, posture, motor skill, proprioception  to assist with core control in self care, mobility, lifting, and ambulation.      Therapeutic Activities and Gait:    [] (32308 or 21938) Provided verbal/tactile cueing for activities related to improving balance, coordination, kinesthetic sense, posture, motor skill, proprioception and motor activation to allow for proper function  with self care and ADLs  [] (96357) Provided training and instruction to the patient for proper core and proximal hip recruitment and positioning with ambulation re-education     Home Exercise Program:    [] (54664) Reviewed/Progressed HEP activities related to strengthening, flexibility, endurance, ROM of core, proximal hip and LE for functional self-care, mobility, lifting and ambulation   [] (35496) Reviewed/Progressed HEP activities related to improving balance, coordination, kinesthetic sense, posture, motor skill, proprioception of core, proximal hip and LE for self care, mobility, lifting, and ambulation      Manual Treatments:  PROM / STM / Oscillations-Mobs:  G-I, II, III, IV (PA's, Inf., Post.)  [] (72007) Provided manual therapy to mobilize soft tissue/joints for the purpose of modulating pain, promoting relaxation,  increasing ROM, reducing/eliminating soft tissue swelling/inflammation/restriction, improving soft tissue extensibility and allowing for proper ROM for normal function with self care, mobility, lifting and ambulation. CRP:  [] (86262) Canalith Repositioning procedure for the assessment, treatment and education of BPPV    Modalities:   [] Ultrasound   [] Estim    [] Mechanical traction    [] Vaso-pneumatic device   [] Ionto     Charges:  Timed Code Treatment Minutes: 40   Total Treatment Minutes: 40          [] EVAL    [] Dry Needling  [x] OS(62394)   x   1  [] EStim Unattended 25776  [x] NMR (21400)  x   2  [] Estim Attended  [id number]  [] Manual (12019)  x      [] Mechanical Txn 97056  [] TA    x     [] Ultrasound  [] Gait   x                    [] Vaso  [] CRP    [] Ionto           [] Other:        Electronically signed by: Niurka Gerber, PT , DPT, NCS, [id number]      Note: If patient does not return for scheduled/ recommended follow up visits, this note will serve as a discharge from care along with most recent update on progress.

## 2023-03-11 ENCOUNTER — HOSPITAL ENCOUNTER (OUTPATIENT)
Dept: GENERAL RADIOLOGY | Age: 73
Discharge: HOME | End: 2023-03-11
Payer: MEDICARE

## 2023-03-11 ENCOUNTER — HOSPITAL ENCOUNTER (OUTPATIENT)
Age: 73
Discharge: HOME | End: 2023-03-11
Payer: MEDICARE

## 2023-03-11 DIAGNOSIS — M50.122 CERVICAL DISC DISORDER AT C5-C6 LEVEL WITH RADICULOPATHY: ICD-10-CM

## 2023-03-11 PROCEDURE — 72040 X-RAY EXAM NECK SPINE 2-3 VW: CPT

## 2023-03-14 ENCOUNTER — HOSPITAL ENCOUNTER (OUTPATIENT)
Dept: PHYSICAL THERAPY | Age: 73
Setting detail: THERAPIES SERIES
Discharge: HOME | End: 2023-03-14
Payer: MEDICARE

## 2023-03-14 ENCOUNTER — HOSPITAL ENCOUNTER (OUTPATIENT)
Dept: OCCUPATIONAL THERAPY | Age: 73
Setting detail: THERAPIES SERIES
Discharge: HOME | End: 2023-03-14
Payer: MEDICARE

## 2023-03-14 PROCEDURE — 97112 NEUROMUSCULAR REEDUCATION: CPT

## 2023-03-14 PROCEDURE — 97110 THERAPEUTIC EXERCISES: CPT

## 2023-03-14 PROCEDURE — 97530 THERAPEUTIC ACTIVITIES: CPT

## 2023-03-14 NOTE — FLOWSHEET NOTE
Physical Therapy Daily Treatment Note    [x]Daily Treatment Note    []Progress Note    [] Discharge Summary         Date:  3/14/2023    Patient Name:  Faith Sweet    :  1950  MRN: [id number]      Medical/Treatment Diagnosis Information:  Diagnosis: G95.9 - Cervical myelopathy ([facility] [address]  Treatment Diagnosis: B LE spasticiy, impaired balance, decreased LE functional strength    Insurance/Certification information:  PT Insurance Information: [place] [employer]    Physician Information:  Referring Provider (secondary): Jeanna Barnhart MD    Plan of care signed :  [x]  Yes  [] No  [x]  Cosign []  Fax    Date of Patient follow up with Physician:     Is this a Progress Report:     []  Yes  [x]  No      If Yes:  Date Range for reporting period:  Beginning 3/7/23  Ending    Progress report will be due (10 Rx or 30 days whichever is less):   26    Recertification will be due (POC Duration  / 90 days whichever is less): 23      Visit # Insurance Allowable Auth Required   3 / 14 14 visits approved through 23 [x]  Yes []  No        Functional Scale/Score:  Measure Used:     Neck disability index  Score:   46%               Date Assessed:  3/7/23        Measure Used:     ABC  Score:   26.25%               Date Assessed:  3/7/23           Latex Allergy:  [x]NO      []YES  Preferred Language for Healthcare:   [x]English       []other:    RESTRICTIONS/PRECAUTIONS:  Cervical spinal precautions, c-collar    SUBJECTIVE:  Pt states she has been using the Rolator. Mainly just grabbing onto furniture at home to ambulate around house. Uses Rolator in the bathroom when getting out of the shower. Saw surgeon who said she should only have to wear the neck brace for 2 more weeks.      Pain level:    Pt does not complain of pain this session      Plan Moving Forward/ For next visit:    Initiate functional training for improved safety/independence with mobility   Issue/progress HEP for improved functional strength and mobility   Progress balance and functional strengthening as tolerated       OBJECTIVE: See eval        Exercises/Interventions:     Exercises in bold performed in department today. Items not bolded are carried forward from prior visits for continuity of the record. Exercise/Equipment Sets/Reps/Resistance Comments/cues          THERAPEUTIC EXERCISE       NuStep 6 min level 5 LE only for aerobic warm up  UE and LE for reciprocal pattern/coordination     Squats 10 x 3                                                         NEUROMUSCULAR RE-ED       Ambulatory balance challenges    - skater stepping fwd/retro    - march with CL knee tap    - monster walk fwd/retro    - tandem in // bars    - opp hand, opp knee        2 x 50 ft ea  2 laps  2 x 50 ft CGA/SBA for balance - one instance of mod A needed to correct posterior LOB with skater stepping   3/14/23: CGA, min A for opp hand to opp knee d/t LOB. 4 square stepping over PVC frame 2 min x 2 CGA for balance, progressing looking straight ahead to eyes closed     1 foot ground + 1 foot 2nd step  -balance  -ball chest press  -ball OH lift  -ball rot  -ball 3rd step reach down   30 sec  10 B  10 B  10 B  10 B                                                THERAPEUTIC ACTIVITY                                                                      GAIT                                   Therapeutic Exercise/Home Exercise Program:  6  minutes  Warm up    Therapeutic Activity:  0 minutes  Pt educated on role and purpose of PT, discussed goals for therapy and rationale for HEP to be provided/progressed. Education on mechanism of injury to spinal cord and functional impact on balance and mobility. Discussed healing process and possible compensation for weakness, spasticity and motor control deficits. Educated on fall prevention, home set up and use of walker, recommended rolling walker for decreased energy expenditure with ambulation and improved stability over rolator. Gait: 0 minutes    Neuromuscular Re-Education:  39 minutes  Balance activities        Canalith Repositioning Procedure:  0 minutes     Manual Therapy:  0 minutes    Modalities: 0 minutes        ASSESSMENT:    Added static balance with staggered stance today with good tolerance. Decreased balance noted when stance on RLE, which decreased as fatigue increased. Vc for muscle activation of gluts on stance LE in order to improve sense of balance. Most difficulty with opp hand to opp knee, requiring CGA to min A to regain balance with LOB. Plan to continue to improve strength, balance, and stability with all functional mobility. Persisting Functional Limitations/Impairments:  [] Sleeping                      [x] Standing  [x] Transfers                     [x] Walking    [x] Kneeling                      [x] Stairs    [x] Squatting / bending     [x] ADLs  [x] Reaching                     [x] Lifting  [x] Housework                  [x] Driving  [] Job related tasks         [x] Sports / recreation   [] Other:     GOALS: Goals established 3/7/23   Patient stated goal: \" Patient Goals : be able to maintain balance \"  [] Progressing: [] Met: [] Not Met: [] Adjusted     Therapist goals for Patient:   Short Term Goals: To be achieved in: 4 weeks   - Independent in HEP and progression per patient tolerance, in order to prevent re-injury. [] Progressing: [] Met: [] Not Met: [] Adjusted   - Patient will demonstrate good head/neck postural control without pain without cervical collar  [] Progressing: [] Met: [] Not Met: [] Adjusted        Long Term Goals: To be achieved in: 8 weeks   - ABC score to improve to 80% confidence or better to assist with reaching prior level of function. [] Progressing: [] Met: [] Not Met: [] Adjusted   - Patient will demonstrate functional LE strength WFL able to complete sit <> stand and chair transfers with no UE support.    [] Progressing: [] Met: [] Not Met: [] Adjusted   - Pt to ambulate functional community distances of 150-250 ft or more with use of no AD Independent  [] Progressing: [] Met: [] Not Met: [] Adjusted   - Pt to negotiate up/down 12 stairs with UL HR Independent  [] Progressing: [] Met: [] Not Met: [] Adjusted   - Improve Angelo score to 45 or better to meet cut off for decreased risk of falls and facilitate improvement in movement, function, and ADLs as indicated by Functional Deficits. [] Progressing: [] Met: [] Not Met: [] Adjusted   -  pt to report confidence walking over indoor/outdoor surfaces performing daily and work related tasks with no AD    [] Progressing: [] Met: [] Not Met: [] Adjusted      Overall Progression Towards Functional goals/ Treatment Progress Update:  [] Patient is progressing as expected towards functional goals listed. [] Progression is slowed due to complexities/Impairments listed. [] Progression has been slowed due to co-morbidities. [x] Plan just implemented, too soon to assess goals progression <30days   [] Goals require adjustment due to lack of progress  [] Patient is not progressing as expected and requires additional follow up with physician  [] Patient has met goals as marked above   [] Other    Prognosis for POC: [x] Good [] Fair  [] Poor    Patient requires continued skilled intervention: [x] Yes  [] No    Treatment/Activity Tolerance:  [x] Patient able to complete treatment  [] Patient limited by fatigue  [] Patient limited by pain    [] Patient limited by other medical complications  [] Other:         PLAN: See eval  [x] Continue per plan of care [] Alter current plan (see comments above)  [] Plan of care initiated [] Hold pending MD visit [] Discharge        Therapeutic Exercise and NMR EXR  [] (15313) Provided verbal/tactile cueing for activities related to strengthening, flexibility, endurance, ROM  for improvements in proximal strength and core control with self care, mobility, lifting and ambulation.   [x] (25368) Provided verbal/tactile cueing for activities related to improving balance, coordination, kinesthetic sense, posture, motor skill, proprioception  to assist with core control in self care, mobility, lifting, and ambulation. Therapeutic Activities and Gait:    [] (44826 or 58479) Provided verbal/tactile cueing for activities related to improving balance, coordination, kinesthetic sense, posture, motor skill, proprioception and motor activation to allow for proper function  with self care and ADLs  [] (08155) Provided training and instruction to the patient for proper core and proximal hip recruitment and positioning with ambulation re-education     Home Exercise Program:    [] (76384) Reviewed/Progressed HEP activities related to strengthening, flexibility, endurance, ROM of core, proximal hip and LE for functional self-care, mobility, lifting and ambulation   [] (97605) Reviewed/Progressed HEP activities related to improving balance, coordination, kinesthetic sense, posture, motor skill, proprioception of core, proximal hip and LE for self care, mobility, lifting, and ambulation      Manual Treatments:  PROM / STM / Oscillations-Mobs:  G-I, II, III, IV (PA's, Inf., Post.)  [] (34661) Provided manual therapy to mobilize soft tissue/joints for the purpose of modulating pain, promoting relaxation,  increasing ROM, reducing/eliminating soft tissue swelling/inflammation/restriction, improving soft tissue extensibility and allowing for proper ROM for normal function with self care, mobility, lifting and ambulation.      CRP:  [] (24121) Canalith Repositioning procedure for the assessment, treatment and education of BPPV    Modalities:   [] Ultrasound   [] Estim    [] Mechanical traction    [] Vaso-pneumatic device   [] Ionto     Charges:  Timed Code Treatment Minutes: 45   Total Treatment Minutes: 45          [] EVAL    [] Dry Needling  [] EG(35819)   x   1  [] EStim Unattended 19307  [x] NMR (80786)  x   3  [] Estim Attended 09849  [] Manual (27507)  x      [] [place] [address]  [] TA    x     [] Ultrasound  [] Gait   x                    [] Vaso  [] CRP    [] Ionto           [] Other:        Electronically signed by: Deven Hart, PT , DPT      Note: If patient does not return for scheduled/ recommended follow up visits, this note will serve as a discharge from care along with most recent update on progress.

## 2023-03-14 NOTE — FLOWSHEET NOTE
[address] [place] [address] and Therapy  [place] [address], [place], [facility]  Office: [phone]   Fax: [phone]    Outpatient Occupational Therapy     [x] Daily Treatment Note   [] Progress Note   [] Discharge Note    Date:  3/14/2023    Patient Name:  Artis Mendez         YOB: 1950    Medical Diagnosis/ICD 10:  Cervical Myelopathy G95.9    Treatment Diagnosis:  Impaired coordination and strength impeding ADLs and IADLs. Onset Date:  2/14/23 (C3-5 laminectomy and and C3-6 posterior fusion (2/14 with Dr. Nj Castro)    Referral Date:    3/2/23    Referring Physician:   MD Dennis Howe MD orthopedic surgeon    Visits Allowed/Insurance/Certification information:   [employer]  Visit # Insurance Allowable Auth Required   3/ 16 14 visits approved [x]  Yes     []  No              Restrictions/Precautions:  Required Braces or Orthoses  Cervical: [place] J  Position Activity Restriction  Spinal Precautions: No Bending, No Lifting, No Twisting  Other position/activity restrictions: \"Ok to remove for meals and hygiene\"    Plan of care sent to provider:    []Faxed   [x]Co-signature (date: 3/7/23)    (attempts: 1[x] 2 []3[])        Plan of care signed:    [x]Yes (date: 3/7/23 )           []No       Progress Note covers period from (if applicable):    [x]NA    []From          To           Next Progress Note due:   4/6/23    Visit# / total visits:  3/16    Functional Outcome Measure:  3/7/2023: QuickDASH: Total score: 46; Disability/Symptom score: 79.55%    Subjective: The client reports she broke B wrists as a child, R worse than L.   They had to re-fx her R.      Pain level: No pain reported today    Plan for Next Session:  pinch strength, B UE coordination activities, B UE  strengthening, UE HEP plus shoulder    Objective:     Strength  Muscle  (*denotes pain) Right      Left 3/7/2023      3/7/2023      Shoulder Flexion  4+      4+      Shoulder Abduction  4+      4+      Elbow Flexion 5      5      Elbow Extension 5      5      Pronation 5      5      Supination 5      5      Wrist Flexion 5      5      Wrist Extension 5      4+      Comments:      Strength (Dynamometry) and Pinch Strength              In lbs. 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 Exercises:    Exercises in bold performed in department today. Items not bolded are carried forward from prior visits for continuity of the record. Exercise/Equipment Resistance/Repetitions HEP Other comments      ADL/IADL TRAINING       AE/DME Educated pt re: where she can obtain wheels for her standard walker (recommended by PT). Discussed current ADL status and reviewed precautions/neck brace use during functional tasks. []        []        []        []       NEUROMUSCULAR RE-EDU and THERAPEUTIC ACTIVITY        fmc Attempted placing small pegs in pegboard copying a  moderate level (level 5) design on a separate card. Noted that her R hand/fingers deviated ulnarly which caused her to go into a carson pinch vs. 3pt. Recommended a Rolyan Soft Hand-Based Ulnar Deviation Insert for Right Hand to improve Joint Alignment and facilitate a pincer grasp. Pt ended up using her L hand to help position the pegs into her R finger and thumb to promote this pinch. Moderate assist was needed with this task.  She had to move the pegs over to copy correctly, and she used her R hand to do so. She dropped multiple pegs during the task. She then removed each peg with her R hand using a pincer grasp with verbal cues. []      Pinch Clothespins:  Pt removed clothespins of various colors and resistance from 1-6# from horizontal bars with his both her R and L hands using three-point pinch.   []        []         THERAPEUTIC EXERCISE and MANUAL TECHNIQUES        HEP BUEs  Finger MP Flexion AROM 10 reps  Seated Digit Tendon Gliding - 10 reps  Finger Spreading - 10 reps  Seated Wrist Radial Deviation Finger Walk - 10 reps  Wrist AROM Wrist Circumduction - 10 reps    Reviewed HEP from IPR-to continue with theraputty and  ball. Instructed pt to use her finger tips to  items vs. Her current carson  technique. []       Therapy flexbar wrist radial flexion x10eps BUEs []        []       MODALITIES         []        []       SPLINTING         []      Home Exercise Program:  Access Code: [email or web address]  URL: [email or web address]  Date: 03/14/2023  Prepared by: Ajay Martinez  Finger MP Flexion AROM 10 reps  Seated Digit Tendon Gliding - 10 reps  Finger Spreading - 10 reps  Seated Wrist Radial Deviation Finger Walk - 10 reps  Wrist AROM Wrist Circumduction - 10 reps  theraputty and  ball    Treatment/Activity Tolerance:    Patients response to treatment:   [x]Patient tolerated treatment well []Patient limited by fatigue   []Patient limited by pain  []Patient limited by other medical complications   []Other:     Assessment: Impaired visual perception noted today during pegboard task. Pt's ulnar deviation in her R hand during activity impedes her fmc. She may benefit from a ulnar deviation splint, which she plans to obtain on her own. Pt continues to present with impaired fine motor coordination, ADLs, IADLs, and strength. Pt will benefit from continued outpatient OT to maximize safety and independence with daily living tasks.     Goals: Goals established 3/7/23   Patient stated goal: Be able to return to work. [] Progressing: [] Met: [] Not Met: [] Adjusted     Therapist goals for Patient  Short Term Goals:  to be met in 4 weeks (by 3/6/2023)  MET 3/9/23  Pt will increase on right  strength to 40lbs to improve performance with daily living tasks that require power grasp. :   Pt will demo improved [facility] for daily living tasks by completing NHPT with on right in 50 seconds or less. Pt will demo improved in hand manipulation skills for daily living tasks by completing WRMT (flipping discs) with on right in 65 seconds or less. Pt will increase on left  strength to 30 lbs to improve performance with daily living tasks that require power grasp. :   Pt will demo improved [facility] for daily living tasks by completing NHPT with on left in 75 seconds or less. Pt will demo improved in hand manipulation skills for daily living tasks by completing WRMT (flipping discs) with on left in 33 seconds or less. Pt will report consistent compliance with HEP at least 4x/wk  Pt will demonstrate improved lateral pinch to 12lbs. To promote functional use of B UEs. Added 3/9/23  Pt will demonstrate improved 3-point pinch to 12lbs. To promote functional use of B UEs. Added 3/9/23     Long Term Goals:  to be met in 8 weeks (by 4/4/2023)  Pt will report successful completion of 1 small shopping trip with mod I. Pt will rate satisfaction with signature as 5/10. Pt will increase on right  strength to 45lbs to improve performance with daily living tasks that require power grasp. Pt will demo improved [facility] for daily living tasks by completing NHPT with on right in 40 seconds or less. Pt will demo improved in hand manipulation skills for daily living tasks by completing WRMT (flipping discs) with on right in 50 seconds or less.     Pt will increase on left  strength to 35 lbs to improve performance with daily living tasks that require power grasp. : Pt will demo improved [address] for daily living tasks by completing NHPT with on left in 65 seconds or less. Pt will demo improved in hand manipulation skills for daily living tasks by completing WRMT (flipping discs) with on left in 28 seconds or less. Pt will report a QuickDASH Symptom Severity Scale score of 50% or less indicating increased safety and functional independence in desired occupational pursuits by discharge. Prognosis: [x]Good   []Fair   []Poor    Patient Requires Follow-up:  [x]Yes  []No    Plan: []Plan of care initiated     [x]Continue per plan of care    [] Alter current plan (see comments)    []Hold pending MD visit []Discharge      ---  ---- --- ---- --- ---- --- ---- --- ---- --- ---- --- ---- --- ---- --- ---- --- --- ---    Therapeutic Exercise/Home Exercise Program:   35 minutes    Therapeutic Activity:  10 minutes    ADL Training:   minutes      Neuromuscular Re-Education:  0 minutes      Manual Therapy:  0 minutes    Splintin minutes     Modalities:  0 minutes    Time in: 1500 Time out:  1545    Timed Code Treatment Minutes: 45 mins. Total Treatment Minutes:  45 mins.     Medicare Cap total YTD:   [x]N/A    Workers Comp Time Stamp  [x]N/A   Time In:   Time Out:    Electronically signed by:  Lily Esquivel Isid[address] 89, OTR/L  #[id number]

## 2023-03-16 ENCOUNTER — HOSPITAL ENCOUNTER (OUTPATIENT)
Dept: PHYSICAL THERAPY | Age: 73
Setting detail: THERAPIES SERIES
Discharge: HOME | End: 2023-03-16
Payer: MEDICARE

## 2023-03-16 ENCOUNTER — HOSPITAL ENCOUNTER (OUTPATIENT)
Dept: OCCUPATIONAL THERAPY | Age: 73
Setting detail: THERAPIES SERIES
Discharge: HOME | End: 2023-03-16
Payer: MEDICARE

## 2023-03-16 PROCEDURE — 97530 THERAPEUTIC ACTIVITIES: CPT

## 2023-03-16 PROCEDURE — 97110 THERAPEUTIC EXERCISES: CPT

## 2023-03-16 PROCEDURE — 97116 GAIT TRAINING THERAPY: CPT

## 2023-03-16 PROCEDURE — 97535 SELF CARE MNGMENT TRAINING: CPT

## 2023-03-16 PROCEDURE — 97112 NEUROMUSCULAR REEDUCATION: CPT

## 2023-03-16 NOTE — FLOWSHEET NOTE
Physical Therapy Daily Treatment Note    [x]Daily Treatment Note    []Progress Note    [] Discharge Summary         Date:  3/16/2023    Patient Name:  Danie Godwin    :  1950  MRN: [id number]      Medical/Treatment Diagnosis Information:  Diagnosis: G95.9 - Cervical myelopathy ([facility] [address]  Treatment Diagnosis: B LE spasticiy, impaired balance, decreased LE functional strength    Insurance/Certification information:  PT Insurance Information: [employer]    Physician Information:  Referring Provider (secondary): Evie Walsh MD    Plan of care signed :  [x]  Yes  [] No  [x]  Cosign []  Fax    Date of Patient follow up with Physician:     Is this a Progress Report:     []  Yes  [x]  No      If Yes:  Date Range for reporting period:  Beginning 3/7/23  Ending    Progress report will be due (10 Rx or 30 days whichever is less):   89    Recertification will be due (POC Duration  / 90 days whichever is less): 23      Visit # Insurance Allowable Auth Required    14 visits approved through 23 [x]  Yes []  No        Functional Scale/Score:  Measure Used:     Neck disability index  Score:   46%               Date Assessed:  3/7/23        Measure Used:     ABC  Score:   26.25%               Date Assessed:  3/7/23           Latex Allergy:  [x]NO      []YES  Preferred Language for Healthcare:   [x]English       []other:    RESTRICTIONS/PRECAUTIONS:  Cervical spinal precautions, c-collar    SUBJECTIVE:  Pt states she has been using the RW but sometimes furniture walks in home. Discussed upcoming release from c-collar and pt plan to wean out of brace.     Pain level:    Pt does not complain of pain this session      Plan Moving Forward/ For next visit:    Initiate functional training for improved safety/independence with mobility   Issue/progress HEP for improved functional strength and mobility   Progress balance and functional strengthening as tolerated       OBJECTIVE: See eval        Exercises/Interventions:     Exercises in bold performed in department today. Items not bolded are carried forward from prior visits for continuity of the record. Exercise/Equipment Sets/Reps/Resistance Comments/cues          THERAPEUTIC EXERCISE       NuStep 6 min level 5 LE only for aerobic warm up and stepping pattern     Squats 10 x 3                                                         NEUROMUSCULAR RE-ED       Ambulatory balance challenges    - skater stepping fwd/retro    - march with CL knee tap    - monster walk fwd/retro    - tandem     - opp hand, opp knee          2 x 50 ft  2 x 50 ft CGA/SBA for balance - one instance of mod A needed to correct posterior LOB with skater stepping        4 square stepping over PVC frame 2 min x 2 CGA for balance, progressing looking straight ahead to eyes closed     1 foot ground + 1 foot 2nd step  -balance  -ball chest press  -ball OH lift  -ball rot  -ball 3rd step reach down   30 sec  10 B  10 B  10 B  10 B       Ambulatory cone tapping    - side step with ant tap in middle and lat taps each end    - fwd/diagonal with backwards to home base between cones                                          THERAPEUTIC ACTIVITY                                   GAIT       Dual task AMB    - motor ice cream ball pass (+ cog overlay naming in categories)    - motor balancing yoga ball on slideboard  To fatigue x 2 each                           Therapeutic Exercise/Home Exercise Program:  10  minutes  Warm up    Therapeutic Activity:  0 minutes  Pt educated on role and purpose of PT, discussed goals for therapy and rationale for HEP to be provided/progressed. Education on mechanism of injury to spinal cord and functional impact on balance and mobility. Discussed healing process and possible compensation for weakness, spasticity and motor control deficits.  Educated on fall prevention, home set up and use of walker, recommended rolling walker for decreased energy expenditure with ambulation and improved stability over rolator. Gait: 10 minutes    Neuromuscular Re-Education:  20 minutes  Balance activities        Canalith Repositioning Procedure:  0 minutes     Manual Therapy:  0 minutes    Modalities: 0 minutes        ASSESSMENT:    PT session focused on dynamic balance. Added both cog and motor dual task challenges this session with good tolerance. Pt reports no pain or adverse reaction to treatment. Does demonstrate increased path deviation while ambulating under dual task conditions though is able to regain balance with stepping strategy and without physical assistance. Persisting Functional Limitations/Impairments:  [] Sleeping                      [x] Standing  [x] Transfers                     [x] Walking    [x] Kneeling                      [x] Stairs    [x] Squatting / bending     [x] ADLs  [x] Reaching                     [x] Lifting  [x] Housework                  [x] Driving  [] Job related tasks         [x] Sports / recreation   [] Other:     GOALS: Goals established 3/7/23   Patient stated goal: \" Patient Goals : be able to maintain balance \"  [] Progressing: [] Met: [] Not Met: [] Adjusted     Therapist goals for Patient:   Short Term Goals: To be achieved in: 4 weeks   - Independent in HEP and progression per patient tolerance, in order to prevent re-injury. [] Progressing: [] Met: [] Not Met: [] Adjusted   - Patient will demonstrate good head/neck postural control without pain without cervical collar  [] Progressing: [] Met: [] Not Met: [] Adjusted        Long Term Goals: To be achieved in: 8 weeks   - ABC score to improve to 80% confidence or better to assist with reaching prior level of function. [] Progressing: [] Met: [] Not Met: [] Adjusted   - Patient will demonstrate functional LE strength WFL able to complete sit <> stand and chair transfers with no UE support.    [] Progressing: [] Met: [] Not Met: [] Adjusted   - Pt to ambulate functional community distances of 150-250 ft or more with use of no AD Independent  [] Progressing: [] Met: [] Not Met: [] Adjusted   - Pt to negotiate up/down 12 stairs with UL HR Independent  [] Progressing: [] Met: [] Not Met: [] Adjusted   - Improve Angelo score to 45 or better to meet cut off for decreased risk of falls and facilitate improvement in movement, function, and ADLs as indicated by Functional Deficits. [] Progressing: [] Met: [] Not Met: [] Adjusted   -  pt to report confidence walking over indoor/outdoor surfaces performing daily and work related tasks with no AD    [] Progressing: [] Met: [] Not Met: [] Adjusted      Overall Progression Towards Functional goals/ Treatment Progress Update:  [] Patient is progressing as expected towards functional goals listed. [] Progression is slowed due to complexities/Impairments listed. [] Progression has been slowed due to co-morbidities. [x] Plan just implemented, too soon to assess goals progression <30days   [] Goals require adjustment due to lack of progress  [] Patient is not progressing as expected and requires additional follow up with physician  [] Patient has met goals as marked above   [] Other    Prognosis for POC: [x] Good [] Fair  [] Poor    Patient requires continued skilled intervention: [x] Yes  [] No    Treatment/Activity Tolerance:  [x] Patient able to complete treatment  [] Patient limited by fatigue  [] Patient limited by pain    [] Patient limited by other medical complications  [] Other:         PLAN: See eval  [x] Continue per plan of care [] Alter current plan (see comments above)  [] Plan of care initiated [] Hold pending MD visit [] Discharge        Therapeutic Exercise and NMR EXR  [] (30474) Provided verbal/tactile cueing for activities related to strengthening, flexibility, endurance, ROM  for improvements in proximal strength and core control with self care, mobility, lifting and ambulation.   [x] (11865) Provided verbal/tactile cueing for activities related to improving balance, coordination, kinesthetic sense, posture, motor skill, proprioception  to assist with core control in self care, mobility, lifting, and ambulation. Therapeutic Activities and Gait:    [] (26900 or 14552) Provided verbal/tactile cueing for activities related to improving balance, coordination, kinesthetic sense, posture, motor skill, proprioception and motor activation to allow for proper function  with self care and ADLs  [] (29232) Provided training and instruction to the patient for proper core and proximal hip recruitment and positioning with ambulation re-education     Home Exercise Program:    [] (65401) Reviewed/Progressed HEP activities related to strengthening, flexibility, endurance, ROM of core, proximal hip and LE for functional self-care, mobility, lifting and ambulation   [] (12194) Reviewed/Progressed HEP activities related to improving balance, coordination, kinesthetic sense, posture, motor skill, proprioception of core, proximal hip and LE for self care, mobility, lifting, and ambulation      Manual Treatments:  PROM / STM / Oscillations-Mobs:  G-I, II, III, IV (PA's, Inf., Post.)  [] (33444) Provided manual therapy to mobilize soft tissue/joints for the purpose of modulating pain, promoting relaxation,  increasing ROM, reducing/eliminating soft tissue swelling/inflammation/restriction, improving soft tissue extensibility and allowing for proper ROM for normal function with self care, mobility, lifting and ambulation.      CRP:  [] (37464) Canalith Repositioning procedure for the assessment, treatment and education of BPPV    Modalities:   [] Ultrasound   [] Estim    [] Mechanical traction    [] Vaso-pneumatic device   [] Ionto     Charges:  Timed Code Treatment Minutes: 40   Total Treatment Minutes: 40          [] EVAL    [] Dry Needling  [x] OM(81014)   x   1  [] EStim Unattended 00650  [x] NMR (18847)  x   1  [] Estim Attended 96177  [] Manual (13484)  x      [] Mechanical [place]xn 38281  [] TA    x     [] Ultrasound  [x] Gait   x   1                 [] Vaso  [] CRP    [] Ionto           [] Other:        Electronically signed by: Shahrzad Street, PT , DPT, NCS, [id number]        Note: If patient does not return for scheduled/ recommended follow up visits, this note will serve as a discharge from care along with most recent update on progress.

## 2023-03-16 NOTE — FLOWSHEET NOTE
[address] [place] [address] and Therapy  [place] [address], [place], [facility]  Office: [phone]   Fax: [phone]    Outpatient Occupational Therapy     [x] Daily Treatment Note   [] Progress Note   [] Discharge Note    Date:  3/16/2023    Patient Name:  Refugio Cook         YOB: 1950    Medical Diagnosis/ICD 10:  Cervical Myelopathy G95.9    Treatment Diagnosis:  Impaired coordination and strength impeding ADLs and IADLs. Onset Date:  2/14/23 (C3-5 laminectomy and and C3-6 posterior fusion (2/14 with Dr. Cydney Sandhu)    Referral Date:    3/2/23    Referring Physician:   MD Lili Moncada MD orthopedic surgeon    Visits Allowed/Insurance/Certification information:   [employer]  Visit # Insurance Allowable Auth Required   4/ 16 14 visits approved [x]  Yes     []  No              Restrictions/Precautions:  Required Braces or Orthoses  Cervical: mi[place] J  Position Activity Restriction  Spinal Precautions: No Bending, No Lifting, No Twisting  Other position/activity restrictions: \"Ok to remove for meals and hygiene\"    Plan of care sent to provider:    []Faxed   [x]Co-signature (date: 3/7/23)    (attempts: 1[x] 2 []3[])        Plan of care signed:    [x]Yes (date: 3/7/23 )           []No       Progress Note covers period from (if applicable):    [x]NA    []From          To           Next Progress Note due:   4/6/23    Visit# / total visits:  4/16    Functional Outcome Measure:  3/7/2023: QuickDASH: Total score: 46; Disability/Symptom score: 79.55%    Subjective: Pt reports she received her ulnar drift splint but stated she doesn't know how to don it. She also now has her RW.     Pain level: No pain reported today    Plan for Next Session:  pinch strength, B UE coordination activities, B UE  strengthening, UE HEP plus shoulder    Objective:     Strength  Muscle  (*denotes pain) Right      Left        3/7/2023      3/7/2023      Shoulder Flexion  4+      4+      Shoulder Abduction  4+      4+      Elbow Flexion 5      5      Elbow Extension 5      5      Pronation 5      5      Supination 5      5      Wrist Flexion 5      5      Wrist Extension 5      4+      Comments:      Strength (Dynamometry) and Pinch Strength              In lbs. 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 Exercises:    Exercises in bold performed in department today. Items not bolded are carried forward from prior visits for continuity of the record. Exercise/Equipment Resistance/Repetitions HEP Other comments      ADL/IADL TRAINING       AE/DME Educated pt re: where she can obtain wheels for her standard walker (recommended by PT). Discussed current ADL status and reviewed precautions/neck brace use during functional tasks. []      Ulnar drift splint Pt was instructed re: donning and doffing R ulner drift splint. Had difficulty with velcroing it. Was instructed to keep velcro intact and slip it on/off instead. Pt demonstrated this several times with supervision only. []      handwriting Pt wrote her signature with various pens, including a RA pen she had ordered, one with built-up tubing, and one with triangular . The RA pen and the triangular  pen worked best while keeping her fingers in a more neutral position.   She rated her best signature as a 6/10. []        []       NEUROMUSCULAR RE-EDU and THERAPEUTIC ACTIVITY        fmc Attempted placing small pegs in pegboard copying a  lower level (level 3) design on a separate card. With ulnar drift splint on, pt was able to hold pegs in between her thumb and pointer finger pad when placed there. Was encouraged to perform the task using hands bilaterally to ensure peg was placed in this position. Min cog A was needed at times to prevent her from going into carson pinch. Noted that her R hand/fingers deviated ulnarly which caused her to go into a carson pinch vs. 3pt. Recommended a Rolyan Soft Hand-Based Ulnar Deviation Insert for Right Hand to improve Joint Alignment and facilitate a pincer grasp. Pt ended up using her L hand to help position the pegs into her R finger and thumb to promote this pinch. Moderate assist was needed with this task. She had to move the pegs over to copy correctly, and she used her R hand to do so. She dropped multiple pegs during the task. She then removed each peg with her R hand using a pincer grasp with verbal cues. []      Pinch Clothespins:  Pt removed clothespins of various colors and resistance from 1-6# from horizontal bars with his both her R and L hands using three-point pinch.   []        []         THERAPEUTIC EXERCISE and MANUAL TECHNIQUES        HEP RUE-finger ABD PROM stretches x3 using L hand placed between each finger. Reviewed UE HEP verbally. Pt reports some wrist pain from trying to keep it flat on the table during some of the exercises. Instructed her to hold the wrist circumduction exercise for now to prevent increased inflammation. BUEs  Finger MP Flexion AROM 10 reps  Seated Digit Tendon Gliding - 10 reps  Finger Spreading - 10 reps  Seated Wrist Radial Deviation Finger Walk - 10 reps  Wrist AROM Wrist Circumduction - 10 reps    Reviewed HEP from IPR-to continue with theraputty and  ball. Instructed pt to use her finger tips to  items vs. Her current carson  technique. []       Therapy flexbar wrist radial flexion x10eps BUEs []        []       MODALITIES         []        []       SPLINTING         []      Home Exercise Program:  Access Code: [email or web address]  URL: [email or web address]  Date: 03/14/2023  Prepared by: Frantz Mckoy    Exercises  Finger MP Flexion AROM 10 reps  Seated Digit Tendon Gliding - 10 reps  Finger Spreading - 10 reps  Seated Wrist Radial Deviation Finger Walk - 10 reps  Wrist AROM Wrist Circumduction - 10 reps  theraputty and  ball    Treatment/Activity Tolerance:    Patients response to treatment:   [x]Patient tolerated treatment well []Patient limited by fatigue   []Patient limited by pain  []Patient limited by other medical complications   []Other:     Assessment: Impaired visual perception noted today during pegboard task. Placed pegs so that it made a mirror image. Pt reports difficulty with visual perception over the last few years, thinks her cataracts may have something to do with it. Pt's ulnar drift splint worked well in her R hand during [facility] activity. Pt continues to present with impaired fine motor coordination, ADLs, IADLs, and strength. Pt will benefit from continued outpatient OT to maximize safety and independence with daily living tasks. Goals:   Goals established 3/7/23   Patient stated goal: Be able to return to work. [] Progressing: [] Met: [] Not Met: [] Adjusted     Therapist goals for Patient  Short Term Goals:  to be met in 4 weeks (by 3/6/2023)  MET 3/9/23  Pt will increase on right  strength to 40lbs to improve performance with daily living tasks that require power grasp. :   Pt will demo improved [facility] for daily living tasks by completing NHPT with on right in 50 seconds or less. Pt will demo improved in hand manipulation skills for daily living tasks by completing WRMT (flipping discs) with on right in 65 seconds or less.     Pt will increase on left  strength to 30 lbs to improve performance with daily living tasks that require power grasp. :   Pt will demo improved [address] for daily living tasks by completing NHPT with on left in 75 seconds or less. Pt will demo improved in hand manipulation skills for daily living tasks by completing WRMT (flipping discs) with on left in 33 seconds or less. Pt will report consistent compliance with HEP at least 4x/wk  Pt will demonstrate improved lateral pinch to 12lbs. To promote functional use of B UEs. Added 3/9/23  Pt will demonstrate improved 3-point pinch to 12lbs. To promote functional use of B UEs. Added 3/9/23     Long Term Goals:  to be met in 8 weeks (by 4/4/2023)  Pt will report successful completion of 1 small shopping trip with mod I. Pt will rate satisfaction with signature as 5/10. Pt will increase on right  strength to 45lbs to improve performance with daily living tasks that require power grasp. Pt will demo improved [address] for daily living tasks by completing NHPT with on right in 40 seconds or less. Pt will demo improved in hand manipulation skills for daily living tasks by completing WRMT (flipping discs) with on right in 50 seconds or less. Pt will increase on left  strength to 35 lbs to improve performance with daily living tasks that require power grasp. :   Pt will demo improved [address] for daily living tasks by completing NHPT with on left in 65 seconds or less. Pt will demo improved in hand manipulation skills for daily living tasks by completing WRMT (flipping discs) with on left in 28 seconds or less. Pt will report a QuickDASH Symptom Severity Scale score of 50% or less indicating increased safety and functional independence in desired occupational pursuits by discharge.          Prognosis: [x]Good   []Fair   []Poor    Patient Requires Follow-up:  [x]Yes  []No    Plan: []Plan of care initiated     [x]Continue per plan of care    [] Alter current plan (see comments)    []Hold pending MD visit []Discharge      --- ---- --- ---- --- ---- --- ---- --- ---- --- ---- --- ---- --- ---- --- ---- --- --- ---    Therapeutic Exercise/Home Exercise Program:   7 minutes    Therapeutic Activity:  15 minutes    ADL Trainin minutes      Neuromuscular Re-Education:  0 minutes      Manual Therapy:  0 minutes    Splintin minutes     Modalities:  0 minutes    Time in: 900 Time out:  945    Timed Code Treatment Minutes: 45 mins. Total Treatment Minutes:  45 mins.     Medicare Cap total YTD:   [x]N/A    Workers Comp Time Stamp  [x]N/A   Time In:   Time Out:    Electronically signed by:  Siddharth Esquivel [address] 89, OTR/L  #[id number]

## 2023-03-21 ENCOUNTER — HOSPITAL ENCOUNTER (OUTPATIENT)
Dept: PHYSICAL THERAPY | Age: 73
Setting detail: THERAPIES SERIES
Discharge: HOME | End: 2023-03-21
Payer: MEDICARE

## 2023-03-21 ENCOUNTER — HOSPITAL ENCOUNTER (OUTPATIENT)
Dept: OCCUPATIONAL THERAPY | Age: 73
Setting detail: THERAPIES SERIES
Discharge: HOME | End: 2023-03-21
Payer: MEDICARE

## 2023-03-21 PROCEDURE — 97110 THERAPEUTIC EXERCISES: CPT

## 2023-03-21 PROCEDURE — 97530 THERAPEUTIC ACTIVITIES: CPT

## 2023-03-21 PROCEDURE — 97116 GAIT TRAINING THERAPY: CPT

## 2023-03-21 PROCEDURE — 97112 NEUROMUSCULAR REEDUCATION: CPT

## 2023-03-21 NOTE — FLOWSHEET NOTE
rated her best signature as a 6/10. []        []       NEUROMUSCULAR RE-EDU and THERAPEUTIC ACTIVITY        [facility] Placed small pegs in pegboard with each hand    Attempted placing small pegs in pegboard copying a  lower level (level 3) design on a separate card. With ulnar drift splint on, pt was able to hold pegs in between her thumb and pointer finger pad when placed there. Was encouraged to perform the task using hands bilaterally to ensure peg was placed in this position. Min cog A was needed at times to prevent her from going into carson pinch. Noted that her R hand/fingers deviated ulnarly which caused her to go into a carson pinch vs. 3pt. Recommended a Rolyan Soft Hand-Based Ulnar Deviation Insert for Right Hand to improve Joint Alignment and facilitate a pincer grasp. Pt ended up using her L hand to help position the pegs into her R finger and thumb to promote this pinch. Moderate assist was needed with this task. She had to move the pegs over to copy correctly, and she used her R hand to do so. She dropped multiple pegs during the task. She then removed each peg with her R hand using a pincer grasp with verbal cues. []      Pinch Clothespins:  Pt placed with R hand and removed with L hand clothespins of various colors and resistance from 1-6# from horizontal bars with  using three-point pinch. Verbal cues needed to perform pinch without moving into carson pinch at times.   []        []         THERAPEUTIC EXERCISE and MANUAL TECHNIQUES        HEP RUE-finger ABD PROM stretches x3 using L hand placed between each finger. Reviewed UE HEP verbally. Pt reports some wrist pain from trying to keep it flat on the table during some of the exercises. Instructed her to hold the wrist circumduction exercise for now to prevent increased inflammation.          BUEs  Finger MP Flexion AROM 3 reps  Seated Digit Tendon Gliding - 3 reps  Finger Spreading - 3 reps  Seated Wrist Radial Deviation Finger Walk

## 2023-03-21 NOTE — FLOWSHEET NOTE
Patient limited by pain    [] Patient limited by other medical complications  [] Other:         PLAN: See eval  [x] Continue per plan of care [] Alter current plan (see comments above)  [] Plan of care initiated [] Hold pending MD visit [] Discharge        Therapeutic Exercise and NMR EXR  [x] (97069) Provided verbal/tactile cueing for activities related to strengthening, flexibility, endurance, ROM  for improvements in proximal strength and core control with self care, mobility, lifting and ambulation. [x] (63672) Provided verbal/tactile cueing for activities related to improving balance, coordination, kinesthetic sense, posture, motor skill, proprioception  to assist with core control in self care, mobility, lifting, and ambulation.      Therapeutic Activities and Gait:    [] (71986 or 12234) Provided verbal/tactile cueing for activities related to improving balance, coordination, kinesthetic sense, posture, motor skill, proprioception and motor activation to allow for proper function  with self care and ADLs  [x] (59400) Provided training and instruction to the patient for proper core and proximal hip recruitment and positioning with ambulation re-education     Home Exercise Program:    [] (50519) Reviewed/Progressed HEP activities related to strengthening, flexibility, endurance, ROM of core, proximal hip and LE for functional self-care, mobility, lifting and ambulation   [] (02945) Reviewed/Progressed HEP activities related to improving balance, coordination, kinesthetic sense, posture, motor skill, proprioception of core, proximal hip and LE for self care, mobility, lifting, and ambulation      Manual Treatments:  PROM / STM / Oscillations-Mobs:  G-I, II, III, IV (PA's, Inf., Post.)  [] (85895) Provided manual therapy to mobilize soft tissue/joints for the purpose of modulating pain, promoting relaxation,  increasing ROM, reducing/eliminating soft tissue swelling/inflammation/restriction, improving soft tissue

## 2023-03-23 ENCOUNTER — HOSPITAL ENCOUNTER (OUTPATIENT)
Dept: OCCUPATIONAL THERAPY | Age: 73
Setting detail: THERAPIES SERIES
Discharge: HOME | End: 2023-03-23
Payer: MEDICARE

## 2023-03-23 ENCOUNTER — HOSPITAL ENCOUNTER (OUTPATIENT)
Dept: PHYSICAL THERAPY | Age: 73
Setting detail: THERAPIES SERIES
Discharge: HOME | End: 2023-03-23
Payer: MEDICARE

## 2023-03-23 PROCEDURE — 97530 THERAPEUTIC ACTIVITIES: CPT

## 2023-03-23 PROCEDURE — 97112 NEUROMUSCULAR REEDUCATION: CPT

## 2023-03-23 PROCEDURE — 97110 THERAPEUTIC EXERCISES: CPT

## 2023-03-23 NOTE — FLOWSHEET NOTE
from trying to keep it flat on the table during some of the exercises. Instructed her to hold the wrist circumduction exercise for now to prevent increased inflammation. BUEs  Finger MP Flexion AROM 3 reps  Seated Digit Tendon Gliding - 3 reps  Finger Spreading - 3 reps  Seated Wrist Radial Deviation Finger Walk - 3 reps  Wrist AROM Wrist Circumduction - 3 reps  Seated Shoulder Flexion - 3 sets - 10 reps  Seated Shoulder Abduction - Thumbs Up - 3 sets - 10 reps    Reviewed HEP from [facility]-to continue with theraputty and  ball. Instructed pt to use her finger tips to  items vs. Her current carson  technique. [x]       Therapy flexbar wrist extension x10eps BUEs []        []       MODALITIES         []        []       SPLINTING         []      Home Exercise Program:  Access Code: [email or web address]  URL: [email or web address]  Date: 03/14/2023  Prepared by: Kathryn Lundberg    Exercises  Finger MP Flexion AROM 10 reps  Seated Digit Tendon Gliding - 10 reps  Finger Spreading - 10 reps  Seated Wrist Radial Deviation Finger Walk - 10 reps  Wrist AROM Wrist Circumduction - 10 reps  theraputty and  ball  Seated Shoulder Flexion - 3 sets - 10 reps  Seated Shoulder Abduction - Thumbs Up - 3 sets - 10 reps    Treatment/Activity Tolerance:    Patients response to treatment:   [x]Patient tolerated treatment well []Patient limited by fatigue   []Patient limited by pain  []Patient limited by other medical complications   []Other:     Assessment: Demonstrating improved pincer grasp while wearing her ulnar splint. Was able to don it herself today IND'ly. Pt continues to present with impaired fine motor coordination, ADLs, IADLs, and strength. Pt will benefit from continued outpatient OT to maximize safety and independence with daily living tasks. Goals:   Goals established 3/7/23   Patient stated goal: Be able to return to work.   [] Progressing: [] Met: [] Not Met: [] Adjusted     Therapist goals for

## 2023-03-23 NOTE — FLOWSHEET NOTE
mobility   Issue/progress HEP for improved functional strength and mobility   Progress balance and functional strengthening as tolerated       OBJECTIVE: See eval        Exercises/Interventions:     Exercises in bold performed in department today. Items not bolded are carried forward from prior visits for continuity of the record. Exercise/Equipment Sets/Reps/Resistance Comments/cues          THERAPEUTIC EXERCISE       NuStep 6 min level 5 LE only for aerobic warm up and stepping pattern     Squats 10 x 3                                                         NEUROMUSCULAR RE-ED       Ambulatory balance challenges    - skater stepping fwd/retro    - march with CL knee tap    - monster walk fwd/retro    - tandem fwd/retro   2 x 50 ft  2 x 50 ft  2 x 50 ft  2 x 50 ft CGA/SBA for balance - one instance of mod A needed to correct posterior LOB with skater stepping        4 square stepping over PVC frame 2 min x 3 CGA for balance, progressing looking straight ahead to eyes closed     1 foot ground + 1 foot 2nd step  -balance  -ball chest press  -ball OH lift  -ball rot  -ball 3rd step reach down   30 sec  10 B  10 B  10 B  10 B       Ambulatory cone tapping    - side step with ant tap in middle and lat taps each end    - fwd/diagonal with backwards to home base between cones       6\" step ups with CL LE tap to 18\" step    - tapping to ipsilateral target    - tapping to ipsilateral and CL target To fatigue x 2 each Initially B UE support from railings at base of stairs progressing to no UE support needing CGA/min A for balance     Heel/toe tapping to 12\" stair from foam    - heel tap to step no UE support    - heel tap to step UL UE, EC    - toe tap to cone on 12\" step UL UE To fatigue x 2 each CGA/min A consistently with up to mod A x 1 for balance recovery with posterior LOB.                           THERAPEUTIC ACTIVITY                                   GAIT       Dual task AMB with cone weaving     - motor ice

## 2023-03-28 ENCOUNTER — HOSPITAL ENCOUNTER (OUTPATIENT)
Dept: PHYSICAL THERAPY | Age: 73
Setting detail: THERAPIES SERIES
Discharge: HOME | End: 2023-03-28
Payer: MEDICARE

## 2023-03-28 ENCOUNTER — HOSPITAL ENCOUNTER (OUTPATIENT)
Dept: OCCUPATIONAL THERAPY | Age: 73
Setting detail: THERAPIES SERIES
Discharge: HOME | End: 2023-03-28
Payer: MEDICARE

## 2023-03-28 PROCEDURE — 97535 SELF CARE MNGMENT TRAINING: CPT

## 2023-03-28 PROCEDURE — 97110 THERAPEUTIC EXERCISES: CPT

## 2023-03-28 PROCEDURE — 97112 NEUROMUSCULAR REEDUCATION: CPT

## 2023-03-28 PROCEDURE — 97530 THERAPEUTIC ACTIVITIES: CPT

## 2023-03-28 NOTE — FLOWSHEET NOTE
Reviewed/Progressed HEP activities related to improving balance, coordination, kinesthetic sense, posture, motor skill, proprioception of core, proximal hip and LE for self care, mobility, lifting, and ambulation      Manual Treatments:  PROM / STM / Oscillations-Mobs:  G-I, II, III, IV (PA's, Inf., Post.)  [] (53407) Provided manual therapy to mobilize soft tissue/joints for the purpose of modulating pain, promoting relaxation,  increasing ROM, reducing/eliminating soft tissue swelling/inflammation/restriction, improving soft tissue extensibility and allowing for proper ROM for normal function with self care, mobility, lifting and ambulation. CRP:  [] (89920) Canalith Repositioning procedure for the assessment, treatment and education of BPPV    Modalities:   [] Ultrasound   [] Estim    [] Mechanical traction    [] Vaso-pneumatic device   [] Ionto     Charges:  Timed Code Treatment Minutes: 45   Total Treatment Minutes: 45          [] EVAL    [] Dry Needling  [x] IT(35692)   x   1  [] EStim Unattended 21866  [x] NMR (14976)  x   2  [] Estim Attended  23324  [] Manual (90161)  x      [] Mechanical Txn 04192  [] TA    x     [] Ultrasound  [] Gait   x                   [] Vaso  [] CRP    [] Ionto           [] Other:        Electronically signed by: Johana Cordova, PT , DPT, NCS, [id number]        Note: If patient does not return for scheduled/ recommended follow up visits, this note will serve as a discharge from care along with most recent update on progress.

## 2023-03-30 ENCOUNTER — HOSPITAL ENCOUNTER (OUTPATIENT)
Dept: PHYSICAL THERAPY | Age: 73
Setting detail: THERAPIES SERIES
Discharge: HOME | End: 2023-03-30
Payer: MEDICARE

## 2023-03-30 ENCOUNTER — HOSPITAL ENCOUNTER (OUTPATIENT)
Dept: OCCUPATIONAL THERAPY | Age: 73
Setting detail: THERAPIES SERIES
Discharge: HOME | End: 2023-03-30
Payer: MEDICARE

## 2023-03-30 PROCEDURE — 97112 NEUROMUSCULAR REEDUCATION: CPT

## 2023-03-30 PROCEDURE — 97110 THERAPEUTIC EXERCISES: CPT

## 2023-03-30 PROCEDURE — 97530 THERAPEUTIC ACTIVITIES: CPT

## 2023-03-30 NOTE — FLOWSHEET NOTE
Flexion - 3 sets - 10 reps  Seated Shoulder Abduction - Thumbs Up - 3 sets - 10 reps    Reviewed HEP from [facility]-to continue with theraputty and  ball. Instructed pt to use her finger tips to  items vs. Her current carson  technique. [x]       Therapy flexbar wrist extension x10eps BUEs []        []       MODALITIES         []        []       SPLINTING         []      Home Exercise Program:  Access Code: [email or web address]  URL: [email or web address]  Date: 03/14/2023  Prepared by: Jass Guallpa    Exercises  Finger MP Flexion AROM 10 reps  Seated Digit Tendon Gliding - 10 reps  Finger Spreading - 10 reps  Seated Wrist Radial Deviation Finger Walk - 10 reps  Wrist AROM Wrist Circumduction - 10 reps  theraputty and  ball  Seated Shoulder Flexion - 3 sets - 10 reps  Seated Shoulder Abduction - Thumbs Up - 3 sets - 10 reps    Treatment/Activity Tolerance:    Patients response to treatment:   [x]Patient tolerated treatment well []Patient limited by fatigue   []Patient limited by pain  []Patient limited by other medical complications   []Other:     Assessment: Tolerated treatment well today. Continues to need verbal cues to use pincer and 3-pt pinch versus carson. Wears ulnar drift splint during treatment. Needs continued OT treatment to further address UE strength and coordination. Goals:   Goals established 3/7/23   Patient stated goal: MET 3/28/23  [] Progressing: [x] Met: [] Not Met: [] Adjusted     Therapist goals for Patient  Short Term Goals:  to be met in 4 weeks (by 4/6/2023)  MET 3/9/23  Pt will increase on right  strength to 40lbs to improve performance with daily living tasks that require power grasp. :   Pt will demo improved [address] for daily living tasks by completing NHPT with on right in 50 seconds or less. Pt will demo improved in hand manipulation skills for daily living tasks by completing WRMT (flipping discs) with on right in 65 seconds or less.     Pt will increase on left

## 2023-03-30 NOTE — FLOWSHEET NOTE
Physical Therapy Daily Treatment Note    [x]Daily Treatment Note    []Progress Note    [] Discharge Summary         Date:  3/30/2023    Patient Name:  Nancy Donovan    :  1950  MRN: [id number]      Medical/Treatment Diagnosis Information:  Diagnosis: G95.9 - Cervical myelopathy ([facility] [address]  Treatment Diagnosis: B LE spasticiy, impaired balance, decreased LE functional strength    Insurance/Certification information:  PT Insurance Information: [place] [employer]    Physician Information:  Referring Provider (secondary): Orion Villagran MD    Plan of care signed :  [x]  Yes  [] No  [x]  Cosign []  Fax    Date of Patient follow up with Physician:     Is this a Progress Report:     []  Yes  [x]  No      If Yes:  Date Range for reporting period:  Beginning 3/7/23  Ending    Progress report will be due (10 Rx or 30 days whichever is less):       Recertification will be due (POC Duration  / 90 days whichever is less): 23      Visit # Insurance Allowable Auth Required    14 visits approved through 23 [x]  Yes []  No        Functional Scale/Score:  Measure Used:     Neck disability index  Score:   46%               Date Assessed:  3/7/23        Measure Used:     ABC  Score:   26.25%               Date Assessed:  3/7/23           Latex Allergy:  [x]NO      []YES  Preferred Language for Healthcare:   [x]English       []other:    RESTRICTIONS/PRECAUTIONS:  Cervical spinal precautions, c-collar (can DC collar as of 3/27)    SUBJECTIVE:  pt has been not wearing brace for most of the day while she is at work and putting on for some time when she is home in the evening. Has noted mild increase in soreness in the R side of her neck with driving.     Pain level:    Pt does not complain of pain this session      Plan Moving Forward/ For next visit:    Initiate functional training for improved safety/independence with mobility   Issue/progress HEP for improved functional strength and mobility   Progress

## 2023-04-04 ENCOUNTER — HOSPITAL ENCOUNTER (OUTPATIENT)
Dept: OCCUPATIONAL THERAPY | Age: 73
Setting detail: THERAPIES SERIES
Discharge: HOME | End: 2023-04-04
Payer: MEDICARE

## 2023-04-04 ENCOUNTER — HOSPITAL ENCOUNTER (OUTPATIENT)
Dept: PHYSICAL THERAPY | Age: 73
Setting detail: THERAPIES SERIES
Discharge: HOME | End: 2023-04-04
Payer: MEDICARE

## 2023-04-04 PROCEDURE — 97116 GAIT TRAINING THERAPY: CPT

## 2023-04-04 PROCEDURE — 97112 NEUROMUSCULAR REEDUCATION: CPT

## 2023-04-04 PROCEDURE — 97530 THERAPEUTIC ACTIVITIES: CPT

## 2023-04-04 NOTE — FLOWSHEET NOTE
eval        Exercises/Interventions:     Exercises in bold performed in department today. Items not bolded are carried forward from prior visits for continuity of the record. Exercise/Equipment Sets/Reps/Resistance Comments/cues          THERAPEUTIC EXERCISE       NuStep 5 min level 5 LE only for aerobic warm up and stepping pattern     Squats 10 x 3      Scap squeeze 10 x 3      Mid row (green theraband) 10 x 3      Chin tuck 10 x 3 Educated in gentle comfortable stretch/ROM                                   NEUROMUSCULAR RE-ED       Ambulatory balance challenges    - skater stepping fwd/retro    - march with CL knee tap    - monster walk fwd/retro    - tandem fwd/retro   4 laps in // bars  4 laps in // bars  2 laps in // bars  2 laps in // bars Intermittent UE support as needed from bars, with mirror for postural control feedback to keep shoulders even        4 square stepping over PVC frame 2 min x 3 CGA for balance, progressing looking straight ahead to eyes closed     1 foot ground + 1 foot on bosu  -balance  -ball chest press  -ball OH lift  -ball trunk rotation   1 min x 2 each       Ambulatory cone tapping with slide board    - side step with ant tap in middle and lat taps each end    - fwd/diagonal with backwards to home base between cones To fatigue x 2 each Slideboard held in B UE, stooping to tap flat to cone with cueing for B shoulder retraction for stability on stand     6\" step ups with CL LE tap to 18\" step    - tapping to ipsilateral target    - tapping to ipsilateral and CL target To fatigue x 2 each Initially B UE support from railings at base of stairs progressing to no UE support needing CGA/min A for balance     Heel/toe tapping to 12\" stair from foam    - heel tap to step no UE support    - heel tap to step UL UE, EC    - toe tap to cone on 12\" step UL UE To fatigue x 2 each CGA/min A consistently with up to mod A x 1 for balance recovery with posterior LOB.      Lat step up to 6\" box with CL

## 2023-04-04 NOTE — FLOWSHEET NOTE
coordination activities,    Objective:     Strength  Muscle  (*denotes pain) Right      Left        3/7/2023 4/4/23     3/7/2023 4/4/23     Shoulder Flexion  4+ 5     4+ 5     Shoulder Abduction  4+ 5     4+ 5     Elbow Flexion 5      5      Elbow Extension 5      5      Pronation 5      5      Supination 5      5      Wrist Flexion 5      5      Wrist Extension 5      4+      Comments:      Strength (Dynamometry) and Pinch Strength              In lbs. Right   Left   Date 3/7/2023  3/9/23 4/4/23*     3/7/2023  3/9/23 4/4/23      - Position Two 33,36   32,28     27,26   45, 32                         Lateral Pinch Assess next visit  9,10 9,10     Assess next visit  9, 8 8,8     Three Point PInch Assess next visit  10,10 9, 9     Assess next visit  10,10 10,10                         Coordination                    9 Hole Peg Test 63 sec   45 sec     83 sec   55 sec      WRMT 78 sec   57 sec     37 sec   44 sec     Comments: Has rhuematoid arthritis with pain in R pointer finger which she tries to avoid using at times  Ulnar deviation noted. *Ulnar drift splint worn    Exercises:    Exercises in bold performed in department today. Items not bolded are carried forward from prior visits for continuity of the record. Exercise/Equipment Resistance/Repetitions HEP Other comments      ADL/IADL TRAINING       AE/DME Educated pt re: where she can obtain wheels for her standard walker (recommended by PT). Discussed current ADL status and reviewed precautions/neck brace use during functional tasks. []    Ulnar drift splint IND with donning hand splint. Pt was instructed re: donning and doffing R ulner drift splint. Needed supervision to don correctly. Had difficulty with velcroing it. Was instructed to keep velcro intact and slip it on/off instead. Pt demonstrated this several times with supervision only.  []    Driving Pt performed a brake reaction test for driving assessment and completed 5 attempts with an

## 2023-04-06 ENCOUNTER — HOSPITAL ENCOUNTER (OUTPATIENT)
Dept: OCCUPATIONAL THERAPY | Age: 73
Setting detail: THERAPIES SERIES
Discharge: HOME | End: 2023-04-06
Payer: MEDICARE

## 2023-04-06 ENCOUNTER — HOSPITAL ENCOUNTER (OUTPATIENT)
Dept: PHYSICAL THERAPY | Age: 73
Setting detail: THERAPIES SERIES
Discharge: HOME | End: 2023-04-06
Payer: MEDICARE

## 2023-04-06 PROCEDURE — 97530 THERAPEUTIC ACTIVITIES: CPT

## 2023-04-06 PROCEDURE — 97116 GAIT TRAINING THERAPY: CPT

## 2023-04-06 PROCEDURE — 97112 NEUROMUSCULAR REEDUCATION: CPT

## 2023-04-06 NOTE — PLAN OF CARE
improved in hand manipulation skills for daily living tasks by completing WRMT (flipping discs) with on left in 33 seconds or less. Continue  Pt will demonstrate improved lateral pinch to 12lbs. To promote functional use of B UEs. Continue  Pt will demonstrate improved 3-point pinch to 12lbs. To promote functional use of B UEs. Continue  MET 3/9/23   MET 4/4/23    MET 4/4/23  MET 4/4/23 Continue  MET 4/4/23      Long Term Goals:  to be met in 8 weeks (by 5/4/2023)  Pt will report successful completion of 1 small shopping trip with mod I. Pt will increase on right  strength to 40lbs to improve performance with daily living tasks that require power grasp. Continue  Pt will demo improved [facility] for daily living tasks by completing NHPT with on right in 40 seconds or less. Pt will demo improved in hand manipulation skills for daily living tasks by completing WRMT (flipping discs) with on right in 50 seconds or less. Continue  Pt will increase on left  strength to 35 lbs to improve performance with daily living tasks that require power grasp. Continue  Pt will demo improved in hand manipulation skills for daily living tasks by completing WRMT (flipping discs) with on left in 28 seconds or less. Continue  . MET 4/6/23   MET 6/10 on 3/16/23   Continue    Prognosis: [x]Good   []Fair   []Poor    Patient Requires Follow-up:  [x]Yes  []No    Plan: []Plan of care initiated     []Continue per plan of care    [x] Alter current plan (see comments) Continue OT treatment 2x/wk for 4 wks.    []Hold pending MD visit []Discharge        Electronically signed by:  Aranza Esquivel Isidoro 89, OTR/L  #[id number]

## 2023-04-06 NOTE — PROGRESS NOTES
rolator at work and in community but generally walks with no device holding onto furniture at home. Pain level:    Pt does not complain of pain this session      Plan Moving Forward/ For next visit:    Initiate functional training for improved safety/independence with mobility   Issue/progress HEP for improved functional strength and mobility   Progress balance and functional strengthening as tolerated       OBJECTIVE: See julio     IE PN 4/6   Angelo 28/56 38/56   FGA  10/30       Exercises/Interventions:     Exercises in bold performed in department today. Items not bolded are carried forward from prior visits for continuity of the record.   Exercise/Equipment Sets/Reps/Resistance Comments/cues          THERAPEUTIC EXERCISE       NuStep 5 min level 5 LE only for aerobic warm up and stepping pattern     Squats 10 x 3      Scap squeeze 10 x 3      Mid row (green theraband) 10 x 3      Chin tuck 10 x 3 Educated in gentle comfortable stretch/ROM                                   NEUROMUSCULAR RE-ED       Ambulatory balance challenges    - skater stepping fwd/retro    - march with CL knee tap    - monster walk fwd/retro    - tandem fwd/retro   4 laps in // bars  4 laps in // bars  2 laps in // bars  2 laps in // bars Intermittent UE support as needed from bars, with mirror for postural control feedback to keep shoulders even        4 square stepping over PVC frame 2 min x 3 CGA for balance, progressing looking straight ahead to eyes closed     1 foot ground + 1 foot on bosu  -balance  -ball chest press  -ball OH lift  -ball trunk rotation   1 min x 2 each       Ambulatory cone tapping with slide board    - side step with ant tap in middle and lat taps each end    - fwd/diagonal with backwards to home base between cones To fatigue x 2 each Slideboard held in B UE, stooping to tap flat to cone with cueing for B shoulder retraction for stability on stand     6\" step ups with CL LE tap to 18\" step    - tapping to

## 2023-04-06 NOTE — FLOWSHEET NOTE
impaired fmc. Wears ulnar drift splint during treatment. Needs continued OT treatment to further address UE strength and coordination. Goals:   Goals established 3/7/23   Patient stated goal: MET 3/28/23  Be able to use tweezers without difficulty. Added 4/6/23     Therapist goals for Patient  Short Term Goals:  to be met in 4 weeks (by 4/6/2023)  Pt will increase on right  strength to 40lbs to improve performance with daily living tasks that require power grasp. Continue  Pt will increase on left  strength to 30 lbs to improve performance with daily living tasks that require power grasp. Continue  Pt will demo improved in hand manipulation skills for daily living tasks by completing WRMT (flipping discs) with on left in 33 seconds or less. Continue  Pt will demonstrate improved lateral pinch to 12lbs. To promote functional use of B UEs. Continue  Pt will demonstrate improved 3-point pinch to 12lbs. To promote functional use of B UEs. Continue  MET 3/9/23   MET 4/4/23    MET 4/4/23  MET 4/4/23 Continue  MET 4/4/23      Long Term Goals:  to be met in 8 weeks (by 5/4/2023)  Pt will report successful completion of 1 small shopping trip with mod I. Pt will increase on right  strength to 40lbs to improve performance with daily living tasks that require power grasp. Continue  Pt will demo improved [facility] for daily living tasks by completing NHPT with on right in 40 seconds or less. Pt will demo improved in hand manipulation skills for daily living tasks by completing WRMT (flipping discs) with on right in 50 seconds or less. Continue  Pt will increase on left  strength to 35 lbs to improve performance with daily living tasks that require power grasp. Continue  Pt will demo improved in hand manipulation skills for daily living tasks by completing WRMT (flipping discs) with on left in 28 seconds or less. Continue  . MET 4/6/23   MET 6/10 on 3/16/23   Continue    Prognosis: [x]Good   []Fair

## 2023-04-18 ENCOUNTER — HOSPITAL ENCOUNTER (OUTPATIENT)
Dept: OCCUPATIONAL THERAPY | Age: 73
Setting detail: THERAPIES SERIES
Discharge: HOME | End: 2023-04-18
Payer: MEDICARE

## 2023-04-18 ENCOUNTER — HOSPITAL ENCOUNTER (OUTPATIENT)
Dept: PHYSICAL THERAPY | Age: 73
Setting detail: THERAPIES SERIES
Discharge: HOME | End: 2023-04-18
Payer: MEDICARE

## 2023-04-18 PROCEDURE — 97116 GAIT TRAINING THERAPY: CPT

## 2023-04-18 PROCEDURE — 97530 THERAPEUTIC ACTIVITIES: CPT

## 2023-04-18 NOTE — FLOWSHEET NOTE
pinch strength,  B UE  strengthening, B UE coordination activities    Objective:     Strength  Muscle  (*denotes pain) Right      Left        3/7/2023 4/4/23  4/20/23   3/7/2023 4/4/23 4/20/23    Shoulder Flexion  4+ 5     4+ 5     Shoulder Abduction  4+ 5     4+ 5     Elbow Flexion 5      5      Elbow Extension 5      5      Pronation 5      5      Supination 5      5      Wrist Flexion 5      5      Wrist Extension 5      4+      Comments:      Strength (Dynamometry) and Pinch Strength               In lbs. Right   Left    Date 3/7/2023  3/9/23 4/4/23*  4/20/23   3/7/2023  3/9/23 4/4/23  4/13/23 4/20/23    - Position Two 33,36   32,28     27,26   45, 32  39,43                         Lateral Pinch Assess next visit  9,10 9,10     Assess next visit  9, 8 8,8      Three Point Pinch Assess next visit  10,10 9, 9     Assess next visit  10,10 10,10                           Coordination                     9 Hole Peg Test 63 sec   45 sec     83 sec   55 sec       WRMT 78 sec   57 sec     37 sec   44 sec      Comments: Has rhuematoid arthritis with pain in R pointer finger which she tries to avoid using at times  Ulnar deviation noted. *Ulnar drift splint worn    Exercises:    Exercises in bold performed in department today. Items not bolded are carried forward from prior visits for continuity of the record. Exercise/Equipment Resistance/Repetitions HEP Other comments      ADL/IADL TRAINING     Nail Clipping Pt brought in her nail clippers Coban material was placed around the handles to keep it from slipping in her hands. She demonstrated the ability to trim nails on each hand. AE/DME Discussed button hook to aid in difficulty buttoning. Gave ordering information. Educated pt re: where she can obtain wheels for her standard walker (recommended by PT). Discussed current ADL status and reviewed precautions/neck brace use during functional tasks.  []    Ulnar drift splint Pt had donned her hand

## 2023-04-18 NOTE — FLOWSHEET NOTE
Functional goals/ Treatment Progress Update:  [x] Patient is progressing as expected towards functional goals listed. [] Progression is slowed due to complexities/Impairments listed. [] Progression has been slowed due to co-morbidities. [] Plan just implemented, too soon to assess goals progression <30days   [] Goals require adjustment due to lack of progress  [] Patient is not progressing as expected and requires additional follow up with physician  [] Patient has met goals as marked above   [] Other    Prognosis for POC: [x] Good [] Fair  [] Poor    Patient requires continued skilled intervention: [x] Yes  [] No    Treatment/Activity Tolerance:  [x] Patient able to complete treatment  [] Patient limited by fatigue  [] Patient limited by pain    [] Patient limited by other medical complications  [] Other:         PLAN: See eval  [x] Continue per plan of care [] Alter current plan (see comments above)  [] Plan of care initiated [] Hold pending MD visit [] Discharge        Therapeutic Exercise and NMR EXR  [] (54507) Provided verbal/tactile cueing for activities related to strengthening, flexibility, endurance, ROM  for improvements in proximal strength and core control with self care, mobility, lifting and ambulation. [x] (86711) Provided verbal/tactile cueing for activities related to improving balance, coordination, kinesthetic sense, posture, motor skill, proprioception  to assist with core control in self care, mobility, lifting, and ambulation.      Therapeutic Activities and Gait:    [] (74343 or 26087) Provided verbal/tactile cueing for activities related to improving balance, coordination, kinesthetic sense, posture, motor skill, proprioception and motor activation to allow for proper function  with self care and ADLs  [x] (34897) Provided training and instruction to the patient for proper core and proximal hip recruitment and positioning with ambulation re-education     Home Exercise Program:    [x]

## 2023-04-20 ENCOUNTER — HOSPITAL ENCOUNTER (OUTPATIENT)
Dept: OCCUPATIONAL THERAPY | Age: 73
Setting detail: THERAPIES SERIES
Discharge: HOME | End: 2023-04-20
Payer: MEDICARE

## 2023-04-20 ENCOUNTER — HOSPITAL ENCOUNTER (OUTPATIENT)
Age: 73
Discharge: HOME | End: 2023-04-20
Payer: MEDICARE

## 2023-04-20 ENCOUNTER — HOSPITAL ENCOUNTER (OUTPATIENT)
Dept: PHYSICAL THERAPY | Age: 73
Setting detail: THERAPIES SERIES
Discharge: HOME | End: 2023-04-20
Payer: MEDICARE

## 2023-04-20 ENCOUNTER — HOSPITAL ENCOUNTER (OUTPATIENT)
Dept: GENERAL RADIOLOGY | Age: 73
Discharge: HOME | End: 2023-04-20
Payer: MEDICARE

## 2023-04-20 DIAGNOSIS — M54.2 NECK PAIN: ICD-10-CM

## 2023-04-20 PROCEDURE — 97116 GAIT TRAINING THERAPY: CPT

## 2023-04-20 PROCEDURE — 97140 MANUAL THERAPY 1/> REGIONS: CPT

## 2023-04-20 PROCEDURE — 97112 NEUROMUSCULAR REEDUCATION: CPT

## 2023-04-20 PROCEDURE — 97530 THERAPEUTIC ACTIVITIES: CPT

## 2023-04-20 PROCEDURE — 97110 THERAPEUTIC EXERCISES: CPT

## 2023-04-20 PROCEDURE — 72040 X-RAY EXAM NECK SPINE 2-3 VW: CPT

## 2023-04-20 NOTE — FLOWSHEET NOTE
lateral pinch strength,  R  strengthening, B UE coordination activities    Objective:     Strength  Muscle  (*denotes pain) Right      Left        3/7/2023 4/4/23  4/20/23   3/7/2023 4/4/23 4/20/23    Shoulder Flexion  4+ 5     4+ 5     Shoulder Abduction  4+ 5     4+ 5     Elbow Flexion 5      5      Elbow Extension 5      5      Pronation 5      5      Supination 5      5      Wrist Flexion 5      5      Wrist Extension 5      4+      Comments:      Strength (Dynamometry) and Pinch Strength               In lbs. Right   Left    Date 3/7/2023  3/9/23 4/4/23*  4/20/23*   3/7/2023  3/9/23 4/4/23  4/13/23 4/20/23    - Position Two 33,36   32,28 30,30   27,26   45, 32  39,43 44, 44                        Lateral Pinch Assess next visit  9,10 9,10 10, 10   Assess next visit  9, 8 8,8   10, 10   Three Point Pinch Assess next visit  10,10 9, 9  9,8   Assess next visit  10,10 10,10   10, 9                        Coordination                     9 Hole Peg Test 63 sec   45 sec  35 sec   83 sec   55 sec   47 sec    WRMT 78 sec   57 sec  42 sec   37 sec   44 sec   32 sec   Comments: Has rhuematoid arthritis with pain in R pointer finger which she tries to avoid using at times  Ulnar deviation noted. *Ulnar drift splint worn    Exercises:    Exercises in bold performed in department today. Items not bolded are carried forward from prior visits for continuity of the record. Exercise/Equipment Resistance/Repetitions HEP Other comments      ADL/IADL TRAINING     Nail Clipping Pt brought in her nail clippers Coban material was placed around the handles to keep it from slipping in her hands. She demonstrated the ability to trim nails on each hand. AE/DME Discussed button hook to aid in difficulty buttoning. Gave ordering information. Educated pt re: where she can obtain wheels for her standard walker (recommended by PT).     Discussed current ADL status and reviewed precautions/neck brace use during functional

## 2023-04-20 NOTE — FLOWSHEET NOTE
Lat step up to 6\" box with CL ABD 10 x 3 B  3# AW B B UE support from // bars ant as needed; with mirror anteriorly for postural control feedback     Mini squats on foam To fatigue In // bars for safety, needing intermittent UE support to correct posterior LOB x3     B stance on flat side of bosu To tolerance Trialed initiating shifting eye gaze for added balance            THERAPEUTIC ACTIVITY                                   GAIT       Dual task AMB with cone weaving     - motor ice cream ball pass (+ cog overlay naming in categories)    - motor balancing yoga ball on slideboard  To fatigue x 2 each      AMB with SPC To hospital lobby/back SPC on the R intermittent CGA with occasional LOB though pt able to self correct all with stepping strategy and cane. Initial demonstration of sequencing and intermittent cueing for 2 pt pattern     Outdoor AMB  With no device, CGA/min A x 1 with lat LOB walking along hill in the grass. Completes curb step x 3. Multidirectional AMB    - cone weaving    - direction change on cue from therapist  SBA with delay and difficulty changing to/from backwards ambulation    AMB with no AD and random perturbations 10 min Pt able to self correct LOB with stepping strategy      Treadmill gait  15 min, 1.0 - 1.4 mph  40 cm step length target  With B UE initially progressing to UL UE support. Note improved step equality with visual feedback displayed for step length though continues to have stability impairment with UL support. Therapeutic Exercise/Home Exercise Program:  0  minutes  Warm up    Bolded exercises below completed, reviewed and issued for HEP.     Current HEP Exercises  - Seated Cervical Retraction  - 1 x daily - 7 x weekly - 3 sets - 10 reps  - Seated Scapular Retraction  - 1 x daily - 7 x weekly - 3 sets - 10 reps  - Standing Shoulder Row with Anchored Resistance  - 1 x daily - 7 x weekly - 3 sets - 10 reps  - Standing Lower Extremity Reach to Object  - 1 x daily -

## 2023-04-25 ENCOUNTER — HOSPITAL ENCOUNTER (OUTPATIENT)
Dept: PHYSICAL THERAPY | Age: 73
Setting detail: THERAPIES SERIES
Discharge: HOME | End: 2023-04-25
Payer: MEDICARE

## 2023-04-25 ENCOUNTER — HOSPITAL ENCOUNTER (OUTPATIENT)
Dept: OCCUPATIONAL THERAPY | Age: 73
Setting detail: THERAPIES SERIES
Discharge: HOME | End: 2023-04-25
Payer: MEDICARE

## 2023-04-25 PROCEDURE — 97535 SELF CARE MNGMENT TRAINING: CPT

## 2023-04-25 PROCEDURE — 97530 THERAPEUTIC ACTIVITIES: CPT

## 2023-04-25 PROCEDURE — 97116 GAIT TRAINING THERAPY: CPT

## 2023-04-25 PROCEDURE — 97112 NEUROMUSCULAR REEDUCATION: CPT

## 2023-04-25 NOTE — FLOWSHEET NOTE
the task by both hands. Verbal cues needed to try to manipulate the pegs in her fingers. Removed all pegs with R hand with verbal cues to use a pincer grasp vs. Lateral.    Nuts/Bolts board:  Pt removed 4 1/2 to 3/4 inch nuts from the bolt board using her R hand, with cues to use only the tips of her fingers, not a casron pinch. Needed min A to initially place one of the bolts. Small Pegboard:  Pt placed small pegs in pegboard copying moderate level design on separate card using B hands. Pt wore unlar drift splint to complete task and was able to  pegs from table and transfer to board dropping pegs 75% of the time. Needed verbal cues due to missing one peg. She used her R hand to remove the pegs using pincer grasp. Placed small pegs in pegboard copying a moderate level (5-22) design on a separate card. With ulnar drift splint on, pt was able to  pegs from pegs placed on a shelf liner to facilitate, vs . Out of a container. She held pegs in between her thumb and pointer finger pad when placed there by her L hand. Had difficulty picking them up due to long nails. Was encouraged to get them trimmed. Pt reports she has to have assist with that. Performed with L hand easier than the R, and she was able to  the pegs with just her L hand. She placed the pegs with min. Cog assist into a mirror image of the design card. She then removed all the pegs from the small pegboard using her R hand pincer grasp. Noted that her R hand/fingers deviated ulnarly which caused her to go into a carson pinch vs. 3pt. Recommended a Rolyan Soft Hand-Based Ulnar Deviation Insert for Right Hand to improve Joint Alignment and facilitate a pincer grasp. Pt ended up using her L hand to help position the pegs into her R finger and thumb to promote this pinch. Moderate assist was needed with this task. She had to move the pegs over to copy correctly, and she used her R hand to do so.   She dropped multiple

## 2023-04-25 NOTE — FLOWSHEET NOTE
Physical Therapy Note    [x]Daily Treatment Note    []Progress Note    [] Discharge Summary     Date:  2023    Patient Name:  Geovanna Garcia    :  1950  MRN: [id number]      Medical/Treatment Diagnosis Information:  Diagnosis: G95.9 - Cervical myelopathy ([facility] [address]  Treatment Diagnosis: B LE spasticiy, impaired balance, decreased LE functional strength    Insurance/Certification information:  PT Insurance Information: [employer]    Physician Information:  Referring Provider (secondary): Lorrie Oliver MD    Plan of care signed :  [x]  Yes  [] No  [x]  Cosign []  Fax    Date of Patient follow up with Physician:     Is this a Progress Report:     []  Yes  [x]  No      If Yes:  Date Range for reporting period:  Beginning 3/7/23  Ending    Progress report will be due (10 Rx or 30 days whichever is less):   49  Recertification will be due (POC Duration  / 90 days whichever is less): 23      Visit # Insurance Allowable Auth Required   15 / 17 14 visits approved through 23  + 3 visits approved through 23 [x]  Yes []  No        Functional Scale/Score:  Measure Used:     Neck disability index  Score:   46%               Date Assessed:  3/7/23        Measure Used:     ABC  Score:   26.25%               Date Assessed:  3/7/23       Measure Used:     ABC  Score:   51.25%             Date Assessed:  23       Latex Allergy:  [x]NO      []YES  Preferred Language for Healthcare:   [x]English       []other:    RESTRICTIONS/PRECAUTIONS:  Cervical spinal precautions, c-collar (can DC collar as of 3/27)    SUBJECTIVE:  reports she has been feeling off balance and like her R shoulder is always lower than the L. Has not noticed significant change in her posture since last session.      Pain level:    Pt does not complain of pain this session      Plan Moving Forward/ For next visit:    Initiate functional training for improved safety/independence with mobility   Issue/progress HEP for

## 2023-04-27 ENCOUNTER — HOSPITAL ENCOUNTER (OUTPATIENT)
Dept: OCCUPATIONAL THERAPY | Age: 73
Setting detail: THERAPIES SERIES
Discharge: HOME | End: 2023-04-27
Payer: MEDICARE

## 2023-04-27 ENCOUNTER — HOSPITAL ENCOUNTER (OUTPATIENT)
Dept: PHYSICAL THERAPY | Age: 73
Setting detail: THERAPIES SERIES
Discharge: HOME | End: 2023-04-27
Payer: MEDICARE

## 2023-04-27 PROCEDURE — 97116 GAIT TRAINING THERAPY: CPT

## 2023-04-27 PROCEDURE — 97112 NEUROMUSCULAR REEDUCATION: CPT

## 2023-04-27 PROCEDURE — 97530 THERAPEUTIC ACTIVITIES: CPT

## 2023-04-27 NOTE — FLOWSHEET NOTE
To promote functional use of R UE. Modified to 50th percentile 23  Pt will demonstrate improved 3-point pinch to  10lbs. To promote functional use of B UEs. Modified to 50th percentile 23    MET 23  MET 3/9/23   MET 23    MET 23  MET 23 Continue  MET 23   MET 23    Long Term Goals:  to be met in 8 weeks (by 2023)  Pt will increase on right  strength to 40lbs to improve performance with daily living tasks that require power grasp. Continue  Pt will demo improved [facility] for daily living tasks by completing NHPT with on right in  30 seconds or less. MET and Upgraded 23  Pt will demo improved in hand manipulation skills for daily living tasks by completing WRMT (flipping discs) with on right in  38 seconds or less. MET and Upgraded 23  Pt will demo improved in hand manipulation skills for daily living tasks by completing WRMT (flipping discs) with on left in 28 seconds or less. Continue  . MET 23  . MET 23   MET /10 on 3/16/23   Continue   MET 23    Prognosis: [x]Good   []Fair   []Poor    Patient Requires Follow-up:  [x]Yes  []No    Plan: []Plan of care initiated     [x]Continue per plan of care OT treatment 2x/wk for 4 wks   [] Alter current plan (see comments)   []Hold pending MD visit []Discharge      ---  ---- --- ---- --- ---- --- ---- --- ---- --- ---- --- ---- --- ---- --- ---- --- --- ---    Therapeutic Exercise/Home Exercise Program:   0 minutes    Therapeutic Activity:  45 minutes    ADL Trainin minutes      Neuromuscular Re-Education:  0 minutes      Manual Therapy:  0 minutes    Splintin minutes     Modalities:  0 minutes    Time in: 945 Time out:  1030    Timed Code Treatment Minutes:  45mins. Total Treatment Minutes:  45 mins.     Medicare Cap total YTD:   [x]N/A    Workers Comp Time Stamp  [x]N/A   Time In:   Time Out:    Electronically signed by:  Baylee Esquivel Isidoro 89, OTR/L  #[id number]

## 2023-04-27 NOTE — FLOWSHEET NOTE
even gait pattern though needs B UE support throughout. Continues to have difficulty coordinating backwards ambulation on treadmill with LOB noted and corrected by harness system even with B UE support. Persisting Functional Limitations/Impairments:  [] Sleeping                      [x] Standing  [x] Transfers                     [x] Walking    [x] Kneeling                      [x] Stairs    [x] Squatting / bending     [x] ADLs  [x] Reaching                     [x] Lifting  [x] Housework                  [x] Driving  [] Job related tasks         [x] Sports / recreation   [] Other:     GOALS: Goals established 3/7/23   Patient stated goal: \" Patient Goals : be able to maintain balance \"  [x] Progressing: [] Met: [] Not Met: [] Adjusted     Therapist goals for Patient:   Short Term Goals: To be achieved in: 4 weeks   - Independent in HEP and progression per patient tolerance, in order to prevent re-injury. - ind with neck ex, ext for added balance challenges  [x] Progressing: [] Met: [] Not Met: [] Adjusted   - Patient will demonstrate good head/neck postural control without pain without cervical collar - continues to need cueing for active engagement of extensors and to maintain midline  [x] Progressing: [] Met: [] Not Met: [] Adjusted        Long Term Goals: To be achieved in: 8 weeks   - ABC score to improve to 80% confidence or better to assist with reaching prior level of function.  - improved over 20%   [x] Progressing: [] Met: [] Not Met: [] Adjusted   - Pt to ambulate functional community distances of 150-250 ft or more with use of no AD Independent - pt now able to complete community distances with rolator and mod I or SPC and SBA  [x] Progressing: [] Met: [] Not Met: [] Adjusted   - Pt to negotiate up/down 12 stairs with UL HR Independent - not formally assessed this session  [] Progressing: [] Met: [] Not Met: [] Adjusted   - Improve Angelo score to 45 or better to meet cut off for decreased risk of

## 2023-05-02 ENCOUNTER — HOSPITAL ENCOUNTER (OUTPATIENT)
Dept: PHYSICAL THERAPY | Age: 73
Setting detail: THERAPIES SERIES
Discharge: HOME | End: 2023-05-02
Payer: MEDICARE

## 2023-05-02 ENCOUNTER — HOSPITAL ENCOUNTER (OUTPATIENT)
Dept: OCCUPATIONAL THERAPY | Age: 73
Setting detail: THERAPIES SERIES
Discharge: HOME | End: 2023-05-02
Payer: MEDICARE

## 2023-05-02 PROCEDURE — 97530 THERAPEUTIC ACTIVITIES: CPT

## 2023-05-02 PROCEDURE — 97116 GAIT TRAINING THERAPY: CPT

## 2023-05-02 PROCEDURE — 97112 NEUROMUSCULAR REEDUCATION: CPT

## 2023-05-02 NOTE — PLAN OF CARE
Physical Therapy Note    []Daily Treatment Note    [x]Progress Note    [] Discharge Summary     Physical Therapy Re-Certification Plan of Care    Dear Mica Villegas  ,    We had the pleasure of treating the following patient for physical therapy services at [facility]. A summary of our findings can be found in the updated assessment below. This includes our plan of care. If you have any questions or concerns regarding these findings, please do not hesitate to contact me at the office phone number checked above. Thank you for the referral.     Physician Signature:________________________________Date:__________________  By signing above (or electronic signature), therapist's plan is approved by physician      Overall Response to Treatment:   [x]Patient is responding well to treatment and improvement is noted with regards  to goals   []Patient should continue to improve in reasonable time if they continue HEP   []Patient has plateaued and is no longer responding to skilled PT intervention    []Patient is getting worse and would benefit from return to referring MD   []Patient unable to adhere to initial POC    Assessment of Improvement: Pt has been seen 17 visits since initial eval on 3/7/23 with treatment emphasis on dynamic balance, gait and functional mobility. Demonstrates good progress towards goals. Pt demonstrates significant improvement in standing balance now scoring 52/56 on the Angelo balance assessment. Has also improved dynamic balance, improving score on the functional gait assessment by 3 points though only scores 13/30 at this time and remains well below cut off for increased risk of falls.  During compensated phase of symptom development prior to surgery pt reports she utilized R lateral lean to decrease pain and is now having difficulty correcting to neutral. Reports she has also developed R knee pain and demonstrates less than full

## 2023-05-04 ENCOUNTER — HOSPITAL ENCOUNTER (OUTPATIENT)
Dept: OCCUPATIONAL THERAPY | Age: 73
Setting detail: THERAPIES SERIES
Discharge: HOME | End: 2023-05-04
Payer: MEDICARE

## 2023-05-04 ENCOUNTER — HOSPITAL ENCOUNTER (OUTPATIENT)
Dept: PHYSICAL THERAPY | Age: 73
Setting detail: THERAPIES SERIES
Discharge: HOME | End: 2023-05-04
Payer: MEDICARE

## 2023-05-04 PROCEDURE — 97530 THERAPEUTIC ACTIVITIES: CPT

## 2023-05-04 NOTE — FLOWSHEET NOTE
difficulty. Added 23     Therapist goals for Patient  Short Term Goals:  to be met in 4 weeks (by 2023)  Pt will increase on right  strength to  33lbs to improve performance with daily living tasks that require power grasp. Continue  Pt will demonstrate improved lateral pinch to 11lbs. To promote functional use of R UE. -Continue  Pt will demonstrate improved 3-point pinch to 10lbs. To promote functional use of B UEs.-Partially MET for L hand 23    MET 23  MET 3/9/23   MET 23    MET 23  MET 23   MET 23   MET 23    Long Term Goals:  to be met in 8 weeks (by 2023)  Pt will increase on right  strength to 40lbs to improve performance with daily living tasks that require power grasp. Continue  Pt will demo improved [facility] for daily living tasks by completing NHPT with on right in  30 seconds or less. Continue  Pt will demo improved in hand manipulation skills for daily living tasks by completing WRMT (flipping discs) with on right in  38 seconds or less. Continue  Pt will demo improved in hand manipulation skills for daily living tasks by completing WRMT (flipping discs) with on left in 28 seconds or less. Continue  . MET 23  . MET 23   MET 6/10 on 3/16/23   Continue   MET 23    Prognosis: [x]Good   []Fair   []Poor    Patient Requires Follow-up:  [x]Yes  []No    Plan: []Plan of care initiated     [x]Continue per plan of care OT treatment 2x/wk for 4 wks   [x] Alter current plan (see comments)   []Hold pending MD visit []Discharge      ---  ---- --- ---- --- ---- --- ---- --- ---- --- ---- --- ---- --- ---- --- ---- --- --- ---    Therapeutic Exercise/Home Exercise Program:   0 minutes    Therapeutic Activity:  45 minutes    ADL Trainin minutes      Neuromuscular Re-Education:  0 minutes      Manual Therapy:  0 minutes    Splintin minutes     Modalities:  0 minutes    Time in: 945 Time out:  1030    Timed Code Treatment Minutes:  45mins.     Total

## 2023-05-04 NOTE — PROGRESS NOTES
[facility]  [address] [place], [facility]  Phone: [phone]   Fax:   [phone]      Physical Therapy  Cancellation/No-show Note    Patient Name:  Nathaly Bojorquez  :  1950   Date:  2023    Cancels to Date: 1  No-shows to Date: 0    For today's appointment patient:  [x]  Cancelled (23)  []  Rescheduled appointment  []  No-show     Reason given by patient:  []  Patient ill  []  Conflicting appointment  []  No transportation    []  Conflict with work  []  No reason given  [x]  Other:     Comments:  pt present in department for OT session though not seen for PT d/t insurance authorization still pending    Electronically signed by:  Maranda Watson, PT , DPT, NCS

## 2023-05-04 NOTE — PLAN OF CARE
[address] [place] [address] and Therapy  [place] [address], [place], [facility]  Office: [phone]   Fax: [phone]      Occupational Therapy Re-Certification Form  Dear Derik Cheatham MD,                                                               Jak Nazario was seen for her progress note today. She has been seen for 8/8 treatment visits, and today demonstrated Improved L pinch and coordination. She has made significant gains with functional use of B hands during ADLs and has improved to 11.36% disability/symptom score on the QuickDash from 79.55% at the evaluation. Her fine motor coordination is still impaired and she can benefit from continued OT treatment. Please review the attached summary of the patient's plan of care, and verify that you agree with plan for additional therapy services at this time.      Plan of Care/Treatment to date:  [x] Therapeutic Exercise                     []  Modalities:  [x] Therapeutic Activity                                   [] Ultrasound  [] Electrical Stimulation           [] Total Motion Release                                [] Fluidotherapy         [] Kinesiotaping  [x]  Neuromuscular Re-education                  [] Iontophoresis         [] Coldpack/hotpack    [x]  Instruction in HEP                                     Other:  []  Manual Therapy                                                    []   Dry needling             [x] ADL/Self Care  [x] IADL Training  [] LSVT BIG  [] Saebo  [] Splinting  [] Wheelchair Mobility                                                                               Frequency/Duration:  # Days per week:       [] 1 day          # Weeks:        [] 1 week        [] 5 weeks                                            [x] 2 days                                [] 2 weeks      [] 6 weeks                                      [] 3 days                                [] 3 weeks      [] 7 weeks

## 2023-05-09 ENCOUNTER — HOSPITAL ENCOUNTER (OUTPATIENT)
Dept: PHYSICAL THERAPY | Age: 73
Setting detail: THERAPIES SERIES
Discharge: HOME | End: 2023-05-09
Payer: MEDICARE

## 2023-05-09 PROCEDURE — 97112 NEUROMUSCULAR REEDUCATION: CPT

## 2023-05-09 PROCEDURE — 97116 GAIT TRAINING THERAPY: CPT

## 2023-05-09 NOTE — FLOWSHEET NOTE
Physical Therapy    [x]Daily Treatment Note    []Progress Note    [] Discharge Summary       Date:  2023    Patient Name:  Bhavesh Gruber    :  1950  MRN: [id number]      Medical/Treatment Diagnosis Information:  Diagnosis: G95.9 - Cervical myelopathy ([address]  Treatment Diagnosis: B LE spasticiy, impaired balance, decreased LE functional strength    Insurance/Certification information:  PT Insurance Information: [place] [employer]    Physician Information:  Referring Provider (secondary): Vernon Rocha MD    Plan of care signed :  [x]  Yes  [] No  [x]  Cosign []  Fax    Date of Patient follow up with Physician:     Is this a Progress Report:     []  Yes  [x]  No      If Yes:  Date Range for reporting period:  Beginning 3/7/23  Ending    Progress report will be due (10 Rx or 30 days whichever is less):   2/3/89  Recertification will be due (POC Duration  / 90 days whichever is less): 23      Visit # Insurance Allowable Auth Required    14 visits approved through 23  + 3 visits approved through 23  + 6 visits approved through 23 [x]  Yes []  No        Functional Scale/Score:  Measure Used:     Neck disability index  Score:   46%               Date Assessed:  3/7/23        Measure Used:     ABC  Score:   26.25%               Date Assessed:  3/7/23       Measure Used:     ABC  Score:   51.25%             Date Assessed:  23    Measure Used:     ABC  Score:   76.25 %             Date Assessed:  23       Latex Allergy:  [x]NO      []YES  Preferred Language for Healthcare:   [x]English       []other:    RESTRICTIONS/PRECAUTIONS:  Cervical spinal precautions, c-collar (can DC collar as of 3/27)    SUBJECTIVE:  Pt reports things are overall going well. Is still using the Rolator for work and most community ambulation though has progressed to no device in and around home with fewer distractions where she can focus on mobility.      Pain level:    Pt does not complain of

## 2023-05-11 ENCOUNTER — HOSPITAL ENCOUNTER (OUTPATIENT)
Dept: PHYSICAL THERAPY | Age: 73
Setting detail: THERAPIES SERIES
Discharge: HOME | End: 2023-05-11
Payer: MEDICARE

## 2023-05-11 PROCEDURE — 97112 NEUROMUSCULAR REEDUCATION: CPT

## 2023-05-11 PROCEDURE — 97110 THERAPEUTIC EXERCISES: CPT

## 2023-05-11 NOTE — FLOWSHEET NOTE
sense, posture, motor skill, proprioception of core, proximal hip and LE for self care, mobility, lifting, and ambulation      Manual Treatments:  PROM / STM / Oscillations-Mobs:  G-I, II, III, IV (PA's, Inf., Post.)  [x] (76965) Provided manual therapy to mobilize soft tissue/joints for the purpose of modulating pain, promoting relaxation,  increasing ROM, reducing/eliminating soft tissue swelling/inflammation/restriction, improving soft tissue extensibility and allowing for proper ROM for normal function with self care, mobility, lifting and ambulation. CRP:  [] (25698) Canalith Repositioning procedure for the assessment, treatment and education of BPPV    Modalities:   [] Ultrasound   [] Estim    [] Mechanical traction    [] Vaso-pneumatic device   [] Ionto     Charges:  Timed Code Treatment Minutes: 40   Total Treatment Minutes: 40          [] EVAL    [] Dry Needling  [x] JQ(54223)   x   1  [] EStim Unattended 86153  [x] NMR (83549)  x   2  [] Estim Attended  84234  [] Manual (45902)  x     [] Mechanical Txn 23938  [] TA    x     [] Ultrasound  [] Gait   x                 [] Vaso  [] CRP    [] Ionto           [] Other:        Electronically signed by: Charlann Ahumada, PT , DPT, NCS, [id number]        Note: If patient does not return for scheduled/ recommended follow up visits, this note will serve as a discharge from care along with most recent update on progress.

## 2023-05-16 ENCOUNTER — HOSPITAL ENCOUNTER (OUTPATIENT)
Dept: OCCUPATIONAL THERAPY | Age: 73
Setting detail: THERAPIES SERIES
Discharge: HOME | End: 2023-05-16
Payer: MEDICARE

## 2023-05-16 ENCOUNTER — HOSPITAL ENCOUNTER (OUTPATIENT)
Dept: PHYSICAL THERAPY | Age: 73
Setting detail: THERAPIES SERIES
Discharge: HOME | End: 2023-05-16
Payer: MEDICARE

## 2023-05-16 PROCEDURE — 97112 NEUROMUSCULAR REEDUCATION: CPT

## 2023-05-16 PROCEDURE — 97110 THERAPEUTIC EXERCISES: CPT

## 2023-05-16 PROCEDURE — 97530 THERAPEUTIC ACTIVITIES: CPT

## 2023-05-16 NOTE — FLOWSHEET NOTE
Physical Therapy    [x]Daily Treatment Note    []Progress Note    [] Discharge Summary       Date:  2023    Patient Name:  Artis Mendez    :  1950  MRN: [id number]      Medical/Treatment Diagnosis Information:  Diagnosis: G95.9 - Cervical myelopathy ([facility] [address]  Treatment Diagnosis: B LE spasticiy, impaired balance, decreased LE functional strength    Insurance/Certification information:  PT Insurance Information: [employer] Medicare    Physician Information:  Referring Provider (secondary): Dennis De Jesus MD    Plan of care signed :  [x]  Yes  [] No  [x]  Cosign []  Fax    Date of Patient follow up with Physician:     Is this a Progress Report:     []  Yes  [x]  No      If Yes:  Date Range for reporting period:  Beginning 3/7/23  Ending    Progress report will be due (10 Rx or 30 days whichever is less):   42  Recertification will be due (POC Duration  / 90 days whichever is less): 23      Visit # Insurance Allowable Auth Required    14 visits approved through 23  + 3 visits approved through 23  + 6 visits approved through 23 [x]  Yes []  No        Functional Scale/Score:  Measure Used:     Neck disability index  Score:   46%               Date Assessed:  3/7/23        Measure Used:     ABC  Score:   26.25%               Date Assessed:  3/7/23       Measure Used:     ABC  Score:   51.25%             Date Assessed:  23    Measure Used:     ABC  Score:   76.25 %             Date Assessed:  23       Latex Allergy:  [x]NO      []YES  Preferred Language for Healthcare:   [x]English       []other:    RESTRICTIONS/PRECAUTIONS:  Cervical spinal precautions, c-collar (can DC collar as of 3/27)    SUBJECTIVE:  Pt reports HEP is going well. Did have an appointment with surgeon after last visit, lumbar MRI showed spinal stenosis. Surgeon recommended PT for now with possible injections to come if symptoms do not improve.      Pain level:    Pt does not complain of pain this

## 2023-05-16 NOTE — FLOWSHEET NOTE
pegboard and clothespin activities. RUE-finger ABD PROM stretches x3 using L hand placed between each finger. Reviewed UE HEP verbally. Pt reports some wrist pain from trying to keep it flat on the table during some of the exercises. Instructed her to hold the wrist circumduction exercise for now to prevent increased inflammation. BUEs  Finger MP Flexion AROM 3 reps  Seated Digit Tendon Gliding - 3 reps  Finger Spreading - 3 reps  Seated Wrist Radial Deviation Finger Walk - 3 reps  Wrist AROM Wrist Circumduction - 3 reps  Seated Shoulder Flexion - 3 sets - 10 reps  Seated Shoulder Abduction - Thumbs Up - 3 sets - 10 reps    Reviewed HEP from IPR-to continue with theraputty and  ball. Instructed pt to use her finger tips to  items vs. Her current carson  technique. [x]      Ther ex Red Therapy flexbar  BUEs:   Smiles x20 reps   Frowns x20 reps   Wrist flex/ext x20 reps  []        []       MODALITIES         []        []       SPLINTING         []      Home Exercise Program:  Access Code: [email or web address]  URL: [email or web address]  Date: 03/14/2023  Prepared by: Joey Pale    Exercises  Finger MP Flexion AROM 10 reps  Seated Digit Tendon Gliding - 10 reps  Finger Spreading - 10 reps  Seated Wrist Radial Deviation Finger Walk - 10 reps  Wrist AROM Wrist Circumduction - 10 reps  Theraputty  /pinch sponge exercises: 20 each  Seated Shoulder Flexion - 3 sets - 10 reps  Seated Shoulder Abduction - Thumbs Up - 3 sets - 10 reps  [address] worksheet    Treatment/Activity Tolerance:    Patients response to treatment:   [x]Patient tolerated treatment well []Patient limited by fatigue   []Patient limited by pain  []Patient limited by other medical complications   []Other:     Assessment:  Pt tolerated session well today. [place] [facility] [place] was noted how often she drops things and picks them back up instead of manipulating them in her hand. Reinforced manipulation today.  Her fine motor

## 2023-05-18 ENCOUNTER — HOSPITAL ENCOUNTER (OUTPATIENT)
Dept: PHYSICAL THERAPY | Age: 73
Setting detail: THERAPIES SERIES
Discharge: HOME | End: 2023-05-18
Payer: MEDICARE

## 2023-05-18 ENCOUNTER — HOSPITAL ENCOUNTER (OUTPATIENT)
Dept: OCCUPATIONAL THERAPY | Age: 73
Setting detail: THERAPIES SERIES
Discharge: HOME | End: 2023-05-18
Payer: MEDICARE

## 2023-05-18 PROCEDURE — 97112 NEUROMUSCULAR REEDUCATION: CPT

## 2023-05-18 PROCEDURE — 97530 THERAPEUTIC ACTIVITIES: CPT

## 2023-05-18 PROCEDURE — 97116 GAIT TRAINING THERAPY: CPT

## 2023-05-18 NOTE — FLOWSHEET NOTE
[address] [place] [address] and Therapy  [place] [address], [place], [facility]  Office: [phone]   Fax: [phone]    Outpatient Occupational Therapy     [x] Daily Treatment Note   [x] Progress Note   [] Discharge Note    Date:  5/18/2023    Patient Name:  Ernesto Lobo         YOB: 1950    Medical Diagnosis/ICD 10:  Cervical Myelopathy G95.9    Treatment Diagnosis:  Impaired coordination and strength impeding ADLs and IADLs. Onset Date:  2/14/23 (C3-5 laminectomy and and C3-6 posterior fusion (2/14 with Dr. Mariama Oliveira)    Referral Date:    3/2/23    Referring Physician:   Percival Mayotte, MD Nichole Schilder, MD orthopedic surgeon    Visits Allowed/Insurance/Certification information:   [employer] Medicare  Visit # Insurance Allowable Auth Required   2/6 6 additional visits approved   5/9/2023-7/7/23 [x]  Yes     []  No              Restrictions/Precautions:    Position Activity Restriction  Spinal Precautions: No Bending, 15 lb wt limit, No Twisting    Plan of care sent to provider:    []Faxed   [x]Co-signature (date: 5/4/23)    (attempts: 1[x] 2 []3[])        Plan of care signed:    [x]Yes (date: 5/4/23 )           []No       Progress Note covers period from (if applicable):    [x]NA    From      4/6/23   To       5/4/23    Next Progress Note due:   6/1/23    Visit# / total visits:  2/8; 8/8 ;10/16    Functional Outcome Measure:    3/7/2023: QuickDASH: Total score: 46; Disability/Symptom score: 79.55%  4/6/23: QuickDASH: Total score: 21; Disability/Symptom score: 22.73%  5/4/23: QuickDASH: Total score: 16; Disability/Symptom score: 11.36%    Subjective: Pt reports she is worn out after therapy so she takes off work those days. Pain level: No pain reported currently.     Plan for Next Session:  R  and pinch strengthening, B fmc activities    Objective:     Strength  Muscle  (*denotes pain)

## 2023-05-18 NOTE — FLOWSHEET NOTE
naming in categories)    - motor balancing yoga ball on slideboard  To fatigue x 2 each      AMB with SPC To hospital lobby/back SPC on the R intermittent CGA with occasional LOB though pt able to self correct all with stepping strategy and cane. Initial demonstration of sequencing and intermittent cueing for 2 pt pattern     Outdoor AMB  With no device, CGA/min A x 1 with lat LOB walking along hill in the grass. Completes curb step x 3. Multidirectional AMB on TM    - RETRO    - lateral R    - lateral L 5 min   0.8 mph 1% incline  0.4 mph 0% incline  0.3 mph 0% incline Biodex harness and B UE support for safety    AMB with no AD and random perturbations 10 min Pt able to self correct LOB with stepping strategy      Treadmill gait  15 min, 1.4 - 2.0 mph  40 cm step length target  With B UE initially progressing to UL UE support. Note improved step equality with visual feedback displayed for step length though continues to have stability impairment with UL support. Treadmill ambulation  15 min progressive hill climb   1 - 7% incline  1.5-1.8 mph B UE support needed and initial difficulty noted with increasing incline d/t poor motor planning of increased clearance needed though with progressive adjustments pt is able to accomodate      Therapeutic Exercise/Home Exercise Program:  0  minutes  Warm up    Bolded exercises below completed, reviewed and issued for HEP.     Current HEP Exercises  - Seated Cervical Retraction  - 1 x daily - 7 x weekly - 3 sets - 10 reps  - Seated Scapular Retraction  - 1 x daily - 7 x weekly - 3 sets - 10 reps  - Standing Shoulder Row with Anchored Resistance  - 1 x daily - 7 x weekly - 3 sets - 10 reps  - Standing Lower Extremity Reach to Object  - 1 x daily - 7 x weekly - 3 sets - 10 reps  - Side Stepping with Resistance at Thighs and Counter Support  - 1 x daily - 7 x weekly - 3 sets - 10 reps  - Forward and Backward Monster Walk with Resistance at Ankles and Counter Support

## 2023-05-23 ENCOUNTER — APPOINTMENT (OUTPATIENT)
Dept: OCCUPATIONAL THERAPY | Age: 73
End: 2023-05-23
Payer: MEDICARE

## 2023-05-25 ENCOUNTER — HOSPITAL ENCOUNTER (OUTPATIENT)
Dept: OCCUPATIONAL THERAPY | Age: 73
Setting detail: THERAPIES SERIES
Discharge: HOME | End: 2023-05-25
Payer: MEDICARE

## 2023-05-25 PROCEDURE — 97530 THERAPEUTIC ACTIVITIES: CPT

## 2023-05-25 NOTE — FLOWSHEET NOTE
[address] [place] [address] and Therapy  [place] [address], [place], [facility]  Office: [phone]   Fax: [phone]    Outpatient Occupational Therapy     [x] Daily Treatment Note   [x] Progress Note   [] Discharge Note    Date:  5/25/2023    Patient Name:  Minh Grimm         YOB: 1950    Medical Diagnosis/ICD 10:  Cervical Myelopathy G95.9    Treatment Diagnosis:  Impaired coordination and strength impeding ADLs and IADLs. Onset Date:  2/14/23 (C3-5 laminectomy and and C3-6 posterior fusion (2/14 with Dr. Suman Madera)    Referral Date:    3/2/23    Referring Physician:   MD America Acuña MD orthopedic surgeon    Visits Allowed/Insurance/Certification information:   An[employer] Medicare  Visit # Insurance Allowable Auth Required   3/6 6 additional visits approved   5/9/2023-7/7/23 [x]  Yes     []  No              Restrictions/Precautions:    Position Activity Restriction  Spinal Precautions: No Bending, 15 lb wt limit, No Twisting    Plan of care sent to provider:    []Faxed   [x]Co-signature (date: 5/4/23)    (attempts: 1[x] 2 []3[])        Plan of care signed:    [x]Yes (date: 5/4/23 )           []No       Progress Note covers period from (if applicable):    [x]NA    From      4/6/23   To       5/4/23    Next Progress Note due:   6/1/23    Visit# / total visits:  3/8; 8/8 ;10/16    Functional Outcome Measure:    3/7/2023: QuickDASH: Total score: 46; Disability/Symptom score: 79.55%  4/6/23: QuickDASH: Total score: 21; Disability/Symptom score: 22.73%  5/4/23: QuickDASH: Total score: 16; Disability/Symptom score: 11.36%    Subjective: Pt reports she is worn out after therapy so she takes off work those days. Pain level: No pain reported currently.     Plan for Next Session:  R  and pinch strengthening, B fmc activities    Objective:     Strength  Muscle  (*denotes pain)

## 2023-06-01 ENCOUNTER — HOSPITAL ENCOUNTER (OUTPATIENT)
Dept: PHYSICAL THERAPY | Age: 73
Setting detail: THERAPIES SERIES
Discharge: HOME | End: 2023-06-01
Payer: MEDICARE

## 2023-06-01 ENCOUNTER — HOSPITAL ENCOUNTER (OUTPATIENT)
Dept: OCCUPATIONAL THERAPY | Age: 73
Setting detail: THERAPIES SERIES
Discharge: HOME | End: 2023-06-01
Payer: MEDICARE

## 2023-06-01 PROCEDURE — 97116 GAIT TRAINING THERAPY: CPT

## 2023-06-01 PROCEDURE — 97110 THERAPEUTIC EXERCISES: CPT

## 2023-06-01 PROCEDURE — 97112 NEUROMUSCULAR REEDUCATION: CPT

## 2023-06-01 PROCEDURE — 97530 THERAPEUTIC ACTIVITIES: CPT

## 2023-06-01 NOTE — FLOWSHEET NOTE
rolator around house or in yard but does take it with her for most community ambulation and work   [x] Progressing: [] Met: [] Not Met: [] Adjusted    Goals met 5/2/23:   - Pt to negotiate up/down 12 stairs with UL HR Independent   [] Progressing: [x] Met: [] Not Met: [] Adjusted   - Improve Angelo score to 45 or better to meet cut off for decreased risk of falls and facilitate improvement in movement, function, and ADLs as indicated by Functional Deficits. - increased 10 points, now scores 38/56  [] Progressing: [x] Met: [] Not Met: [] Adjusted    Overall Progression Towards Functional goals/ Treatment Progress Update:  [x] Patient is progressing as expected towards functional goals listed. [] Progression is slowed due to complexities/Impairments listed. [] Progression has been slowed due to co-morbidities. [] Plan just implemented, too soon to assess goals progression <30days   [] Goals require adjustment due to lack of progress  [] Patient is not progressing as expected and requires additional follow up with physician  [] Patient has met goals as marked above   [] Other    Prognosis for POC: [x] Good [] Fair  [] Poor    Patient requires continued skilled intervention: [x] Yes  [] No    Treatment/Activity Tolerance:  [x] Patient able to complete treatment  [] Patient limited by fatigue  [] Patient limited by pain    [] Patient limited by other medical complications  [] Other:         PLAN: See eval  [x] Continue per plan of care [] Alter current plan (see comments above)  [] Plan of care initiated [] Hold pending MD visit [] Discharge        Therapeutic Exercise and NMR EXR  [x] (87974) Provided verbal/tactile cueing for activities related to strengthening, flexibility, endurance, ROM  for improvements in proximal strength and core control with self care, mobility, lifting and ambulation.   [x] (87682) Provided verbal/tactile cueing for activities related to improving balance, coordination, kinesthetic sense,

## 2023-06-01 NOTE — FLOWSHEET NOTE
Modalities:  0 minutes    Time in: 1200 Time out:  1245    Timed Code Treatment Minutes:  45 mins. Total Treatment Minutes:  45 mins.     Medicare Cap total YTD:   [x]N/A    Workers Comp Time Stamp  [x]N/A   Time In:   Time Out:    Electronically signed by:  Chago Esquivel Isidoro 89, OTR/L  #[id number]

## 2023-06-01 NOTE — PROGRESS NOTES
Patient stated goal:   MET 3/28/23   MET 4/25/23  MET 5/2/23  MET 5/16/23  Pt will demonstrate ability to squeeze eyedrops from a hard container. Added 6/1/23     Therapist goals for Patient  Short Term Goals:  to be met in 4 weeks (by 4/6/2023)  MET 6/1/23  Pt will demonstrate improved lateral pinch to 11lbs. To promote functional use of R UE. -Continue  Pt will demonstrate improved 3-point pinch to 10lbs. To promote functional use of B UEs.-Partially MET for L hand 5/4/23    Long Term Goals:  to be met in 8 weeks (by 5/4/2023)  Pt will increase on right  strength to 40lbs to improve performance with daily living tasks that require power grasp. Continue  Pt will demo improved [facility] for daily living tasks by completing NHPT with on right in  30 seconds or less. Continue    MET 6/1/23  Pt will demo improved in hand manipulation skills for daily living tasks by completing WRMT (flipping discs) with on left in 28 seconds or less.   Continue    Prognosis: [x]Good   []Fair   []Poor    Patient Requires Follow-up:  [x]Yes  []No    Plan: []Plan of care initiated     [x]Continue per plan of care OT treatment 2x/wk for 4 wks   [] Alter current plan (see comments)   []Hold pending MD visit []Discharge      Electronically signed by:  Keshawn Esquivel [address] 89, OTR/L  #[id number]

## 2023-06-06 ENCOUNTER — HOSPITAL ENCOUNTER (OUTPATIENT)
Dept: PHYSICAL THERAPY | Age: 73
Setting detail: THERAPIES SERIES
Discharge: HOME | End: 2023-06-06
Payer: MEDICARE

## 2023-06-06 ENCOUNTER — HOSPITAL ENCOUNTER (OUTPATIENT)
Dept: OCCUPATIONAL THERAPY | Age: 73
Setting detail: THERAPIES SERIES
Discharge: HOME | End: 2023-06-06
Payer: MEDICARE

## 2023-06-06 PROCEDURE — 97530 THERAPEUTIC ACTIVITIES: CPT

## 2023-06-06 PROCEDURE — 97110 THERAPEUTIC EXERCISES: CPT

## 2023-06-06 NOTE — FLOWSHEET NOTE
tea diffuser with her each hand to grasp various size fabric balls and placed each in a plastic container to improve both  and pinch strength. Had difficulty with this task initially, moved onto clothespin activity then returned with better results. []         THERAPEUTIC EXERCISE and MANUAL TECHNIQUES        HEP Gave list of fmc activities to work on at home and reviewed activities such as tying, zipping, etc. That will also address fmc. Completed theraputty exercises with yellow putty completing 1 round of 5 exercises in preparation for pegboard and clothespin activities. RUE-finger ABD PROM stretches x3 using L hand placed between each finger. Reviewed UE HEP verbally. Pt reports some wrist pain from trying to keep it flat on the table during some of the exercises. Instructed her to hold the wrist circumduction exercise for now to prevent increased inflammation. BUEs  Finger MP Flexion AROM 3 reps  Seated Digit Tendon Gliding - 3 reps  Finger Spreading - 3 reps  Seated Wrist Radial Deviation Finger Walk - 3 reps  Wrist AROM Wrist Circumduction - 3 reps  Seated Shoulder Flexion - 3 sets - 10 reps  Seated Shoulder Abduction - Thumbs Up - 3 sets - 10 reps    Reviewed HEP from IPR-to continue with theraputty and  ball. Instructed pt to use her finger tips to  items vs. Her current carson  technique. [x]      Ther ex Red Therapy flexbar  BUEs:   Smiles x20 reps   Frowns x20 reps   Wrist flex/ext x20 reps  []        []       MODALITIES         []        []       SPLINTING         []      Home Exercise Program:  Access Code: [email or web address]  URL: [email or web address]  Date: 03/14/2023  Prepared by: Debbie Peak    Exercises  Finger MP Flexion AROM 10 reps  Seated Digit Tendon Gliding - 10 reps  Finger Spreading - 10 reps  Seated Wrist Radial Deviation Finger Walk - 10 reps  Wrist AROM Wrist Circumduction - 10 reps  Theraputty  /pinch sponge exercises: 20 each  Seated Shoulder

## 2023-06-08 ENCOUNTER — APPOINTMENT (OUTPATIENT)
Dept: PHYSICAL THERAPY | Age: 73
End: 2023-06-08
Payer: MEDICARE

## 2023-06-08 ENCOUNTER — APPOINTMENT (OUTPATIENT)
Dept: OCCUPATIONAL THERAPY | Age: 73
End: 2023-06-08
Payer: MEDICARE

## 2023-06-10 ENCOUNTER — HOSPITAL ENCOUNTER (OUTPATIENT)
Age: 73
Discharge: HOME | End: 2023-06-10
Payer: MEDICARE

## 2023-06-10 LAB
ALBUMIN SERPL-MCNC: 4.1 G/DL (ref 3.4–5)
ALBUMIN/GLOB SERPL: 1.4 {RATIO} (ref 1.1–2.2)
ALP SERPL-CCNC: 75 U/L (ref 40–129)
ALT SERPL-CCNC: 22 U/L (ref 10–40)
ANION GAP SERPL CALCULATED.3IONS-SCNC: 13 MMOL/L (ref 3–16)
AST SERPL-CCNC: 30 U/L (ref 15–37)
BASOPHILS # BLD: 0.1 K/UL (ref 0–0.2)
BASOPHILS NFR BLD: 1.2 %
BILIRUB SERPL-MCNC: 0.3 MG/DL (ref 0–1)
BUN SERPL-MCNC: 28 MG/DL (ref 7–20)
CALCIUM SERPL-MCNC: 10.2 MG/DL (ref 8.3–10.6)
CHLORIDE SERPL-SCNC: 102 MMOL/L (ref 99–110)
CHOLEST SERPL-MCNC: 161 MG/DL (ref 0–199)
CO2 SERPL-SCNC: 22 MMOL/L (ref 21–32)
CREAT SERPL-MCNC: 1 MG/DL (ref 0.6–1.2)
DEPRECATED RDW RBC AUTO: 14.2 % (ref 12.4–15.4)
EOSINOPHIL # BLD: 0.1 K/UL (ref 0–0.6)
EOSINOPHIL NFR BLD: 2.6 %
GFR SERPLBLD CREATININE-BSD FMLA CKD-EPI: >60 ML/MIN/{1.73_M2}
GLUCOSE P FAST SERPL-MCNC: 117 MG/DL (ref 70–99)
HCT VFR BLD AUTO: 37.2 % (ref 36–48)
HDLC SERPL-MCNC: 74 MG/DL (ref 40–60)
HGB BLD-MCNC: 12.8 G/DL (ref 12–16)
LDL CHOLESTEROL CALCULATED: 72 MG/DL
LYMPHOCYTES # BLD: 1.1 K/UL (ref 1–5.1)
LYMPHOCYTES NFR BLD: 21.3 %
MCH RBC QN AUTO: 31.7 PG (ref 26–34)
MCHC RBC AUTO-ENTMCNC: 34.4 G/DL (ref 31–36)
MCV RBC AUTO: 92 FL (ref 80–100)
MONOCYTES # BLD: 0.5 K/UL (ref 0–1.3)
MONOCYTES NFR BLD: 9.9 %
NEUTROPHILS # BLD: 3.2 K/UL (ref 1.7–7.7)
NEUTROPHILS NFR BLD: 65 %
PLATELET # BLD AUTO: 258 K/UL (ref 135–450)
PMV BLD AUTO: 8 FL (ref 5–10.5)
POTASSIUM SERPL-SCNC: 4.4 MMOL/L (ref 3.5–5.1)
PROT SERPL-MCNC: 7.1 G/DL (ref 6.4–8.2)
RBC # BLD AUTO: 4.04 M/UL (ref 4–5.2)
SODIUM SERPL-SCNC: 137 MMOL/L (ref 136–145)
TRIGL SERPL-MCNC: 74 MG/DL (ref 0–150)
TSH SERPL DL<=0.005 MIU/L-ACNC: 1.98 UIU/ML (ref 0.27–4.2)
VLDLC SERPL CALC-MCNC: 15 MG/DL
WBC # BLD AUTO: 4.9 K/UL (ref 4–11)

## 2023-06-10 PROCEDURE — 83036 HEMOGLOBIN GLYCOSYLATED A1C: CPT

## 2023-06-10 PROCEDURE — 80061 LIPID PANEL: CPT

## 2023-06-10 PROCEDURE — 84443 ASSAY THYROID STIM HORMONE: CPT

## 2023-06-10 PROCEDURE — 85025 COMPLETE CBC W/AUTO DIFF WBC: CPT

## 2023-06-10 PROCEDURE — 80053 COMPREHEN METABOLIC PANEL: CPT

## 2023-06-11 LAB
EST. AVERAGE GLUCOSE BLD GHB EST-MCNC: 145.6 MG/DL
HBA1C MFR BLD: 6.7 %

## 2023-06-20 ENCOUNTER — HOSPITAL ENCOUNTER (OUTPATIENT)
Dept: PHYSICAL THERAPY | Age: 73
Setting detail: THERAPIES SERIES
Discharge: HOME | End: 2023-06-20
Payer: MEDICARE

## 2023-06-20 ENCOUNTER — HOSPITAL ENCOUNTER (OUTPATIENT)
Dept: OCCUPATIONAL THERAPY | Age: 73
Setting detail: THERAPIES SERIES
Discharge: HOME | End: 2023-06-20
Payer: MEDICARE

## 2023-06-20 PROCEDURE — 97116 GAIT TRAINING THERAPY: CPT

## 2023-06-20 PROCEDURE — 97110 THERAPEUTIC EXERCISES: CPT

## 2023-06-20 NOTE — PROGRESS NOTES
[address] and Therapy  [address], [place], [facility]  Office: [phone]   Fax: [phone]      Patient Name:  Eric Lee  :  1950   Date:  2023  Cancels to Date: 1  No-shows to Date: 0    For today's appointment patient:  [x] Cancelled  [] Rescheduled appointment  [] No-show     Reason given by patient:  [] Patient ill  [] Conflicting appointment  [] No transportation    [] Conflict with work  [] No reason given  [x] Other:  Awaiting insurance approval   Comments:          Electronically signed by:  Keshawn Esquivel [address] 89, OTR/L  #[id number]

## 2023-06-20 NOTE — FLOWSHEET NOTE
co-morbidities. [] Plan just implemented, too soon to assess goals progression <30days   [] Goals require adjustment due to lack of progress  [] Patient is not progressing as expected and requires additional follow up with physician  [] Patient has met goals as marked above   [] Other    Prognosis for POC: [x] Good [] Fair  [] Poor    Patient requires continued skilled intervention: [x] Yes  [] No    Treatment/Activity Tolerance:  [x] Patient able to complete treatment  [] Patient limited by fatigue  [] Patient limited by pain    [] Patient limited by other medical complications  [] Other:         PLAN: See eval  [x] Continue per plan of care [] Alter current plan (see comments above)  [] Plan of care initiated [] Hold pending MD visit [] Discharge        Therapeutic Exercise and NMR EXR  [x] (83806) Provided verbal/tactile cueing for activities related to strengthening, flexibility, endurance, ROM  for improvements in proximal strength and core control with self care, mobility, lifting and ambulation.  [] (70248) Provided verbal/tactile cueing for activities related to improving balance, coordination, kinesthetic sense, posture, motor skill, proprioception  to assist with core control in self care, mobility, lifting, and ambulation.      Therapeutic Activities and Gait:    [] (60038 or 37877) Provided verbal/tactile cueing for activities related to improving balance, coordination, kinesthetic sense, posture, motor skill, proprioception and motor activation to allow for proper function  with self care and ADLs  [x] (60669) Provided training and instruction to the patient for proper core and proximal hip recruitment and positioning with ambulation re-education     Home Exercise Program:    [] (63784) Reviewed/Progressed HEP activities related to strengthening, flexibility, endurance, ROM of core, proximal hip and LE for functional self-care, mobility, lifting and ambulation   [] (12631) Reviewed/Progressed HEP

## 2023-06-22 ENCOUNTER — HOSPITAL ENCOUNTER (OUTPATIENT)
Dept: OCCUPATIONAL THERAPY | Age: 73
Setting detail: THERAPIES SERIES
Discharge: HOME | End: 2023-06-22
Payer: MEDICARE

## 2023-06-22 ENCOUNTER — HOSPITAL ENCOUNTER (OUTPATIENT)
Dept: PHYSICAL THERAPY | Age: 73
Setting detail: THERAPIES SERIES
Discharge: HOME | End: 2023-06-22
Payer: MEDICARE

## 2023-06-22 PROCEDURE — 97116 GAIT TRAINING THERAPY: CPT

## 2023-06-22 PROCEDURE — 97530 THERAPEUTIC ACTIVITIES: CPT

## 2023-06-22 PROCEDURE — 97110 THERAPEUTIC EXERCISES: CPT

## 2023-06-22 NOTE — FLOWSHEET NOTE
stenosis. Surgeon recommended PT for now with possible injections to come if symptoms do not improve. Pain level:  5/10 LBP      Plan Moving Forward/ For next visit:    Initiate functional training for improved safety/independence with mobility   Issue/progress HEP for improved functional strength and mobility   Progress balance and functional strengthening as tolerated       OBJECTIVE: updated 5/2/23     IE PN 4/6 PN 5/2 POC 6/6   Angelo 28/56 38/56 52/56 -   FGA  10/30 13/30 16/30   TUG 17.26\"   10.65\"     6/6/23: Movement Left Right comments    Hip Flexion 4 4     Hip Extension 3+ 3+  prone   Glut max 3 3 prone   Hip Abduction 3+ 4  SL   Hip Adduction 4+ 4+     Knee Flexion 4+ 4+     Knee Extension 5 5     Ankle Dorsiflexion 5 5     Ankle Plantarflexion 5 5        Gait     Exercises/Interventions:     Exercises in bold performed in department today. Items not bolded are carried forward from prior visits for continuity of the record.   Exercise/Equipment Sets/Reps/Resistance Comments/cues          THERAPEUTIC EXERCISE       NuStep 5 min level 5 LE only for aerobic warm up and stepping pattern     Squat with chair tap 10 x 3 Chair for external target     Scap squeeze 10 x 3      Mid row (green theraband) 10 x 3      Chin tuck 10 x 3 Educated in gentle comfortable stretch/ROM     Mat strengthening    - clams    - bridges   10 x 3 B  10 x 3       Quadruped cat/cow 10 x 2 x 5 sec holds    LTR 1 x 10    bridge 2 x 10, red    SKFO 1 x 10, red    Gliders  NPV    3 way hip 1 x 10 B, red    Side stepping 3 laps, red    Monster walk 3 laps, red    Standing march 30\" x 2, red                NEUROMUSCULAR RE-ED       Ambulatory balance challenges    - skater stepping fwd/retro    - march with CL knee tap    - monster walk fwd/retro    - tandem fwd/retro    - side stepping    - carioca     - resisted side step (blue band at thighs)   3 laps in // bars each Intermittent UE support as needed from bars, with mirror for

## 2023-06-22 NOTE — FLOWSHEET NOTE
[]Poor    Patient Requires Follow-up:  [x]Yes  []No    Plan: []Plan of care initiated     [x]Continue per plan of care OT treatment 2x/wk for 4 wks   [] Alter current plan (see comments)   []Hold pending MD visit []Discharge      ---  ---- --- ---- --- ---- --- ---- --- ---- --- ---- --- ---- --- ---- --- ---- --- --- ---    Therapeutic Exercise/Home Exercise Program:  0 minutes    Therapeutic Activity:  48 minutes    ADL Trainin minutes      Neuromuscular Re-Education:  0 minutes      Manual Therapy:  0 minutes    Splintin minutes     Modalities:  0 minutes    Time in: 2835 Time out:  1330    Timed Code Treatment Minutes:  48 mins. Total Treatment Minutes:  48 mins.     Medicare Cap total YTD:   [x]N/A    Workers Comp Time Stamp  [x]N/A   Time In:   Time Out:    Electronically signed by:  Esme Esquivel Isidoro 89, OTR/L  #[id number]

## 2023-06-28 ENCOUNTER — HOSPITAL ENCOUNTER (OUTPATIENT)
Dept: OCCUPATIONAL THERAPY | Age: 73
Setting detail: THERAPIES SERIES
Discharge: HOME | End: 2023-06-28
Payer: MEDICARE

## 2023-06-28 ENCOUNTER — HOSPITAL ENCOUNTER (OUTPATIENT)
Dept: PHYSICAL THERAPY | Age: 73
Setting detail: THERAPIES SERIES
Discharge: HOME | End: 2023-06-28
Payer: MEDICARE

## 2023-06-28 PROCEDURE — 97116 GAIT TRAINING THERAPY: CPT

## 2023-06-28 PROCEDURE — 97530 THERAPEUTIC ACTIVITIES: CPT

## 2023-06-28 PROCEDURE — 97110 THERAPEUTIC EXERCISES: CPT

## 2023-07-06 ENCOUNTER — HOSPITAL ENCOUNTER (OUTPATIENT)
Dept: OCCUPATIONAL THERAPY | Age: 73
Setting detail: THERAPIES SERIES
Discharge: HOME | End: 2023-07-06
Payer: MEDICARE

## 2023-07-06 ENCOUNTER — HOSPITAL ENCOUNTER (OUTPATIENT)
Dept: PHYSICAL THERAPY | Age: 73
Setting detail: THERAPIES SERIES
Discharge: HOME | End: 2023-07-06
Payer: MEDICARE

## 2023-07-06 PROCEDURE — 97530 THERAPEUTIC ACTIVITIES: CPT

## 2023-07-06 PROCEDURE — 97110 THERAPEUTIC EXERCISES: CPT

## 2023-07-06 PROCEDURE — 97116 GAIT TRAINING THERAPY: CPT

## 2023-07-06 NOTE — FLOWSHEET NOTE
Physical Therapy    [x]Daily Treatment Note    []Progress Note    [] Discharge Summary       Date:  2023    Patient Name:  Jose Billingsley    :  1950  MRN: [id number]      Medical/Treatment Diagnosis Information:  Diagnosis: G95.9 - Cervical myelopathy ([address])  Treatment Diagnosis: B LE spasticiy, impaired balance, decreased LE functional strength    Insurance/Certification information:  PT Insurance Information: [place] Medicare    Physician Information:  Referring Provider (secondary): Iris Guerra MD    Plan of care signed :  [x]  Yes  [] No  [x]  Cosign []  Fax    Is this a Progress Report:     []  Yes  [x]  No      If Yes:  Date Range for reporting period:  Beginning 3/7/23  POC 23  Ending    Progress report will be due (10 Rx or 30 days whichever is less):   43  Recertification will be due (POC Duration  / 90 days whichever is less): 23      Visit # Insurance Allowable Auth Required    14 visits approved through 23  + 3 visits approved through 23  + 6 visits approved through 23  + 6 visits approved from 23 to 23 [x]  Yes []  No      Functional Scale/Score:  Measure Used:     Neck disability index  Score:   46%               Date Assessed:  3/7/23        Measure Used:     ABC  Score:   26.25%               Date Assessed:  3/7/23       Measure Used:     ABC  Score:   51.25%             Date Assessed:  23    Measure Used:     ABC  Score:   76.25 %             Date Assessed:  23     Measure Used:     ABC  Score:   69 %             Date Assessed:  23    Latex Allergy:  [x]NO      []YES  Preferred Language for Healthcare:   [x]English       []other:    RESTRICTIONS/PRECAUTIONS:  Cervical spinal precautions, c-collar (can DC collar as of 3/27)    SUBJECTIVE:    Has been leaving Rolator in the car when walk into places. Used it today to walk in so she could walk faster since she was running late.        Did have an appointment with surgeon:

## 2023-07-10 ENCOUNTER — APPOINTMENT (OUTPATIENT)
Dept: OCCUPATIONAL THERAPY | Age: 73
End: 2023-07-10
Payer: MEDICARE

## 2023-07-10 ENCOUNTER — APPOINTMENT (OUTPATIENT)
Dept: PHYSICAL THERAPY | Age: 73
End: 2023-07-10
Payer: MEDICARE

## 2023-07-11 ENCOUNTER — HOSPITAL ENCOUNTER (OUTPATIENT)
Dept: PHYSICAL THERAPY | Age: 73
Setting detail: THERAPIES SERIES
Discharge: HOME | End: 2023-07-11
Payer: MEDICARE

## 2023-07-11 ENCOUNTER — HOSPITAL ENCOUNTER (OUTPATIENT)
Dept: OCCUPATIONAL THERAPY | Age: 73
Setting detail: THERAPIES SERIES
Discharge: HOME | End: 2023-07-11
Payer: MEDICARE

## 2023-07-11 PROCEDURE — 97530 THERAPEUTIC ACTIVITIES: CPT

## 2023-07-12 ENCOUNTER — APPOINTMENT (OUTPATIENT)
Dept: OCCUPATIONAL THERAPY | Age: 73
End: 2023-07-12
Payer: MEDICARE

## 2023-07-12 ENCOUNTER — APPOINTMENT (OUTPATIENT)
Dept: PHYSICAL THERAPY | Age: 73
End: 2023-07-12
Payer: MEDICARE

## 2023-07-13 ENCOUNTER — HOSPITAL ENCOUNTER (OUTPATIENT)
Dept: PHYSICAL THERAPY | Age: 73
Setting detail: THERAPIES SERIES
End: 2023-07-13
Payer: MEDICARE

## 2023-07-13 ENCOUNTER — HOSPITAL ENCOUNTER (OUTPATIENT)
Dept: OCCUPATIONAL THERAPY | Age: 73
Setting detail: THERAPIES SERIES
Discharge: HOME | End: 2023-07-13
Payer: MEDICARE

## 2023-07-13 PROCEDURE — 97530 THERAPEUTIC ACTIVITIES: CPT

## 2023-07-13 NOTE — FLOWSHEET NOTE
3-point pinch to 10lbs. To promote functional use of B UEs.- Continue,     Long Term Goals:  to be met in 4 weeks (by 2023)  -MET    -MET     MET 23  Pt will demo improved in hand manipulation skills for daily living tasks by completing WRMT (flipping discs) with on left in 28 seconds or less. Continue,     Prognosis: [x]Good   []Fair   []Poor    Patient Requires Follow-up:  [x]Yes  []No    Plan: []Plan of care initiated     [x]Continue per plan of care (1-2x/wk for 2-4 weeks)   [] Alter current plan    []Hold pending MD visit []Discharge      ---  ---- --- ---- --- ---- --- ---- --- ---- --- ---- --- ---- --- ---- --- ---- --- --- ---    Therapeutic Exercise/Home Exercise Program:  0 minutes    Therapeutic Activity: 45 minutes    ADL Trainin minutes      Neuromuscular Re-Education:  0 minutes      Manual Therapy:  0 minutes    Splintin minutes     Modalities:  0 minutes    Time in: 900 Time out:  945    Timed Code Treatment Minutes:  45 mins. Total Treatment Minutes:  45 mins.     Medicare Cap total YTD:   [x]N/A    Workers Comp Time Stamp  [x]N/A   Time In:   Time Out:    Electronically signed by:  Monroe Castellon [id number] [facility] Raj OTR/L  #[id number]

## 2023-07-26 ENCOUNTER — HOSPITAL ENCOUNTER (OUTPATIENT)
Dept: OCCUPATIONAL THERAPY | Age: 73
Setting detail: THERAPIES SERIES
Discharge: HOME | End: 2023-07-26
Payer: MEDICARE

## 2023-07-26 PROCEDURE — 97530 THERAPEUTIC ACTIVITIES: CPT

## 2023-07-26 NOTE — FLOWSHEET NOTE
[facility]  [address], [address] [facility]  Office: [phone]   Fax: [phone]    Outpatient Occupational Therapy     [x] Daily Treatment Note   [] Progress Note   [x] Discharge Note    Date:  7/26/2023    Patient Name:  Tiffanie Mireles         YOB: 1950    Medical Diagnosis/ICD 10:  Cervical Myelopathy G95.9    Treatment Diagnosis:  Impaired coordination and strength impeding ADLs and IADLs. Onset Date:  2/14/23 (C3-5 laminectomy and and C3-6 posterior fusion (2/14 with Dr. Erasto Cantu)    Referral Date:    3/2/23    Referring Physician:   MD Italo Abreu MD orthopedic surgeon    Visits Allowed/Insurance/Certification information:   [facility]  Visit # Insurance Allowable Auth Required   6/6 6 additional visits approved      6/16/2023 - 8/14/2023 [x]  Yes     []  No              Restrictions/Precautions:    Position Activity Restriction  Spinal Precautions: No Bending, 15 lb wt limit, No Twisting    Plan of care sent to provider:    []Faxed   [x]Co-signature (date: 5/4/23; 6/28/2023)    (attempts: 1[x] 2 []3[])        Plan of care signed:    [x]Yes (date: 7/3/23, 5/4/23 )           []No       Progress Note covers period from (if applicable):    []NA    [x]From      6/28/23   To       7/26/23    Next Progress Note due:   7/26/23    Visit# / total visits: 4/4; 8/8; 8/8 ;10/16    Functional Outcome Measure:    3/7/2023: QuickDASH: Total score: 46; Disability/Symptom score: 79.55%  4/6/23: QuickDASH: Total score: 21; Disability/Symptom score: 22.73%  5/4/23: QuickDASH: Total score: 16; Disability/Symptom score: 11.36%  6/6/23: QuickDASH: Total score: 15; Disability/Symptom score: 9.09%  6/28/23: QuickDASH: Total score: 18; Disability/Symptom score: 16%  7/26/23: QuickDASH: Total score: 11;  Disability/Symptom score: 0%    Subjective:  Pt

## 2023-08-31 NOTE — PROGRESS NOTES
CARDIOLOGY CONSULTATION        Patient Name: Jennie Parish  Primary Care physician: Sae Smoker, DO    Reason for Referral/Chief Complaint: Jennie Parish is a 72 y.o. patient who is referred to cardiology clinic today for evaluation and treatment of calcification found on CT scan. History of Present Illness:   Jennie Parish is a 72 y. o.female who presents to office at the request of PCP for Severe coronary artery calcification found on CT lung screen. 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 Patient had CT lung screen on 6/20/2023 that showed Atherosclerotic change is seen in aorta. Severe coronary artery calcification is seen. Today, patient states she is doing well. She had neck surgery in February with 3 discs removed and placement of metal rods. She has gone through rehabilitation but still remains somewhat limited in her activity level due to imbalance. States her neurosurgeon wants her to use a walker when working. 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 She does no scheduled exercise at this time. With the activity she is performing she denies any ischemic symptoms. Particularly she denies chest pressure or heaviness with inclines or long distances. She is not limited by shortness of breath. She has no palpitations, dizziness or syncope. She does have occasional pinpoint chest discomfort, sharp in quality, without radiation or associated symptoms. This occurs with anxiety or stress she states. She also may feel her heart race during these episodes. She has no heart racing or irregular heartbeat at rest or at nighttime. She reports since her neck surgery she has gained a lot of weight due to her balance issues.   No increasing

## 2023-09-05 ENCOUNTER — OFFICE VISIT (OUTPATIENT)
Dept: CARDIOLOGY CLINIC | Age: 73
End: 2023-09-05
Payer: MEDICARE

## 2023-09-05 VITALS
OXYGEN SATURATION: 94 % | HEART RATE: 76 BPM | SYSTOLIC BLOOD PRESSURE: 112 MMHG | DIASTOLIC BLOOD PRESSURE: 62 MMHG | BODY MASS INDEX: 22.88 KG/M2 | HEIGHT: 64 IN

## 2023-09-05 DIAGNOSIS — I10 ESSENTIAL HYPERTENSION: ICD-10-CM

## 2023-09-05 DIAGNOSIS — I25.10 CORONARY ARTERY DISEASE INVOLVING NATIVE CORONARY ARTERY OF NATIVE HEART WITHOUT ANGINA PECTORIS: Primary | ICD-10-CM

## 2023-09-05 DIAGNOSIS — Z76.89 ESTABLISHING CARE WITH NEW DOCTOR, ENCOUNTER FOR: ICD-10-CM

## 2023-09-05 PROCEDURE — 99204 OFFICE O/P NEW MOD 45 MIN: CPT | Performed by: INTERNAL MEDICINE

## 2023-09-05 PROCEDURE — 93000 ELECTROCARDIOGRAM COMPLETE: CPT | Performed by: INTERNAL MEDICINE

## 2023-09-05 PROCEDURE — 1123F ACP DISCUSS/DSCN MKR DOCD: CPT | Performed by: INTERNAL MEDICINE

## 2023-09-05 PROCEDURE — 3078F DIAST BP <80 MM HG: CPT | Performed by: INTERNAL MEDICINE

## 2023-09-05 PROCEDURE — 3074F SYST BP LT 130 MM HG: CPT | Performed by: INTERNAL MEDICINE

## 2023-09-05 RX ORDER — ASPIRIN 81 MG/1
81 TABLET, CHEWABLE ORAL DAILY
COMMUNITY

## 2023-09-05 RX ORDER — ASPIRIN 81 MG/1
81 TABLET, CHEWABLE ORAL DAILY
Qty: 30 TABLET | Refills: 0 | COMMUNITY
Start: 2023-09-05

## 2023-09-05 NOTE — PATIENT INSTRUCTIONS
Plan:  1) 1-800-Quit-now, smoking cessation  2) Baby Aspirin 81 mg daily; you can get this OTC   3) Continue taking cardiac medications as prescribed  4) CT calcium score; this is not covered by insurance. It can cost upwards of $150. You can also call [facility] and they have specials on the scans. 5) Mediterranean diet; heart healthy diet   Your provider has ordered testing for further evaluation. An order/prescription has been included in your paper work. To schedule outpatient testing, contact Central Scheduling by calling [address] [place] [address] ([phone]).       Follow up in 1 year

## 2023-09-21 ENCOUNTER — TELEPHONE (OUTPATIENT)
Dept: CARDIOLOGY CLINIC | Age: 73
End: 2023-09-21

## 2023-09-21 ENCOUNTER — HOSPITAL ENCOUNTER (OUTPATIENT)
Dept: WOMENS IMAGING | Age: 73
Discharge: HOME | End: 2023-09-21
Attending: INTERNAL MEDICINE
Payer: MEDICARE

## 2023-09-21 ENCOUNTER — HOSPITAL ENCOUNTER (OUTPATIENT)
Dept: CT IMAGING | Age: 73
Discharge: HOME | End: 2023-09-21
Attending: INTERNAL MEDICINE
Payer: MEDICARE

## 2023-09-21 VITALS — WEIGHT: 150 LBS | HEIGHT: 64 IN | BODY MASS INDEX: 25.61 KG/M2

## 2023-09-21 DIAGNOSIS — R07.2 PRECORDIAL PAIN: ICD-10-CM

## 2023-09-21 DIAGNOSIS — I10 ESSENTIAL HYPERTENSION: ICD-10-CM

## 2023-09-21 DIAGNOSIS — I25.10 CORONARY ARTERY DISEASE INVOLVING NATIVE CORONARY ARTERY OF NATIVE HEART WITHOUT ANGINA PECTORIS: ICD-10-CM

## 2023-09-21 DIAGNOSIS — Z12.31 ENCOUNTER FOR SCREENING MAMMOGRAM FOR BREAST CANCER: ICD-10-CM

## 2023-09-21 DIAGNOSIS — I25.10 CORONARY ARTERY DISEASE INVOLVING NATIVE CORONARY ARTERY OF NATIVE HEART WITHOUT ANGINA PECTORIS: Primary | ICD-10-CM

## 2023-09-21 PROCEDURE — 77063 BREAST TOMOSYNTHESIS BI: CPT

## 2023-09-21 PROCEDURE — 75571 CT HRT W/O DYE W/CA TEST: CPT

## 2023-09-21 NOTE — TELEPHONE ENCOUNTER
Created telephone encounter. [facility] requesting a call back to the office. Will inform pt of [facility] message when pt returns call. I routed this to RJM basket for him to review when he returns.

## 2023-09-21 NOTE — TELEPHONE ENCOUNTER
----- Message from Reynaldo Oneal MD sent at 9/21/2023 12:26 PM EDT -----  Please notify patient that their Ca score = 460, indicating CAD  As per Dr Trevon Alvarado note, will consider stress test as > 400  Will have Dr Trevon Alvarado review when returns to determine best next step

## 2023-09-22 NOTE — TELEPHONE ENCOUNTER
Created telephone encounter. [facility] requesting a call back to the office. Will inform pt of [facility] message when pt returns call. I routed this to RJM basket for him to review when he returns.

## 2023-09-25 NOTE — TELEPHONE ENCOUNTER
IMPRESSION:  Total calcium score of 416 is in the 80th percentile for the patient's age. Over 400 Extensive plaque burden. High likelihood of at least one  significant coronary artery stenosis (>50% diameter). Given the degree of Coronary calcification, chest pain endorsed at previous OV, limited mobility owing to neck surgery previously and risk factors (hypertension, hyperlipidemia, smoking history), I would suggest further risk stratification with Lexiscan/pharmacologic myoview stress test. If she is willing to proceed, we can place order.        MIKO

## 2023-10-03 ENCOUNTER — TELEPHONE (OUTPATIENT)
Dept: CARDIOLOGY CLINIC | Age: 73
End: 2023-10-03

## 2023-10-03 NOTE — TELEPHONE ENCOUNTER
Pt stated that she has a lexiscan scheduled for 10/13 and would like to know what the medication they inject with and would they give her something if her heart rate gets too high. Pt said she could probably do the treadmill stress test but is not sure her heart would do what they want. Please advise.

## 2023-10-05 ENCOUNTER — TELEPHONE (OUTPATIENT)
Dept: CARDIOLOGY CLINIC | Age: 73
End: 2023-10-05

## 2023-10-05 NOTE — TELEPHONE ENCOUNTER
Pt stated that she has a stress test scheduled for 10/13 and would like to know if she needs a . Please advise.

## 2023-10-17 ENCOUNTER — HOSPITAL ENCOUNTER (OUTPATIENT)
Dept: CARDIOLOGY | Age: 73
Discharge: HOME | End: 2023-10-17
Attending: INTERNAL MEDICINE
Payer: MEDICARE

## 2023-10-17 ENCOUNTER — TELEPHONE (OUTPATIENT)
Dept: CARDIOLOGY CLINIC | Age: 73
End: 2023-10-17

## 2023-10-17 DIAGNOSIS — R07.2 PRECORDIAL PAIN: ICD-10-CM

## 2023-10-17 DIAGNOSIS — I25.10 CORONARY ARTERY DISEASE INVOLVING NATIVE CORONARY ARTERY OF NATIVE HEART WITHOUT ANGINA PECTORIS: ICD-10-CM

## 2023-10-17 DIAGNOSIS — I10 ESSENTIAL HYPERTENSION: ICD-10-CM

## 2023-10-17 PROCEDURE — 6360000002 HC RX W HCPCS: Performed by: INTERNAL MEDICINE

## 2023-10-17 PROCEDURE — 3430000000 HC RX DIAGNOSTIC RADIOPHARMACEUTICAL: Performed by: INTERNAL MEDICINE

## 2023-10-17 PROCEDURE — A9502 TC99M TETROFOSMIN: HCPCS | Performed by: INTERNAL MEDICINE

## 2023-10-17 PROCEDURE — 78452 HT MUSCLE IMAGE SPECT MULT: CPT

## 2023-10-17 PROCEDURE — 93017 CV STRESS TEST TRACING ONLY: CPT

## 2023-10-17 RX ORDER — REGADENOSON 0.08 MG/ML
0.4 INJECTION, SOLUTION INTRAVENOUS
Status: COMPLETED | OUTPATIENT
Start: 2023-10-17 | End: 2023-10-17

## 2023-10-17 RX ADMIN — TETROFOSMIN 11.4 MILLICURIE: 1.38 INJECTION, POWDER, LYOPHILIZED, FOR SOLUTION INTRAVENOUS at 07:46

## 2023-10-17 RX ADMIN — REGADENOSON 0.4 MG: 0.08 INJECTION, SOLUTION INTRAVENOUS at 08:52

## 2023-10-17 RX ADMIN — TETROFOSMIN 33.2 MILLICURIE: 1.38 INJECTION, POWDER, LYOPHILIZED, FOR SOLUTION INTRAVENOUS at 08:52

## 2023-10-17 NOTE — TELEPHONE ENCOUNTER
----- Message from Alex Pal MD sent at 10/17/2023  4:30 PM EDT -----  No evidence of underlying blockage by stress test. Reassuring study. Follow up as planned.

## 2023-10-23 ENCOUNTER — TELEPHONE (OUTPATIENT)
Dept: CARDIOLOGY CLINIC | Age: 73
End: 2023-10-23

## 2023-10-23 NOTE — TELEPHONE ENCOUNTER
----- Message from Marina Castano MD sent at 10/23/2023  9:39 AM EDT -----  Please let the patient know her stress test was normal.  Reassuring study. Follow-up as planned. Notify me if any new symptoms in the interim.   Thank you

## 2023-10-30 ENCOUNTER — TELEPHONE (OUTPATIENT)
Dept: CARDIOLOGY CLINIC | Age: 73
End: 2023-10-30

## 2023-10-30 NOTE — TELEPHONE ENCOUNTER
----- Message from Jacob Munoz MD sent at 10/30/2023 10:46 AM EDT -----  Please notify patient that their stress test looks ok  Low risk - no sig blockage identified

## 2024-03-20 NOTE — PLAN OF CARE
Yes flexibility, endurance, ROM  for improvements in proximal strength and core control with self care, mobility, lifting and ambulation.  [] (27140) Provided verbal/tactile cueing for activities related to improving balance, coordination, kinesthetic sense, posture, motor skill, proprioception  to assist with core control in self care, mobility, lifting, and ambulation. Therapeutic Activities and Gait:    [x] (29114 or 21152) Provided verbal/tactile cueing for activities related to improving balance, coordination, kinesthetic sense, posture, motor skill, proprioception and motor activation to allow for proper function  with self care and ADLs  [] (77366) Provided training and instruction to the patient for proper core and proximal hip recruitment and positioning with ambulation re-education     Home Exercise Program:    [] (02959) Reviewed/Progressed HEP activities related to strengthening, flexibility, endurance, ROM of core, proximal hip and LE for functional self-care, mobility, lifting and ambulation   [] (64565) Reviewed/Progressed HEP activities related to improving balance, coordination, kinesthetic sense, posture, motor skill, proprioception of core, proximal hip and LE for self care, mobility, lifting, and ambulation      Manual Treatments:  PROM / STM / Oscillations-Mobs:  G-I, II, III, IV (PA's, Inf., Post.)  [] (13131) Provided manual therapy to mobilize soft tissue/joints for the purpose of modulating pain, promoting relaxation,  increasing ROM, reducing/eliminating soft tissue swelling/inflammation/restriction, improving soft tissue extensibility and allowing for proper ROM for normal function with self care, mobility, lifting and ambulation.      CRP:  [] (71807) Canalith Repositioning procedure for the assessment, treatment and education of BPPV    Modalities:   [] Ultrasound   [] Estim    [] Mechanical traction    [] Vaso-pneumatic device   [] Ionto     Charges:  Timed Code Treatment Minutes: 39

## 2024-05-23 ENCOUNTER — TELEPHONE (OUTPATIENT)
Dept: TELEMETRY | Age: 74
End: 2024-05-23

## 2024-05-23 NOTE — TELEPHONE ENCOUNTER
Patient due for annual CT Lung Screening. Reminder letter mailed.    Routed to PCP, Lisa Ramos DO with [facility].    Eleanor Rios RN

## 2024-05-29 PROBLEM — M47.816 SPONDYLOSIS OF LUMBAR REGION WITHOUT MYELOPATHY OR RADICULOPATHY: Status: ACTIVE | Noted: 2024-05-29

## 2024-05-29 PROBLEM — M54.51 VERTEBROGENIC LOW BACK PAIN: Status: ACTIVE | Noted: 2024-05-29

## 2024-05-29 PROBLEM — M48.062 LUMBAR STENOSIS WITH NEUROGENIC CLAUDICATION: Status: ACTIVE | Noted: 2024-05-29

## 2024-07-15 NOTE — PROGRESS NOTES
1.  Do not eat or drink anything after 12 midnight prior to surgery.  This includes no water, chewing gum or mints.  2.  Take the following pills with a small sip of water on the morning of surgery Metoprolol.  3.  Aspirin, Ibuprofen, Advil, Naproxen, Vitamin E and other Anti-inflammatory products should be stopped for 5 days before surgery or as directed by your physician.  4.  Check with your doctor regarding stopping Plavix, Coumadin, Lovenox, Fragmin or other blood thinners.  5.  Do not smoke and do not drink alcoholic beverages 24 hours prior to surgery.  This includes NA Beer.  6.  You may brush your teeth and gargle the morning of surgery.  DO NOT SWALLOW WATER.  7.  You MUST make arrangements for a responsible adult to take you home after your surgery.  You will not be allowed to leave alone or drive yourself home.  It is strongly suggested someone stay with you the first 24 hours.  Your surgery will be cancelled if you do not have a ride home.  8.  A parent/legal guardian must accompany a child scheduled for surgery and plan to stay at the hospital until the child is discharged.  Please do not bring other children with you.  9.  Please wear simple, loose fitting clothing to the hospital.  Do not bring valuables ( money, credit cards, checkbooks, etc.)  Do not wear any makeup (including no eye makeup) or nail polish on your fingers or toes.  10.  Do not wear any jewelry or piercing on the day of surgery.  All body piercing jewelry must be removed.  11.  If you have dentures, they will be removed before going to the OR; we will provide you a container.  If you wear contact lenses or glasses, they will be removed; please bring a case for them.  12.  Please see your family doctor/pediatrician for a history & physical and/or concerning medications.  Bring any test results/reports from your physician's office the day of surgery.  PCP   Phone     H&P appt date   13.  Remember to bring Blood Bank Bracelet to the

## 2024-07-16 NOTE — PROGRESS NOTES
Message left on PT's voicemail to hold Ozempic until after scheduled surgery and to call center to verify receiving message

## 2024-07-24 NOTE — H&P
Update History & Physical    The patient's History and Physical of July 25, 2024 was reviewed with the patient and I examined the patient. There was no change. The surgical site was confirmed by the patient and me.       Plan: The risks, benefits, expected outcome, and alternative to the recommended procedure have been discussed with the patient. Patient understands and wants to proceed with the procedure.     Electronically signed by Moshe Oconnor MD on 7/25/2024 at 12.20pm

## 2024-07-25 ENCOUNTER — ANESTHESIA (OUTPATIENT)
Dept: OPERATING ROOM | Age: 74
End: 2024-07-25
Payer: MEDICARE

## 2024-07-25 ENCOUNTER — ANESTHESIA EVENT (OUTPATIENT)
Dept: OPERATING ROOM | Age: 74
End: 2024-07-25
Payer: MEDICARE

## 2024-07-25 ENCOUNTER — HOSPITAL ENCOUNTER (OUTPATIENT)
Age: 74
Setting detail: OUTPATIENT SURGERY
Discharge: HOME | End: 2024-07-25
Attending: ANESTHESIOLOGY | Admitting: ANESTHESIOLOGY
Payer: MEDICARE

## 2024-07-25 VITALS
BODY MASS INDEX: 24.45 KG/M2 | OXYGEN SATURATION: 100 % | TEMPERATURE: 98 F | HEIGHT: 63 IN | HEART RATE: 65 BPM | WEIGHT: 138 LBS | RESPIRATION RATE: 16 BRPM | DIASTOLIC BLOOD PRESSURE: 77 MMHG | SYSTOLIC BLOOD PRESSURE: 116 MMHG

## 2024-07-25 PROBLEM — Z00.6 EXAMINATION OF PARTICIPANT IN CLINICAL TRIAL: Status: ACTIVE | Noted: 2024-07-25

## 2024-07-25 LAB
GLUCOSE BLD-MCNC: 102 MG/DL (ref 70–99)
PERFORMED ON: ABNORMAL

## 2024-07-25 PROCEDURE — 6360000002 HC RX W HCPCS: Performed by: ANESTHESIOLOGY

## 2024-07-25 PROCEDURE — 6370000000 HC RX 637 (ALT 250 FOR IP): Performed by: ANESTHESIOLOGY

## 2024-07-25 PROCEDURE — 2500000003 HC RX 250 WO HCPCS: Performed by: ANESTHESIOLOGY

## 2024-07-25 PROCEDURE — 3700000001 HC ADD 15 MINUTES (ANESTHESIA): Performed by: ANESTHESIOLOGY

## 2024-07-25 PROCEDURE — 3600000004 HC SURGERY LEVEL 4 BASE: Performed by: ANESTHESIOLOGY

## 2024-07-25 PROCEDURE — 7100000010 HC PHASE II RECOVERY - FIRST 15 MIN: Performed by: ANESTHESIOLOGY

## 2024-07-25 PROCEDURE — 6360000004 HC RX CONTRAST MEDICATION: Performed by: ANESTHESIOLOGY

## 2024-07-25 PROCEDURE — C1889 IMPLANT/INSERT DEVICE, NOC: HCPCS | Performed by: ANESTHESIOLOGY

## 2024-07-25 PROCEDURE — 3700000000 HC ANESTHESIA ATTENDED CARE: Performed by: ANESTHESIOLOGY

## 2024-07-25 PROCEDURE — 2709999900 HC NON-CHARGEABLE SUPPLY: Performed by: ANESTHESIOLOGY

## 2024-07-25 PROCEDURE — 3600000014 HC SURGERY LEVEL 4 ADDTL 15MIN: Performed by: ANESTHESIOLOGY

## 2024-07-25 PROCEDURE — 2500000003 HC RX 250 WO HCPCS: Performed by: NURSE ANESTHETIST, CERTIFIED REGISTERED

## 2024-07-25 PROCEDURE — 2580000003 HC RX 258: Performed by: ANESTHESIOLOGY

## 2024-07-25 PROCEDURE — 7100000011 HC PHASE II RECOVERY - ADDTL 15 MIN: Performed by: ANESTHESIOLOGY

## 2024-07-25 PROCEDURE — 6360000002 HC RX W HCPCS: Performed by: NURSE ANESTHETIST, CERTIFIED REGISTERED

## 2024-07-25 RX ORDER — LIDOCAINE HYDROCHLORIDE 20 MG/ML
INJECTION, SOLUTION INFILTRATION; PERINEURAL PRN
Status: DISCONTINUED | OUTPATIENT
Start: 2024-07-25 | End: 2024-07-25 | Stop reason: SDUPTHER

## 2024-07-25 RX ORDER — ONDANSETRON 4 MG/1
4 TABLET, ORALLY DISINTEGRATING ORAL EVERY 8 HOURS PRN
Status: DISCONTINUED | OUTPATIENT
Start: 2024-07-25 | End: 2024-07-25 | Stop reason: HOSPADM

## 2024-07-25 RX ORDER — PROPOFOL 10 MG/ML
INJECTION, EMULSION INTRAVENOUS CONTINUOUS PRN
Status: DISCONTINUED | OUTPATIENT
Start: 2024-07-25 | End: 2024-07-25 | Stop reason: SDUPTHER

## 2024-07-25 RX ORDER — PROPOFOL 10 MG/ML
INJECTION, EMULSION INTRAVENOUS
Status: COMPLETED
Start: 2024-07-25 | End: 2024-07-25

## 2024-07-25 RX ORDER — SODIUM CHLORIDE 9 MG/ML
INJECTION, SOLUTION INTRAVENOUS PRN
Status: DISCONTINUED | OUTPATIENT
Start: 2024-07-25 | End: 2024-07-25 | Stop reason: HOSPADM

## 2024-07-25 RX ORDER — OXYCODONE HYDROCHLORIDE 5 MG/1
5 TABLET ORAL EVERY 4 HOURS PRN
Status: DISCONTINUED | OUTPATIENT
Start: 2024-07-25 | End: 2024-07-25 | Stop reason: HOSPADM

## 2024-07-25 RX ORDER — KETAMINE HCL IN NACL, ISO-OSM 100MG/10ML
SYRINGE (ML) INJECTION PRN
Status: DISCONTINUED | OUTPATIENT
Start: 2024-07-25 | End: 2024-07-25 | Stop reason: SDUPTHER

## 2024-07-25 RX ORDER — SODIUM CHLORIDE, SODIUM LACTATE, POTASSIUM CHLORIDE, CALCIUM CHLORIDE 600; 310; 30; 20 MG/100ML; MG/100ML; MG/100ML; MG/100ML
INJECTION, SOLUTION INTRAVENOUS CONTINUOUS
Status: DISCONTINUED | OUTPATIENT
Start: 2024-07-25 | End: 2024-07-25 | Stop reason: HOSPADM

## 2024-07-25 RX ORDER — SODIUM CHLORIDE 0.9 % (FLUSH) 0.9 %
5-40 SYRINGE (ML) INJECTION PRN
Status: DISCONTINUED | OUTPATIENT
Start: 2024-07-25 | End: 2024-07-25 | Stop reason: HOSPADM

## 2024-07-25 RX ORDER — PHENYLEPHRINE HYDROCHLORIDE 10 MG/ML
INJECTION INTRAVENOUS PRN
Status: DISCONTINUED | OUTPATIENT
Start: 2024-07-25 | End: 2024-07-25 | Stop reason: SDUPTHER

## 2024-07-25 RX ORDER — ONDANSETRON 2 MG/ML
4 INJECTION INTRAMUSCULAR; INTRAVENOUS EVERY 30 MIN PRN
Status: DISCONTINUED | OUTPATIENT
Start: 2024-07-25 | End: 2024-07-25 | Stop reason: HOSPADM

## 2024-07-25 RX ORDER — OXYCODONE HYDROCHLORIDE 5 MG/1
5 TABLET ORAL PRN
Status: DISCONTINUED | OUTPATIENT
Start: 2024-07-25 | End: 2024-07-25 | Stop reason: HOSPADM

## 2024-07-25 RX ORDER — OXYCODONE HYDROCHLORIDE 5 MG/1
10 TABLET ORAL PRN
Status: DISCONTINUED | OUTPATIENT
Start: 2024-07-25 | End: 2024-07-25 | Stop reason: HOSPADM

## 2024-07-25 RX ORDER — NALOXONE HYDROCHLORIDE 0.4 MG/ML
INJECTION, SOLUTION INTRAMUSCULAR; INTRAVENOUS; SUBCUTANEOUS PRN
Status: DISCONTINUED | OUTPATIENT
Start: 2024-07-25 | End: 2024-07-25 | Stop reason: HOSPADM

## 2024-07-25 RX ORDER — SODIUM CHLORIDE 0.9 % (FLUSH) 0.9 %
5-40 SYRINGE (ML) INJECTION EVERY 12 HOURS SCHEDULED
Status: DISCONTINUED | OUTPATIENT
Start: 2024-07-25 | End: 2024-07-25 | Stop reason: HOSPADM

## 2024-07-25 RX ORDER — IPRATROPIUM BROMIDE AND ALBUTEROL SULFATE 2.5; .5 MG/3ML; MG/3ML
1 SOLUTION RESPIRATORY (INHALATION) ONCE
Status: COMPLETED | OUTPATIENT
Start: 2024-07-25 | End: 2024-07-25

## 2024-07-25 RX ORDER — OXYCODONE HYDROCHLORIDE 5 MG/1
10 TABLET ORAL EVERY 4 HOURS PRN
Status: DISCONTINUED | OUTPATIENT
Start: 2024-07-25 | End: 2024-07-25 | Stop reason: HOSPADM

## 2024-07-25 RX ORDER — SODIUM CHLORIDE 9 MG/ML
INJECTION, SOLUTION INTRAMUSCULAR; INTRAVENOUS; SUBCUTANEOUS
Status: DISCONTINUED
Start: 2024-07-25 | End: 2024-07-25 | Stop reason: WASHOUT

## 2024-07-25 RX ORDER — ONDANSETRON 2 MG/ML
4 INJECTION INTRAMUSCULAR; INTRAVENOUS EVERY 6 HOURS PRN
Status: DISCONTINUED | OUTPATIENT
Start: 2024-07-25 | End: 2024-07-25 | Stop reason: HOSPADM

## 2024-07-25 RX ADMIN — PHENYLEPHRINE HYDROCHLORIDE 100 MCG: 10 INJECTION INTRAVENOUS at 14:54

## 2024-07-25 RX ADMIN — IPRATROPIUM BROMIDE AND ALBUTEROL SULFATE 1 DOSE: .5; 2.5 SOLUTION RESPIRATORY (INHALATION) at 12:48

## 2024-07-25 RX ADMIN — SODIUM CHLORIDE, POTASSIUM CHLORIDE, SODIUM LACTATE AND CALCIUM CHLORIDE: 600; 310; 30; 20 INJECTION, SOLUTION INTRAVENOUS at 12:05

## 2024-07-25 RX ADMIN — CEFAZOLIN 2000 MG: 2 INJECTION, POWDER, FOR SOLUTION INTRAMUSCULAR; INTRAVENOUS at 14:08

## 2024-07-25 RX ADMIN — PROPOFOL 150 MCG/KG/MIN: 10 INJECTION, EMULSION INTRAVENOUS at 14:10

## 2024-07-25 RX ADMIN — Medication 25 MG: at 14:20

## 2024-07-25 RX ADMIN — LIDOCAINE HYDROCHLORIDE 100 MG: 20 INJECTION, SOLUTION INFILTRATION; PERINEURAL at 14:10

## 2024-07-25 RX ADMIN — PHENYLEPHRINE HYDROCHLORIDE 100 MCG: 10 INJECTION INTRAVENOUS at 15:03

## 2024-07-25 RX ADMIN — Medication 25 MG: at 14:10

## 2024-07-25 ASSESSMENT — COPD QUESTIONNAIRES: CAT_SEVERITY: MODERATE

## 2024-07-25 NOTE — DISCHARGE INSTRUCTIONS
your appointments.    * [phone]      Warning Signs  - Contact your physician immediately, at [phone], if you experience any of the following:    * Constant bleeding from the incision that will not stop after applying direct pressure for 10 minutes.    * Swelling, redness, or a change in drainage at incision site (such as an increase in amount of fluid, a  foul odor, or a change in color).    * A change in mental status, such as unusual behavior, confusion, or difficulty standing or walking.    * A temperature greater than 101 degrees Fahrenheit.      Additional Notes/Instructions  -    Dr Moshe Oconnor  [phone]    ANESTHESIA DISCHARGE INSTRUCTIONS    You are under the influence of drugs- do not drink alcohol, drive a car, operate machinery(such as power tools, kitchen appliances, etc), sign legal documents, or make any important decisions for 24 hours (or while on pain medications).   Children should not ride bikes or skate boards or play on gym sets  for 24 hours after surgery.  A responsible adult should be with you for 24 hours.  Rest at home today- increase activity as tolerated.  Progress slowly to a regular diet unless your physician has instructed you otherwise. Drink plenty of water.    CALL YOUR DOCTOR IF YOU:  Have moderate to severe nausea or vomiting AND are unable to hold down fluids or prescribed medications.  Have bright red bloody drainage from your dressing that won't stop oozing.  Do not get relief with your pain medication    NORMAL (POSSIBLE) SIDE EFFECTS FROM ANESTHESIA:     Confusion, temporary memory loss, delayed reaction times in the first 24 hours  Lightheadedness, dizziness, difficulty focusing, blurred vision  Nausea/vomiting can happen  Shivering, feeling cold, sore throat, cough and muscle aches should stop within 24-48 hours  Trouble urinating - call your surgeon if it has been more than 8 hrs  Bruising or soreness at the IV site - call if it remains red, firm or there

## 2024-07-25 NOTE — ANESTHESIA POSTPROCEDURE EVALUATION
Department of Anesthesiology  Postprocedure Note    Patient: Citlali Dan  MRN: [id number]  YOB: 1950  Date of evaluation: 7/25/2024    Procedure Summary       Date: 07/25/24 Room / Location: [address]    Anesthesia Start: 1408 Anesthesia Stop: 1522    Procedure: BILATERAL LUMBAR THREE FOUR, LUMBAR FOUR FIVE MINIMALLY INVASIVE LUMBAR DECOMPRESSION (Bilateral: Back) Diagnosis:       Spinal stenosis, lumbar region, without neurogenic claudication      (Spinal stenosis, lumbar region, without neurogenic claudication [M48.061])    Surgeons: Moshe Oconnor MD Responsible Provider: Taras Sams MD    Anesthesia Type: MAC ASA Status: 3            Anesthesia Type: No value filed.    Karsten Phase I: Karsten Score: 10    Karsten Phase II: Karsten Score: 10    Anesthesia Post Evaluation    Patient location during evaluation: PACU  Level of consciousness: awake  Nausea & Vomiting: no vomiting  Cardiovascular status: blood pressure returned to baseline  Respiratory status: acceptable  Hydration status: stable  Pain management: adequate    No notable events documented.

## 2024-07-25 NOTE — ANESTHESIA PRE PROCEDURE
Department of Anesthesiology  Preprocedure Note       Name:  Citlali Dan   Age:  73 y.o.  :  1950                                          MRN:  [id number]         Date:  2024      Surgeon: Surgeon(s):  Moshe Oconnor MD    Procedure: Procedure(s):  BILATERAL LUMBAR THREE FOUR, LUMBAR FOUR FIVE MINIMALLY INVASIVE LUMBAR DECOMPRESSION    Medications prior to admission:   Prior to Admission medications    Medication Sig Start Date End Date Taking? Authorizing Provider   Semaglutide (OZEMPIC, 1 MG/DOSE, SC) Inject 1 mg into the skin once a week   Yes Marshall Rizvi MD   aspirin 81 MG chewable tablet Take 1 tablet by mouth daily    Marshall Rizvi MD   aspirin (ASPIRIN CHILDRENS) 81 MG chewable tablet Take 1 tablet by mouth daily 23   Prashant Valdez MD   metoprolol tartrate (LOPRESSOR) 25 MG tablet Take 1 tablet by mouth 2 times daily 3/2/23   Marlin Sanchez MD   cloNIDine (CATAPRES) 0.2 MG tablet Take 0.5 tablets by mouth nightly Historical med. Dose corrected. 23   Matias Light MD   Polyethylene Glycol 3350 (MIRALAX PO) Take 0.52 mg by mouth as needed    Marshall Rizvi MD   Melatonin 10 MG TABS Take by mouth nightly as needed    Marshall Rizvi MD   fluticasone (FLONASE) 50 MCG/ACT nasal spray 1 spray by Each Nostril route daily    Marshall Rizvi MD   venlafaxine (EFFEXOR XR) 75 MG extended release capsule Take 2 capsules by mouth daily    ProviderMarshall MD   atorvastatin (LIPITOR) 20 MG tablet TAKE ONE TABLET BY MOUTH DAILY 17   Hola Rodas DO   LORazepam (ATIVAN) 1 MG tablet Take 1 tablet by mouth every 6 hours as needed for Anxiety 17   Reji Faustin MD       Current medications:    Current Facility-Administered Medications   Medication Dose Route Frequency Provider Last Rate Last Admin   • ceFAZolin (ANCEF) 2,000 mg in sodium chloride 0.9 % 50 mL IVPB (mini-bag)  2,000 mg IntraVENous On Call to OR Elvin

## 2024-07-26 ENCOUNTER — TELEPHONE (OUTPATIENT)
Dept: TELEMETRY | Age: 74
End: 2024-07-26

## 2024-07-26 NOTE — PROCEDURES
PROCEDURE NOTE  Date: 7/25/2024   Name: Citlali Dan  YOB: 1950    Procedures    PREOPERATIVE DIAGNOSIS:  Lumbar stenosis w/ neurogenic claudication    POSTOPERATIVE DIAGNOSIS:  Same    OPERATIVE PROCEDURES PERFORMED:  MILD (Minimally invasive lumbar decompression) at Bilateral L3-4, L4-5    ANESTHESIA: MAC  ESTIMATED BLOOD LOSS: minimal  INTRAOPERATIVE FLUIDS: 1000 cc NS  BLOOD/BLOOD PRODUCTS: None.  SPECIMENS SENT TO LAB: None  COMPLICATIONS: None  INDICATIONS FOR OPERATION:   The patient was radiologically confirmed to be suffering from clinically symptomatic lumbar spinal stenosis associated with ligamentum flavum hypertrophy >2.5 mm and confirmed neurogenic claudication.  The patient's condition has progressed such that activities of daily living are limited due to pain intensity and neurogenic claudication symptomatology.  Standing time and walking distance have declined steadily.  Patient has tried and failed conservative treatment measures such as physical therapy, epidural injections and pharmaceutical management.  According to the treatment algorithm for lumbar spinal stenosis, patient is a candidate for a percutaneous lumbar decompression procedure.    DESCRIPTION OF OPERATION:   The patient was met in the holding area.  Informed consent was obtained and opportunity for questions and answers provided. The surgical site was marked per hospital protocol.  The patient was taken to the operating room and placed prone on the OR table with appropriate padding to reverse the normal lordosis.  A \"time out\" was performed per hospital protocol identifying the patient, procedure and surgical site. The mid to lower back and upper buttocks was prepped and draped under sterile fashion and IV sedation administered. Under fluoroscopic guidance with the C arm in the AP view, the L4-5 interspace was identified. Ample amounts of local anesthetic were used to anesthetize the skin and deep facial planes after

## 2024-08-22 ENCOUNTER — TELEPHONE (OUTPATIENT)
Dept: TELEMETRY | Age: 74
End: 2024-08-22

## 2024-08-22 NOTE — TELEPHONE ENCOUNTER
Patient due for annual CT Lung Screening. Reminder letter mailed.    Routed to PCP, Lisa Ramos DO. Final notice.    Eleanor Rios RN

## 2024-09-27 ENCOUNTER — HOSPITAL ENCOUNTER (OUTPATIENT)
Dept: WOMENS IMAGING | Age: 74
Discharge: HOME | End: 2024-09-27
Attending: INTERNAL MEDICINE
Payer: MEDICARE

## 2024-09-27 ENCOUNTER — OFFICE VISIT (OUTPATIENT)
Dept: CARDIOLOGY CLINIC | Age: 74
End: 2024-09-27
Payer: MEDICARE

## 2024-09-27 VITALS
HEIGHT: 63 IN | DIASTOLIC BLOOD PRESSURE: 70 MMHG | HEART RATE: 73 BPM | SYSTOLIC BLOOD PRESSURE: 118 MMHG | BODY MASS INDEX: 23.04 KG/M2 | OXYGEN SATURATION: 92 % | WEIGHT: 130 LBS

## 2024-09-27 VITALS — HEIGHT: 63 IN | BODY MASS INDEX: 23.04 KG/M2 | WEIGHT: 130 LBS

## 2024-09-27 DIAGNOSIS — Z12.31 ENCOUNTER FOR SCREENING MAMMOGRAM FOR MALIGNANT NEOPLASM OF BREAST: ICD-10-CM

## 2024-09-27 DIAGNOSIS — R07.89 OTHER CHEST PAIN: ICD-10-CM

## 2024-09-27 DIAGNOSIS — Z72.0 TOBACCO USE: ICD-10-CM

## 2024-09-27 DIAGNOSIS — I25.10 CORONARY ARTERY CALCIFICATION SEEN ON CAT SCAN: ICD-10-CM

## 2024-09-27 DIAGNOSIS — E78.5 HYPERLIPIDEMIA, UNSPECIFIED HYPERLIPIDEMIA TYPE: ICD-10-CM

## 2024-09-27 DIAGNOSIS — R06.02 SOB (SHORTNESS OF BREATH): ICD-10-CM

## 2024-09-27 DIAGNOSIS — I10 ESSENTIAL HYPERTENSION: Primary | ICD-10-CM

## 2024-09-27 PROCEDURE — 99214 OFFICE O/P EST MOD 30 MIN: CPT | Performed by: INTERNAL MEDICINE

## 2024-09-27 PROCEDURE — 3078F DIAST BP <80 MM HG: CPT | Performed by: INTERNAL MEDICINE

## 2024-09-27 PROCEDURE — 3074F SYST BP LT 130 MM HG: CPT | Performed by: INTERNAL MEDICINE

## 2024-09-27 PROCEDURE — 1123F ACP DISCUSS/DSCN MKR DOCD: CPT | Performed by: INTERNAL MEDICINE

## 2024-09-27 PROCEDURE — 77063 BREAST TOMOSYNTHESIS BI: CPT

## 2024-09-27 PROCEDURE — 93000 ELECTROCARDIOGRAM COMPLETE: CPT | Performed by: INTERNAL MEDICINE

## 2024-09-27 RX ORDER — TERBINAFINE HYDROCHLORIDE 250 MG/1
250 TABLET ORAL DAILY
COMMUNITY

## 2024-09-27 RX ORDER — SOLIFENACIN SUCCINATE 10 MG/1
5 TABLET, FILM COATED ORAL DAILY
COMMUNITY

## 2024-10-01 ENCOUNTER — TELEPHONE (OUTPATIENT)
Dept: CARDIOLOGY CLINIC | Age: 74
End: 2024-10-01

## 2024-10-01 DIAGNOSIS — E78.5 HYPERLIPIDEMIA, UNSPECIFIED HYPERLIPIDEMIA TYPE: Primary | ICD-10-CM

## 2024-10-01 NOTE — TELEPHONE ENCOUNTER
Received fax from [facility] with labs that hortencia requested. Rjm was requesting lipid panel. Their was not one sent over. I called and spoke with pt and she said she did not get one done. I placed the order for fasting lipid panel, pt will have completed

## 2024-10-05 ENCOUNTER — HOSPITAL ENCOUNTER (OUTPATIENT)
Age: 74
Discharge: HOME | End: 2024-10-05
Payer: MEDICARE

## 2024-10-05 DIAGNOSIS — E78.5 HYPERLIPIDEMIA, UNSPECIFIED HYPERLIPIDEMIA TYPE: ICD-10-CM

## 2024-10-05 LAB
CHOLEST SERPL-MCNC: 161 MG/DL (ref 0–199)
HDLC SERPL-MCNC: 60 MG/DL (ref 40–60)
LDL CHOLESTEROL: 85 MG/DL
TRIGL SERPL-MCNC: 81 MG/DL (ref 0–150)
VLDLC SERPL CALC-MCNC: 16 MG/DL

## 2024-10-05 PROCEDURE — 36415 COLL VENOUS BLD VENIPUNCTURE: CPT

## 2024-10-05 PROCEDURE — 80061 LIPID PANEL: CPT

## 2024-10-07 DIAGNOSIS — I25.10 CORONARY ARTERY CALCIFICATION SEEN ON CAT SCAN: ICD-10-CM

## 2024-10-07 DIAGNOSIS — Z79.899 MEDICATION MANAGEMENT: ICD-10-CM

## 2024-10-07 DIAGNOSIS — E78.5 HYPERLIPIDEMIA, UNSPECIFIED HYPERLIPIDEMIA TYPE: Primary | ICD-10-CM

## 2024-10-07 RX ORDER — ATORVASTATIN CALCIUM 40 MG/1
40 TABLET, FILM COATED ORAL DAILY
Qty: 90 TABLET | Refills: 3 | Status: SHIPPED | OUTPATIENT
Start: 2024-10-07

## 2025-02-03 ENCOUNTER — HOSPITAL ENCOUNTER (OUTPATIENT)
Age: 75
Discharge: HOME | End: 2025-02-03
Payer: MEDICARE

## 2025-02-03 DIAGNOSIS — Z79.899 MEDICATION MANAGEMENT: ICD-10-CM

## 2025-02-03 LAB
ALBUMIN SERPL-MCNC: 3.8 G/DL (ref 3.4–5)
ALP SERPL-CCNC: 57 U/L (ref 40–129)
ALT SERPL-CCNC: 14 U/L (ref 10–40)
AST SERPL-CCNC: 22 U/L (ref 15–37)
BILIRUB DIRECT SERPL-MCNC: <0.1 MG/DL (ref 0–0.3)
BILIRUB INDIRECT SERPL-MCNC: NORMAL MG/DL (ref 0–1)
BILIRUB SERPL-MCNC: <0.2 MG/DL (ref 0–1)
CHOLEST SERPL-MCNC: 154 MG/DL (ref 0–199)
HDLC SERPL-MCNC: 51 MG/DL (ref 40–60)
LDLC SERPL CALC-MCNC: 80 MG/DL
PROT SERPL-MCNC: 6.6 G/DL (ref 6.4–8.2)
TRIGL SERPL-MCNC: 116 MG/DL (ref 0–150)
VLDLC SERPL CALC-MCNC: 23 MG/DL

## 2025-02-03 PROCEDURE — 80061 LIPID PANEL: CPT

## 2025-02-03 PROCEDURE — 80076 HEPATIC FUNCTION PANEL: CPT

## 2025-02-03 PROCEDURE — 36415 COLL VENOUS BLD VENIPUNCTURE: CPT

## (undated) DEVICE — 1016 S-DRAPE IRRIG POUCH 10/BOX: Brand: STERI-DRAPE™

## (undated) DEVICE — UNDERGLOVE SURG SZ 8 FNGR THK0.21MIL GRN LTX BEAD CUF

## (undated) DEVICE — SUTURE VCRL + SZ 0 L18IN ABSRB UD L36MM CT-1 1/2 CIR VCP840D

## (undated) DEVICE — 3M™ TEGADERM™ TRANSPARENT FILM DRESSING FRAME STYLE, 1626W, 4 IN X 4-3/4 IN (10 CM X 12 CM), 50/CT 4CT/CASE: Brand: 3M™ TEGADERM™

## (undated) DEVICE — TOOL 14MH30 LEGEND 14CM 3MM: Brand: MIDAS REX ™

## (undated) DEVICE — SUTURE MONOCRYL SZ 4-0 L18IN ABSRB UD L19MM PS-2 3/8 CIR PRIM Y496G

## (undated) DEVICE — SOLUTION IV IRRIG POUR BRL 0.9% SODIUM CHL 2F7124

## (undated) DEVICE — AVANOS* TUOHY EPIDURAL NEEDLE: Brand: AVANOS

## (undated) DEVICE — STERILE POLYISOPRENE POWDER-FREE SURGICAL GLOVES: Brand: PROTEXIS

## (undated) DEVICE — COVER,LIGHT HANDLE,FLX,1/PK: Brand: MEDLINE INDUSTRIES, INC.

## (undated) DEVICE — SKIN MARKER REGULAR TIP WITH RULER CAP AND LABELS: Brand: DEVON

## (undated) DEVICE — SURGICAL SUCTION CONNECTING TUBE WITH MALE CONNECTOR AND SUCTION CLAMP, 2 FT. LONG (.6 M), 5 MM I.D.: Brand: CONMED

## (undated) DEVICE — SOLUTION IRRIG 500ML 0.9% SOD CHLO USP POUR PLAS BTL

## (undated) DEVICE — SYRINGE 20ML LL S/C 50

## (undated) DEVICE — SYRINGE IRRIG 60ML SFT PLIABLE BLB EZ TO GRP 1 HND USE W/

## (undated) DEVICE — SOLUTION SCRB 4OZ 10% POVIDONE IOD ANTIMIC BTL

## (undated) DEVICE — SUTURE VCRL + SZ 0 L27IN ABSRB UD CT-1 L36MM 1/2 CIR TAPR VCP260H

## (undated) DEVICE — Device

## (undated) DEVICE — TOWEL,OR,DSP,ST,BLUE,STD,4/PK,20PK/CS: Brand: MEDLINE

## (undated) DEVICE — SHEET,DRAPE,53X77,STERILE: Brand: MEDLINE

## (undated) DEVICE — GAUZE,SPONGE,4"X4",8PLY,STRL,LF,10/TRAY: Brand: MEDLINE

## (undated) DEVICE — C-ARM: Brand: UNBRANDED

## (undated) DEVICE — SOLUTION PREP POVIDONE IOD FOR SKIN MUCOUS MEM PRIOR TO

## (undated) DEVICE — SMARTGOWN SURGICAL GOWN, XL: Brand: CONVERTORS

## (undated) DEVICE — COVER,TABLE,44X90,STERILE: Brand: MEDLINE

## (undated) DEVICE — ELECTRODE PT RET AD L9FT HI MOIST COND ADH HYDRGEL CORDED

## (undated) DEVICE — MHCZ MINOR: Brand: MEDLINE INDUSTRIES, INC.

## (undated) DEVICE — GLOVE SURG SZ 8 CRM LTX FREE POLYISOPRENE POLYMER BEAD ANTI

## (undated) DEVICE — 3M™ IOBAN™ 2 ANTIMICROBIAL INCISE DRAPE 6650EZ: Brand: IOBAN™ 2

## (undated) DEVICE — PAD,NON-ADHERENT,3X8,STERILE,LF,1/PK: Brand: MEDLINE

## (undated) DEVICE — FLOSEAL HEMOSTATIC MATRIX, 10 ML: Brand: FLOSEAL

## (undated) DEVICE — Device: Brand: PORTEX

## (undated) DEVICE — KIT EVAC 0.13IN RECT TB DIA10FR 400CC PVC 3 SPR Y CONN DRN

## (undated) DEVICE — GARMENT,MEDLINE,DVT,INT,CALF,MED, GEN2: Brand: MEDLINE

## (undated) DEVICE — COVER,MAYO STAND,STERILE: Brand: MEDLINE

## (undated) DEVICE — GOWN SIRUS NONREIN LG W/TWL: Brand: MEDLINE INDUSTRIES, INC.

## (undated) DEVICE — NEEDLE SPNL 22GA L3.5IN BLK HUB S STL REG WALL FIT STYL

## (undated) DEVICE — GOWN,AURORA,NONREINF,RAGLAN,XXL,STERILE: Brand: MEDLINE

## (undated) DEVICE — CODMAN® SURGICAL PATTIES 1/2" X 3" (1.27CM X 7.62CM): Brand: CODMAN®

## (undated) DEVICE — SUTURE VCRL + SZ 2-0 L18IN ABSRB UD CT1 L36MM 1/2 CIR VCP839D

## (undated) DEVICE — MASTISOL ADHESIVE LIQ 2/3ML

## (undated) DEVICE — SHEET, T, LAPAROTOMY, STERILE: Brand: MEDLINE

## (undated) DEVICE — SYRINGE MED 30ML STD CLR PLAS LUERLOCK TIP N CTRL DISP

## (undated) DEVICE — Device: Brand: MILD DEVICE KIT

## (undated) DEVICE — SUTURE ETHLN SZ 3-0 L18IN NONABSORBABLE BLK FS-1 L24MM 3/8 663H

## (undated) DEVICE — NEURO SPINE: Brand: MEDLINE INDUSTRIES, INC.